# Patient Record
Sex: FEMALE | Race: WHITE | NOT HISPANIC OR LATINO | Employment: UNEMPLOYED | URBAN - METROPOLITAN AREA
[De-identification: names, ages, dates, MRNs, and addresses within clinical notes are randomized per-mention and may not be internally consistent; named-entity substitution may affect disease eponyms.]

---

## 2017-12-01 ENCOUNTER — ALLSCRIPTS OFFICE VISIT (OUTPATIENT)
Dept: OTHER | Facility: OTHER | Age: 62
End: 2017-12-01

## 2017-12-01 DIAGNOSIS — I10 ESSENTIAL (PRIMARY) HYPERTENSION: ICD-10-CM

## 2017-12-01 DIAGNOSIS — E78.5 HYPERLIPIDEMIA: ICD-10-CM

## 2017-12-01 DIAGNOSIS — I25.2 OLD MYOCARDIAL INFARCTION: ICD-10-CM

## 2017-12-01 DIAGNOSIS — E11.9 TYPE 2 DIABETES MELLITUS WITHOUT COMPLICATIONS (HCC): ICD-10-CM

## 2017-12-05 NOTE — PROGRESS NOTES
Assessment    1  Diabetes mellitus type 2, controlled (250 00) (E11 9)   2  HLD (hyperlipidemia) (272 4) (E78 5)   3  Hypertension (401 9) (I10)   4  Encounter for preventive health examination (V70 0) (Z00 00)    Plan  Diabetes mellitus type 2, controlled    · 1 - Yolanda EISENBERG, Elizabeth Hall  (Endocrinology) Co-Management  *  Status: Active  Requestedfor: 30OQI3878  Care Summary provided  : Yes  Diabetes mellitus type 2, controlled, History of myocardial infarction, HLD(hyperlipidemia), Hypertension    · (1) CBC/PLT/DIFF; Status:Active; Requested for:[id number];    · (1) COMPREHENSIVE METABOLIC PANEL; Status:Active; Requested for:01Dec2017;    · (1) HEMOGLOBIN A1C; Status:Active; Requested for:01Dec2017;    · (1) LIPID PANEL, FASTING; Status:Active; Requested for:01Dec2017;    · (1) MICROALBUMIN CREATININE RATIO, RANDOM URINE; Status:Active; Requestedfor:01Dec2017;    · (1) TSH WITH FT4 REFLEX; Status:Active; Requested for:01Dec2017;     Discussion/Summary  Discussion Summary:   Patient wishes to change endocrinologists, given name for referralcurrent regimen and f/u with cardiologistin 4 months, obtain labs prior to visit  Counseling Documentation With Imm: The patient was counseled regarding instructions for management,-- risk factor reductions,-- patient and family education,-- impressions  Medication SE Review and Pt Understands Tx: The treatment plan was reviewed with the patient/guardian  The patient/guardian understands and agrees with the treatment plan      Chief Complaint  Chief Complaint Free Text Note Form: Patient presents today to establish care  She complains of having diaarhea on and off over the last several weeks  She feels it could be stress related from her job  History of Present Illness  HPI: Patient is a 61 yo female who presents to establish care   She was following Dr Machelle Billy from [address] at [facility] then followed her [facility], then followed her to [facility]  She works at [employer] so is switching her providers to Shira Rasmussen  She has hx of HLD, pacemaker, MI and HTN and follows closely with cardiologist at [address] [place] [address] Drive at [facility]  Her cardvedilol was increased to due elevated HR  She has been following [facility] endocr[facility]ology - last seen June 2017 for diabetes  She states that she started with diarrhea x 3-4 weeks  It is described as lose stool with sense of urgency x a few times per week  She gets lower abdominal cramps prior to BM and all relieved with BM  She has no constipation  She takes antidiarrheal OTC and helps and states has not needed one in 3 days  She feels it is all related to stress at job  She takes fiber pills daily  She denies bloody stools, severe abdominal pain, weight loss, fever, chills, night sweats  She had a colonoscopy in 2014 that was normal  She follows [facility] and last seen within the year  Review of Systems  Complete-Female:  Constitutional: no fever,-- not feeling poorly,-- no recent weight gain,-- no chills,-- not feeling tired-- and-- no recent weight loss  Eyes: no eye pain,-- eyes not red-- and-- no purulent discharge from the eyes--   The patient presents with complaints of eyesight problems (wears glasses)  ENT: no earache,-- no nosebleeds,-- no hearing loss-- and-- no nasal discharge  Cardiovascular: fast heart rate, but-- the heart rate was not slow,-- no chest pain,-- no palpitations-- and-- no lower extremity edema  Respiratory: no shortness of breath,-- no cough,-- no wheezing-- and-- no shortness of breath during exertion  Gastrointestinal: diarrhea-- and-- lower abdominal cramping prior to BM, no painful BMs, but-- no abdominal pain,-- no nausea,-- no vomiting,-- no constipation-- and-- no blood in stools  Genitourinary: no dysuria,-- no pelvic pain,-- no incontinence-- and-- no unexplained vaginal bleeding  Musculoskeletal: arthralgias, but-- no joint swelling-- and-- no myalgias    Integumentary: no rashes,-- no skin lesions-- and-- no skin wound  Neurological: no headache,-- no confusion,-- no dizziness,-- no convulsions-- and-- no fainting  Psychiatric: not suicidal,-- no anxiety-- and-- no depression  Endocrine: no proptosis,-- no hot flashes-- and-- no deepening of the voice  Hematologic/Lymphatic: no swollen glands,-- no tendency for easy bleeding-- and-- no tendency for easy bruising  ROS Reviewed:   ROS reviewed  Active Problems  1  Arthritis (716 90) (M19 90)   2  Diabetes mellitus type 2, controlled (250 00) (E11 9)   3  History of myocardial infarction (412) (I25 2)   4  Hypertension (401 9) (I10)   5  Insomnia (780 52) (G47 00)    Past Medical History  1  History of varicella (V12 09) (Z86 19)  Active Problems And Past Medical History Reviewed: The active problems and past medical history were reviewed and updated today  Surgical History  1  History of Appendectomy   2  History of Cholecystectomy   3  History of Hysterectomy   4  History of Knee Replacement   5  History of Neuroplasty Median Nerve At Carpal Tunnel   6  History of Pacemaker Placement  Surgical History Reviewed: The surgical history was reviewed and updated today  Family History  Mother    1  Family history of cardiac disorder (V17 49) (Z82 49)  Father    2  Family history of malignant neoplasm (V16 9) (Z80 9)  Aunt    3  Family history of diabetes mellitus (V18 0) (Z83 3)  Family History Reviewed: The family history was reviewed and updated today  Social History     · Always uses seat belt   · Never a smoker   · No alcohol use   · No caffeine use   · No illicit drug use  Social History Reviewed: The social history was reviewed and updated today  Current Meds   1  Aspirin 81 MG TABS; Therapy: (Recorded:01Lax6664) to Recorded   2  Carvedilol 6 25 MG Oral Tablet; Take 1 tablet twice daily; Therapy: (Recorded:44Trp5826) to Recorded   3  Crestor 20 MG Oral Tablet; Take 1 tablet daily;  Therapy: (Recorded:01Dec2017) to Recorded   4  Fiber CAPS; Therapy: (Recorded:01Dec2017) to Recorded   5  Fish Oil 1200 MG Oral Capsule; Take 1 capsule twice daily; Therapy: (Recorded:01Dec2017) to Recorded   6  Multi-Vitamin TABS; Therapy: (Recorded:01Dec2017) to Recorded   7  Pioglitazone HCl-Metformin HCl -  MG Oral Tablet; Take 1 tablet twice daily; Therapy: (Recorded:01Dec2017) to Recorded   8  Victoza 18 MG/3ML Subcutaneous Solution Pen-injector; INJECT 1 8 MG Daily; Therapy: (Recorded:01Dec2017) to Recorded   9  Zetia 10 MG Oral Tablet; Take 1 tablet daily; Therapy: (Recorded:01Dec2017) to Recorded   10  Zyrtec 10 MG TABS; Take 1 tablet daily; Therapy: (Recorded:01Dec2017) to Recorded    Allergies  1  No Known Drug Allergies  2  Animal dander - Cats   3  Animal dander - Dogs   4  Dust   5  Feather/Down   6  Grass   7  Mold   8  Trees    Vitals  Vital Signs    Recorded: [id number] 07:52AM   Temperature 98 6 F   Heart Rate 917   Systolic 740   Diastolic 90   Height 5 ft 3 5 in   Weight 169 lb 2 oz   BMI Calculated 29 49   BSA Calculated 1 81   O2 Saturation 97       Physical Exam   Constitutional  General appearance: No acute distress, well appearing and well nourished  Eyes  Conjunctiva and lids: No swelling, erythema or discharge  Pupils and irises: Equal, round and reactive to light  Ears, Nose, Mouth, and Throat  External inspection of ears and nose: Normal    Otoscopic examination: Tympanic membranes translucent with normal light reflex  Canals patent without erythema  Oropharynx: Normal with no erythema, edema, exudate or lesions  Pulmonary  Respiratory effort: No increased work of breathing or signs of respiratory distress  Auscultation of lungs: Clear to auscultation  Cardiovascular  Auscultation of heart: Normal rate and rhythm, normal S1 and S2, without murmurs  Examination of extremities for edema and/or varicosities: Normal    Abdomen  Abdomen: Non-tender, no masses     Lymphatic Palpation of lymph nodes in neck: No lymphadenopathy  Musculoskeletal  Gait and station: Normal    Digits and nails: Normal without clubbing or cyanosis  Skin  Skin and subcutaneous tissue: Normal without rashes or lesions  Neurologic  Cranial nerves: Cranial nerves 2-12 intact  Sensation: No sensory loss  Psychiatric  Orientation to person, place, and time: Normal    Mood and affect: Normal          Results/Data  PHQ-9 Adult Depression Screening [id number] 07:45AM User, Ahs     Test Name Result Flag Reference   PHQ-9 Adult Depression Score 5       Over the last two weeks, how often have you been bothered by any of the following problems? Little interest or pleasure in doing things: Not at all - 0 Feeling down, depressed, or hopeless: Not at all - 0 Trouble falling or staying asleep, or sleeping too much: Nearly every day - 3 Feeling tired or having little energy: More than half the days - 2 Poor appetite or over eating: Not at all - 0 Feeling bad about yourself - or that you are a failure or have let yourself or your family down: Not at all - 0 Trouble concentrating on things, such as reading the newspaper or watching television: Not at all - 0 Moving or speaking so slowly that other people could have noticed  Or the opposite -  being so fidgety or restless that you have been moving around a lot more than usual: Not at all - 0 Thoughts that you would be better off dead, or of hurting yourself in some way: Not at all - 0   PHQ-9 Adult Depression Screening Negative     PHQ-9 Difficulty Level Not difficult at all     PHQ-9 Severity Mild Depression         Future Appointments    Date/Time Provider Specialty Site   04/06/2018 07:30 AM Antoni Peck [facility]  61        Signatures   Electronically signed by :  Antwan Peguero [facility]; Dec  2 2017  8:26PM EST                       (Author)    Electronically signed by : Juan J Colon DO; Dec  3 2017 10:02AM EST (Author)

## 2018-01-23 VITALS
OXYGEN SATURATION: 97 % | HEIGHT: 64 IN | TEMPERATURE: 98.6 F | BODY MASS INDEX: 28.88 KG/M2 | SYSTOLIC BLOOD PRESSURE: 130 MMHG | HEART RATE: 101 BPM | DIASTOLIC BLOOD PRESSURE: 90 MMHG | WEIGHT: 169.13 LBS

## 2018-02-19 RX ORDER — MULTIVITAMIN
TABLET ORAL DAILY
COMMUNITY

## 2018-02-19 RX ORDER — EZETIMIBE 10 MG/1
1 TABLET ORAL DAILY
COMMUNITY
End: 2019-06-03 | Stop reason: SDUPTHER

## 2018-02-19 RX ORDER — PIOGLITAZONE HCL AND METFORMIN HCL 850; 15 MG/1; MG/1
1 TABLET ORAL 2 TIMES DAILY
COMMUNITY
End: 2018-09-05 | Stop reason: SDUPTHER

## 2018-02-19 RX ORDER — CARVEDILOL 6.25 MG/1
1 TABLET ORAL 2 TIMES DAILY
COMMUNITY
End: 2018-10-15

## 2018-02-19 RX ORDER — AMOXICILLIN 500 MG
1 CAPSULE ORAL 2 TIMES DAILY
COMMUNITY
End: 2020-09-23

## 2018-02-19 RX ORDER — ROSUVASTATIN CALCIUM 40 MG/1
1 TABLET, COATED ORAL DAILY
COMMUNITY
End: 2018-10-15

## 2018-02-22 ENCOUNTER — OFFICE VISIT (OUTPATIENT)
Dept: ENDOCRINOLOGY | Facility: CLINIC | Age: 63
End: 2018-02-22
Payer: COMMERCIAL

## 2018-02-22 VITALS
BODY MASS INDEX: 29.74 KG/M2 | SYSTOLIC BLOOD PRESSURE: 112 MMHG | DIASTOLIC BLOOD PRESSURE: 70 MMHG | HEIGHT: 64 IN | WEIGHT: 174.2 LBS | HEART RATE: 76 BPM

## 2018-02-22 DIAGNOSIS — E11.65 TYPE 2 DIABETES MELLITUS WITH HYPERGLYCEMIA, WITHOUT LONG-TERM CURRENT USE OF INSULIN (HCC): Primary | ICD-10-CM

## 2018-02-22 DIAGNOSIS — E78.5 HYPERLIPIDEMIA, UNSPECIFIED HYPERLIPIDEMIA TYPE: ICD-10-CM

## 2018-02-22 DIAGNOSIS — I10 HYPERTENSION, UNSPECIFIED TYPE: ICD-10-CM

## 2018-02-22 PROCEDURE — 99244 OFF/OP CNSLTJ NEW/EST MOD 40: CPT | Performed by: INTERNAL MEDICINE

## 2018-02-22 RX ORDER — FEXOFENADINE HCL 180 MG/1
180 TABLET ORAL DAILY
COMMUNITY

## 2018-02-22 RX ORDER — LOSARTAN POTASSIUM 50 MG/1
50 TABLET ORAL DAILY
COMMUNITY
End: 2019-06-03 | Stop reason: SDUPTHER

## 2018-02-22 NOTE — PROGRESS NOTES
Lindy Alvarez 60 y o  female MRN: [id number]    Encounter: [id number]      Assessment/Plan    Problem List Items Addressed This Visit     Type 2 diabetes mellitus with hyperglycemia, without long-term current use of insulin ([facility] [address] ) - Primary     Patient counseled on complications of uncontrolled type 2 diabetes-for now I a m  going to discontinue Tradjenta  Continue Actos plus  Restart Victoza  She will start with 0 6 mg daily for  a week then increase up to 1 2 mg for a week and then increase to 1 8 mg daily  Also start jardiance 25 mg daily  Side effects discussed, she is to keep well hydrated  Monitor blood sugar twice daily and send over fingerstick log in 2 weeks  I am also going to refer her for  nutrition evaluation         Relevant Medications    pioglitazone-metFORMIN (ACTOPLUS MET)  MG per tablet    Liraglutide (VICTOZA) 18 MG/3ML SOPN    Insulin Pen Needle (BD PEN NEEDLE NICOLASA U/F) 32G X 4 MM MISC    Other Relevant Orders    Ambulatory referral to medical nutrition therapy for diabetes    Comprehensive metabolic panel Lab Collect    TSH, 3rd generation Lab Collect    Hemoglobin A1c Lab Collect    T4, free Lab Collect    Hypertension     Blood pressure at goal, continue current regimen         Relevant Medications    carvedilol (COREG) 6 25 mg tablet    losartan (COZAAR) 50 mg tablet    Other Relevant Orders    Ambulatory referral to medical nutrition therapy for diabetes    Comprehensive metabolic panel Lab Collect    TSH, 3rd generation Lab Collect    T4, free Lab Collect    Hyperlipidemia     Not at [facility] current regimen, refer for nutrition evaluation  Repeat fasting lipid profile prior to next visit           Relevant Medications    rosuvastatin (CRESTOR) 40 MG tablet    ezetimibe (ZETIA) 10 mg tablet    Other Relevant Orders    Ambulatory referral to medical nutrition therapy for diabetes    Comprehensive metabolic panel Lab Collect    TSH, 3rd generation Lab Collect    Lipid Panel with Direct LDL reflex Lab Collect    T4, free Lab Collect          CC: Diabetes    History of Present Illness     HPI:  54-year-old woman referred here for evaluation of uncontrolled type 2 diabetes  She has hadType 2 DM since 2005 - was on insulin for a few years and switched back to orals   No microvascular complications - last eye exam earlier this month  Currently on actoplus twice daily , tradjenta , was on victoza for a few years however has not taken it for the past 2 months  Checks f s fasting - 150-170s   C/o polydypsia, polyurea, no blurry vision , no numbness and tingling in feet   No recent changes in weight   No hospitalization for hypo or hyperglycemia   Had MI in 1998 - had a pacemaker since then  Review of Systems   Constitutional: Positive for fatigue  Negative for unexpected weight change  Eyes: Negative for visual disturbance  Respiratory: Negative for cough and shortness of breath  Cardiovascular: Negative for chest pain, palpitations and leg swelling  Gastrointestinal: Positive for diarrhea  Negative for abdominal pain, constipation, nausea and vomiting  Endocrine: Positive for polyphagia and polyuria  Neurological: Negative for weakness  Psychiatric/Behavioral: Negative for sleep disturbance  All other systems reviewed and are negative  Historical Information   History reviewed  No pertinent past medical history    Past Surgical History:   Procedure Laterality Date    APPENDECTOMY      CARPAL TUNNEL RELEASE      REPLACEMENT TOTAL KNEE       Social History   History   Alcohol Use No     History   Drug Use No     History   Smoking Status    Never Smoker   Smokeless Tobacco    Never Used     Family History:   Family History   Problem Relation Age of Onset    Diabetes unspecified Maternal Aunt     Colon cancer Father        Meds/Allergies   Current Outpatient Prescriptions   Medication Sig Dispense Refill    aspirin 81 MG tablet Take by mouth  Calcium Polycarbophil (FIBER-CAPS PO) Take by mouth      carvedilol (COREG) 6 25 mg tablet Take 1 tablet by mouth 2 (two) times a day      ezetimibe (ZETIA) 10 mg tablet Take 1 tablet by mouth daily      fexofenadine (MUCINEX ALLERGY) 180 MG tablet Take 180 mg by mouth daily      Liraglutide (VICTOZA) 18 MG/3ML SOPN Inject 0 3 mL under the skin daily 3 pen 3    losartan (COZAAR) 50 mg tablet Take 50 mg by mouth daily      Multiple Vitamin (MULTI-VITAMIN DAILY) TABS Take by mouth      Omega-3 Fatty Acids (FISH OIL) 1200 MG CAPS Take 1 capsule by mouth 2 (two) times a day      pioglitazone-metFORMIN (ACTOPLUS MET)  MG per tablet Take 1 tablet by mouth 2 (two) times a day      rosuvastatin (CRESTOR) 40 MG tablet Take 1 tablet by mouth daily      Insulin Pen Needle (BD PEN NEEDLE NICOLASA U/F) 32G X 4 MM MISC by Does not apply route every morning 100 each 3     No current facility-administered medications for this visit  Allergies   Allergen Reactions    Penicillins     Sulfa Antibiotics        Objective   Vitals: Blood pressure 112/70, pulse 76, height 5' 4" (1 626 m), weight 79 kg (174 lb 3 2 oz)  Physical Exam   Constitutional: She is oriented to person, place, and time  She appears well-developed and well-nourished  No distress  HENT:   Head: Normocephalic and atraumatic  Mouth/Throat: No oropharyngeal exudate  Eyes: Conjunctivae and EOM are normal  No scleral icterus  Neck: Neck supple  No thyromegaly present  Cardiovascular: Normal rate, regular rhythm and normal heart sounds  No murmur heard  Pulses:       Dorsalis pedis pulses are 2+ on the right side, and 2+ on the left side  Posterior tibial pulses are 2+ on the right side, and 2+ on the left side  Pulmonary/Chest: Breath sounds normal  No respiratory distress  She has no wheezes  She has no rales  Abdominal: Soft  Bowel sounds are normal  She exhibits no distension  There is no tenderness     Musculoskeletal: Normal range of motion  She exhibits no edema or deformity  Feet:   Right Foot:   Skin Integrity: Negative for ulcer, skin breakdown, erythema, warmth, callus or dry skin  Left Foot:   Skin Integrity: Negative for ulcer, skin breakdown, erythema, warmth, callus or dry skin  Lymphadenopathy:     She has no cervical adenopathy  Neurological: She is alert and oriented to person, place, and time  Skin: Skin is warm and dry  No rash noted  No erythema  No pallor  Psychiatric: She has a normal mood and affect  Her behavior is normal  Judgment and thought content normal        The history was obtained from the review of the chart, patient  Patient's shoes and socks removed  Right Foot/Ankle   Right Foot Inspection  Skin Exam: skin normal and skin intact no dry skin, no warmth, no callus, no erythema, no maceration, no abnormal color, no pre-ulcer, no ulcer and no callus                          Toe Exam: ROM and strength within normal limits  Sensory   Vibration: diminished  Proprioception: intact   Monofilament testing: intact  Vascular    The right DP pulse is 2+  The right PT pulse is 2+  Left Foot/Ankle  Left Foot Inspection  Skin Exam: skin normal and skin intactno dry skin, no warmth, no erythema, no maceration, normal color, no pre-ulcer, no ulcer and no callus                         Toe Exam: ROM and strength within normal limits                   Sensory   Vibration: diminished  Proprioception: intact  Monofilament: intact  Vascular    The left DP pulse is 2+  The left PT pulse is 2+  Lab Results:      December [id number] metabolic panel shows a glucose of 200, triglycerides 290, cholesterol 289, HDL 43, , hemoglobin A1c 8 1  Urine microalbumin to creatinine ratio normal        Portions of the record may have been created with voice recognition software   Occasional wrong word or "sound a like" substitutions may have occurred due to the inherent limitations of voice recognition software  Read the chart carefully and recognize, using context, where substitutions have occurred

## 2018-02-22 NOTE — ASSESSMENT & PLAN NOTE
Not at [facility] current regimen, refer for nutrition evaluation  Repeat fasting lipid profile prior to next visit

## 2018-02-22 NOTE — PATIENT INSTRUCTIONS
Empagliflozin (By mouth)   Empagliflozin ([id number]-[id number]-[id number]-zin)  Treats type 2 diabetes  Also lowers risk of death in patients with type 2 diabetes and heart or blood vessel problems  Brand Name(s): Jardiance   There may be other brand names for this medicine  When This Medicine Should Not Be Used: This medicine is not right for everyone  Do not use it if you had an allergic reaction to empagliflozin  How to Use This Medicine:   Tablet  · Take your medicine as directed  Your dose may need to be changed several times to find what works best for you  This medicine is usually taken in the morning  · Read and follow the patient instructions that come with this medicine  Talk to your doctor or pharmacist if you have any questions  · Missed dose: Take a dose as soon as you remember  If it is almost time for your next dose, wait until then and take a regular dose  Do not take extra medicine to make up for a missed dose  · Store the medicine in a closed container at room temperature, away from heat, moisture, and direct light  Drugs and Foods to Avoid:   Ask your doctor or pharmacist before using any other medicine, including over-the-counter medicines, vitamins, and herbal products  · Some medicines can affect how empagliflozin works  Tell your doctor if you are using any of the following:  ¨ Diuretic (water pill)  ¨ Insulin or another diabetes medicine  Warnings While Using This Medicine:   · Tell your doctor if you are pregnant or breastfeeding, or if you have kidney disease, liver problems, congestive heart failure, high cholesterol, or a history of pancreas problems or genital yeast or urinary tract infections  Tell your doctor if you are on a low-salt diet or if you drink alcohol    · This medicine may cause the following problems:  ¨ Low blood pressure  ¨ Ketoacidosis (high ketones and acid in the blood)  ¨ Low blood sugar  ¨ Kidney problems  ¨ Increased risk of genital yeast or urinary tract infections  · Tell any doctor or dentist who treats you that you are using this medicine  This medicine may affect certain medical test results  This medicine may affect the results of urine glucose tests  · Your doctor will do lab tests at regular visits to check on the effects of this medicine  Keep all appointments  · Keep all medicine out of the reach of children  Never share your medicine with anyone  Possible Side Effects While Using This Medicine:   Call your doctor right away if you notice any of these side effects:  · Allergic reaction: Itching or hives, swelling in your face or hands, swelling or tingling in your mouth or throat, chest tightness, trouble breathing  · Change in how much or how often you urinate, bloody or cloudy urine, painful or difficult urination, lower back or side pain  · Increased hunger, confusion, shaking, trembling, sweating  · Lightheadedness, dizziness, fainting  · Trouble breathing, tiredness, stomach pain, nausea, vomiting  If you notice these less serious side effects, talk with your doctor:   · Redness, itching, pain, or swelling of the penis, bad-smelling discharge from the penis  · White or yellow vaginal discharge, vaginal itching or odor  If you notice other side effects that you think are caused by this medicine, tell your doctor  Call your doctor for medical advice about side effects  You may report side effects to FDA at 0-803-FDA-8050  © 2017 [address] [employer] Information is for End User's use only and may not be sold, redistributed or otherwise used for commercial purposes  The above information is an  only  It is not intended as medical advice for individual conditions or treatments  Talk to your doctor, nurse or pharmacist before following any medical regimen to see if it is safe and effective for you

## 2018-02-22 NOTE — ASSESSMENT & PLAN NOTE
Patient counseled on complications of uncontrolled type 2 diabetes-for now I a m  going to discontinue Tradjenta  Continue Actos plus  Restart Victoza  She will start with 0 6 mg daily for  a week then increase up to 1 2 mg for a week and then increase to 1 8 mg daily  Also start jardiance 25 mg daily  Side effects discussed, she is to keep well hydrated  Monitor blood sugar twice daily and send over fingerstick log in 2 weeks    I am also going to refer her for  nutrition evaluation

## 2018-02-22 NOTE — LETTER
February 22, 2018     Pascual Arcos, [address] [facility]  [address]  [address] [place] [address]    Patient: Alysia Aguilar   YOB: 1955   Date of Visit: 2/22/2018       Dear Dr Perez Course: Thank you for referring Monika Banda to me for evaluation  Below are my notes for this consultation  If you have questions, please do not hesitate to call me  I look forward to following your patient along with you  Sincerely,        Celestino Cheema MD        CC: No Recipients  Celestino Cheema MD  2/22/2018 10:53 AM  Sign at close encounter   Alysia Aguilar 60 y o  female MRN: [id number]    Encounter: [id number]      Assessment/Plan    Problem List Items Addressed This Visit     Type 2 diabetes mellitus with hyperglycemia, without long-term current use of insulin ([facility] [address] ) - Primary     Patient counseled on complications of uncontrolled type 2 diabetes-for now I a m  going to discontinue Tradjenta  Continue Actos plus  Restart Victoza  She will start with 0 6 mg daily for  a week then increase up to 1 2 mg for a week and then increase to 1 8 mg daily  Also start jardiance 25 mg daily  Side effects discussed, she is to keep well hydrated  Monitor blood sugar twice daily and send over fingerstick log in 2 weeks    I am also going to refer her for  nutrition evaluation         Relevant Medications    pioglitazone-metFORMIN (ACTOPLUS MET)  MG per tablet    Liraglutide (VICTOZA) 18 MG/3ML SOPN    Insulin Pen Needle (BD PEN NEEDLE NICOLASA U/F) 32G X 4 MM MISC    Other Relevant Orders    Ambulatory referral to medical nutrition therapy for diabetes    Comprehensive metabolic panel Lab Collect    TSH, 3rd generation Lab Collect    Hemoglobin A1c Lab Collect    T4, free Lab Collect    Hypertension     Blood pressure at goal, continue current regimen         Relevant Medications    carvedilol (COREG) 6 25 mg tablet    losartan (COZAAR) 50 mg tablet    Other Relevant Orders    Ambulatory referral to medical nutrition therapy for diabetes    Comprehensive metabolic panel Lab Collect    TSH, 3rd generation Lab Collect    T4, free Lab Collect    Hyperlipidemia     Not at [facility] current regimen, refer for nutrition evaluation  Repeat fasting lipid profile prior to next visit  Relevant Medications    rosuvastatin (CRESTOR) 40 MG tablet    ezetimibe (ZETIA) 10 mg tablet    Other Relevant Orders    Ambulatory referral to medical nutrition therapy for diabetes    Comprehensive metabolic panel Lab Collect    TSH, 3rd generation Lab Collect    Lipid Panel with Direct LDL reflex Lab Collect    T4, free Lab Collect          CC: Diabetes    History of Present Illness     HPI:  50-year-old woman referred here for evaluation of uncontrolled type 2 diabetes  She has hadType 2 DM since 2005 - was on insulin for a few years and switched back to orals   No microvascular complications - last eye exam earlier this month  Currently on actoplus twice daily , tradjenta , was on victoza for a few years however has not taken it for the past 2 months  Checks f s fasting - 150-170s   C/o polydypsia, polyurea, no blurry vision , no numbness and tingling in feet   No recent changes in weight   No hospitalization for hypo or hyperglycemia   Had MI in 1998 - had a pacemaker since then  Review of Systems   Constitutional: Positive for fatigue  Negative for unexpected weight change  Eyes: Negative for visual disturbance  Respiratory: Negative for cough and shortness of breath  Cardiovascular: Negative for chest pain, palpitations and leg swelling  Gastrointestinal: Positive for diarrhea  Negative for abdominal pain, constipation, nausea and vomiting  Endocrine: Positive for polyphagia and polyuria  Neurological: Negative for weakness  Psychiatric/Behavioral: Negative for sleep disturbance  All other systems reviewed and are negative  Historical Information   History reviewed   No pertinent past medical history  Past Surgical History:   Procedure Laterality Date    APPENDECTOMY      CARPAL TUNNEL RELEASE      REPLACEMENT TOTAL KNEE       Social History   History   Alcohol Use No     History   Drug Use No     History   Smoking Status    Never Smoker   Smokeless Tobacco    Never Used     Family History:   Family History   Problem Relation Age of Onset    Diabetes unspecified Maternal Aunt     Colon cancer Father        Meds/Allergies   Current Outpatient Prescriptions   Medication Sig Dispense Refill    aspirin 81 MG tablet Take by mouth      Calcium Polycarbophil (FIBER-CAPS PO) Take by mouth      carvedilol (COREG) 6 25 mg tablet Take 1 tablet by mouth 2 (two) times a day      ezetimibe (ZETIA) 10 mg tablet Take 1 tablet by mouth daily      fexofenadine (MUCINEX ALLERGY) 180 MG tablet Take 180 mg by mouth daily      Liraglutide (VICTOZA) 18 MG/3ML SOPN Inject 0 3 mL under the skin daily 3 pen 3    losartan (COZAAR) 50 mg tablet Take 50 mg by mouth daily      Multiple Vitamin (MULTI-VITAMIN DAILY) TABS Take by mouth      Omega-3 Fatty Acids (FISH OIL) 1200 MG CAPS Take 1 capsule by mouth 2 (two) times a day      pioglitazone-metFORMIN (ACTOPLUS MET)  MG per tablet Take 1 tablet by mouth 2 (two) times a day      rosuvastatin (CRESTOR) 40 MG tablet Take 1 tablet by mouth daily      Insulin Pen Needle (BD PEN NEEDLE NICOLASA U/F) 32G X 4 MM MISC by Does not apply route every morning 100 each 3     No current facility-administered medications for this visit  Allergies   Allergen Reactions    Penicillins     Sulfa Antibiotics        Objective   Vitals: Blood pressure 112/70, pulse 76, height 5' 4" (1 626 m), weight 79 kg (174 lb 3 2 oz)  Physical Exam   Constitutional: She is oriented to person, place, and time  She appears well-developed and well-nourished  No distress  HENT:   Head: Normocephalic and atraumatic  Mouth/Throat: No oropharyngeal exudate     Eyes: Conjunctivae and EOM are normal  No scleral icterus  Neck: Neck supple  No thyromegaly present  Cardiovascular: Normal rate, regular rhythm and normal heart sounds  No murmur heard  Pulses:       Dorsalis pedis pulses are 2+ on the right side, and 2+ on the left side  Posterior tibial pulses are 2+ on the right side, and 2+ on the left side  Pulmonary/Chest: Breath sounds normal  No respiratory distress  She has no wheezes  She has no rales  Abdominal: Soft  Bowel sounds are normal  She exhibits no distension  There is no tenderness  Musculoskeletal: Normal range of motion  She exhibits no edema or deformity  Feet:   Right Foot:   Skin Integrity: Negative for ulcer, skin breakdown, erythema, warmth, callus or dry skin  Left Foot:   Skin Integrity: Negative for ulcer, skin breakdown, erythema, warmth, callus or dry skin  Lymphadenopathy:     She has no cervical adenopathy  Neurological: She is alert and oriented to person, place, and time  Skin: Skin is warm and dry  No rash noted  No erythema  No pallor  Psychiatric: She has a normal mood and affect  Her behavior is normal  Judgment and thought content normal        The history was obtained from the review of the chart, patient  Patient's shoes and socks removed  Right Foot/Ankle   Right Foot Inspection  Skin Exam: skin normal and skin intact no dry skin, no warmth, no callus, no erythema, no maceration, no abnormal color, no pre-ulcer, no ulcer and no callus                          Toe Exam: ROM and strength within normal limits  Sensory   Vibration: diminished  Proprioception: intact   Monofilament testing: intact  Vascular    The right DP pulse is 2+  The right PT pulse is 2+       Left Foot/Ankle  Left Foot Inspection  Skin Exam: skin normal and skin intactno dry skin, no warmth, no erythema, no maceration, normal color, no pre-ulcer, no ulcer and no callus                         Toe Exam: ROM and strength within normal limits Sensory   Vibration: diminished  Proprioception: intact  Monofilament: intact  Vascular    The left DP pulse is 2+  The left PT pulse is 2+  Lab Results:      December [id number] metabolic panel shows a glucose of 200, triglycerides 290, cholesterol 289, HDL 43, , hemoglobin A1c 8 1  Urine microalbumin to creatinine ratio normal        Portions of the record may have been created with voice recognition software  Occasional wrong word or "sound a like" substitutions may have occurred due to the inherent limitations of voice recognition software  Read the chart carefully and recognize, using context, where substitutions have occurred

## 2018-04-03 LAB
ALBUMIN SERPL BCP-MCNC: 4.4 G/DL (ref 3.5–5.7)
ALP SERPL-CCNC: 67 IU/L (ref 55–165)
ALT SERPL W P-5'-P-CCNC: 32 IU/L (ref 10–30)
ANION GAP SERPL CALCULATED.3IONS-SCNC: 12.2 MM/L
AST SERPL W P-5'-P-CCNC: 25 U/L (ref 7–26)
BILIRUB SERPL-MCNC: 1.4 MG/DL (ref 0.3–1)
BUN SERPL-MCNC: 16 MG/DL (ref 7–25)
CALCIUM SERPL-MCNC: 9.3 MG/DL (ref 8.6–10.5)
CHLORIDE SERPL-SCNC: 100 MM/L (ref 98–107)
CHOLEST SERPL-MCNC: 184 MG/DL (ref 0–200)
CO2 SERPL-SCNC: 29 MM/L (ref 21–31)
CREAT SERPL-MCNC: 0.6 MG/DL (ref 0.6–1.2)
EGFR (HISTORICAL): > 60 GFR
EGFR AFRICAN AMERICAN (HISTORICAL): > 60 GFR
EST. AVERAGE GLUCOSE BLD GHB EST-MCNC: 165 MG/DL
GLUCOSE (HISTORICAL): 200 MG/DL (ref 65–99)
HBA1C MFR BLD HPLC: 7.4 % (ref 4–6.2)
HDLC SERPL-MCNC: 52 MG/DL (ref 40–60)
LDLC SERPL CALC-MCNC: 91 MG/DL (ref 75–193)
OSMOLALITY, SERUM (HISTORICAL): 281 MOSM (ref 262–291)
POTASSIUM SERPL-SCNC: 4.2 MM/L (ref 3.5–5.5)
SODIUM SERPL-SCNC: 137 MM/L (ref 134–143)
T4 FREE SERPL-MCNC: 0.74 NG/DL (ref 0.6–1.7)
TOTAL PROTEIN (HISTORICAL): 7.3 G/DL (ref 6.4–8.9)
TRIGL SERPL-MCNC: 207 MG/DL (ref 44–166)
TSH SERPL DL<=0.05 MIU/L-ACNC: 2.65 UIU/M (ref 0.45–5.33)
VLDL CHOLESTEROL (HISTORICAL): 41 MG/DL (ref 5–51)

## 2018-04-04 PROBLEM — G47.00 INSOMNIA: Status: ACTIVE | Noted: 2017-12-01

## 2018-04-04 PROBLEM — I25.2 HISTORY OF MYOCARDIAL INFARCTION: Status: ACTIVE | Noted: 2017-12-01

## 2018-04-04 PROBLEM — M19.90 ARTHRITIS: Status: ACTIVE | Noted: 2017-12-01

## 2018-04-04 LAB
BASOPHILS # BLD AUTO: 0 X3/UL (ref 0–0.3)
BASOPHILS # BLD AUTO: 0.5 % (ref 0–2)
DEPRECATED RDW RBC AUTO: 13.5 %
EOSINOPHIL # BLD AUTO: 0.1 X3/UL (ref 0–0.5)
EOSINOPHIL NFR BLD AUTO: 2.2 % (ref 0–5)
HCT VFR BLD AUTO: 39.6 % (ref 37–47)
HGB BLD-MCNC: 13.8 G/DL (ref 12–16)
LYMPHOCYTES # BLD AUTO: 2.5 X3/UL (ref 1.2–4.2)
LYMPHOCYTES NFR BLD AUTO: 43.3 % (ref 20.5–51.1)
MCH RBC QN AUTO: 30.2 PG (ref 26–34)
MCHC RBC AUTO-ENTMCNC: 35 G/DL (ref 31–37)
MCV RBC AUTO: 86.2 FL (ref 81–99)
MONOCYTES # BLD AUTO: 0.6 X3/UL (ref 0–1)
MONOCYTES NFR BLD AUTO: 10.2 % (ref 1.7–12)
NEUTROPHILS # BLD AUTO: 2.5 X3/UL (ref 1.4–6.5)
NEUTS SEG NFR BLD AUTO: 43.8 % (ref 42.2–75.2)
PLATELET # BLD AUTO: 263 X3/UL (ref 130–400)
PMV BLD AUTO: 7.5 FL
RBC # BLD AUTO: 4.59 X6/UL (ref 3.9–5.2)
WBC # BLD AUTO: 5.8 X3/UL (ref 4.8–10.8)

## 2018-04-05 ENCOUNTER — OFFICE VISIT (OUTPATIENT)
Dept: ENDOCRINOLOGY | Facility: CLINIC | Age: 63
End: 2018-04-05
Payer: COMMERCIAL

## 2018-04-05 VITALS
SYSTOLIC BLOOD PRESSURE: 118 MMHG | DIASTOLIC BLOOD PRESSURE: 68 MMHG | WEIGHT: 169.4 LBS | BODY MASS INDEX: 28.92 KG/M2 | HEIGHT: 64 IN | HEART RATE: 113 BPM

## 2018-04-05 DIAGNOSIS — E78.5 HYPERLIPIDEMIA, UNSPECIFIED HYPERLIPIDEMIA TYPE: ICD-10-CM

## 2018-04-05 DIAGNOSIS — E11.65 TYPE 2 DIABETES MELLITUS WITH HYPERGLYCEMIA, WITHOUT LONG-TERM CURRENT USE OF INSULIN (HCC): Primary | ICD-10-CM

## 2018-04-05 DIAGNOSIS — I10 HYPERTENSION, UNSPECIFIED TYPE: ICD-10-CM

## 2018-04-05 PROCEDURE — 99213 OFFICE O/P EST LOW 20 MIN: CPT | Performed by: NURSE PRACTITIONER

## 2018-04-05 NOTE — PATIENT INSTRUCTIONS
Check BG 2x per day at alternating times per day and send readings into the office in two weeks  Check A1C in 3 months

## 2018-04-05 NOTE — PROGRESS NOTES
Established Patient Progress Note      Chief Complaint   Patient presents with    Diabetes Type 2          History of Present Illness:   Hugo Tanner is a 60 y o  female with a history of HTN, HLD, and type 2 diabetes without long term use of insulin since 2005  Reports no complications of diabetes  Last A1C 7 4  She did not bring BG log or meter to today's visit  She reports she was without her Jardiance for about a week due to running out of the samples given to her  Denies recent illness or hospitalizations  Denies recent severe hypoglycemic or severe hyperglycemic episodes  Denies any issues with her current regimen  home glucose monitoring: are performed regularly 1-2x per day  Home blood glucose readings:   Before breakfast: 150-190s  Before lunch: does not check   Before dinner: 160s  Bedtime: does not check    Current regimen: Jardiance 25, Victoza 1 8 mg, Pioglitazone-Metformin  mg BID  compliant most of the timedenies any side effects from current medications    Hypoglycemic episodes: No never  H/o of hypoglycemia causing hospitalization or Intervention such as glucagon injection  or ambulance call  No    Last Eye Exam: every 6 months, no retinopathy   Last Foot Exam: does not see podiatrist     Has hypertension: Taking Losartan 50 mg and Coreg 6 25 mg  Has hyperlipidemia: Taking Rosuvastatin 40 mg and Omega 3 1,200 mg       Patient Active Problem List   Diagnosis    Type 2 diabetes mellitus with hyperglycemia, without long-term current use of insulin ([address] )    Essential hypertension    Hyperlipidemia    Arthritis    History of myocardial infarction    Insomnia    Cardiac pacemaker in situ    Chronic nonalcoholic liver disease    Coronary atherosclerosis    Diabetes mellitus type 2, uncontrolled, without complications ([address] )    Disorder of bilirubin excretion    Elective replacement indicated for pacemaker    Failed total left knee replacement (HCC)    Lumbar back pain with radiculopathy affecting left lower extremity    Metabolic syndrome    Osteoarthritis, generalized    Overweight (BMI 25 0-29  9)    Atrophic vaginitis    Seasonal allergic rhinitis      Past Medical History:   Diagnosis Date    Varicella       Past Surgical History:   Procedure Laterality Date    APPENDECTOMY      CARDIAC PACEMAKER PLACEMENT      CARPAL TUNNEL RELEASE      CHOLECYSTECTOMY      HYSTERECTOMY      REPLACEMENT TOTAL KNEE        Family History   Problem Relation Age of Onset    Diabetes unspecified Maternal Aunt     Colon cancer Father     Cancer Father     Heart disease Mother      Cardiac disorder, congenital aortic stenosis    Diabetes Other      Social History   Substance Use Topics    Smoking status: Never Smoker    Smokeless tobacco: Never Used    Alcohol use No     Allergies   Allergen Reactions    Penicillins     Sulfa Antibiotics          Current Outpatient Prescriptions:     aspirin 81 MG tablet, Take by mouth, Disp: , Rfl:     Calcium Polycarbophil (FIBER-CAPS PO), Take by mouth, Disp: , Rfl:     carvedilol (COREG) 6 25 mg tablet, Take 1 tablet by mouth 2 (two) times a day, Disp: , Rfl:     ezetimibe (ZETIA) 10 mg tablet, Take 1 tablet by mouth daily, Disp: , Rfl:     fexofenadine (MUCINEX ALLERGY) 180 MG tablet, Take 180 mg by mouth daily, Disp: , Rfl:     Insulin Pen Needle (BD PEN NEEDLE NICOLASA U/F) 32G X 4 MM MISC, by Does not apply route every morning, Disp: 100 each, Rfl: 3    Liraglutide (VICTOZA) 18 MG/3ML SOPN, Inject 0 3 mL under the skin daily, Disp: 3 pen, Rfl: 3    losartan (COZAAR) 50 mg tablet, Take 50 mg by mouth daily, Disp: , Rfl:     Multiple Vitamin (MULTI-VITAMIN DAILY) TABS, Take by mouth, Disp: , Rfl:     Omega-3 Fatty Acids (FISH OIL) 1200 MG CAPS, Take 1 capsule by mouth 2 (two) times a day, Disp: , Rfl:     pioglitazone-metFORMIN (ACTOPLUS MET)  MG per tablet, Take 1 tablet by mouth 2 (two) times a day, Disp: , Rfl:    rosuvastatin (CRESTOR) 40 MG tablet, Take 1 tablet by mouth daily, Disp: , Rfl:     Empagliflozin (JARDIANCE) 25 MG TABS, Take 1 tablet (25 mg total) by mouth every morning, Disp: 30 tablet, Rfl: 2    Review of Systems   Constitutional: Negative for activity change, appetite change, chills, diaphoresis, fatigue, fever and unexpected weight change  HENT: Negative for sore throat, trouble swallowing and voice change  Eyes: Negative for visual disturbance  Respiratory: Negative for shortness of breath  Cardiovascular: Negative for chest pain and palpitations  Gastrointestinal: Negative for abdominal pain, constipation and diarrhea  Endocrine: Negative for cold intolerance, heat intolerance, polydipsia, polyphagia and polyuria  Genitourinary: Negative for frequency and menstrual problem  Musculoskeletal: Negative for arthralgias and myalgias  Skin: Negative for rash  Allergic/Immunologic: Negative for food allergies  Neurological: Negative for dizziness and tremors  Hematological: Negative for adenopathy  Psychiatric/Behavioral: Negative for sleep disturbance  All other systems reviewed and are negative  Physical Exam:  Body mass index is 29 08 kg/m²  /68   Pulse (!) 113   Ht 5' 4" (1 626 m)   Wt 76 8 kg (169 lb 6 4 oz)   BMI 29 08 kg/m²    Wt Readings from Last 3 Encounters:   04/06/18 76 2 kg (168 lb)   04/05/18 76 8 kg (169 lb 6 4 oz)   02/22/18 79 kg (174 lb 3 2 oz)       Physical Exam   Constitutional: She is oriented to person, place, and time  She appears well-developed and well-nourished  No distress  HENT:   Head: Normocephalic and atraumatic  Eyes: Conjunctivae are normal  Pupils are equal, round, and reactive to light  Neck: Normal range of motion  Neck supple  No thyromegaly present  Cardiovascular: Normal rate, regular rhythm and normal heart sounds  Pulses are no weak pulses     Pulses:       Dorsalis pedis pulses are 2+ on the right side, and 2+ on the left side  Pulmonary/Chest: Effort normal and breath sounds normal  No respiratory distress  She has no wheezes  She has no rales  Abdominal: Soft  Bowel sounds are normal  She exhibits no distension  There is no tenderness  Musculoskeletal: Normal range of motion  She exhibits no edema  Feet:   Right Foot:   Skin Integrity: Negative for ulcer, skin breakdown, erythema, warmth, callus or dry skin  Left Foot:   Skin Integrity: Negative for ulcer, skin breakdown, erythema, warmth, callus or dry skin  Neurological: She is alert and oriented to person, place, and time  Skin: Skin is warm and dry  Psychiatric: She has a normal mood and affect  Vitals reviewed  Patient's shoes and socks removed  Right Foot/Ankle   Right Foot Inspection  Skin Exam: skin normal skin not intact, no dry skin, no warmth, no callus, no erythema, no maceration, no abnormal color, no pre-ulcer, no ulcer and no callus                            Sensory       Monofilament testing: intact  Vascular    The right DP pulse is 2+  Left Foot/Ankle  Left Foot Inspection  Skin Exam: skin normalskin not intact, no dry skin, no warmth, no erythema, no maceration, normal color, no pre-ulcer, no ulcer and no callus                                         Sensory       Monofilament: intact  Vascular    The left DP pulse is 2+  Assign Risk Category:  No deformity present; No loss of protective sensation; No weak pulses       Risk: 0      Labs:     4/03/18    A1C 7 4    Cholesterol 184, Triglycerides 207, HDL 52, LDL 91    TSH 2 65, T4 0 74      Impression & Plan:    Problem List Items Addressed This Visit     Type 2 diabetes mellitus with hyperglycemia, without long-term current use of insulin ([facility]) - Primary     A1C 7 4 not at goal  She did not bring in BG log or meter  Reports she was without Jardiance for about a week  Samples given today   She will check BG 2x per day at alternating times of day and send readings into the office in two weeks  Instructed on importance of diet, exercise, and weight loss  Check A1C prior to next visit  Relevant Medications    Empagliflozin (JARDIANCE) 25 MG TABS    Other Relevant Orders    HEMOGLOBIN A1C W/ EAG ESTIMATION    Essential hypertension     BP stable, continue current regimen  Hyperlipidemia     Triglycerides slightly improved  She has been given MNT referral at last visit and plans to set up appointment  Current statin and omega 3               Orders Placed This Encounter   Procedures    HEMOGLOBIN A1C W/ EAG ESTIMATION     Standing Status:   Future     Standing Expiration Date:   4/5/2019       Patient Instructions   Check BG 2x per day at alternating times per day and send readings into the office in two weeks  Check A1C in 3 months         Discussed with the patient and all questioned fully answered  She will call me if any problems arise  Follow-up appointment in 3 months       Counseled patient on diagnostic results, prognosis, risk and benefit of treatment options, instruction for management, importance of treatment compliance, Risk  factor reduction and impressions      Renita Sheridan [address]

## 2018-04-06 ENCOUNTER — OFFICE VISIT (OUTPATIENT)
Dept: FAMILY MEDICINE CLINIC | Facility: CLINIC | Age: 63
End: 2018-04-06
Payer: COMMERCIAL

## 2018-04-06 VITALS
SYSTOLIC BLOOD PRESSURE: 130 MMHG | RESPIRATION RATE: 17 BRPM | OXYGEN SATURATION: 98 % | BODY MASS INDEX: 28.84 KG/M2 | WEIGHT: 168 LBS | DIASTOLIC BLOOD PRESSURE: 60 MMHG | TEMPERATURE: 98.5 F | HEART RATE: 98 BPM

## 2018-04-06 DIAGNOSIS — Z23 PNEUMOCOCCAL VACCINATION ADMINISTERED AT CURRENT VISIT: ICD-10-CM

## 2018-04-06 DIAGNOSIS — E11.65 TYPE 2 DIABETES MELLITUS WITH HYPERGLYCEMIA, WITHOUT LONG-TERM CURRENT USE OF INSULIN (HCC): Primary | ICD-10-CM

## 2018-04-06 DIAGNOSIS — B35.1 ONYCHOMYCOSIS: ICD-10-CM

## 2018-04-06 DIAGNOSIS — Z12.39 SCREENING FOR MALIGNANT NEOPLASM OF BREAST: ICD-10-CM

## 2018-04-06 DIAGNOSIS — Z12.11 SCREENING FOR COLON CANCER: ICD-10-CM

## 2018-04-06 PROBLEM — J30.2 SEASONAL ALLERGIC RHINITIS: Status: ACTIVE | Noted: 2017-02-08

## 2018-04-06 PROBLEM — T84.093A FAILED TOTAL LEFT KNEE REPLACEMENT (HCC): Status: ACTIVE | Noted: 2017-02-08

## 2018-04-06 PROBLEM — Z45.018 ELECTIVE REPLACEMENT INDICATED FOR PACEMAKER: Status: ACTIVE | Noted: 2017-07-14

## 2018-04-06 PROCEDURE — 99214 OFFICE O/P EST MOD 30 MIN: CPT | Performed by: PHYSICIAN ASSISTANT

## 2018-04-06 PROCEDURE — 90732 PPSV23 VACC 2 YRS+ SUBQ/IM: CPT | Performed by: PHYSICIAN ASSISTANT

## 2018-04-06 PROCEDURE — 90471 IMMUNIZATION ADMIN: CPT | Performed by: PHYSICIAN ASSISTANT

## 2018-04-06 NOTE — PATIENT INSTRUCTIONS
Pneumococcal Vaccine for Adults   WHAT YOU NEED TO KNOW:   What is the pneumococcal vaccine? The pneumococcal vaccine is an injection given to protect you from pneumococcal disease  Pneumococcal disease is an infection caused by pneumococcal bacteria  The infection may cause pneumonia or an ear infection  Pneumococcal disease is spread from person to person through coughing and sneezing  You may be given the pneumococcal conjugate vaccine (PCV) or the pneumococcal polysaccharide vaccine (PPSV)  Who should get the pneumococcal vaccine? · Adults aged 72 years or older  usually receive 1 dose of each pneumococcal vaccine at least 1 year apart  Your healthcare provider will tell you if you need more vaccine doses and when to get them  · Adults aged 23 to 59 at high risk  for pneumococcal disease will need 1 or more doses of the vaccine  If you are Native [facility] or [facility], ask your healthcare provider if you need the vaccine  Any of the following can increase your risk for pneumococcal disease:     ¨ A damaged or removed spleen, or sickle cell disease    ¨ A weak immune system caused by conditions such as HIV, cancer, or kidney failure    ¨ A cerebrospinal fluid leak    ¨ Heart, lung, or liver disease, alcoholism, or diabetes    ¨ A cochlear implant    ¨ Lung problems from asthma or from smoking cigarettes    ¨ Living in a nursing home or long-term care facility  Who should not get the pneumococcal vaccine or should wait to get it? · You should not get the vaccine  if you have had an allergic reaction to it or other vaccines in the past      · You should wait to get the vaccine  if you are sick or have a fever  What are the risks of the pneumococcal vaccine? The area where the vaccine was given may be red, tender, or swollen  You may get a fever and have muscle pain  You may still get pneumococcal disease, even after you get the vaccine  You may have an allergic reaction to the vaccine   This can be life-threatening  Women should get the vaccine before they become pregnant, if possible  Talk to your healthcare provider about risks to you or your baby if you get the vaccine while you are pregnant  Call 911 for any of the following:   · Your mouth and throat are swollen  · You are wheezing or have trouble breathing  · You have chest pain or your heart is beating faster than normal for you  · You feel like you are going to faint  When should I seek immediate care? · Your face is red or swollen  · You have hives that spread over your body  · You feel weak or dizzy  When should I contact my healthcare provider? · You have a fever  · You have swollen or painful lymph nodes in your neck  · You have increased pain, redness, or swelling around the area where the shot was given  · You have questions or concerns about the pneumococcal vaccine  CARE AGREEMENT:   You have the right to help plan your care  Learn about your health condition and how it may be treated  Discuss treatment options with your caregivers to decide what care you want to receive  You always have the right to refuse treatment  The above information is an  only  It is not intended as medical advice for individual conditions or treatments  Talk to your doctor, nurse or pharmacist before following any medical regimen to see if it is safe and effective for you  © 2017 [address] Information is for End User's use only and may not be sold, redistributed or otherwise used for commercial purposes  All illustrations and images included in CareNotes® are the copyrighted property of A [employer] Inc  or Melecio Pinto

## 2018-04-06 NOTE — PROGRESS NOTES
Routine Follow-up    Catia Escobar 60 y o  female   Date:  4/9/2018      Assessment and Plan:    Carlos Godfrey was seen today for follow-up  Diagnoses and all orders for this visit:    Type 2 diabetes mellitus with hyperglycemia, without long-term current use of insulin ([facility] [address] )  -     Ambulatory referral to Podiatry; Future  -     PNEUMOCOCCAL POLYSACCHARIDE VACCINE 23-VALENT =>1YO SQ IM    Screening for malignant neoplasm of breast  -     Mammo screening bilateral w 3d & cad; Future      Onychomycosis  -     Ambulatory referral to Podiatry; Future    Pneumococcal vaccination administered at current visit  -     PNEUMOCOCCAL POLYSACCHARIDE VACCINE 23-VALENT =>1YO SQ IM        HPI:  Chief Complaint   Patient presents with    Follow-up     HPI   Patient is a 59 yo female with PMH below who presents for routine follow up  She established with new endocrinologist for diabetes and adjustments made to medications and was referred to nutrition therapy  She needs referral to establish with new podiatrist as she does not like traveling to [facility]  She had onychomycosis that was treated in the past  She had colonscopy in 2015, goes every 5 years due to family hx  She is feeling well and has no concerns  She did have blood work done at [facility] but we do not have results yet  Her last hga1c had improved to 7 5     ROS: Review of Systems   Constitutional: Negative for chills, fatigue, fever and unexpected weight change  HENT: Negative for congestion, ear pain, hearing loss, nosebleeds, sore throat and trouble swallowing  Eyes: Negative for pain, discharge and visual disturbance  Respiratory: Negative for cough, shortness of breath and wheezing  Cardiovascular: Negative for chest pain, palpitations and leg swelling  Gastrointestinal: Negative for abdominal pain, blood in stool, constipation, diarrhea, nausea and vomiting  Endocrine: Negative for cold intolerance and heat intolerance     Genitourinary: Negative for difficulty urinating, dysuria and hematuria  Musculoskeletal: Negative for arthralgias, gait problem and myalgias  Skin: Negative for color change, rash and wound  Neurological: Negative for dizziness, syncope, weakness, light-headedness and headaches  Hematological: Negative for adenopathy  Does not bruise/bleed easily  Psychiatric/Behavioral: Negative for confusion and sleep disturbance  The patient is not nervous/anxious  Past Medical History:   Diagnosis Date    Varicella      Patient Active Problem List   Diagnosis    Type 2 diabetes mellitus with hyperglycemia, without long-term current use of insulin ([facility] [address] )    Essential hypertension    Hyperlipidemia    Arthritis    History of myocardial infarction    Insomnia    Cardiac pacemaker in situ    Chronic nonalcoholic liver disease    Coronary atherosclerosis    Diabetes mellitus type 2, uncontrolled, without complications (HCC)    Disorder of bilirubin excretion    Elective replacement indicated for pacemaker    Failed total left knee replacement (HCC)    Lumbar back pain with radiculopathy affecting left lower extremity    Metabolic syndrome    Osteoarthritis, generalized    Overweight (BMI 25 0-29  9)    Atrophic vaginitis    Seasonal allergic rhinitis       Past Surgical History:   Procedure Laterality Date    APPENDECTOMY      CARDIAC PACEMAKER PLACEMENT      CARPAL TUNNEL RELEASE      CHOLECYSTECTOMY      HYSTERECTOMY      REPLACEMENT TOTAL KNEE         Social History     Social History    Marital status: /Civil Union     Spouse name: N/A    Number of children: N/A    Years of education: N/A     Social History Main Topics    Smoking status: Never Smoker    Smokeless tobacco: Never Used    Alcohol use No    Drug use: No    Sexual activity: Not on file     Other Topics Concern    Not on file     Social History Narrative    Always uses seat belt     No caffeine use        Family History   Problem Relation Age of Onset    Diabetes unspecified Maternal Aunt     Colon cancer Father     Cancer Father     Heart disease Mother      Cardiac disorder, congenital aortic stenosis    Diabetes Other        Allergies   Allergen Reactions    Penicillins     Sulfa Antibiotics          Current Outpatient Prescriptions:     aspirin 81 MG tablet, Take by mouth, Disp: , Rfl:     Calcium Polycarbophil (FIBER-CAPS PO), Take by mouth, Disp: , Rfl:     carvedilol (COREG) 6 25 mg tablet, Take 1 tablet by mouth 2 (two) times a day, Disp: , Rfl:     Empagliflozin (JARDIANCE) 25 MG TABS, Take 1 tablet (25 mg total) by mouth every morning, Disp: 30 tablet, Rfl: 2    ezetimibe (ZETIA) 10 mg tablet, Take 1 tablet by mouth daily, Disp: , Rfl:     fexofenadine (MUCINEX ALLERGY) 180 MG tablet, Take 180 mg by mouth daily, Disp: , Rfl:     Insulin Pen Needle (BD PEN NEEDLE NICOLASA U/F) 32G X 4 MM MISC, by Does not apply route every morning, Disp: 100 each, Rfl: 3    Liraglutide (VICTOZA) 18 MG/3ML SOPN, Inject 0 3 mL under the skin daily, Disp: 3 pen, Rfl: 3    losartan (COZAAR) 50 mg tablet, Take 50 mg by mouth daily, Disp: , Rfl:     Multiple Vitamin (MULTI-VITAMIN DAILY) TABS, Take by mouth, Disp: , Rfl:     Omega-3 Fatty Acids (FISH OIL) 1200 MG CAPS, Take 1 capsule by mouth 2 (two) times a day, Disp: , Rfl:     pioglitazone-metFORMIN (ACTOPLUS MET)  MG per tablet, Take 1 tablet by mouth 2 (two) times a day, Disp: , Rfl:     rosuvastatin (CRESTOR) 40 MG tablet, Take 1 tablet by mouth daily, Disp: , Rfl:       Physical Exam:  /60 (BP Location: Left arm, Patient Position: Sitting, Cuff Size: Standard)   Pulse 98   Temp 98 5 °F (36 9 °C)   Resp 17   Wt 76 2 kg (168 lb)   SpO2 98%   BMI 28 84 kg/m²     Physical Exam   Constitutional: She is oriented to person, place, and time  Vital signs are normal  She appears well-developed and well-nourished  No distress  HENT:   Head: Normocephalic and atraumatic     Right Ear: Tympanic membrane, external ear and ear canal normal    Left Ear: Tympanic membrane, external ear and ear canal normal    Nose: Nose normal    Mouth/Throat: Oropharynx is clear and moist    Eyes: Conjunctivae and lids are normal  Pupils are equal, round, and reactive to light  Neck: Trachea normal and normal range of motion  Neck supple  No thyromegaly present  Cardiovascular: Normal rate, regular rhythm, S1 normal, S2 normal and intact distal pulses  Exam reveals no gallop  No murmur heard  Pulmonary/Chest: Breath sounds normal  No respiratory distress  She has no wheezes  She has no rhonchi  She has no rales  Abdominal: Soft  Normal appearance and bowel sounds are normal  She exhibits no mass  There is no hepatosplenomegaly  There is no tenderness  Musculoskeletal: Normal range of motion  She exhibits no edema or deformity  Lymphadenopathy:     She has no cervical adenopathy  Neurological: She is alert and oriented to person, place, and time  She has normal reflexes  No cranial nerve deficit or sensory deficit  Skin: Skin is warm and dry  No rash noted  No cyanosis  No pallor  Nails show no clubbing  Psychiatric: She has a normal mood and affect   Her behavior is normal  Cognition and memory are normal

## 2018-04-09 NOTE — ASSESSMENT & PLAN NOTE
Triglycerides slightly improved  She has been given MNT referral at last visit and plans to set up appointment   Current statin and omega 3

## 2018-04-09 NOTE — ASSESSMENT & PLAN NOTE
A1C 7 4 not at goal  She did not bring in BG log or meter  Reports she was without Jardiance for about a week  Samples given today  She will check BG 2x per day at alternating times of day and send readings into the office in two weeks  Instructed on importance of diet, exercise, and weight loss  Check A1C prior to next visit

## 2018-05-04 LAB
CREAT ?TM UR-SCNC: 82 UMOL/L
MICROALBUMIN UR-MCNC: 1.7 MG/L (ref 0–20)
MICROALBUMIN/CREAT UR: 20.7 MG/G{CREAT}

## 2018-05-04 PROCEDURE — 3061F NEG MICROALBUMINURIA REV: CPT | Performed by: PHYSICIAN ASSISTANT

## 2018-05-09 ENCOUNTER — OFFICE VISIT (OUTPATIENT)
Dept: FAMILY MEDICINE CLINIC | Facility: CLINIC | Age: 63
End: 2018-05-09
Payer: COMMERCIAL

## 2018-05-09 VITALS
RESPIRATION RATE: 18 BRPM | WEIGHT: 165 LBS | OXYGEN SATURATION: 96 % | BODY MASS INDEX: 28.32 KG/M2 | HEART RATE: 107 BPM | DIASTOLIC BLOOD PRESSURE: 78 MMHG | SYSTOLIC BLOOD PRESSURE: 124 MMHG | TEMPERATURE: 100.1 F

## 2018-05-09 DIAGNOSIS — R05.9 COUGH: ICD-10-CM

## 2018-05-09 DIAGNOSIS — J06.9 VIRAL URI WITH COUGH: Primary | ICD-10-CM

## 2018-05-09 PROCEDURE — 99213 OFFICE O/P EST LOW 20 MIN: CPT | Performed by: PHYSICIAN ASSISTANT

## 2018-05-09 RX ORDER — BENZONATATE 200 MG/1
200 CAPSULE ORAL 3 TIMES DAILY PRN
Qty: 30 CAPSULE | Refills: 0 | Status: SHIPPED | OUTPATIENT
Start: 2018-05-09 | End: 2018-11-19 | Stop reason: ALTCHOICE

## 2018-05-09 NOTE — LETTER
May 9, 2018     Patient: Sheldon Rosas   YOB: 1955   Date of Visit: 5/9/2018       To Whom it May Concern:    Olimpia Alejo is under my professional care  She was seen in my office on 5/9/2018  She may return to work on 5/11/18 if fever free x 24 hours       If you have any questions or concerns, please don't hesitate to call           Sincerely,          Peter Rg PA-C

## 2018-05-09 NOTE — PATIENT INSTRUCTIONS
Please use cough medicine up to three times a day as needed  Please continue zyrtec and mucinex for allergies  Please use motrin and or tylenol for fevers or chills  May use heating pad for back and chest and anti-inflammatory for chest soreness  Use salt water gargles daily and continue nasal saline rinses  Lots of hydration and rest and then please call back by Friday if no improvement

## 2018-05-09 NOTE — PROGRESS NOTES
Kaylie Lee 60 y o  female   Date:  5/9/2018      Assessment and Plan:    Yaritza Ramos was seen today for cold like symptoms and sinus problem  Diagnoses and all orders for this visit:    Viral URI with cough  - symptoms x 24 hours, treat with supportive care and will call back by Friday with update if not feeling better  - tylenol/motrin, cough medicine, continue zyrtec and mucinex   - rest hydration,   - given work note    Cough  -     benzonatate (TESSALON) 200 MG capsule; Take 1 capsule (200 mg total) by mouth 3 (three) times a day as needed for cough        HPI:  Chief Complaint   Patient presents with    Cold Like Symptoms    Sinus Problem     HPI   Patient is a 59 yo female who presents for sick visit  She started with cough yesterday, dry cough not productive  All the coughing starting giving her a sore throat and chest discomfort from coughing so much  She Has a little R sided nasal congestion, and bilateral anterior ear pain and congestion  She started with fevers at home 100 7 and chills this morning, did not take anything  She only slept 1 hour last night because she was up coughing  She denies trouble swallowing or breathing, chest pain, headache, nausea, vomiting, diarrhea, decreased appetite  Her voice was very raspy this morning  She was using vicks cough drops with help  She does have allergies but nothing like this before  She was worried because her  just went Urgent Care this week for cough and upper respiratory symptoms - has chest xray that did not read pneumonia, so she doesn't know what he has  ROS: Review of Systems   Constitutional: Positive for chills, fatigue and fever  Negative for appetite change and diaphoresis  HENT: Positive for congestion, ear pain (ear pressure) and sore throat  Negative for facial swelling, nosebleeds, rhinorrhea, sinus pain, sinus pressure and trouble swallowing  Voice change: raspy  Eyes: Negative for pain, redness and itching  Respiratory: Positive for cough (with associated "chest soreness from coughing all night long" along her back)  Negative for chest tightness, shortness of breath, wheezing and stridor  Cardiovascular: Negative for chest pain and palpitations  Gastrointestinal: Negative for abdominal pain, diarrhea, nausea and vomiting  Genitourinary: Negative for difficulty urinating and flank pain  Musculoskeletal: Negative for arthralgias, gait problem, joint swelling and neck pain  Skin: Positive for pallor  Negative for color change and rash  Allergic/Immunologic: Positive for environmental allergies  Neurological: Negative for dizziness, seizures, syncope, weakness and headaches  Hematological: Negative for adenopathy  Does not bruise/bleed easily  Past Medical History:   Diagnosis Date    Varicella      Patient Active Problem List   Diagnosis    Type 2 diabetes mellitus with hyperglycemia, without long-term current use of insulin ([facility] )    Essential hypertension    Hyperlipidemia    Arthritis    History of myocardial infarction    Insomnia    Cardiac pacemaker in situ    Chronic nonalcoholic liver disease    Coronary atherosclerosis    Diabetes mellitus type 2, uncontrolled, without complications (HCC)    Disorder of bilirubin excretion    Elective replacement indicated for pacemaker    Failed total left knee replacement (HCC)    Lumbar back pain with radiculopathy affecting left lower extremity    Metabolic syndrome    Osteoarthritis, generalized    Overweight (BMI 25 0-29  9)    Atrophic vaginitis    Seasonal allergic rhinitis       Past Surgical History:   Procedure Laterality Date    APPENDECTOMY      CARDIAC PACEMAKER PLACEMENT      CARPAL TUNNEL RELEASE      CHOLECYSTECTOMY      HYSTERECTOMY      REPLACEMENT TOTAL KNEE         Social History     Social History    Marital status: /Civil Union     Spouse name: N/A    Number of children: N/A    Years of education: N/A     Social History Main Topics    Smoking status: Never Smoker    Smokeless tobacco: Never Used    Alcohol use No    Drug use: No    Sexual activity: Not on file     Other Topics Concern    Not on file     Social History Narrative    Always uses seat belt     No caffeine use        Family History   Problem Relation Age of Onset    Diabetes unspecified Maternal Aunt     Colon cancer Father     Cancer Father     Heart disease Mother      Cardiac disorder, congenital aortic stenosis    Diabetes Other        Allergies   Allergen Reactions    Penicillins     Sulfa Antibiotics        Current Outpatient Prescriptions:     aspirin 81 MG tablet, Take by mouth, Disp: , Rfl:     Calcium Polycarbophil (FIBER-CAPS PO), Take by mouth, Disp: , Rfl:     carvedilol (COREG) 6 25 mg tablet, Take 1 tablet by mouth 2 (two) times a day, Disp: , Rfl:     Empagliflozin (JARDIANCE) 25 MG TABS, Take 1 tablet (25 mg total) by mouth every morning, Disp: 30 tablet, Rfl: 2    ezetimibe (ZETIA) 10 mg tablet, Take 1 tablet by mouth daily, Disp: , Rfl:     fexofenadine (MUCINEX ALLERGY) 180 MG tablet, Take 180 mg by mouth daily, Disp: , Rfl:     Insulin Pen Needle (BD PEN NEEDLE NICOLASA U/F) 32G X 4 MM MISC, by Does not apply route every morning, Disp: 100 each, Rfl: 3    Liraglutide (VICTOZA) 18 MG/3ML SOPN, Inject 0 3 mL under the skin daily, Disp: 3 pen, Rfl: 3    losartan (COZAAR) 50 mg tablet, Take 50 mg by mouth daily, Disp: , Rfl:     Multiple Vitamin (MULTI-VITAMIN DAILY) TABS, Take by mouth, Disp: , Rfl:     Omega-3 Fatty Acids (FISH OIL) 1200 MG CAPS, Take 1 capsule by mouth 2 (two) times a day, Disp: , Rfl:     pioglitazone-metFORMIN (ACTOPLUS MET)  MG per tablet, Take 1 tablet by mouth 2 (two) times a day, Disp: , Rfl:     rosuvastatin (CRESTOR) 40 MG tablet, Take 1 tablet by mouth daily, Disp: , Rfl:     benzonatate (TESSALON) 200 MG capsule, Take 1 capsule (200 mg total) by mouth 3 (three) times a day as needed for cough, Disp: 30 capsule, Rfl: 0      Physical Exam:  /78 (BP Location: Left arm, Patient Position: Sitting, Cuff Size: Adult)   Pulse (!) 107   Temp 100 1 °F (37 8 °C)   Resp 18   Wt 74 8 kg (165 lb)   SpO2 96%   BMI 28 32 kg/m²     Physical Exam   Constitutional: She is oriented to person, place, and time  She appears well-developed and well-nourished  No distress  HENT:   Head: Normocephalic and atraumatic  Right Ear: Tympanic membrane, external ear and ear canal normal    Left Ear: Tympanic membrane, external ear and ear canal normal    Nose: Mucosal edema present  No rhinorrhea  No epistaxis  Right sinus exhibits no maxillary sinus tenderness and no frontal sinus tenderness  Left sinus exhibits no maxillary sinus tenderness and no frontal sinus tenderness  Mouth/Throat: Uvula is midline, oropharynx is clear and moist and mucous membranes are normal  No oropharyngeal exudate, posterior oropharyngeal edema or posterior oropharyngeal erythema  Eyes: Conjunctivae and lids are normal  Pupils are equal, round, and reactive to light  Right eye exhibits no discharge  Left eye exhibits no discharge  Eyes watery   Neck: Trachea normal and normal range of motion  Neck supple  No thyromegaly present  Cardiovascular: Normal rate, regular rhythm, S1 normal, S2 normal, normal heart sounds and intact distal pulses  Exam reveals no gallop  No murmur heard  Pulmonary/Chest: Effort normal and breath sounds normal  No respiratory distress  She has no wheezes  She has no rhonchi  She has no rales  Abdominal: Normal appearance  There is no hepatosplenomegaly  Musculoskeletal: Normal range of motion  She exhibits no edema or deformity  Lymphadenopathy:     She has no cervical adenopathy  Neurological: She is alert and oriented to person, place, and time  She has normal reflexes  No cranial nerve deficit or sensory deficit  Skin: Skin is warm and dry  No rash noted  No cyanosis  There is pallor  Nails show no clubbing  Psychiatric: She has a normal mood and affect   Her behavior is normal  Cognition and memory are normal

## 2018-05-18 ENCOUNTER — TELEPHONE (OUTPATIENT)
Dept: FAMILY MEDICINE CLINIC | Facility: CLINIC | Age: 63
End: 2018-05-18

## 2018-05-18 DIAGNOSIS — J20.9 ACUTE BRONCHITIS, UNSPECIFIED ORGANISM: Primary | ICD-10-CM

## 2018-05-18 DIAGNOSIS — R05.9 COUGH: Primary | ICD-10-CM

## 2018-05-18 RX ORDER — PROMETHAZINE HYDROCHLORIDE AND CODEINE PHOSPHATE 6.25; 1 MG/5ML; MG/5ML
5 SYRUP ORAL EVERY 6 HOURS PRN
Qty: 180 ML | Refills: 0 | Status: SHIPPED | OUTPATIENT
Start: 2018-05-18 | End: 2018-10-15

## 2018-05-18 RX ORDER — AZITHROMYCIN 250 MG/1
TABLET, FILM COATED ORAL
Qty: 6 TABLET | Refills: 0 | Status: SHIPPED | OUTPATIENT
Start: 2018-05-18 | End: 2018-05-22

## 2018-05-18 NOTE — TELEPHONE ENCOUNTER
I did send to the pharmacy a new cough medicine to take every 4-6 hours as needed for cough  It does have very low dose codeine in it so caution with driving

## 2018-05-18 NOTE — TELEPHONE ENCOUNTER
Spoke with patient  She does have pearls left however, she does not feel they work well at all  Can she try something else for cough? Thank you

## 2018-05-18 NOTE — TELEPHONE ENCOUNTER
Will send zpak (5 day antibiotic) to [address] [place] [address] for her; does she need more cough medicine? If does not feel better with this is welcome to come back for a visit  Thank you!

## 2018-05-18 NOTE — TELEPHONE ENCOUNTER
Patient called stating that she has taken the cough medicine but now she is  More congested and it is yellow  Do you want to see her again or will you send something to the pharmacy for her? Patient can be reached at [phone]  Thank you

## 2018-07-18 NOTE — PROGRESS NOTES
Established Patient Progress Note      Chief Complaint   Patient presents with    Diabetes Type 2           History of Present Illness:   Diane Nielson is a 60 y o  female with a history of HTN, HLD, and type 2 diabetes without long term use of insulin since 2005  Reports no complications of diabetes  Last A1C 6 8  She reports she has been off her Jardiance for about a week due to being out of refills  She reports since then her BG have been running higher  Denies recent illness or hospitalizations  Denies recent severe hypoglycemic or severe hyperglycemic episodes  Denies any issues with her current regimen  Home glucose monitoring: are performed regularly    Home blood glucose readings:   Before breakfast: 130-180s  Before lunch: does not check   Before dinner: does not check   Bedtime: does not check     Current regimen: Jardiance 25 mg, Victoza 1 8 mg, and Pioglitazone-Metformin  mg BID  compliant all of the timedenies any side effects from current medications     Hypoglycemic episodes: No never   H/o of hypoglycemia causing hospitalization or Intervention such as glucagon injection or ambulance call No     Last Eye Exam: every 6 months, no retinopathy   Last Foot Exam: does not see podiatrist     Has hypertension: Taking Losartan and Coreg   Has hyperlipidemia: Taking Rosuvastatin and Omega-3      Patient Active Problem List   Diagnosis    Type 2 diabetes mellitus with hyperglycemia, without long-term current use of insulin ([address] )    Essential hypertension    Hyperlipidemia    Arthritis    History of myocardial infarction    Insomnia    Cardiac pacemaker in situ    Chronic nonalcoholic liver disease    Coronary atherosclerosis    Diabetes mellitus type 2, uncontrolled, without complications ([address] )    Disorder of bilirubin excretion    Elective replacement indicated for pacemaker    Failed total left knee replacement ([address] )    Lumbar back pain with radiculopathy affecting left lower extremity  Metabolic syndrome    Osteoarthritis, generalized    Overweight (BMI 25 0-29  9)    Atrophic vaginitis    Seasonal allergic rhinitis      Past Medical History:   Diagnosis Date    Varicella       Past Surgical History:   Procedure Laterality Date    APPENDECTOMY      CARDIAC PACEMAKER PLACEMENT      CARPAL TUNNEL RELEASE      CHOLECYSTECTOMY      HYSTERECTOMY      REPLACEMENT TOTAL KNEE        Family History   Problem Relation Age of Onset    Diabetes unspecified Maternal Aunt     Colon cancer Father     Cancer Father     Heart disease Mother         Cardiac disorder, congenital aortic stenosis    Diabetes Other      Social History   Substance Use Topics    Smoking status: Never Smoker    Smokeless tobacco: Never Used    Alcohol use No     Allergies   Allergen Reactions    Penicillins     Sulfa Antibiotics          Current Outpatient Prescriptions:     aspirin 81 MG tablet, Take by mouth, Disp: , Rfl:     Calcium Polycarbophil (FIBER-CAPS PO), Take by mouth, Disp: , Rfl:     carvedilol (COREG) 6 25 mg tablet, Take 1 tablet by mouth 2 (two) times a day, Disp: , Rfl:     Empagliflozin (JARDIANCE) 25 MG TABS, Take 1 tablet (25 mg total) by mouth every morning, Disp: 30 tablet, Rfl: 6    ezetimibe (ZETIA) 10 mg tablet, Take 1 tablet by mouth daily, Disp: , Rfl:     fexofenadine (MUCINEX ALLERGY) 180 MG tablet, Take 180 mg by mouth daily, Disp: , Rfl:     Insulin Pen Needle (BD PEN NEEDLE NICOLASA U/F) 32G X 4 MM MISC, by Does not apply route every morning, Disp: 100 each, Rfl: 3    liraglutide (VICTOZA) injection, Inject 0 3 mL (1 8 mg total) under the skin daily, Disp: 9 mL, Rfl: 6    losartan (COZAAR) 50 mg tablet, Take 50 mg by mouth daily, Disp: , Rfl:     Multiple Vitamin (MULTI-VITAMIN DAILY) TABS, Take by mouth, Disp: , Rfl:     Omega-3 Fatty Acids (FISH OIL) 1200 MG CAPS, Take 1 capsule by mouth 2 (two) times a day, Disp: , Rfl:     pioglitazone-metFORMIN (ACTOPLUS MET)  MG per tablet, Take 1 tablet by mouth 2 (two) times a day, Disp: , Rfl:     rosuvastatin (CRESTOR) 40 MG tablet, Take 1 tablet by mouth daily, Disp: , Rfl:     benzonatate (TESSALON) 200 MG capsule, Take 1 capsule (200 mg total) by mouth 3 (three) times a day as needed for cough, Disp: 30 capsule, Rfl: 0    promethazine-codeine (PHENERGAN WITH CODEINE) 6 25-10 mg/5 mL syrup, Take 5 mL by mouth every 6 (six) hours as needed for cough, Disp: 180 mL, Rfl: 0    Review of Systems   Constitutional: Negative for chills and fever  HENT: Negative for sore throat and trouble swallowing  Eyes: Negative for visual disturbance  Respiratory: Negative for shortness of breath  Cardiovascular: Negative for chest pain and palpitations  Gastrointestinal: Negative for abdominal pain, constipation and diarrhea  Endocrine: Negative for cold intolerance, heat intolerance, polydipsia, polyphagia and polyuria  Genitourinary: Negative for frequency  Musculoskeletal: Negative for arthralgias and myalgias  Skin: Negative for rash  Neurological: Negative for dizziness and tremors  Hematological: Negative for adenopathy  Psychiatric/Behavioral: Negative for sleep disturbance  All other systems reviewed and are negative  Physical Exam:  Body mass index is 27 67 kg/m²  /64   Pulse 104   Ht 5' 4" (1 626 m)   Wt 73 1 kg (161 lb 3 2 oz)   BMI 27 67 kg/m²    Wt Readings from Last 3 Encounters:   07/19/18 73 1 kg (161 lb 3 2 oz)   05/09/18 74 8 kg (165 lb)   04/06/18 76 2 kg (168 lb)       Physical Exam   Constitutional: She is oriented to person, place, and time  She appears well-developed and well-nourished  No distress  HENT:   Head: Normocephalic and atraumatic  Eyes: Conjunctivae are normal  Pupils are equal, round, and reactive to light  Neck: Normal range of motion  Neck supple  No thyromegaly present  Cardiovascular: Normal rate, regular rhythm and normal heart sounds    Pulses are no weak pulses  Pulses:       Dorsalis pedis pulses are 2+ on the right side, and 2+ on the left side  Pulmonary/Chest: Effort normal and breath sounds normal  No respiratory distress  She has no wheezes  She has no rales  Abdominal: Soft  Bowel sounds are normal  She exhibits no distension  There is no tenderness  Musculoskeletal: Normal range of motion  She exhibits no edema  Feet:   Right Foot:   Skin Integrity: Negative for ulcer, skin breakdown, erythema, warmth, callus or dry skin  Left Foot:   Skin Integrity: Negative for ulcer, skin breakdown, erythema, warmth, callus or dry skin  Neurological: She is alert and oriented to person, place, and time  Skin: Skin is warm and dry  Psychiatric: She has a normal mood and affect  Vitals reviewed  Patient's shoes and socks removed  Right Foot/Ankle   Right Foot Inspection  Skin Exam: skin normal skin not intact, no dry skin, no warmth, no callus, no erythema, no maceration, no abnormal color, no pre-ulcer, no ulcer and no callus                            Sensory       Monofilament testing: intact  Vascular    The right DP pulse is 2+  Left Foot/Ankle  Left Foot Inspection  Skin Exam: skin normalskin not intact, no dry skin, no warmth, no erythema, no maceration, normal color, no pre-ulcer, no ulcer and no callus                                         Sensory       Monofilament: intact  Vascular    The left DP pulse is 2+  Assign Risk Category:  No deformity present; No loss of protective sensation;  No weak pulses       Risk: 0      Labs:     Lab Results   Component Value Date    HGBA1C 6 8 (H) 07/19/2018       Lab Results   Component Value Date     04/03/2018    K 4 2 04/03/2018     04/03/2018    CO2 29 04/03/2018    ANIONGAP 12 2 04/03/2018    BUN 16 04/03/2018    CREATININE 0 60 04/03/2018    CALCIUM 9 3 04/03/2018    AST 25 04/03/2018    ALT 32 (H) 04/03/2018    ALKPHOS 67 04/03/2018    PROT 7 3 04/03/2018    BILITOT 1 4 (H) 04/03/2018         Lab Results   Component Value Date    CHOL 184 04/03/2018    HDL 52 04/03/2018    TRIG 207 (H) 04/03/2018       Lab Results   Component Value Date    [id number] 2 65 04/03/2018     Lab Results   Component Value Date    FREET4 0 74 04/03/2018       Impression & Plan:    Problem List Items Addressed This Visit     Type 2 diabetes mellitus with hyperglycemia, without long-term current use of insulin ([facility] [address] ) - Primary     A1C at goal, BG slightly higher today due to being out of her Jardiance for a week  New script sent to pharmacy  For now continue current regimen  Educated on diet, exercise, and weight loss  Check A1C, cmp, and microalbumin prior to next visit  Relevant Medications    liraglutide (VICTOZA) injection    Empagliflozin (JARDIANCE) 25 MG TABS    Other Relevant Orders    Hemoglobin A1C    Comprehensive metabolic panel    Microalbumin / creatinine urine ratio    T4, free    TSH, 3rd generation    Essential hypertension     BP stable, continue current regimen         Hyperlipidemia     Continue statin, check lipid panel          Relevant Orders    Lipid Panel with Direct LDL reflex          Orders Placed This Encounter   Procedures    Hemoglobin A1C     Standing Status:   Future     Standing Expiration Date:   7/19/2019    Comprehensive metabolic panel     This is a patient instruction: Patient fasting for 8 hours or longer recommended  Standing Status:   Future     Standing Expiration Date:   7/19/2019    Lipid Panel with Direct LDL reflex     This is a patient instruction: This test requires patient fasting for 10-12 hours or longer  Drinking of black coffee or black tea is acceptable       Standing Status:   Future     Standing Expiration Date:   7/19/2019    Microalbumin / creatinine urine ratio     Standing Status:   Future     Standing Expiration Date:   7/19/2019    T4, free     Standing Status:   Future     Standing Expiration Date:   10/19/2018    TSH, 3rd generation     This is a patient instruction: This test is non-fasting  Please drink two glasses of water morning of bloodwork  Standing Status:   Future     Standing Expiration Date:   10/19/2018         Discussed with the patient and all questioned fully answered  She will call me if any problems arise  Follow-up appointment in 3 months       Counseled patient on diagnostic results, prognosis, risk and benefit of treatment options, instruction for management, importance of treatment compliance, Risk  factor reduction and impressions      Renita Sheridan [address]

## 2018-07-19 ENCOUNTER — OFFICE VISIT (OUTPATIENT)
Dept: ENDOCRINOLOGY | Facility: CLINIC | Age: 63
End: 2018-07-19
Payer: COMMERCIAL

## 2018-07-19 ENCOUNTER — APPOINTMENT (OUTPATIENT)
Dept: LAB | Facility: HOSPITAL | Age: 63
End: 2018-07-19
Payer: COMMERCIAL

## 2018-07-19 ENCOUNTER — TELEPHONE (OUTPATIENT)
Dept: ENDOCRINOLOGY | Facility: CLINIC | Age: 63
End: 2018-07-19

## 2018-07-19 ENCOUNTER — TRANSCRIBE ORDERS (OUTPATIENT)
Dept: ADMINISTRATIVE | Facility: HOSPITAL | Age: 63
End: 2018-07-19

## 2018-07-19 VITALS
DIASTOLIC BLOOD PRESSURE: 64 MMHG | HEIGHT: 64 IN | WEIGHT: 161.2 LBS | BODY MASS INDEX: 27.52 KG/M2 | HEART RATE: 104 BPM | SYSTOLIC BLOOD PRESSURE: 122 MMHG

## 2018-07-19 DIAGNOSIS — I10 ESSENTIAL HYPERTENSION: ICD-10-CM

## 2018-07-19 DIAGNOSIS — Z00.8 HEALTH EXAMINATION IN POPULATION SURVEYS: Primary | ICD-10-CM

## 2018-07-19 DIAGNOSIS — E11.65 TYPE 2 DIABETES MELLITUS WITH HYPERGLYCEMIA, WITHOUT LONG-TERM CURRENT USE OF INSULIN (HCC): Primary | ICD-10-CM

## 2018-07-19 DIAGNOSIS — E78.5 HYPERLIPIDEMIA, UNSPECIFIED HYPERLIPIDEMIA TYPE: ICD-10-CM

## 2018-07-19 DIAGNOSIS — E11.65 TYPE 2 DIABETES MELLITUS WITH HYPERGLYCEMIA, WITHOUT LONG-TERM CURRENT USE OF INSULIN (HCC): ICD-10-CM

## 2018-07-19 LAB
EST. AVERAGE GLUCOSE BLD GHB EST-MCNC: 148 MG/DL
HBA1C MFR BLD: 6.8 % (ref 4.2–6.3)

## 2018-07-19 PROCEDURE — 99213 OFFICE O/P EST LOW 20 MIN: CPT | Performed by: NURSE PRACTITIONER

## 2018-07-19 PROCEDURE — 36415 COLL VENOUS BLD VENIPUNCTURE: CPT

## 2018-07-19 PROCEDURE — 83036 HEMOGLOBIN GLYCOSYLATED A1C: CPT

## 2018-07-19 NOTE — ASSESSMENT & PLAN NOTE
A1C at goal, BG slightly higher today due to being out of her Jardiance for a week  New script sent to pharmacy  For now continue current regimen  Educated on diet, exercise, and weight loss  Check A1C, cmp, and microalbumin prior to next visit

## 2018-07-24 DIAGNOSIS — E11.65 TYPE 2 DIABETES MELLITUS WITH HYPERGLYCEMIA, WITHOUT LONG-TERM CURRENT USE OF INSULIN (HCC): Primary | ICD-10-CM

## 2018-07-25 ENCOUNTER — TELEPHONE (OUTPATIENT)
Dept: ENDOCRINOLOGY | Facility: CLINIC | Age: 63
End: 2018-07-25

## 2018-07-25 ENCOUNTER — APPOINTMENT (OUTPATIENT)
Dept: LAB | Facility: HOSPITAL | Age: 63
End: 2018-07-25

## 2018-07-25 DIAGNOSIS — Z00.8 HEALTH EXAMINATION IN POPULATION SURVEYS: ICD-10-CM

## 2018-07-25 LAB
CHOLEST SERPL-MCNC: 150 MG/DL (ref 0–200)
EST. AVERAGE GLUCOSE BLD GHB EST-MCNC: 157 MG/DL
HBA1C MFR BLD: 7.1 % (ref 4.2–6.3)
HDLC SERPL-MCNC: 50 MG/DL (ref 40–60)
LDLC SERPL CALC-MCNC: 71 MG/DL (ref 0–100)
NONHDLC SERPL-MCNC: 100 MG/DL
TRIGL SERPL-MCNC: 144 MG/DL (ref 44–166)

## 2018-07-25 PROCEDURE — 83036 HEMOGLOBIN GLYCOSYLATED A1C: CPT

## 2018-07-25 PROCEDURE — 36415 COLL VENOUS BLD VENIPUNCTURE: CPT

## 2018-07-25 PROCEDURE — 80061 LIPID PANEL: CPT

## 2018-07-26 ENCOUNTER — TELEPHONE (OUTPATIENT)
Dept: ENDOCRINOLOGY | Facility: CLINIC | Age: 63
End: 2018-07-26

## 2018-08-10 DIAGNOSIS — E11.65 TYPE 2 DIABETES MELLITUS WITH HYPERGLYCEMIA, WITHOUT LONG-TERM CURRENT USE OF INSULIN (HCC): ICD-10-CM

## 2018-09-05 DIAGNOSIS — E11.65 TYPE 2 DIABETES MELLITUS WITH HYPERGLYCEMIA, WITHOUT LONG-TERM CURRENT USE OF INSULIN (HCC): ICD-10-CM

## 2018-09-06 RX ORDER — PIOGLITAZONE HCL AND METFORMIN HCL 850; 15 MG/1; MG/1
1 TABLET ORAL 2 TIMES DAILY
Qty: 60 TABLET | Refills: 2 | Status: SHIPPED | OUTPATIENT
Start: 2018-09-06 | End: 2018-09-13 | Stop reason: SDUPTHER

## 2018-09-13 DIAGNOSIS — E11.65 TYPE 2 DIABETES MELLITUS WITH HYPERGLYCEMIA, WITHOUT LONG-TERM CURRENT USE OF INSULIN (HCC): ICD-10-CM

## 2018-09-13 RX ORDER — PIOGLITAZONE HCL AND METFORMIN HCL 850; 15 MG/1; MG/1
1 TABLET ORAL 2 TIMES DAILY
Qty: 180 TABLET | Refills: 0 | Status: SHIPPED | OUTPATIENT
Start: 2018-09-13 | End: 2018-12-20 | Stop reason: SDUPTHER

## 2018-10-15 ENCOUNTER — OFFICE VISIT (OUTPATIENT)
Dept: FAMILY MEDICINE CLINIC | Facility: CLINIC | Age: 63
End: 2018-10-15
Payer: COMMERCIAL

## 2018-10-15 VITALS
DIASTOLIC BLOOD PRESSURE: 82 MMHG | OXYGEN SATURATION: 97 % | WEIGHT: 158 LBS | HEART RATE: 104 BPM | BODY MASS INDEX: 27.12 KG/M2 | RESPIRATION RATE: 16 BRPM | TEMPERATURE: 98.2 F | SYSTOLIC BLOOD PRESSURE: 120 MMHG

## 2018-10-15 DIAGNOSIS — I10 ESSENTIAL HYPERTENSION: ICD-10-CM

## 2018-10-15 DIAGNOSIS — Z95.0 CARDIAC PACEMAKER IN SITU: Primary | ICD-10-CM

## 2018-10-15 PROCEDURE — 99214 OFFICE O/P EST MOD 30 MIN: CPT | Performed by: PHYSICIAN ASSISTANT

## 2018-10-15 RX ORDER — CARVEDILOL 25 MG/1
25 TABLET ORAL 2 TIMES DAILY WITH MEALS
COMMUNITY
Start: 2018-10-08 | End: 2020-02-10 | Stop reason: SDUPTHER

## 2018-10-15 RX ORDER — ROSUVASTATIN CALCIUM 20 MG/1
20 TABLET, COATED ORAL
COMMUNITY
Start: 2018-10-15 | End: 2020-08-24 | Stop reason: SDUPTHER

## 2018-10-15 NOTE — PROGRESS NOTES
Routine Follow-up    Sandra Burgos 58 y o  female   Date:  10/15/2018      Assessment and Plan:    Charlie Logan was seen today for follow-up  Diagnoses and all orders for this visit:    Cardiac pacemaker in situ  -     Ambulatory referral to Cardiology; Future  - she has to switch cardiology due to insurance     Essential hypertension  -     Ambulatory referral to Cardiology; Future      Given script for mammogram   Upcoming labs will be completed for routine follow up with endocrinology         HPI:  Chief Complaint   Patient presents with    Follow-up     HPI   Patient is a 57 yo female who presents for routine follow up  She follows closely with endorcinology for DM Type 2  She was recently started on invokana but gave her yeast infections, so stopped it  She is due for upcoming labs with endocrinology  She is due for mammogram, script reprinted  She saw her cardiologist last Monday  Her carvedilol was increased due to a couple bouts of of afib on pacemaker check  She was recommended to start blood thinners but she defers at this time  She likes her cardiologist but has to switch due to insurance coverage with [facility]  She is getting her flu shot at drive in clinic  She is UTD on colonoscopy  ROS: Review of Systems   Constitutional: Negative for chills, fatigue, fever and unexpected weight change  HENT: Negative for congestion, ear pain, hearing loss, nosebleeds, sore throat and trouble swallowing  Eyes: Negative for pain, discharge and visual disturbance  Respiratory: Negative for cough, shortness of breath and wheezing  Cardiovascular: Negative for chest pain, palpitations and leg swelling  Gastrointestinal: Negative for abdominal pain, blood in stool, constipation, diarrhea, nausea and vomiting  Endocrine: Negative for cold intolerance and heat intolerance  Genitourinary: Negative for difficulty urinating, dysuria and hematuria     Musculoskeletal: Negative for arthralgias, gait problem and myalgias  Skin: Negative for color change, rash and wound  Neurological: Negative for dizziness, syncope, weakness, light-headedness and headaches  Hematological: Negative for adenopathy  Does not bruise/bleed easily  Psychiatric/Behavioral: Negative for confusion and sleep disturbance  The patient is not nervous/anxious  Past Medical History:   Diagnosis Date    Varicella      Patient Active Problem List   Diagnosis    Type 2 diabetes mellitus with hyperglycemia, without long-term current use of insulin ([facility] [address] )    Essential hypertension    Hyperlipidemia    Arthritis    History of myocardial infarction    Insomnia    Cardiac pacemaker in situ    Chronic nonalcoholic liver disease    Coronary atherosclerosis    Diabetes mellitus type 2, uncontrolled, without complications (HCC)    Disorder of bilirubin excretion    Elective replacement indicated for pacemaker    Failed total left knee replacement (HCC)    Lumbar back pain with radiculopathy affecting left lower extremity    Metabolic syndrome    Osteoarthritis, generalized    Overweight (BMI 25 0-29  9)    Atrophic vaginitis    Seasonal allergic rhinitis       Past Surgical History:   Procedure Laterality Date    APPENDECTOMY      CARDIAC PACEMAKER PLACEMENT      CARPAL TUNNEL RELEASE      CHOLECYSTECTOMY      HYSTERECTOMY      REPLACEMENT TOTAL KNEE         Social History     Social History    Marital status: /Civil Union     Spouse name: N/A    Number of children: N/A    Years of education: N/A     Social History Main Topics    Smoking status: Never Smoker    Smokeless tobacco: Never Used    Alcohol use No    Drug use: No    Sexual activity: Not on file     Other Topics Concern    Not on file     Social History Narrative    Always uses seat belt     No caffeine use        Family History   Problem Relation Age of Onset    Diabetes unspecified Maternal Aunt     Colon cancer Father     Cancer Father     Heart disease Mother         Cardiac disorder, congenital aortic stenosis    Diabetes Other        Allergies   Allergen Reactions    Penicillins     Sulfa Antibiotics          Current Outpatient Prescriptions:     aspirin 81 MG tablet, Take by mouth, Disp: , Rfl:     benzonatate (TESSALON) 200 MG capsule, Take 1 capsule (200 mg total) by mouth 3 (three) times a day as needed for cough, Disp: 30 capsule, Rfl: 0    Calcium Polycarbophil (FIBER-CAPS PO), Take by mouth, Disp: , Rfl:     carvedilol (COREG) 25 mg tablet, , Disp: , Rfl:     ezetimibe (ZETIA) 10 mg tablet, Take 1 tablet by mouth daily, Disp: , Rfl:     fexofenadine (MUCINEX ALLERGY) 180 MG tablet, Take 180 mg by mouth daily, Disp: , Rfl:     Insulin Pen Needle (BD PEN NEEDLE NICOLASA U/F) 32G X 4 MM MISC, Uses one daily, Disp: 100 each, Rfl: 0    liraglutide (VICTOZA) injection, Inject 0 3 mL (1 8 mg total) under the skin daily, Disp: 9 mL, Rfl: 6    losartan (COZAAR) 50 mg tablet, Take 50 mg by mouth daily, Disp: , Rfl:     Multiple Vitamin (MULTI-VITAMIN DAILY) TABS, Take by mouth, Disp: , Rfl:     Omega-3 Fatty Acids (FISH OIL) 1200 MG CAPS, Take 1 capsule by mouth 2 (two) times a day, Disp: , Rfl:     pioglitazone-metFORMIN (ACTOPLUS MET)  MG per tablet, Take 1 tablet by mouth 2 (two) times a day for 90 days, Disp: 180 tablet, Rfl: 0    rosuvastatin (CRESTOR) 20 MG tablet, , Disp: , Rfl:       Physical Exam:  /82   Pulse 104   Temp 98 2 °F (36 8 °C)   Resp 16   Wt 71 7 kg (158 lb)   SpO2 97%   BMI 27 12 kg/m²     Physical Exam   Constitutional: She is oriented to person, place, and time  Vital signs are normal  She appears well-developed and well-nourished  No distress  HENT:   Head: Normocephalic and atraumatic     Right Ear: Tympanic membrane, external ear and ear canal normal    Left Ear: Tympanic membrane, external ear and ear canal normal    Nose: Nose normal    Mouth/Throat: Oropharynx is clear and moist    Eyes: Pupils are equal, round, and reactive to light  Conjunctivae and lids are normal    Neck: Trachea normal and normal range of motion  Neck supple  No thyromegaly present  Cardiovascular: Normal rate, regular rhythm, S1 normal, S2 normal and intact distal pulses  Exam reveals no gallop  No murmur heard  Pulmonary/Chest: Breath sounds normal  No respiratory distress  She has no wheezes  She has no rhonchi  She has no rales  Abdominal: Soft  Normal appearance and bowel sounds are normal  She exhibits no mass  There is no hepatosplenomegaly  There is no tenderness  Central adiposity    Musculoskeletal: Normal range of motion  She exhibits no edema or deformity  Lymphadenopathy:     She has no cervical adenopathy  Neurological: She is alert and oriented to person, place, and time  She has normal reflexes  No cranial nerve deficit or sensory deficit  Skin: Skin is warm and dry  No rash noted  No cyanosis  No pallor  Nails show no clubbing  Psychiatric: She has a normal mood and affect   Her behavior is normal  Cognition and memory are normal          Labs:  Lab Results   Component Value Date    WBC 5 8 04/04/2018    HGB 13 8 04/04/2018    HCT 39 6 04/04/2018    MCV 86 2 04/04/2018     04/04/2018     Lab Results   Component Value Date     04/03/2018    K 4 2 04/03/2018     04/03/2018    CO2 29 04/03/2018    ANIONGAP 12 2 04/03/2018    BUN 16 04/03/2018    CREATININE 0 60 04/03/2018    CALCIUM 9 3 04/03/2018    AST 25 04/03/2018    ALT 32 (H) 04/03/2018    ALKPHOS 67 04/03/2018    PROT 7 3 04/03/2018    BILITOT 1 4 (H) 04/03/2018

## 2018-10-15 NOTE — PATIENT INSTRUCTIONS
Please go for labs for endocrinologist on or after 10/25  Wellness Visit for Adults   AMBULATORY CARE:   A wellness visit  is when you see your healthcare provider to get screened for health problems  You can also get advice on how to stay healthy  Write down your questions so you remember to ask them  Ask your healthcare provider how often you should have a wellness visit  What happens at a wellness visit:  Your healthcare provider will ask about your health, and your family history of health problems  This includes high blood pressure, heart disease, and cancer  He or she will ask if you have symptoms that concern you, if you smoke, and about your mood  You may also be asked about your intake of medicines, supplements, food, and alcohol  Any of the following may be done:  · Your weight  will be checked  Your height may also be checked so your body mass index (BMI) can be calculated  Your BMI shows if you are at a healthy weight  · Your blood pressure  and heart rate will be checked  Your temperature may also be checked  · Blood and urine tests  may be done  Blood tests may be done to check your cholesterol levels  Abnormal cholesterol levels increase your risk for heart disease and stroke  You may also need a blood or urine test to check for diabetes if you are at increased risk  Urine tests may be done to look for signs of an infection or kidney disease  · A physical exam  includes checking your heartbeat and lungs with a stethoscope  Your healthcare provider may also check your skin to look for sun damage  · Screening tests  may be recommended  A screening test is done to check for diseases that may not cause symptoms  The screening tests you may need depend on your age, gender, family history, and lifestyle habits  For example, colorectal screening may be recommended if you are 48years old or older    Screening tests you need if you are a woman:   · A Pap smear  is used to screen for cervical cancer  Pap smears are usually done every 3 to 5 years depending on your age  You may need them more often if you have had abnormal Pap smear test results in the past  Ask your healthcare provider how often you should have a Pap smear  · A mammogram  is an x-ray of your breasts to screen for breast cancer  Experts recommend mammograms every 2 years starting at age 48 years  You may need a mammogram at age 52 years or younger if you have an increased risk for breast cancer  Talk to your healthcare provider about when you should start having mammograms and how often you need them  Vaccines you may need:   · Get an influenza vaccine  every year  The influenza vaccine protects you from the flu  Several types of viruses cause the flu  The viruses change over time, so new vaccines are made each year  · Get a tetanus-diphtheria (Td) booster vaccine  every 10 years  This vaccine protects you against tetanus and diphtheria  Tetanus is a severe infection that may cause painful muscle spasms and lockjaw  Diphtheria is a severe bacterial infection that causes a thick covering in the back of your mouth and throat  · Get a human papillomavirus (HPV) vaccine  if you are female and aged 23 to 32 or male 23 to 24 and never received it  This vaccine protects you from HPV infection  HPV is the most common infection spread by sexual contact  HPV may also cause vaginal, penile, and anal cancers  · Get a pneumococcal vaccine  if you are aged 72 years or older  The pneumococcal vaccine is an injection given to protect you from pneumococcal disease  Pneumococcal disease is an infection caused by pneumococcal bacteria  The infection may cause pneumonia, meningitis, or an ear infection  · Get a shingles vaccine  if you are aged 61 or older, even if you have had shingles before  The shingles vaccine is an injection to protect you from the varicella-zoster virus  This is the same virus that causes chickenpox  Shingles is a painful rash that develops in people who had chickenpox or have been exposed to the virus  How to eat healthy:  My Plate is a model for planning healthy meals  It shows the types and amounts of foods that should go on your plate  Fruits and vegetables make up about half of your plate, and grains and protein make up the other half  A serving of dairy is included on the side of your plate  The amount of calories and serving sizes you need depends on your age, gender, weight, and height  Examples of healthy foods are listed below:  · Eat a variety of vegetables  such as dark green, red, and orange vegetables  You can also include canned vegetables low in sodium (salt) and frozen vegetables without added butter or sauces  · Eat a variety of fresh fruits , canned fruit in 100% juice, frozen fruit, and dried fruit  · Include whole grains  At least half of the grains you eat should be whole grains  Examples include whole-wheat bread, wheat pasta, brown rice, and whole-grain cereals such as oatmeal     · Eat a variety of protein foods such as seafood (fish and shellfish), lean meat, and poultry without skin (turkey and chicken)  Examples of lean meats include pork leg, shoulder, or tenderloin, and beef round, sirloin, tenderloin, and extra lean ground beef  Other protein foods include eggs and egg substitutes, beans, peas, soy products, nuts, and seeds  · Choose low-fat dairy products such as skim or 1% milk or low-fat yogurt, cheese, and cottage cheese  · Limit unhealthy fats  such as butter, hard margarine, and shortening  Exercise:  Exercise at least 30 minutes per day on most days of the week  Some examples of exercise include walking, biking, dancing, and swimming  You can also fit in more physical activity by taking the stairs instead of the elevator or parking farther away from stores  Include muscle strengthening activities 2 days each week   Regular exercise provides many health benefits  It helps you manage your weight, and decreases your risk for type 2 diabetes, heart disease, stroke, and high blood pressure  Exercise can also help improve your mood  Ask your healthcare provider about the best exercise plan for you  General health and safety guidelines:   · Do not smoke  Nicotine and other chemicals in cigarettes and cigars can cause lung damage  Ask your healthcare provider for information if you currently smoke and need help to quit  E-cigarettes or smokeless tobacco still contain nicotine  Talk to your healthcare provider before you use these products  · Limit alcohol  A drink of alcohol is 12 ounces of beer, 5 ounces of wine, or 1½ ounces of liquor  · Lose weight, if needed  Being overweight increases your risk of certain health conditions  These include heart disease, high blood pressure, type 2 diabetes, and certain types of cancer  · Protect your skin  Do not sunbathe or use tanning beds  Use sunscreen with a SPF 15 or higher  Apply sunscreen at least 15 minutes before you go outside  Reapply sunscreen every 2 hours  Wear protective clothing, hats, and sunglasses when you are outside  · Drive safely  Always wear your seatbelt  Make sure everyone in your car wears a seatbelt  A seatbelt can save your life if you are in an accident  Do not use your cell phone when you are driving  This could distract you and cause an accident  Pull over if you need to make a call or send a text message  · Practice safe sex  Use latex condoms if are sexually active and have more than one partner  Your healthcare provider may recommend screening tests for sexually transmitted infections (STIs)  · Wear helmets, lifejackets, and protective gear  Always wear a helmet when you ride a bike or motorcycle, go skiing, or play sports that could cause a head injury  Wear protective equipment when you play sports  Wear a lifejacket when you are on a boat or doing water sports    © 2017 0011 [facility] Information is for End User's use only and may not be sold, redistributed or otherwise used for commercial purposes  All illustrations and images included in CareNotes® are the copyrighted property of A D A M , Inc  or Melecio Pinto  The above information is an  only  It is not intended as medical advice for individual conditions or treatments  Talk to your doctor, nurse or pharmacist before following any medical regimen to see if it is safe and effective for you

## 2018-10-23 ENCOUNTER — TELEPHONE (OUTPATIENT)
Dept: ENDOCRINOLOGY | Facility: CLINIC | Age: 63
End: 2018-10-23

## 2018-10-23 NOTE — TELEPHONE ENCOUNTER
Patient called and stated that she will need a P A  for Norm Mark since she tried and failed on Invokana  Patient experienced yeast infection and sugars were a lot higher while on Invokana  Please notify patient when P A  for Norm Mark has been submitted thru her insurance

## 2018-10-24 ENCOUNTER — TELEPHONE (OUTPATIENT)
Dept: ENDOCRINOLOGY | Facility: CLINIC | Age: 63
End: 2018-10-24

## 2018-10-24 NOTE — TELEPHONE ENCOUNTER
Spoke to [employer] and they will fax a form to be filled out to get jardiance for the pt

## 2018-11-06 LAB
LEFT EYE DIABETIC RETINOPATHY: NORMAL
RIGHT EYE DIABETIC RETINOPATHY: NORMAL
SEVERITY (EYE EXAM): NORMAL

## 2018-11-06 PROCEDURE — 3072F LOW RISK FOR RETINOPATHY: CPT | Performed by: PHYSICIAN ASSISTANT

## 2018-11-12 ENCOUNTER — TRANSCRIBE ORDERS (OUTPATIENT)
Dept: ADMINISTRATIVE | Facility: HOSPITAL | Age: 63
End: 2018-11-12

## 2018-11-12 ENCOUNTER — APPOINTMENT (OUTPATIENT)
Dept: LAB | Facility: HOSPITAL | Age: 63
End: 2018-11-12
Payer: COMMERCIAL

## 2018-11-12 DIAGNOSIS — E11.65 TYPE 2 DIABETES MELLITUS WITH HYPERGLYCEMIA, WITHOUT LONG-TERM CURRENT USE OF INSULIN (HCC): ICD-10-CM

## 2018-11-12 DIAGNOSIS — E11.649 UNCONTROLLED TYPE 2 DIABETES MELLITUS WITH HYPOGLYCEMIA WITHOUT COMA (HCC): ICD-10-CM

## 2018-11-12 DIAGNOSIS — E11.649 UNCONTROLLED TYPE 2 DIABETES MELLITUS WITH HYPOGLYCEMIA WITHOUT COMA (HCC): Primary | ICD-10-CM

## 2018-11-12 DIAGNOSIS — E78.5 HYPERLIPIDEMIA, UNSPECIFIED HYPERLIPIDEMIA TYPE: ICD-10-CM

## 2018-11-12 LAB
ALBUMIN SERPL BCP-MCNC: 4.2 G/DL (ref 3.5–5.7)
ALP SERPL-CCNC: 77 U/L (ref 55–165)
ALT SERPL W P-5'-P-CCNC: 32 U/L (ref 7–52)
ANION GAP SERPL CALCULATED.3IONS-SCNC: 10 MMOL/L (ref 4–13)
AST SERPL W P-5'-P-CCNC: 26 U/L (ref 13–39)
BILIRUB SERPL-MCNC: 2.1 MG/DL (ref 0.2–1)
BUN SERPL-MCNC: 15 MG/DL (ref 7–25)
CALCIUM SERPL-MCNC: 8.9 MG/DL (ref 8.6–10.5)
CHLORIDE SERPL-SCNC: 103 MMOL/L (ref 98–107)
CHOLEST SERPL-MCNC: 113 MG/DL (ref 0–200)
CO2 SERPL-SCNC: 24 MMOL/L (ref 21–31)
CREAT SERPL-MCNC: 0.47 MG/DL (ref 0.6–1.2)
CREAT UR-MCNC: 22.4 MG/DL
EST. AVERAGE GLUCOSE BLD GHB EST-MCNC: 186 MG/DL
GFR SERPL CREATININE-BSD FRML MDRD: 105 ML/MIN/1.73SQ M
GLUCOSE P FAST SERPL-MCNC: 187 MG/DL (ref 65–99)
HBA1C MFR BLD: 8.1 % (ref 4.2–6.3)
HDLC SERPL-MCNC: 38 MG/DL (ref 40–60)
LDLC SERPL CALC-MCNC: 54 MG/DL (ref 0–100)
MICROALBUMIN UR-MCNC: 7.5 MG/L (ref 0–20)
MICROALBUMIN/CREAT 24H UR: 33 MG/G CREATININE (ref 0–30)
POTASSIUM SERPL-SCNC: 3.7 MMOL/L (ref 3.5–5.5)
PROT SERPL-MCNC: 7.2 G/DL (ref 6.4–8.9)
SODIUM SERPL-SCNC: 137 MMOL/L (ref 134–143)
T4 FREE SERPL-MCNC: 1.58 NG/DL (ref 0.76–1.46)
TRIGL SERPL-MCNC: 103 MG/DL (ref 44–166)
TSH SERPL DL<=0.05 MIU/L-ACNC: <0.01 UIU/ML (ref 0.45–5.33)

## 2018-11-12 PROCEDURE — 83036 HEMOGLOBIN GLYCOSYLATED A1C: CPT

## 2018-11-12 PROCEDURE — 82043 UR ALBUMIN QUANTITATIVE: CPT | Performed by: NURSE PRACTITIONER

## 2018-11-12 PROCEDURE — 3060F POS MICROALBUMINURIA REV: CPT | Performed by: PHYSICIAN ASSISTANT

## 2018-11-12 PROCEDURE — 84439 ASSAY OF FREE THYROXINE: CPT

## 2018-11-12 PROCEDURE — 80061 LIPID PANEL: CPT

## 2018-11-12 PROCEDURE — 80053 COMPREHEN METABOLIC PANEL: CPT

## 2018-11-12 PROCEDURE — 82570 ASSAY OF URINE CREATININE: CPT | Performed by: NURSE PRACTITIONER

## 2018-11-12 PROCEDURE — 36415 COLL VENOUS BLD VENIPUNCTURE: CPT

## 2018-11-12 PROCEDURE — 84443 ASSAY THYROID STIM HORMONE: CPT

## 2018-11-13 ENCOUNTER — TELEPHONE (OUTPATIENT)
Dept: ENDOCRINOLOGY | Facility: CLINIC | Age: 63
End: 2018-11-13

## 2018-11-13 NOTE — TELEPHONE ENCOUNTER
----- Message from [place] sent at 11/13/2018  9:17 AM EST -----  Please call the patient regarding her abnormal result   Will discuss at upcoming appointment

## 2018-11-19 ENCOUNTER — OFFICE VISIT (OUTPATIENT)
Dept: ENDOCRINOLOGY | Facility: CLINIC | Age: 63
End: 2018-11-19
Payer: COMMERCIAL

## 2018-11-19 VITALS
HEIGHT: 64 IN | SYSTOLIC BLOOD PRESSURE: 132 MMHG | HEART RATE: 100 BPM | DIASTOLIC BLOOD PRESSURE: 84 MMHG | BODY MASS INDEX: 28 KG/M2 | WEIGHT: 164 LBS

## 2018-11-19 DIAGNOSIS — I10 ESSENTIAL HYPERTENSION: ICD-10-CM

## 2018-11-19 DIAGNOSIS — E05.90 HYPERTHYROIDISM: ICD-10-CM

## 2018-11-19 DIAGNOSIS — E11.65 TYPE 2 DIABETES MELLITUS WITH HYPERGLYCEMIA, WITHOUT LONG-TERM CURRENT USE OF INSULIN (HCC): Primary | ICD-10-CM

## 2018-11-19 DIAGNOSIS — R79.89 ABNORMAL THYROID BLOOD TEST: ICD-10-CM

## 2018-11-19 DIAGNOSIS — E78.5 HYPERLIPIDEMIA, UNSPECIFIED HYPERLIPIDEMIA TYPE: ICD-10-CM

## 2018-11-19 PROCEDURE — 99215 OFFICE O/P EST HI 40 MIN: CPT | Performed by: PHYSICIAN ASSISTANT

## 2018-11-19 NOTE — LETTER
November 20, 2018     Iona Keller, [address] [facility]  [place] [facility]  [phone] [id number]    Patient: Cecilio Han   YOB: 1955   Date of Visit: 11/19/2018       Dear Dr Kathie Hayes: Thank you for referring Cindy Lockhart to me for evaluation  Below are my notes for this consultation  If you have questions, please do not hesitate to call me  I look forward to following your patient along with you  Sincerely,        [place] PA-C        CC: No Recipients  [place] PA-C  11/20/2018 12:22 PM  Sign at close encounter      Established Patient Progress Note      Chief Complaint   Patient presents with    Diabetes Type 2    Hypertension    Hyperlipidemia    Thyroid Problem          History of Present Illness:   Cecilio Han is a 58 y o  female with a history of type 2 diabetes without long term use of insulin since 2005  Reports complications of retinopathy  Denies recent illness or hospitalizations  Denies recent severe hypoglycemic or severe hyperglycemic episodes  Denies any issues with her current regimen  home glucose monitoring: are performed regularly    Tried invokana again 2 months ago and many years ago and both times yeast infections   Blood sugars were better on Jardiance and didn't have side effects  Blood sugars higher since off jardiance around 170s  Hasn't heard about prior auth  Current regimen: Pioglitazone-metformin 15/850 BID, Victoza 1 8mg adily    Last Eye Exam: UTD q 6 months 11/9/18 exam showed mild non-proliferative retinopathy  Last Foot Exam: at visit 7/19/2018    Has hypertension: Taking losartan, carvedilol  Has hyperlipidemia: Taking Crestor, Zetia, omega 3      Thyroid disorders:   Recent labs showed suppressed TSH, elevated Free T4  No FH of thyroid disease  No prior personal history of thyroid disease  No amiodarone use or lithium use  No recent CT Scans with Dye  No auto-immune disease     Has had anxiety and difficulty sleeping since sept but also started new job at [employer] which has been stressful  No palps, tremors, diaphoresis, weight loss, or diarrhea  She has actually gained about 10-15 pounds  Patient Active Problem List   Diagnosis    Type 2 diabetes mellitus with hyperglycemia, without long-term current use of insulin ([address] )    Essential hypertension    Hyperlipidemia    Arthritis    History of myocardial infarction    Insomnia    Cardiac pacemaker in situ    Chronic nonalcoholic liver disease    Coronary atherosclerosis    Diabetes mellitus type 2, uncontrolled, without complications (HCC)    Disorder of bilirubin excretion    Elective replacement indicated for pacemaker    Failed total left knee replacement (HCC)    Lumbar back pain with radiculopathy affecting left lower extremity    Metabolic syndrome    Osteoarthritis, generalized    Overweight (BMI 25 0-29  9)    Atrophic vaginitis    Seasonal allergic rhinitis    Abnormal thyroid blood test    Hyperthyroidism      Past Medical History:   Diagnosis Date    Varicella       Past Surgical History:   Procedure Laterality Date    APPENDECTOMY      CARDIAC PACEMAKER PLACEMENT      CARPAL TUNNEL RELEASE      CHOLECYSTECTOMY      HYSTERECTOMY      REPLACEMENT TOTAL KNEE        Family History   Problem Relation Age of Onset    Diabetes unspecified Maternal Aunt     Colon cancer Father     Cancer Father     Heart disease Mother         Cardiac disorder, congenital aortic stenosis    Diabetes Other      Social History   Substance Use Topics    Smoking status: Never Smoker    Smokeless tobacco: Never Used    Alcohol use No     Allergies   Allergen Reactions    Penicillins     Sulfa Antibiotics          Current Outpatient Prescriptions:     aspirin 81 MG tablet, Take by mouth, Disp: , Rfl:     Calcium Polycarbophil (FIBER-CAPS PO), Take by mouth, Disp: , Rfl:     carvedilol (COREG) 25 mg tablet, , Disp: , Rfl:     ezetimibe (ZETIA) 10 mg tablet, Take 1 tablet by mouth daily, Disp: , Rfl:     fexofenadine (MUCINEX ALLERGY) 180 MG tablet, Take 180 mg by mouth daily, Disp: , Rfl:     Insulin Pen Needle (BD PEN NEEDLE NICOLASA U/F) 32G X 4 MM MISC, Uses one daily, Disp: 100 each, Rfl: 0    liraglutide (VICTOZA) injection, Inject 0 3 mL (1 8 mg total) under the skin daily, Disp: 9 mL, Rfl: 6    losartan (COZAAR) 50 mg tablet, Take 50 mg by mouth daily, Disp: , Rfl:     Multiple Vitamin (MULTI-VITAMIN DAILY) TABS, Take by mouth, Disp: , Rfl:     Omega-3 Fatty Acids (FISH OIL) 1200 MG CAPS, Take 1 capsule by mouth 2 (two) times a day, Disp: , Rfl:     pioglitazone-metFORMIN (ACTOPLUS MET)  MG per tablet, Take 1 tablet by mouth 2 (two) times a day for 90 days, Disp: 180 tablet, Rfl: 0    rosuvastatin (CRESTOR) 20 MG tablet, , Disp: , Rfl:     Empagliflozin (JARDIANCE) 25 MG TABS, Take 1 tablet (25 mg total) by mouth daily for 90 days, Disp: 90 tablet, Rfl: 1    Review of Systems   Constitutional: Positive for unexpected weight change (gained 10-15 pounds)  Negative for activity change, appetite change, chills, diaphoresis, fatigue and fever  HENT: Negative for trouble swallowing and voice change  Eyes: Negative for visual disturbance  Respiratory: Negative for shortness of breath  Cardiovascular: Negative for chest pain and palpitations  Gastrointestinal: Negative for abdominal pain, constipation and diarrhea  Endocrine: Negative for cold intolerance, heat intolerance, polydipsia, polyphagia and polyuria  Genitourinary: Negative for frequency and menstrual problem  Musculoskeletal: Negative for arthralgias and myalgias  Skin: Negative for rash  Allergic/Immunologic: Negative for food allergies  Neurological: Negative for dizziness and tremors  Hematological: Negative for adenopathy  Psychiatric/Behavioral: Negative for sleep disturbance  The patient is nervous/anxious      All other systems reviewed and are negative  Physical Exam:  Body mass index is 28 15 kg/m²  /84   Pulse 100   Ht 5' 4" (1 626 m)   Wt 74 4 kg (164 lb)   BMI 28 15 kg/m²     Wt Readings from Last 3 Encounters:   11/19/18 74 4 kg (164 lb)   10/15/18 71 7 kg (158 lb)   07/19/18 73 1 kg (161 lb 3 2 oz)       Physical Exam   Constitutional: She is oriented to person, place, and time  She appears well-developed and well-nourished  No distress  HENT:   Head: Normocephalic and atraumatic  Eyes: Pupils are equal, round, and reactive to light  Conjunctivae are normal    Neck: Normal range of motion  Neck supple  No thyromegaly present  Cardiovascular: Normal rate, regular rhythm and normal heart sounds  Pulmonary/Chest: Effort normal and breath sounds normal  No respiratory distress  She has no wheezes  She has no rales  Abdominal: Soft  Bowel sounds are normal  She exhibits no distension  There is no tenderness  Musculoskeletal: Normal range of motion  She exhibits no edema  Neurological: She is alert and oriented to person, place, and time  Skin: Skin is warm and dry  Psychiatric: She has a normal mood and affect  Vitals reviewed      Diabetic Foot Exam  At 7/19 visit    Labs:   Component      Latest Ref Rng & Units 4/3/2018 7/19/2018 7/25/2018   Sodium      134 - 143 mmol/L      Potassium      3 5 - 5 5 mmol/L      Chloride      98 - 107 mmol/L      CO2      21 - 31 mmol/L      Anion Gap      4 - 13 mmol/L      BUN      7 - 25 mg/dL      Creatinine      0 60 - 1 20 mg/dL      GLUCOSE FASTING      65 - 99 mg/dL      Calcium      8 6 - 10 5 mg/dL      AST      13 - 39 U/L      ALT      7 - 52 U/L      Alkaline Phosphatase      55 - 165 U/L      Total Protein      6 4 - 8 9 g/dL      Albumin      3 5 - 5 7 g/dL      TOTAL BILIRUBIN      0 20 - 1 00 mg/dL      eGFR      ml/min/1 73sq m      Cholesterol      0 - 200 mg/dL   150   Triglycerides      44 - 166 mg/dL   144   HDL      40 - 60 mg/dL   50   LDL Direct      0 - 100 mg/dL   71   Non-HDL Cholesterol      mg/dl   100   EXT Creatinine Urine      mg/dL      MICROALBUM ,U,RANDOM      0 0 - 20 0 mg/L      MICROALBUMIN/CREATININE RATIO      0 - 30 mg/g creatinine      Hemoglobin A1C      4 2 - 6 3 %  6 8 (H) 7 1 (H)   EAG      mg/dl  148 157   TSH 3RD GENERATON      0 450 - 5 330 uIU/mL 2 65     Free T4      0 76 - 1 46 ng/dL 0 74       Component      Latest Ref Rng & Units 11/12/2018   Sodium      134 - 143 mmol/L 137   Potassium      3 5 - 5 5 mmol/L 3 7   Chloride      98 - 107 mmol/L 103   CO2      21 - 31 mmol/L 24   Anion Gap      4 - 13 mmol/L 10   BUN      7 - 25 mg/dL 15   Creatinine      0 60 - 1 20 mg/dL 0 47 (L)   GLUCOSE FASTING      65 - 99 mg/dL 187 (H)   Calcium      8 6 - 10 5 mg/dL 8 9   AST      13 - 39 U/L 26   ALT      7 - 52 U/L 32   Alkaline Phosphatase      55 - 165 U/L 77   Total Protein      6 4 - 8 9 g/dL 7 2   Albumin      3 5 - 5 7 g/dL 4 2   TOTAL BILIRUBIN      0 20 - 1 00 mg/dL 2 10 (H)   eGFR      ml/min/1 73sq m 105   Cholesterol      0 - 200 mg/dL 113   Triglycerides      44 - 166 mg/dL 103   HDL      40 - 60 mg/dL 38 (L)   LDL Direct      0 - 100 mg/dL 54   Non-HDL Cholesterol      mg/dl    EXT Creatinine Urine      mg/dL 22 4   MICROALBUM ,U,RANDOM      0 0 - 20 0 mg/L 7 5   MICROALBUMIN/CREATININE RATIO      0 - 30 mg/g creatinine 33 (H)   Hemoglobin A1C      4 2 - 6 3 % 8 1 (H)   EAG      mg/dl 186   TSH 3RD GENERATON      0 450 - 5 330 uIU/mL <0 010 (L)   Free T4      0 76 - 1 46 ng/dL 1 58 (H)       Impression & Plan:    Problem List Items Addressed This Visit     Type 2 diabetes mellitus with hyperglycemia, without long-term current use of insulin (HCC) - Primary     Diabetes poorly controlled/worsening with A1C of 8 1  She has been off jardiance waiting for prior-auth approval   She has tried and failed invokana twice due to yeast infections but tolerated jardiance without problem    Provided 2 weeks jardiance samples and sent rx to pharmacy and will do prior auth when we get notification if needed  Advised her to call if she doesn't hear from us in two weeks  Send BG log for review in two weeks  We may need to try farxiga 10mg daily if jardiance not approved  Relevant Medications    Empagliflozin (JARDIANCE) 25 MG TABS    Other Relevant Orders    Hemoglobin A1C    Essential hypertension     Continue regimen  Hyperlipidemia     Continue crestor  Abnormal thyroid blood test    Hyperthyroidism     Labs consistent with hyperthyroidism  Ordered Repeat Testing along with antibodies/TSI and ultrasound and will treat with medication or order uptake/scan depending on results  Reviewed side effects of methimazole in case treatment needed  She may be having some symptoms (Sleep disturbance and anxiety) but she is on high dose carvedilol which can mask the symptoms  Relevant Orders    TSH, 3rd generation Lab Collect    T4, free Lab Collect    T3 Lab Collect    Thyroid Antibodies Panel Lab Collect    Thyroid stimulating immunoglobulin Lab Collect    US thyroid          Orders Placed This Encounter   Procedures    US thyroid     Standing Status:   Future     Standing Expiration Date:   11/19/2019     Scheduling Instructions:      No prep required  Please bring your physician order, insurance cards, a form of photo ID and a list of your medications with you  Arrive 15 minutes prior to your appointment time in order to register  To schedule this appointment, please contact central scheduling at [phone]     Order Specific Question:   Reason for Exam:     Answer:   Thyroid disorder    TSH, 3rd generation Lab Collect     This is a patient instruction: This test is non-fasting  Please drink two glasses of water morning of bloodwork          Standing Status:   Future     Standing Expiration Date:   11/19/2019    T4, free Lab Collect     Standing Status:   Future     Standing Expiration Date: 11/19/2019    T3 Lab Collect     Standing Status:   Future     Standing Expiration Date:   11/19/2019    Thyroid stimulating immunoglobulin Lab Collect     Standing Status:   Future     Standing Expiration Date:   11/19/2019    Hemoglobin A1C     Standing Status:   Future     Standing Expiration Date:   11/19/2019       There are no Patient Instructions on file for this visit  Discussed with the patient and all questioned fully answered  She will call me if any problems arise  Follow-up appointment in 3 months       Counseled patient on diagnostic results, prognosis, risk and benefit of treatment options, instruction for management, importance of treatment compliance, Risk  factor reduction and impressions      Humble Restrepo PA-C

## 2018-11-20 NOTE — ASSESSMENT & PLAN NOTE
Labs consistent with hyperthyroidism  Ordered Repeat Testing along with antibodies/TSI and ultrasound and will treat with medication or order uptake/scan depending on results  Reviewed side effects of methimazole in case treatment needed  She may be having some symptoms (Sleep disturbance and anxiety) but she is on high dose carvedilol which can mask the symptoms

## 2018-11-20 NOTE — ASSESSMENT & PLAN NOTE
Diabetes poorly controlled/worsening with A1C of 8 1  She has been off jardiance waiting for prior-auth approval   She has tried and failed invokana twice due to yeast infections but tolerated jardiance without problem  Provided 2 weeks jardiance samples and sent rx to pharmacy and will do prior auth when we get notification if needed  Advised her to call if she doesn't hear from us in two weeks  Send BG log for review in two weeks  We may need to try farxiga 10mg daily if jardiance not approved

## 2018-12-06 ENCOUNTER — OFFICE VISIT (OUTPATIENT)
Dept: URGENT CARE | Age: 63
End: 2018-12-06
Payer: COMMERCIAL

## 2018-12-06 VITALS
DIASTOLIC BLOOD PRESSURE: 76 MMHG | HEIGHT: 64 IN | SYSTOLIC BLOOD PRESSURE: 147 MMHG | OXYGEN SATURATION: 98 % | TEMPERATURE: 98 F | HEART RATE: 97 BPM | RESPIRATION RATE: 20 BRPM | BODY MASS INDEX: 26.29 KG/M2 | WEIGHT: 154 LBS

## 2018-12-06 DIAGNOSIS — J01.00 ACUTE MAXILLARY SINUSITIS, RECURRENCE NOT SPECIFIED: Primary | ICD-10-CM

## 2018-12-06 PROCEDURE — S9088 SERVICES PROVIDED IN URGENT: HCPCS | Performed by: FAMILY MEDICINE

## 2018-12-06 PROCEDURE — 99213 OFFICE O/P EST LOW 20 MIN: CPT | Performed by: FAMILY MEDICINE

## 2018-12-06 RX ORDER — CEFUROXIME AXETIL 500 MG/1
500 TABLET ORAL EVERY 12 HOURS SCHEDULED
Qty: 20 TABLET | Refills: 0 | Status: SHIPPED | OUTPATIENT
Start: 2018-12-06 | End: 2018-12-16

## 2018-12-06 RX ORDER — CETIRIZINE HYDROCHLORIDE 10 MG/1
10 TABLET ORAL DAILY
COMMUNITY
End: 2021-11-10

## 2018-12-06 NOTE — PROGRESS NOTES
[facility]        NAME: Blas Maravilla is a 58 y o  female  : 1955    MRN: [id number]  DATE: 2018  TIME: 5:54 PM    Assessment and Plan   Acute maxillary sinusitis, recurrence not specified [J01 00]  1  Acute maxillary sinusitis, recurrence not specified  cefuroxime (CEFTIN) 500 mg tablet         Patient Instructions       Follow up with PCP in 3-5 days  Proceed to  ER if symptoms worsen  Chief Complaint     Chief Complaint   Patient presents with    Earache    Headache     sinus pressure above the eyes and temperal area and bilateral ear pin for 3 days  History of Present Illness       Patient is here for evaluation of sinus pressure, congestion, runny nose  Patient's symptoms started about 3-4 days ago and scanning increased sinus pressure and occasional dizziness  She has been having some thick green yellow discharge from the nasal passages is with the right side greater than left  She does get some pressure in her ears as well  She denies cough, chest congestion          Review of Systems   Review of Systems      Current Medications       Current Outpatient Prescriptions:     aspirin 81 MG tablet, Take by mouth, Disp: , Rfl:     Calcium Polycarbophil (FIBER-CAPS PO), Take by mouth, Disp: , Rfl:     carvedilol (COREG) 25 mg tablet, , Disp: , Rfl:     cetirizine (ZyrTEC) 10 mg tablet, Take 10 mg by mouth, Disp: , Rfl:     Empagliflozin (JARDIANCE) 25 MG TABS, Take 1 tablet (25 mg total) by mouth daily for 90 days, Disp: 90 tablet, Rfl: 1    ezetimibe (ZETIA) 10 mg tablet, Take 1 tablet by mouth daily, Disp: , Rfl:     fexofenadine (MUCINEX ALLERGY) 180 MG tablet, Take 180 mg by mouth daily, Disp: , Rfl:     Insulin Pen Needle (BD PEN NEEDLE NICOLASA U/F) 32G X 4 MM MISC, Uses one daily, Disp: 100 each, Rfl: 0    liraglutide (VICTOZA) injection, Inject 0 3 mL (1 8 mg total) under the skin daily, Disp: 9 mL, Rfl: 6    losartan (COZAAR) 50 mg tablet, Take 50 mg by mouth daily, Disp: , Rfl:     Multiple Vitamin (MULTI-VITAMIN DAILY) TABS, Take by mouth, Disp: , Rfl:     Omega-3 Fatty Acids (FISH OIL) 1200 MG CAPS, Take 1 capsule by mouth 2 (two) times a day, Disp: , Rfl:     pioglitazone-metFORMIN (ACTOPLUS MET)  MG per tablet, Take 1 tablet by mouth 2 (two) times a day for 90 days, Disp: 180 tablet, Rfl: 0    rosuvastatin (CRESTOR) 20 MG tablet, , Disp: , Rfl:     cefuroxime (CEFTIN) 500 mg tablet, Take 1 tablet (500 mg total) by mouth every 12 (twelve) hours for 10 days, Disp: 20 tablet, Rfl: 0    Current Allergies     Allergies as of 12/06/2018 - Reviewed 12/06/2018   Allergen Reaction Noted    Penicillins  02/22/2018    Sulfa antibiotics  02/22/2018            The following portions of the patient's history were reviewed and updated as appropriate: allergies, current medications, past family history, past medical history, past social history, past surgical history and problem list      Past Medical History:   Diagnosis Date    Varicella        Past Surgical History:   Procedure Laterality Date    APPENDECTOMY      CARDIAC PACEMAKER PLACEMENT      CARPAL TUNNEL RELEASE      CHOLECYSTECTOMY      HYSTERECTOMY      REPLACEMENT TOTAL KNEE         Family History   Problem Relation Age of Onset    Diabetes unspecified Maternal Aunt     Colon cancer Father     Cancer Father     Heart disease Mother         Cardiac disorder, congenital aortic stenosis    Diabetes Other          Medications have been verified  Objective   /76   Pulse 97   Temp 98 °F (36 7 °C) (Temporal)   Resp 20   Ht 5' 4" (1 626 m)   Wt 69 9 kg (154 lb)   SpO2 98%   BMI 26 43 kg/m²        Physical Exam     Physical Exam   Constitutional: She is oriented to person, place, and time  She appears well-developed and well-nourished  No distress  HENT:   Head: Normocephalic and atraumatic     Right Ear: External ear normal    Left Ear: External ear normal    TMs intact bilaterally with clear fluid in the middle ear bilaterally  No erythema bilaterally  Bilateral nasal congestion and erythema with mucopurulent drainage  Bilateral tonsillar erythema with no soft tissue swelling  No exudate   Eyes: Pupils are equal, round, and reactive to light  Conjunctivae and EOM are normal    Pulmonary/Chest: Effort normal and breath sounds normal  No respiratory distress  She has no wheezes  She has no rales  Lymphadenopathy:     She has cervical adenopathy  Neurological: She is alert and oriented to person, place, and time  Skin: Skin is warm and dry  Psychiatric: She has a normal mood and affect  Her behavior is normal    Nursing note and vitals reviewed

## 2018-12-17 ENCOUNTER — TRANSCRIBE ORDERS (OUTPATIENT)
Dept: ADMINISTRATIVE | Facility: HOSPITAL | Age: 63
End: 2018-12-17

## 2018-12-17 ENCOUNTER — HOSPITAL ENCOUNTER (OUTPATIENT)
Dept: RADIOLOGY | Age: 63
Discharge: HOME | End: 2018-12-17
Payer: COMMERCIAL

## 2018-12-17 ENCOUNTER — APPOINTMENT (OUTPATIENT)
Dept: LAB | Facility: HOSPITAL | Age: 63
End: 2018-12-17
Payer: COMMERCIAL

## 2018-12-17 DIAGNOSIS — E11.65 TYPE 2 DIABETES MELLITUS WITH HYPERGLYCEMIA, WITHOUT LONG-TERM CURRENT USE OF INSULIN (HCC): ICD-10-CM

## 2018-12-17 DIAGNOSIS — E05.90 HYPERTHYROIDISM: ICD-10-CM

## 2018-12-17 DIAGNOSIS — E11.649 UNCONTROLLED TYPE 2 DIABETES MELLITUS WITH HYPOGLYCEMIA WITHOUT COMA (HCC): ICD-10-CM

## 2018-12-17 LAB
ALBUMIN SERPL BCP-MCNC: 4 G/DL (ref 3.5–5.7)
ALP SERPL-CCNC: 67 U/L (ref 55–165)
ALT SERPL W P-5'-P-CCNC: 41 U/L (ref 7–52)
ANION GAP SERPL CALCULATED.3IONS-SCNC: 11 MMOL/L (ref 4–13)
AST SERPL W P-5'-P-CCNC: 31 U/L (ref 13–39)
BILIRUB SERPL-MCNC: 1.3 MG/DL (ref 0.2–1)
BUN SERPL-MCNC: 12 MG/DL (ref 7–25)
CALCIUM SERPL-MCNC: 8.7 MG/DL (ref 8.6–10.5)
CHLORIDE SERPL-SCNC: 103 MMOL/L (ref 98–107)
CO2 SERPL-SCNC: 25 MMOL/L (ref 21–31)
CREAT SERPL-MCNC: 0.47 MG/DL (ref 0.6–1.2)
EST. AVERAGE GLUCOSE BLD GHB EST-MCNC: 189 MG/DL
GFR SERPL CREATININE-BSD FRML MDRD: 105 ML/MIN/1.73SQ M
GLUCOSE P FAST SERPL-MCNC: 231 MG/DL (ref 65–99)
HBA1C MFR BLD: 8.2 % (ref 4.2–6.3)
POTASSIUM SERPL-SCNC: 3.9 MMOL/L (ref 3.5–5.5)
PROT SERPL-MCNC: 6.5 G/DL (ref 6.4–8.9)
SODIUM SERPL-SCNC: 139 MMOL/L (ref 134–143)
T3 SERPL-MCNC: 1.7 NG/ML (ref 0.6–1.8)
T4 FREE SERPL-MCNC: 1.51 NG/DL (ref 0.76–1.46)
TSH SERPL DL<=0.05 MIU/L-ACNC: <0.01 UIU/ML (ref 0.45–5.33)

## 2018-12-17 PROCEDURE — 83036 HEMOGLOBIN GLYCOSYLATED A1C: CPT

## 2018-12-17 PROCEDURE — 84480 ASSAY TRIIODOTHYRONINE (T3): CPT

## 2018-12-17 PROCEDURE — 84443 ASSAY THYROID STIM HORMONE: CPT

## 2018-12-17 PROCEDURE — 86376 MICROSOMAL ANTIBODY EACH: CPT

## 2018-12-17 PROCEDURE — 76536 US EXAM OF HEAD AND NECK: CPT

## 2018-12-17 PROCEDURE — 80053 COMPREHEN METABOLIC PANEL: CPT

## 2018-12-17 PROCEDURE — 84445 ASSAY OF TSI GLOBULIN: CPT

## 2018-12-17 PROCEDURE — 36415 COLL VENOUS BLD VENIPUNCTURE: CPT

## 2018-12-17 PROCEDURE — 84439 ASSAY OF FREE THYROXINE: CPT

## 2018-12-18 LAB
THYROPEROXIDASE AB SERPL-ACNC: 363 IU/ML (ref 0–34)
TSI SER-ACNC: 1.26 IU/L (ref 0–0.55)

## 2018-12-19 ENCOUNTER — TELEPHONE (OUTPATIENT)
Dept: ENDOCRINOLOGY | Facility: CLINIC | Age: 63
End: 2018-12-19

## 2018-12-19 NOTE — TELEPHONE ENCOUNTER
Radiology called stating that they wanted to report significant findings on Thyroid US   Please advise

## 2018-12-20 ENCOUNTER — TELEPHONE (OUTPATIENT)
Dept: ENDOCRINOLOGY | Facility: CLINIC | Age: 63
End: 2018-12-20

## 2018-12-20 DIAGNOSIS — E05.90 HYPERTHYROIDISM: Primary | ICD-10-CM

## 2018-12-20 DIAGNOSIS — I10 ESSENTIAL HYPERTENSION: ICD-10-CM

## 2018-12-20 DIAGNOSIS — E05.90 HYPERTHYROIDISM: ICD-10-CM

## 2018-12-20 DIAGNOSIS — E11.65 TYPE 2 DIABETES MELLITUS WITH HYPERGLYCEMIA, WITHOUT LONG-TERM CURRENT USE OF INSULIN (HCC): ICD-10-CM

## 2018-12-20 DIAGNOSIS — E04.1 THYROID NODULE: ICD-10-CM

## 2018-12-20 DIAGNOSIS — E11.65 TYPE 2 DIABETES MELLITUS WITH HYPERGLYCEMIA, WITHOUT LONG-TERM CURRENT USE OF INSULIN (HCC): Primary | ICD-10-CM

## 2018-12-20 RX ORDER — METHIMAZOLE 5 MG/1
5 TABLET ORAL 3 TIMES DAILY
Qty: 30 TABLET | Refills: 3 | Status: SHIPPED | OUTPATIENT
Start: 2018-12-20 | End: 2019-02-20 | Stop reason: HOSPADM

## 2018-12-20 RX ORDER — PIOGLITAZONE HCL AND METFORMIN HCL 850; 15 MG/1; MG/1
1 TABLET ORAL 2 TIMES DAILY
Qty: 180 TABLET | Refills: 0 | Status: SHIPPED | OUTPATIENT
Start: 2018-12-20 | End: 2019-03-05 | Stop reason: SDUPTHER

## 2018-12-20 NOTE — TELEPHONE ENCOUNTER
----- Message from Neeta Harrell PA-C sent at 12/18/2018  4:42 PM EST -----  A1C remains elevated at 8 2-- did she get the jardiance? Let us know if there is anything we can do to help get the jardiance, if prior auth needed we will have to do one  Send BG log for review  Thyroid remains overactive and she does have + antibodies consistent with graves disease  Start methimazole 5mg daily  Check TSH/Free T4/T3 total in 1 month  Reviewed side effects at visit but if she develops sore throat/fever or severe rash stop med and call office

## 2018-12-20 NOTE — TELEPHONE ENCOUNTER
Left message with lab results, prescription for  methimazole 5mg submitted, lab slip mailed to repeat TSH/T4/T3 in one month

## 2018-12-20 NOTE — TELEPHONE ENCOUNTER
----- Message from Elizabeth Moreno PA-C sent at 12/20/2018  4:02 PM EST -----  Spoke with patient by phone  Will do FNA with Afirma of suspicious nodule  Also she was given info about lab results from results note from yesterday    Please Send her the repeat labs for thyroid to be done in 1 month  Sent rx to pharmacy for methimazole and refill for actosplus met

## 2018-12-26 ENCOUNTER — TRANSCRIBE ORDERS (OUTPATIENT)
Dept: ADMINISTRATIVE | Facility: HOSPITAL | Age: 63
End: 2018-12-26

## 2018-12-26 DIAGNOSIS — E04.1 NONTOXIC UNINODULAR GOITER: Primary | ICD-10-CM

## 2019-01-07 ENCOUNTER — HOSPITAL ENCOUNTER (OUTPATIENT)
Dept: RADIOLOGY | Facility: HOSPITAL | Age: 64
Discharge: HOME | End: 2019-01-07
Admitting: RADIOLOGY
Payer: COMMERCIAL

## 2019-01-07 DIAGNOSIS — E04.1 NONTOXIC UNINODULAR GOITER: ICD-10-CM

## 2019-01-07 PROCEDURE — 88172 CYTP DX EVAL FNA 1ST EA SITE: CPT | Performed by: PATHOLOGY

## 2019-01-07 PROCEDURE — 88173 CYTOPATH EVAL FNA REPORT: CPT | Performed by: PATHOLOGY

## 2019-01-07 PROCEDURE — 10005 FNA BX W/US GDN 1ST LES: CPT

## 2019-01-07 RX ORDER — LIDOCAINE HYDROCHLORIDE 10 MG/ML
1.5 INJECTION, SOLUTION INFILTRATION; PERINEURAL ONCE
Status: COMPLETED | OUTPATIENT
Start: 2019-01-07 | End: 2019-01-07

## 2019-01-07 RX ADMIN — LIDOCAINE HYDROCHLORIDE 1.5 ML: 10 INJECTION, SOLUTION INFILTRATION; PERINEURAL at 11:45

## 2019-01-09 ENCOUNTER — TELEPHONE (OUTPATIENT)
Dept: ENDOCRINOLOGY | Facility: CLINIC | Age: 64
End: 2019-01-09

## 2019-01-10 DIAGNOSIS — E04.1 THYROID NODULE: Primary | ICD-10-CM

## 2019-01-10 PROBLEM — C73 PAPILLARY ADENOCARCINOMA OF THYROID (HCC): Status: ACTIVE | Noted: 2019-01-10

## 2019-01-14 ENCOUNTER — APPOINTMENT (OUTPATIENT)
Dept: LAB | Facility: HOSPITAL | Age: 64
End: 2019-01-14
Payer: COMMERCIAL

## 2019-01-14 ENCOUNTER — OFFICE VISIT (OUTPATIENT)
Dept: LAB | Facility: HOSPITAL | Age: 64
End: 2019-01-14
Attending: SURGERY
Payer: COMMERCIAL

## 2019-01-14 ENCOUNTER — CONSULT (OUTPATIENT)
Dept: SURGICAL ONCOLOGY | Facility: CLINIC | Age: 64
End: 2019-01-14
Payer: COMMERCIAL

## 2019-01-14 ENCOUNTER — HOSPITAL ENCOUNTER (OUTPATIENT)
Dept: ULTRASOUND IMAGING | Facility: HOSPITAL | Age: 64
Discharge: HOME | End: 2019-01-14
Payer: COMMERCIAL

## 2019-01-14 ENCOUNTER — HOSPITAL ENCOUNTER (OUTPATIENT)
Dept: RADIOLOGY | Facility: HOSPITAL | Age: 64
Discharge: HOME | End: 2019-01-14
Attending: SURGERY
Payer: COMMERCIAL

## 2019-01-14 VITALS
DIASTOLIC BLOOD PRESSURE: 70 MMHG | BODY MASS INDEX: 26.46 KG/M2 | RESPIRATION RATE: 16 BRPM | TEMPERATURE: 98.6 F | WEIGHT: 155 LBS | HEIGHT: 64 IN | SYSTOLIC BLOOD PRESSURE: 120 MMHG | HEART RATE: 98 BPM

## 2019-01-14 DIAGNOSIS — C73 PAPILLARY ADENOCARCINOMA OF THYROID (HCC): ICD-10-CM

## 2019-01-14 DIAGNOSIS — E04.1 THYROID NODULE: ICD-10-CM

## 2019-01-14 DIAGNOSIS — E05.90 HYPERTHYROIDISM: ICD-10-CM

## 2019-01-14 DIAGNOSIS — I10 ESSENTIAL HYPERTENSION: ICD-10-CM

## 2019-01-14 DIAGNOSIS — C73 PAPILLARY ADENOCARCINOMA OF THYROID (HCC): Primary | ICD-10-CM

## 2019-01-14 DIAGNOSIS — E11.65 TYPE 2 DIABETES MELLITUS WITH HYPERGLYCEMIA, WITHOUT LONG-TERM CURRENT USE OF INSULIN (HCC): ICD-10-CM

## 2019-01-14 LAB
ALBUMIN SERPL BCP-MCNC: 3.6 G/DL (ref 3.5–5)
ALP SERPL-CCNC: 86 U/L (ref 46–116)
ALT SERPL W P-5'-P-CCNC: 46 U/L (ref 12–78)
ANION GAP SERPL CALCULATED.3IONS-SCNC: 11 MMOL/L (ref 4–13)
APTT PPP: 26 SECONDS (ref 26–38)
AST SERPL W P-5'-P-CCNC: 28 U/L (ref 5–45)
ATRIAL RATE: 79 BPM
BASOPHILS # BLD AUTO: 0.04 THOUSANDS/ΜL (ref 0–0.1)
BASOPHILS NFR BLD AUTO: 1 % (ref 0–1)
BILIRUB SERPL-MCNC: 1.2 MG/DL (ref 0.2–1)
BUN SERPL-MCNC: 19 MG/DL (ref 5–25)
CALCIUM SERPL-MCNC: 9.3 MG/DL (ref 8.3–10.1)
CHLORIDE SERPL-SCNC: 104 MMOL/L (ref 100–108)
CO2 SERPL-SCNC: 22 MMOL/L (ref 21–32)
CREAT SERPL-MCNC: 0.63 MG/DL (ref 0.6–1.3)
EOSINOPHIL # BLD AUTO: 0.06 THOUSAND/ΜL (ref 0–0.61)
EOSINOPHIL NFR BLD AUTO: 1 % (ref 0–6)
ERYTHROCYTE [DISTWIDTH] IN BLOOD BY AUTOMATED COUNT: 13.2 % (ref 11.6–15.1)
EST. AVERAGE GLUCOSE BLD GHB EST-MCNC: 192 MG/DL
GFR SERPL CREATININE-BSD FRML MDRD: 95 ML/MIN/1.73SQ M
GLUCOSE SERPL-MCNC: 191 MG/DL (ref 65–140)
HBA1C MFR BLD: 8.3 % (ref 4.2–6.3)
HCT VFR BLD AUTO: 43.2 % (ref 34.8–46.1)
HGB BLD-MCNC: 14.7 G/DL (ref 11.5–15.4)
IMM GRANULOCYTES # BLD AUTO: 0.01 THOUSAND/UL (ref 0–0.2)
IMM GRANULOCYTES NFR BLD AUTO: 0 % (ref 0–2)
INR PPP: 0.88 (ref 0.86–1.17)
LYMPHOCYTES # BLD AUTO: 2.29 THOUSANDS/ΜL (ref 0.6–4.47)
LYMPHOCYTES NFR BLD AUTO: 43 % (ref 14–44)
MCH RBC QN AUTO: 28.8 PG (ref 26.8–34.3)
MCHC RBC AUTO-ENTMCNC: 34 G/DL (ref 31.4–37.4)
MCV RBC AUTO: 85 FL (ref 82–98)
MONOCYTES # BLD AUTO: 0.58 THOUSAND/ΜL (ref 0.17–1.22)
MONOCYTES NFR BLD AUTO: 11 % (ref 4–12)
NEUTROPHILS # BLD AUTO: 2.3 THOUSANDS/ΜL (ref 1.85–7.62)
NEUTS SEG NFR BLD AUTO: 44 % (ref 43–75)
NRBC BLD AUTO-RTO: 0 /100 WBCS
P AXIS: 27 DEGREES
PLATELET # BLD AUTO: 253 THOUSANDS/UL (ref 149–390)
PMV BLD AUTO: 9.6 FL (ref 8.9–12.7)
POTASSIUM SERPL-SCNC: 3.8 MMOL/L (ref 3.5–5.3)
PR INTERVAL: 194 MS
PROT SERPL-MCNC: 7.3 G/DL (ref 6.4–8.2)
PROTHROMBIN TIME: 12 SECONDS (ref 11.8–14.2)
QRS AXIS: -27 DEGREES
QRSD INTERVAL: 106 MS
QT INTERVAL: 378 MS
QTC INTERVAL: 433 MS
RBC # BLD AUTO: 5.1 MILLION/UL (ref 3.81–5.12)
SODIUM SERPL-SCNC: 137 MMOL/L (ref 136–145)
T WAVE AXIS: 61 DEGREES
T3 SERPL-MCNC: 1.1 NG/ML (ref 0.6–1.8)
T4 FREE SERPL-MCNC: 0.99 NG/DL (ref 0.76–1.46)
TSH SERPL DL<=0.05 MIU/L-ACNC: <0.007 UIU/ML (ref 0.36–3.74)
VENTRICULAR RATE: 79 BPM
WBC # BLD AUTO: 5.28 THOUSAND/UL (ref 4.31–10.16)

## 2019-01-14 PROCEDURE — 85610 PROTHROMBIN TIME: CPT

## 2019-01-14 PROCEDURE — 85025 COMPLETE CBC W/AUTO DIFF WBC: CPT

## 2019-01-14 PROCEDURE — 84480 ASSAY TRIIODOTHYRONINE (T3): CPT

## 2019-01-14 PROCEDURE — 80053 COMPREHEN METABOLIC PANEL: CPT

## 2019-01-14 PROCEDURE — 99244 OFF/OP CNSLTJ NEW/EST MOD 40: CPT | Performed by: SURGERY

## 2019-01-14 PROCEDURE — 93010 ELECTROCARDIOGRAM REPORT: CPT | Performed by: INTERNAL MEDICINE

## 2019-01-14 PROCEDURE — 83036 HEMOGLOBIN GLYCOSYLATED A1C: CPT

## 2019-01-14 PROCEDURE — 84439 ASSAY OF FREE THYROXINE: CPT

## 2019-01-14 PROCEDURE — 85730 THROMBOPLASTIN TIME PARTIAL: CPT

## 2019-01-14 PROCEDURE — 76536 US EXAM OF HEAD AND NECK: CPT

## 2019-01-14 PROCEDURE — 93005 ELECTROCARDIOGRAM TRACING: CPT

## 2019-01-14 PROCEDURE — 71046 X-RAY EXAM CHEST 2 VIEWS: CPT

## 2019-01-14 PROCEDURE — 36415 COLL VENOUS BLD VENIPUNCTURE: CPT

## 2019-01-14 PROCEDURE — 84443 ASSAY THYROID STIM HORMONE: CPT

## 2019-01-14 RX ORDER — TRAMADOL HYDROCHLORIDE 50 MG/1
50 TABLET ORAL EVERY 6 HOURS PRN
Qty: 10 TABLET | Refills: 0 | Status: SHIPPED | OUTPATIENT
Start: 2019-01-14 | End: 2019-03-04 | Stop reason: HOSPADM

## 2019-01-14 RX ORDER — LEVOTHYROXINE SODIUM 0.12 MG/1
125 TABLET ORAL DAILY
Qty: 30 TABLET | Refills: 3 | Status: SHIPPED | OUTPATIENT
Start: 2019-01-14 | End: 2019-03-19 | Stop reason: DRUGHIGH

## 2019-01-14 NOTE — LETTER
January 14, 2019     Alessio Monsivais PA-C  [address]  [address] [facility] [place] [address]    Patient: Sarah Clarke   YOB: 1955   Date of Visit: 1/14/2019       Dear Dr Qamar Pichardo Recipients: Thank you for referring Gurvinder Merrill to me for evaluation  Below are my notes for this consultation  If you have questions, please do not hesitate to call me  I look forward to following your patient along with you  Sincerely,        Michelle Paz MD        CC: Eveleen Dross, MD Harrold Boast, CRNP Silvana Penny, MD Alvira Kettering, MD  1/14/2019 11:15 AM  Sign at close encounter               Surgical Oncology Follow Up       [address] [facility] ONCOLOGY [facility]  [address] [facility] [address] [place] [id number] [address]  1955  [id number]  [address] [facility] CANCER CARE SURGICAL ONCOLOGY [address] [facility] [id number] [phone]    Chief Complaint   Patient presents with    Consult     New patient referral for papillary thyroid cancer  Assessment/Plan:    No problem-specific Assessment & Plan notes found for this encounter  Diagnoses and all orders for this visit:    Papillary adenocarcinoma of thyroid (HCC)  -     traMADol (ULTRAM) 50 mg tablet; Take 1 tablet (50 mg total) by mouth every 6 (six) hours as needed for moderate pain        Advance Care Planning/Advance Directives:  Discussed disease status, cancer treatment plans and/or cancer treatment goals with the patient  Papillary adenocarcinoma of thyroid ([facility] [address] )    1/7/2019 Biopsy     Fine needle aspiration of right thyroid  Malignant ([place] category VI) papillary thyroid carcinoma            History of Present Illness:  Patient is a 78-year-old woman being treated for hyper thyroidism/Graves disease  She was noted to have multiple thyroid nodules on recent ultrasound    One of these met criteria for biopsy  This was done confirming papillary thyroid cancer  No history of radiation exposure to the head or neck area  No family history or personal history of endocrine cancer  Review of Systems   Constitutional: Negative  HENT: Negative  Eyes: Negative  Respiratory: Negative  Cardiovascular: Negative  Gastrointestinal: Negative  Endocrine: Negative  Genitourinary: Negative  Musculoskeletal: Negative  Skin: Negative  Allergic/Immunologic: Negative  Neurological: Negative  Hematological: Negative  Psychiatric/Behavioral: Negative  Patient Active Problem List   Diagnosis    Essential hypertension    Hyperlipidemia    Arthritis    History of myocardial infarction    Insomnia    Cardiac pacemaker in situ    Chronic nonalcoholic liver disease    Coronary atherosclerosis    Diabetes mellitus type 2, uncontrolled, without complications (HCC)    Disorder of bilirubin excretion    Elective replacement indicated for pacemaker    Failed total left knee replacement (HCC)    Lumbar back pain with radiculopathy affecting left lower extremity    Metabolic syndrome    Osteoarthritis, generalized    Overweight (BMI 25 0-29  9)    Atrophic vaginitis    Seasonal allergic rhinitis    Abnormal thyroid blood test    Hyperthyroidism    Acute maxillary sinusitis    Thyroid nodule    Papillary adenocarcinoma of thyroid ([address] )     Past Medical History:   Diagnosis Date    Hypertension     Varicella      Past Surgical History:   Procedure Laterality Date    APPENDECTOMY      CARDIAC PACEMAKER PLACEMENT      CARPAL TUNNEL RELEASE      CHOLECYSTECTOMY      HYSTERECTOMY      REPLACEMENT TOTAL KNEE      US GUIDED THYROID BIOPSY  1/7/2019     Family History   Problem Relation Age of Onset    Diabetes unspecified Maternal Aunt     Colon cancer Father     Cancer Father     Heart disease Mother         Cardiac disorder, congenital aortic stenosis    Diabetes Other      Social History     Social History    Marital status: /Civil Union     Spouse name: N/A    Number of children: N/A    Years of education: N/A     Occupational History    Not on file       Social History Main Topics    Smoking status: Current Every Day Smoker    Smokeless tobacco: Never Used    Alcohol use No    Drug use: No    Sexual activity: Not on file     Other Topics Concern    Not on file     Social History Narrative    Always uses seat belt     No caffeine use        Current Outpatient Prescriptions:     aspirin 81 MG tablet, Take by mouth, Disp: , Rfl:     Calcium Polycarbophil (FIBER-CAPS PO), Take by mouth, Disp: , Rfl:     carvedilol (COREG) 25 mg tablet, , Disp: , Rfl:     cetirizine (ZyrTEC) 10 mg tablet, Take 10 mg by mouth, Disp: , Rfl:     Empagliflozin (JARDIANCE) 25 MG TABS, Take 1 tablet (25 mg total) by mouth daily for 90 days, Disp: 90 tablet, Rfl: 1    ezetimibe (ZETIA) 10 mg tablet, Take 1 tablet by mouth daily, Disp: , Rfl:     fexofenadine (MUCINEX ALLERGY) 180 MG tablet, Take 180 mg by mouth daily, Disp: , Rfl:     liraglutide (VICTOZA) injection, Inject 0 3 mL (1 8 mg total) under the skin daily, Disp: 9 mL, Rfl: 6    losartan (COZAAR) 50 mg tablet, Take 50 mg by mouth daily, Disp: , Rfl:     methimazole (TAPAZOLE) 5 mg tablet, Take 1 tablet (5 mg total) by mouth 3 (three) times a day, Disp: 30 tablet, Rfl: 3    Multiple Vitamin (MULTI-VITAMIN DAILY) TABS, Take by mouth, Disp: , Rfl:     Omega-3 Fatty Acids (FISH OIL) 1200 MG CAPS, Take 1 capsule by mouth 2 (two) times a day, Disp: , Rfl:     pioglitazone-metFORMIN (ACTOPLUS MET)  MG per tablet, Take 1 tablet by mouth 2 (two) times a day for 90 days, Disp: 180 tablet, Rfl: 0    rosuvastatin (CRESTOR) 20 MG tablet, , Disp: , Rfl:     traMADol (ULTRAM) 50 mg tablet, Take 1 tablet (50 mg total) by mouth every 6 (six) hours as needed for moderate pain, Disp: 10 tablet, Rfl: 0  Allergies   Allergen Reactions    Penicillins Rash    Sulfa Antibiotics Rash     Vitals:    01/14/19 1036   BP: 120/70   Pulse: 98   Resp: 16   Temp: 98 6 °F (37 °C)       Physical Exam   Constitutional: She is oriented to person, place, and time  She appears well-developed and well-nourished  HENT:   Head: Normocephalic and atraumatic  Right Ear: External ear normal    Left Ear: External ear normal    Eyes: Pupils are equal, round, and reactive to light  Conjunctivae and EOM are normal    Neck: Normal range of motion  Neck supple  No JVD present  No tracheal deviation present  No thyromegaly present  Cardiovascular: Normal rate, regular rhythm, normal heart sounds and intact distal pulses  Pulmonary/Chest: Effort normal and breath sounds normal  No stridor  Abdominal: Soft  Bowel sounds are normal    Lymphadenopathy:     She has no cervical adenopathy  Neurological: She is alert and oriented to person, place, and time  Skin: Skin is warm and dry  Pathology:  Case Report   Non-gynecologic Cytology                          Case: [id number]                                   Authorizing Provider: Alis Antunez PA-C    Collected:           01/07/2019 1146               Ordering Location:     [address]      Received:            01/07/2019 [address] [place] [address] Ultrasound                                                           Pathologist:           Clary Segundo MD                                                          Specimens:   A) - Thyroid, Right, mid to lower                                                                    B) - Thyroid, Right, mid to lower                                                          Final Diagnosis   A & B  Thyroid, right, mid to lower (ThinPrep and smear preparations): Malignant ([place] Category VI) - See note    Papillary thyroid carcinoma - see Note      Satisfactory for evaluation      Note: As reported in the [facility] for Reporting Thyroid Cytopathology*, this diagnostic category has demonstrated anywhere from 97% - 99% risk of malignancy being found in subsequent resections  A small proportion of cases (~3-4%) diagnosed as malignant  compatible with papillary thyroid carcinoma  may prove to be NIFTP (non-invasive follicular thyroid neoplasm with papillary-like nuclear features) on histopathologic examination  Due to the usage of the surgical pathology diagnosis of NIFTP, the anticipated risk of malignancy is 94-96%  The manual reports that the usual management following this diagnosis is near-total or total thyroidectomy (in cases of "Malignant" interpretation indicating metastatic tumor rather than primary, surgery may not be indicated)  Ultimately, clinical/imaging correlation for this patient is needed in arriving at the actual management plan  *  *The [employer] System for Reporting Thyroid Cytopathology, Nellene Brittle , Lynwood Donald Angelo Knox ), 2018 (2nd ed )   Electronically signed by Pepe Whitfield MD on 1/8/2019 at  2:20 PM         Labs:  Lab Results   Component Value Date    [id number] <0 010 (L) 12/17/2018    B8TWCZK 1 70 12/17/2018       Imaging  Us Thyroid    Result Date: 12/19/2018  Narrative: THYROID ULTRASOUND INDICATION:    E05 90: Thyrotoxicosis, unspecified without thyrotoxic crisis or storm  New diagnosis of hyperthyroidism  COMPARISON:  None TECHNIQUE:   Ultrasound of the thyroid was performed with a high frequency linear transducer in transverse and sagittal planes including volumetric imaging sweeps as well as traditional still imaging technique  FINDINGS:  Thyroid parenchyma is diffusely heterogeneous in echotexture  Right lobe:  4 7 x 1 9 x 1 7 cm  Left lobe:  4 3 x 1 9 x 1 8 cm  Isthmus:  0 2 cm  Nodule #1  Image [id number]  RIGHT midgland nodule measuring 0 4 x 0 6 x 0 5 cm  COMPOSITION:  2 points, could not be determined do to calcification   ECHOGENICITY:  1 point, echogenicity cannot be determined  SHAPE:  0 points, wider-than-tall  MARGIN: 0 points, cannot be determined  ECHOGENIC FOCI:  1 point, macrocalcifications  TI-RADS Classification: TR 4 (4-6 points), Moderately suspicious  FNA if > 1 5 cm  Follow if > 1cm  Nodule #2  Image [id number]  Right mid gland to lower pole measuring 1 3 x 1 1 x 1 1 cm  COMPOSITION:  2 points, solid or almost completely solid   ECHOGENICITY:  2 points, hypoechoic  SHAPE:  3 points, taller-than-wide  MARGIN: 0 points, ill-defined  ECHOGENIC FOCI:  3 points, punctate echogenic foci  TI-RADS Classification: TR 5 (7 or > points), Highly suspicious  FNA if > 1 cm  Follow if > 0 5 cm  Impression: Heterogeneous thyroid gland with thyroid nodules as described  The right mid gland to lower pole 1 3 x 1 1 x 1 1 cm nodule meets criteria for fine-needle aspiration  (Image number [id number]) (CRITERIA: TR 5, Highly suspicious  FNA if > 1 cm  Reference: ACR Thyroid Imaging, Reporting and Data System (TI-RADS): White Paper of the sezmi  J AM Severo Radiol 9414;06:510-933  (additional recommendations based on American Thyroid Association 2015 guidelines ) The study was marked in EPIC for significant notification  Workstation performed: [id number]     Us Head Neck Lymph Node Mapping    Result Date: 1/14/2019  Narrative: NECK ULTRASOUND INDICATION:     Papillary thyroid carcinoma, preop thyroidectomy  COMPARISON:  1/7/2019 FINDINGS: Ultrasound of the cervical lymph node chains was performed with a high frequency linear transducer  Lymph nodes maintain normal morphologic contour, echogenicity and short axis dimensions of less than 0 7 cm  No evidence for microcalcification or focal nodularity  Impression: No evidence of metastatic disease   Workstation performed: [id number]     Us Guided Thyroid Biopsy With Affirma    Result Date: 1/7/2019  Narrative: ULTRASOUND-GUIDED THYROID BIOPSY HISTORY: 58year-old with a history of thyroid nodules and hyperthyroidism  Patient presents with a prescription for ultrasound-guided fine-needle aspiration biopsy of the thyroid performed with Afirma tissue sampling  COMPARISON: 12/17/2018 US Thyroid: A solid right mid to lower pole thyroid nodule measuring 1 3 x 1 1 x 1 1 cm was identified and recommended for biopsy  FINDINGS: Prior ultrasound images were reviewed  On ultrasound imaging performed today, the corresponding right mid to lower pole thyroid nodule measures 1 4 x 1 2 x 1 0 cm  The procedure was explained to the patient, including risks of hemorrhage, infection and local injury  The possibility of a nondiagnostic biopsy result and the need for repeat biopsy or sonographic follow up was explained to the patient  Informed consent was freely obtained  The patient verbalized expressed understanding of the above risks and wished to proceed with the procedure  Final standard "time-out" procedure performed  PROCEDURE: The neck was prepped and draped in normal sterile fashion  Under real-time ultrasound guidance and local anesthesia five passes with 25 gauge needles were made through the right mid to lower pole thyroid nodule  Cytopathology was present and deemed the specimens adequate for evaluation  Two of the needle passes were saved for potential Afirma testing  The patient tolerated the procedure well  Postprocedure instructions were provided for the patient  I asked the patient to call us with any questions, concerns, or acute problems  The patient expressed understanding of the above  Impression: Status post successful ultrasound-guided thyroid biopsy  Tissue samples were obtained for Afirma testing, if required  The above findings and procedure were reviewed with Dr Maurisio Mooney  Procedure was performed by Jenn Elkins PA-C under the direct supervision of Dr Kathy Castillo  Workstation performed: [id number]     I reviewed the above laboratory and imaging data      Discussion/Summary:  50-year-old woman with history of Graves disease, with incidentally found nodules, biopsied and confirmed as right-sided thyroid cancer  Treatment options including total thyroidectomy were discussed with patient  Risks and benefits of surgery including infection, bleeding, need for possible additional surgery,  Recurrent nerve injury, hypocalcemia, discussed with her  She understands the plan and wishes to proceed as outlined

## 2019-01-14 NOTE — LETTER
January 14, 2019     Aristides Daniel PA-C  [address]  [address] [facility] [address]    Patient: Lindy Alvarez   YOB: 1955   Date of Visit: 1/14/2019       Dear Dr Antwon Braswell: Thank you for referring Bel Tadeo to me for evaluation  Below are my notes for this consultation  If you have questions, please do not hesitate to call me  I look forward to following your patient along with you  Sincerely,        Ebenezer Hidalgo MD        CC: MD Leo Phan CRNP Larene Harry, MD Nile Lamp, MD  1/14/2019 11:15 AM  Sign at close encounter               Surgical Oncology Follow Up       [address] [facility]  [address]  Verd[address] [place] [id number] [address]  1955  [id number]  [address] [facility]  [address]  Verdene Corolla [address] [facility] [address] [phone]    Chief Complaint   Patient presents with    Consult     New patient referral for papillary thyroid cancer  Assessment/Plan:    No problem-specific Assessment & Plan notes found for this encounter  Diagnoses and all orders for this visit:    Papillary adenocarcinoma of thyroid (HCC)  -     traMADol (ULTRAM) 50 mg tablet; Take 1 tablet (50 mg total) by mouth every 6 (six) hours as needed for moderate pain        Advance Care Planning/Advance Directives:  Discussed disease status, cancer treatment plans and/or cancer treatment goals with the patient  Papillary adenocarcinoma of thyroid ([facility] [address] )    1/7/2019 Biopsy     Fine needle aspiration of right thyroid  Malignant (Virgil category VI) papillary thyroid carcinoma            History of Present Illness:  Patient is a 25-year-old woman being treated for hyper thyroidism/Graves disease  She was noted to have multiple thyroid nodules on recent ultrasound    One of these met criteria for biopsy  This was done confirming papillary thyroid cancer  No history of radiation exposure to the head or neck area  No family history or personal history of endocrine cancer  Review of Systems   Constitutional: Negative  HENT: Negative  Eyes: Negative  Respiratory: Negative  Cardiovascular: Negative  Gastrointestinal: Negative  Endocrine: Negative  Genitourinary: Negative  Musculoskeletal: Negative  Skin: Negative  Allergic/Immunologic: Negative  Neurological: Negative  Hematological: Negative  Psychiatric/Behavioral: Negative  Patient Active Problem List   Diagnosis    Essential hypertension    Hyperlipidemia    Arthritis    History of myocardial infarction    Insomnia    Cardiac pacemaker in situ    Chronic nonalcoholic liver disease    Coronary atherosclerosis    Diabetes mellitus type 2, uncontrolled, without complications (HCC)    Disorder of bilirubin excretion    Elective replacement indicated for pacemaker    Failed total left knee replacement (HCC)    Lumbar back pain with radiculopathy affecting left lower extremity    Metabolic syndrome    Osteoarthritis, generalized    Overweight (BMI 25 0-29  9)    Atrophic vaginitis    Seasonal allergic rhinitis    Abnormal thyroid blood test    Hyperthyroidism    Acute maxillary sinusitis    Thyroid nodule    Papillary adenocarcinoma of thyroid ([address] )     Past Medical History:   Diagnosis Date    Hypertension     Varicella      Past Surgical History:   Procedure Laterality Date    APPENDECTOMY      CARDIAC PACEMAKER PLACEMENT      CARPAL TUNNEL RELEASE      CHOLECYSTECTOMY      HYSTERECTOMY      REPLACEMENT TOTAL KNEE      US GUIDED THYROID BIOPSY  1/7/2019     Family History   Problem Relation Age of Onset    Diabetes unspecified Maternal Aunt     Colon cancer Father     Cancer Father     Heart disease Mother         Cardiac disorder, congenital aortic stenosis  Diabetes Other      Social History     Social History    Marital status: /Civil Union     Spouse name: N/A    Number of children: N/A    Years of education: N/A     Occupational History    Not on file       Social History Main Topics    Smoking status: Current Every Day Smoker    Smokeless tobacco: Never Used    Alcohol use No    Drug use: No    Sexual activity: Not on file     Other Topics Concern    Not on file     Social History Narrative    Always uses seat belt     No caffeine use        Current Outpatient Prescriptions:     aspirin 81 MG tablet, Take by mouth, Disp: , Rfl:     Calcium Polycarbophil (FIBER-CAPS PO), Take by mouth, Disp: , Rfl:     carvedilol (COREG) 25 mg tablet, , Disp: , Rfl:     cetirizine (ZyrTEC) 10 mg tablet, Take 10 mg by mouth, Disp: , Rfl:     Empagliflozin (JARDIANCE) 25 MG TABS, Take 1 tablet (25 mg total) by mouth daily for 90 days, Disp: 90 tablet, Rfl: 1    ezetimibe (ZETIA) 10 mg tablet, Take 1 tablet by mouth daily, Disp: , Rfl:     fexofenadine (MUCINEX ALLERGY) 180 MG tablet, Take 180 mg by mouth daily, Disp: , Rfl:     liraglutide (VICTOZA) injection, Inject 0 3 mL (1 8 mg total) under the skin daily, Disp: 9 mL, Rfl: 6    losartan (COZAAR) 50 mg tablet, Take 50 mg by mouth daily, Disp: , Rfl:     methimazole (TAPAZOLE) 5 mg tablet, Take 1 tablet (5 mg total) by mouth 3 (three) times a day, Disp: 30 tablet, Rfl: 3    Multiple Vitamin (MULTI-VITAMIN DAILY) TABS, Take by mouth, Disp: , Rfl:     Omega-3 Fatty Acids (FISH OIL) 1200 MG CAPS, Take 1 capsule by mouth 2 (two) times a day, Disp: , Rfl:     pioglitazone-metFORMIN (ACTOPLUS MET)  MG per tablet, Take 1 tablet by mouth 2 (two) times a day for 90 days, Disp: 180 tablet, Rfl: 0    rosuvastatin (CRESTOR) 20 MG tablet, , Disp: , Rfl:     traMADol (ULTRAM) 50 mg tablet, Take 1 tablet (50 mg total) by mouth every 6 (six) hours as needed for moderate pain, Disp: 10 tablet, Rfl: 0  Allergies   Allergen Reactions    Penicillins Rash    Sulfa Antibiotics Rash     Vitals:    01/14/19 1036   BP: 120/70   Pulse: 98   Resp: 16   Temp: 98 6 °F (37 °C)       Physical Exam   Constitutional: She is oriented to person, place, and time  She appears well-developed and well-nourished  HENT:   Head: Normocephalic and atraumatic  Right Ear: External ear normal    Left Ear: External ear normal    Eyes: Pupils are equal, round, and reactive to light  Conjunctivae and EOM are normal    Neck: Normal range of motion  Neck supple  No JVD present  No tracheal deviation present  No thyromegaly present  Cardiovascular: Normal rate, regular rhythm, normal heart sounds and intact distal pulses  Pulmonary/Chest: Effort normal and breath sounds normal  No stridor  Abdominal: Soft  Bowel sounds are normal    Lymphadenopathy:     She has no cervical adenopathy  Neurological: She is alert and oriented to person, place, and time  Skin: Skin is warm and dry  Pathology:  Case Report   Non-gynecologic Cytology                          Case: [id number]                                   Authorizing Provider: Lesa Gowers, PA-C    Collected:           01/07/2019 1146               Ordering Location:     [address]      Received:            01/07/2019 [address] [place] [address] Ultrasound                                                           Pathologist:           Liam Bo MD                                                          Specimens:   A) - Thyroid, Right, mid to lower                                                                    B) - Thyroid, Right, mid to lower                                                          Final Diagnosis   A & B  Thyroid, right, mid to lower (ThinPrep and smear preparations): Malignant (Hassell Category VI) - See note    Papillary thyroid carcinoma - see Note      Satisfactory for evaluation      Note: As reported in the [facility] for Reporting Thyroid Cytopathology*, this diagnostic category has demonstrated anywhere from 97% - 99% risk of malignancy being found in subsequent resections  A small proportion of cases (~3-4%) diagnosed as malignant  compatible with papillary thyroid carcinoma  may prove to be NIFTP (non-invasive follicular thyroid neoplasm with papillary-like nuclear features) on histopathologic examination  Due to the usage of the surgical pathology diagnosis of NIFTP, the anticipated risk of malignancy is 94-96%  The manual reports that the usual management following this diagnosis is near-total or total thyroidectomy (in cases of "Malignant" interpretation indicating metastatic tumor rather than primary, surgery may not be indicated)  Ultimately, clinical/imaging correlation for this patient is needed in arriving at the actual management plan  *  *The Roy System for Reporting Thyroid Cytopathology, Paula Lawrence ), 2018 (2nd ed )   Electronically signed by Lawrence Garzon MD on 1/8/2019 at  2:20 PM         Labs:  Lab Results   Component Value Date    [id number] <0 010 (L) 12/17/2018    B3FQPLT 1 70 12/17/2018       Imaging  Us Thyroid    Result Date: 12/19/2018  Narrative: THYROID ULTRASOUND INDICATION:    E05 90: Thyrotoxicosis, unspecified without thyrotoxic crisis or storm  New diagnosis of hyperthyroidism  COMPARISON:  None TECHNIQUE:   Ultrasound of the thyroid was performed with a high frequency linear transducer in transverse and sagittal planes including volumetric imaging sweeps as well as traditional still imaging technique  FINDINGS:  Thyroid parenchyma is diffusely heterogeneous in echotexture  Right lobe:  4 7 x 1 9 x 1 7 cm  Left lobe:  4 3 x 1 9 x 1 8 cm  Isthmus:  0 2 cm  Nodule #1  Image [id number]  RIGHT midgland nodule measuring 0 4 x 0 6 x 0 5 cm  COMPOSITION:  2 points, could not be determined do to calcification   ECHOGENICITY:  1 point, echogenicity cannot be determined  SHAPE:  0 points, wider-than-tall  MARGIN: 0 points, cannot be determined  ECHOGENIC FOCI:  1 point, macrocalcifications  TI-RADS Classification: TR 4 (4-6 points), Moderately suspicious  FNA if > 1 5 cm  Follow if > 1cm  Nodule #2  Image [id number]  Right mid gland to lower pole measuring 1 3 x 1 1 x 1 1 cm  COMPOSITION:  2 points, solid or almost completely solid   ECHOGENICITY:  2 points, hypoechoic  SHAPE:  3 points, taller-than-wide  MARGIN: 0 points, ill-defined  ECHOGENIC FOCI:  3 points, punctate echogenic foci  TI-RADS Classification: TR 5 (7 or > points), Highly suspicious  FNA if > 1 cm  Follow if > 0 5 cm  Impression: Heterogeneous thyroid gland with thyroid nodules as described  The right mid gland to lower pole 1 3 x 1 1 x 1 1 cm nodule meets criteria for fine-needle aspiration  (Image number [id number]) (CRITERIA: TR 5, Highly suspicious  FNA if > 1 cm  Reference: ACR Thyroid Imaging, Reporting and Data System (TI-RADS): White Paper of the [employer]  J AM Severo Radiol 4588;39:534-993  (additional recommendations based on American Thyroid Association 2015 guidelines ) The study was marked in EPIC for significant notification  Workstation performed: [id number]     Us Head Neck Lymph Node Mapping    Result Date: 1/14/2019  Narrative: NECK ULTRASOUND INDICATION:     Papillary thyroid carcinoma, preop thyroidectomy  COMPARISON:  1/7/2019 FINDINGS: Ultrasound of the cervical lymph node chains was performed with a high frequency linear transducer  Lymph nodes maintain normal morphologic contour, echogenicity and short axis dimensions of less than 0 7 cm  No evidence for microcalcification or focal nodularity  Impression: No evidence of metastatic disease   Workstation performed: [id number]     Us Guided Thyroid Biopsy With Affirma    Result Date: 1/7/2019  Narrative: ULTRASOUND-GUIDED THYROID BIOPSY HISTORY: 58year-old with a history of thyroid nodules and hyperthyroidism  Patient presents with a prescription for ultrasound-guided fine-needle aspiration biopsy of the thyroid performed with Afirma tissue sampling  COMPARISON: 12/17/2018 US Thyroid: A solid right mid to lower pole thyroid nodule measuring 1 3 x 1 1 x 1 1 cm was identified and recommended for biopsy  FINDINGS: Prior ultrasound images were reviewed  On ultrasound imaging performed today, the corresponding right mid to lower pole thyroid nodule measures 1 4 x 1 2 x 1 0 cm  The procedure was explained to the patient, including risks of hemorrhage, infection and local injury  The possibility of a nondiagnostic biopsy result and the need for repeat biopsy or sonographic follow up was explained to the patient  Informed consent was freely obtained  The patient verbalized expressed understanding of the above risks and wished to proceed with the procedure  Final standard "time-out" procedure performed  PROCEDURE: The neck was prepped and draped in normal sterile fashion  Under real-time ultrasound guidance and local anesthesia five passes with 25 gauge needles were made through the right mid to lower pole thyroid nodule  Cytopathology was present and deemed the specimens adequate for evaluation  Two of the needle passes were saved for potential Afirma testing  The patient tolerated the procedure well  Postprocedure instructions were provided for the patient  I asked the patient to call us with any questions, concerns, or acute problems  The patient expressed understanding of the above  Impression: Status post successful ultrasound-guided thyroid biopsy  Tissue samples were obtained for Afirma testing, if required  The above findings and procedure were reviewed with Dr Lynn Alejo  Procedure was performed by Junior Elkins PA-C under the direct supervision of Dr Maxime Flores  Workstation performed: [id number]     I reviewed the above laboratory and imaging data      Discussion/Summary:  41-year-old woman with history of Graves disease, with incidentally found nodules, biopsied and confirmed as right-sided thyroid cancer  Treatment options including total thyroidectomy were discussed with patient  Risks and benefits of surgery including infection, bleeding, need for possible additional surgery,  Recurrent nerve injury, hypocalcemia, discussed with her  She understands the plan and wishes to proceed as outlined

## 2019-01-14 NOTE — PATIENT INSTRUCTIONS
Pre-Surgery Instructions:   Medication Instructions    aspirin 81 MG tablet Consult prescribing physician    Calcium Polycarbophil (FIBER-CAPS PO) Stop taking 1 week prior to surgery    carvedilol (COREG) 25 mg tablet Take morning of surgery    cetirizine (ZyrTEC) 10 mg tablet Stop taking 1 days prior to surgery    Empagliflozin (JARDIANCE) 25 MG TABS Consult prescribing physician    ezetimibe (ZETIA) 10 mg tablet Consult prescribing physician    fexofenadine (MUCINEX ALLERGY) 180 MG tablet Stop taking 1 days prior to surgery    liraglutide (VICTOZA) injection Consult prescribing physician    losartan (COZAAR) 50 mg tablet Take morning of surgery    methimazole (TAPAZOLE) 5 mg tablet Take morning of surgery    Multiple Vitamin (MULTI-VITAMIN DAILY) TABS Stop taking 1 days prior to surgery    Omega-3 Fatty Acids (FISH OIL) 1200 MG CAPS Stop taking 1 week prior to surgery    pioglitazone-metFORMIN (ACTOPLUS MET)  MG per tablet Consult prescribing physician    rosuvastatin (CRESTOR) 20 MG tablet Stop taking 1 days prior to surgery           Total Thyroidectomy   WHAT YOU NEED TO KNOW:   A total thyroidectomy is surgery to remove your thyroid gland  Your thyroid gland makes hormones that regulate your metabolism, body temperature, heart rate, and the level of calcium in your blood  Your thyroid gland is shaped like a butterfly and found in the front lower part of your neck  DISCHARGE INSTRUCTIONS:   Medicines:   · Thyroid hormone: You are given this medicine to bring your thyroid hormone level back to normal      · Pain medicine: You may be given a prescription medicine to decrease pain  Do not wait until the pain is severe before you take this medicine  · Take your medicine as directed  Contact your healthcare provider if you think your medicine is not helping or if you have side effects  Tell him or her if you are allergic to any medicine   Keep a list of the medicines, vitamins, and herbs you take  Include the amounts, and when and why you take them  Bring the list or the pill bottles to follow-up visits  Carry your medicine list with you in case of an emergency  Follow up with your endocrinologist or surgeon as directed: You may need to return to have your bandage changed, drains removed, or more tests  Write down your questions so you remember to ask them during your visits  Self-care:   · Drains: You may go home with one or more drains in your neck  Ask your surgeon for more information about drains  · Swallowing and voice changes: You may have a sore throat, hoarse voice, or difficulty swallowing after surgery  It is normal to have these problems for up to 6 months after total thyroidectomy surgery  · Supplements:  Ask your endocrinologist if you need to take calcium or vitamin D and how much to take  Contact your endocrinologist or surgeon if:   · You have a fever  · You have questions about your drain  · You have pain in your surgery area that does not go away after you take pain medicine  · You lose weight, feel very nervous and hungry, and sweat for no reason  · You feel very tired and cold, gain weight for no reason, and your skin is very dry  · You vomit several times in a row  · Your skin is itchy, swollen, or has a rash  · Your incision is swollen, red, or has pus coming from it  · You have new voice weakness or hoarseness, or it is getting worse  · You have questions or concerns about your condition or care  Seek care immediately or call 911 if:   · You have sudden tingling or muscle cramps in your face, arm, or leg  · You have muscle spasms in your legs and feet that do not go away  · You have sudden abdominal pain  · Your arm or leg feels warm, tender, and painful  It may look swollen and red  · Your incision comes apart, or blood soaks through your bandage       · You have sudden swelling in your neck or difficulty swallowing  · You suddenly feel lightheaded and short of breath  · You have chest pain when you take a deep breath or cough, or you cough up blood  © 2017 [address] [employer] Information is for End User's use only and may not be sold, redistributed or otherwise used for commercial purposes  All illustrations and images included in CareNotes® are the copyrighted property of A [employer] M [employer]  or [employer]le[employer] Pinto  The above information is an  only  It is not intended as medical advice for individual conditions or treatments  Talk to your doctor, nurse or pharmacist before following any medical regimen to see if it is safe and effective for you

## 2019-01-14 NOTE — PROGRESS NOTES
Surgical Oncology Follow Up       [address] [facility]  [address] [id number]  51 [facility] MADAY Gandhi  1955  [id number]  [address] [facility]  [address] [id number] [phone]    Chief Complaint   Patient presents with    Consult     New patient referral for papillary thyroid cancer  Assessment/Plan:    No problem-specific Assessment & Plan notes found for this encounter  Diagnoses and all orders for this visit:    Papillary adenocarcinoma of thyroid (HCC)  -     traMADol (ULTRAM) 50 mg tablet; Take 1 tablet (50 mg total) by mouth every 6 (six) hours as needed for moderate pain        Advance Care Planning/Advance Directives:  Discussed disease status, cancer treatment plans and/or cancer treatment goals with the patient  Papillary adenocarcinoma of thyroid ([facility] [address] )    1/7/2019 Biopsy     Fine needle aspiration of right thyroid  Malignant ([place] category VI) papillary thyroid carcinoma            History of Present Illness:  Patient is a 58-year-old woman being treated for hyper thyroidism/Graves disease  She was noted to have multiple thyroid nodules on recent ultrasound  One of these met criteria for biopsy  This was done confirming papillary thyroid cancer  No history of radiation exposure to the head or neck area  No family history or personal history of endocrine cancer  Review of Systems   Constitutional: Negative  HENT: Negative  Eyes: Negative  Respiratory: Negative  Cardiovascular: Negative  Gastrointestinal: Negative  Endocrine: Negative  Genitourinary: Negative  Musculoskeletal: Negative  Skin: Negative  Allergic/Immunologic: Negative  Neurological: Negative  Hematological: Negative  Psychiatric/Behavioral: Negative            Patient Active Problem List   Diagnosis    Essential hypertension    Hyperlipidemia    Arthritis    History of myocardial infarction    Insomnia    Cardiac pacemaker in situ    Chronic nonalcoholic liver disease    Coronary atherosclerosis    Diabetes mellitus type 2, uncontrolled, without complications (HCC)    Disorder of bilirubin excretion    Elective replacement indicated for pacemaker    Failed total left knee replacement (HCC)    Lumbar back pain with radiculopathy affecting left lower extremity    Metabolic syndrome    Osteoarthritis, generalized    Overweight (BMI 25 0-29  9)    Atrophic vaginitis    Seasonal allergic rhinitis    Abnormal thyroid blood test    Hyperthyroidism    Acute maxillary sinusitis    Thyroid nodule    Papillary adenocarcinoma of thyroid ([address] )     Past Medical History:   Diagnosis Date    Hypertension     Varicella      Past Surgical History:   Procedure Laterality Date    APPENDECTOMY      CARDIAC PACEMAKER PLACEMENT      CARPAL TUNNEL RELEASE      CHOLECYSTECTOMY      HYSTERECTOMY      REPLACEMENT TOTAL KNEE      US GUIDED THYROID BIOPSY  1/7/2019     Family History   Problem Relation Age of Onset    Diabetes unspecified Maternal Aunt     Colon cancer Father     Cancer Father     Heart disease Mother         Cardiac disorder, congenital aortic stenosis    Diabetes Other      Social History     Social History    Marital status: /Civil Union     Spouse name: N/A    Number of children: N/A    Years of education: N/A     Occupational History    Not on file       Social History Main Topics    Smoking status: Current Every Day Smoker    Smokeless tobacco: Never Used    Alcohol use No    Drug use: No    Sexual activity: Not on file     Other Topics Concern    Not on file     Social History Narrative    Always uses seat belt     No caffeine use        Current Outpatient Prescriptions:     aspirin 81 MG tablet, Take by mouth, Disp: , Rfl:    Calcium Polycarbophil (FIBER-CAPS PO), Take by mouth, Disp: , Rfl:     carvedilol (COREG) 25 mg tablet, , Disp: , Rfl:     cetirizine (ZyrTEC) 10 mg tablet, Take 10 mg by mouth, Disp: , Rfl:     Empagliflozin (JARDIANCE) 25 MG TABS, Take 1 tablet (25 mg total) by mouth daily for 90 days, Disp: 90 tablet, Rfl: 1    ezetimibe (ZETIA) 10 mg tablet, Take 1 tablet by mouth daily, Disp: , Rfl:     fexofenadine (MUCINEX ALLERGY) 180 MG tablet, Take 180 mg by mouth daily, Disp: , Rfl:     liraglutide (VICTOZA) injection, Inject 0 3 mL (1 8 mg total) under the skin daily, Disp: 9 mL, Rfl: 6    losartan (COZAAR) 50 mg tablet, Take 50 mg by mouth daily, Disp: , Rfl:     methimazole (TAPAZOLE) 5 mg tablet, Take 1 tablet (5 mg total) by mouth 3 (three) times a day, Disp: 30 tablet, Rfl: 3    Multiple Vitamin (MULTI-VITAMIN DAILY) TABS, Take by mouth, Disp: , Rfl:     Omega-3 Fatty Acids (FISH OIL) 1200 MG CAPS, Take 1 capsule by mouth 2 (two) times a day, Disp: , Rfl:     pioglitazone-metFORMIN (ACTOPLUS MET)  MG per tablet, Take 1 tablet by mouth 2 (two) times a day for 90 days, Disp: 180 tablet, Rfl: 0    rosuvastatin (CRESTOR) 20 MG tablet, , Disp: , Rfl:     traMADol (ULTRAM) 50 mg tablet, Take 1 tablet (50 mg total) by mouth every 6 (six) hours as needed for moderate pain, Disp: 10 tablet, Rfl: 0  Allergies   Allergen Reactions    Penicillins Rash    Sulfa Antibiotics Rash     Vitals:    01/14/19 1036   BP: 120/70   Pulse: 98   Resp: 16   Temp: 98 6 °F (37 °C)       Physical Exam   Constitutional: She is oriented to person, place, and time  She appears well-developed and well-nourished  HENT:   Head: Normocephalic and atraumatic  Right Ear: External ear normal    Left Ear: External ear normal    Eyes: Pupils are equal, round, and reactive to light  Conjunctivae and EOM are normal    Neck: Normal range of motion  Neck supple  No JVD present  No tracheal deviation present  No thyromegaly present  Cardiovascular: Normal rate, regular rhythm, normal heart sounds and intact distal pulses  Pulmonary/Chest: Effort normal and breath sounds normal  No stridor  Abdominal: Soft  Bowel sounds are normal    Lymphadenopathy:     She has no cervical adenopathy  Neurological: She is alert and oriented to person, place, and time  Skin: Skin is warm and dry  Pathology:  Case Report   Non-gynecologic Cytology                          Case: [id number]                                   Authorizing Provider: Sue Collado PA-C    Collected:           01/07/2019 1146               Ordering Location:     [address]      Received:            01/07/2019 1210                                      [facility] Ultrasound                                                           Pathologist:           Josefa Mercado MD                                                          Specimens:   A) - Thyroid, Right, mid to lower                                                                    B) - Thyroid, Right, mid to lower                                                          Final Diagnosis   A & B  Thyroid, right, mid to lower (ThinPrep and smear preparations): Malignant ([place] Category VI) - See note  Papillary thyroid carcinoma - see Note      Satisfactory for evaluation      Note: As reported in the [facility] for Reporting Thyroid Cytopathology*, this diagnostic category has demonstrated anywhere from 97% - 99% risk of malignancy being found in subsequent resections  A small proportion of cases (~3-4%) diagnosed as malignant - compatible with papillary thyroid carcinoma - may prove to be NIFTP (non-invasive follicular thyroid neoplasm with papillary-like nuclear features) on histopathologic examination  Due to the usage of the surgical pathology diagnosis of NIFTP, the anticipated risk of malignancy is 94-96%    The manual reports that the usual management following this diagnosis is near-total or total thyroidectomy (in cases of "Malignant" interpretation indicating metastatic tumor rather than primary, surgery may not be indicated)  Ultimately, clinical/imaging correlation for this patient is needed in arriving at the actual management plan  *  *The [place] System for Reporting Thyroid Cytopathology, Roma Lagun[facility] ), 2018 (2nd ed )   Electronically signed by Clary Segundo MD on 1/8/2019 at  2:20 PM         Labs:  Lab Results   Component Value Date    [id number] <0 010 (L) 12/17/2018    O3DLITA 1 70 12/17/2018       Imaging  Us Thyroid    Result Date: 12/19/2018  Narrative: THYROID ULTRASOUND INDICATION:    E05 90: Thyrotoxicosis, unspecified without thyrotoxic crisis or storm  New diagnosis of hyperthyroidism  COMPARISON:  None TECHNIQUE:   Ultrasound of the thyroid was performed with a high frequency linear transducer in transverse and sagittal planes including volumetric imaging sweeps as well as traditional still imaging technique  FINDINGS:  Thyroid parenchyma is diffusely heterogeneous in echotexture  Right lobe:  4 7 x 1 9 x 1 7 cm  Left lobe:  4 3 x 1 9 x 1 8 cm  Isthmus:  0 2 cm  Nodule #1  Image [id number]  RIGHT midgland nodule measuring 0 4 x 0 6 x 0 5 cm  COMPOSITION:  2 points, could not be determined do to calcification  ECHOGENICITY:  1 point, echogenicity cannot be determined  SHAPE:  0 points, wider-than-tall  MARGIN: 0 points, cannot be determined  ECHOGENIC FOCI:  1 point, macrocalcifications  TI-RADS Classification: TR 4 (4-6 points), Moderately suspicious  FNA if > 1 5 cm  Follow if > 1cm  Nodule #2  Image [id number]  Right mid gland to lower pole measuring 1 3 x 1 1 x 1 1 cm  COMPOSITION:  2 points, solid or almost completely solid   ECHOGENICITY:  2 points, hypoechoic  SHAPE:  3 points, taller-than-wide  MARGIN: 0 points, ill-defined  ECHOGENIC FOCI:  3 points, punctate echogenic foci   TI-RADS Classification: TR 5 (7 or > points), Highly suspicious  FNA if > 1 cm  Follow if > 0 5 cm  Impression: Heterogeneous thyroid gland with thyroid nodules as described  The right mid gland to lower pole 1 3 x 1 1 x 1 1 cm nodule meets criteria for fine-needle aspiration  (Image number [id number]) (CRITERIA: TR 5, Highly suspicious  FNA if > 1 cm  Reference: ACR Thyroid Imaging, Reporting and Data System (TI-RADS): White Paper of the [employer]  J AM Severo Radiol 8857;95:221-470  (additional recommendations based on American Thyroid Association 2015 guidelines ) The study was marked in EPIC for significant notification  Workstation performed: [id number]     Us Head Neck Lymph Node Mapping    Result Date: 1/14/2019  Narrative: NECK ULTRASOUND INDICATION:     Papillary thyroid carcinoma, preop thyroidectomy  COMPARISON:  1/7/2019 FINDINGS: Ultrasound of the cervical lymph node chains was performed with a high frequency linear transducer  Lymph nodes maintain normal morphologic contour, echogenicity and short axis dimensions of less than 0 7 cm  No evidence for microcalcification or focal nodularity  Impression: No evidence of metastatic disease  Workstation performed: [id number]     Us Guided Thyroid Biopsy With Affirma    Result Date: 1/7/2019  Narrative: ULTRASOUND-GUIDED THYROID BIOPSY HISTORY: 58year-old with a history of thyroid nodules and hyperthyroidism  Patient presents with a prescription for ultrasound-guided fine-needle aspiration biopsy of the thyroid performed with Afirma tissue sampling  COMPARISON: 12/17/2018 US Thyroid: A solid right mid to lower pole thyroid nodule measuring 1 3 x 1 1 x 1 1 cm was identified and recommended for biopsy  FINDINGS: Prior ultrasound images were reviewed  On ultrasound imaging performed today, the corresponding right mid to lower pole thyroid nodule measures 1 4 x 1 2 x 1 0 cm  The procedure was explained to the patient, including risks of hemorrhage, infection and local injury    The possibility of a nondiagnostic biopsy result and the need for repeat biopsy or sonographic follow up was explained to the patient  Informed consent was freely obtained  The patient verbalized expressed understanding of the above risks and wished to proceed with the procedure  Final standard "time-out" procedure performed  PROCEDURE: The neck was prepped and draped in normal sterile fashion  Under real-time ultrasound guidance and local anesthesia five passes with 25 gauge needles were made through the right mid to lower pole thyroid nodule  Cytopathology was present and deemed the specimens adequate for evaluation  Two of the needle passes were saved for potential Afirma testing  The patient tolerated the procedure well  Postprocedure instructions were provided for the patient  I asked the patient to call us with any questions, concerns, or acute problems  The patient expressed understanding of the above  Impression: Status post successful ultrasound-guided thyroid biopsy  Tissue samples were obtained for Afirma testing, if required  The above findings and procedure were reviewed with Dr Nicolasa Nissen  Procedure was performed by Analilia Elkins PA-C under the direct supervision of Dr Anne Houston  Workstation performed: [id number]     I reviewed the above laboratory and imaging data  Discussion/Summary:  77-year-old woman with history of Graves disease, with incidentally found nodules, biopsied and confirmed as right-sided thyroid cancer  Treatment options including total thyroidectomy were discussed with patient  Risks and benefits of surgery including infection, bleeding, need for possible additional surgery,  Recurrent nerve injury, hypocalcemia, discussed with her  She understands the plan and wishes to proceed as outlined

## 2019-01-15 ENCOUNTER — TELEPHONE (OUTPATIENT)
Dept: ENDOCRINOLOGY | Facility: CLINIC | Age: 64
End: 2019-01-15

## 2019-01-15 NOTE — TELEPHONE ENCOUNTER
----- Message from Mario Bennett PA-C sent at 1/14/2019 11:44 AM EST -----  Ultrasound showed normal lymph nodes  She likely got these results at surgical visit today but call her just in case  Thanks! Surgery

## 2019-01-17 ENCOUNTER — TELEPHONE (OUTPATIENT)
Dept: ENDOCRINOLOGY | Facility: CLINIC | Age: 64
End: 2019-01-17

## 2019-01-17 DIAGNOSIS — E05.90 HYPERTHYROIDISM: Primary | ICD-10-CM

## 2019-01-17 NOTE — TELEPHONE ENCOUNTER
----- Message from Elizabeth Moreno PA-C sent at 1/16/2019 12:55 PM EST -----  Hyperthyroidism has improved  Continue methimazole  When surgery is performed, stop the methimazole and start Levothyroxine daily as prescribed  Check TSH/Free T4 6 weeks after surgery

## 2019-01-28 ENCOUNTER — IN-CLINIC DEVICE VISIT (OUTPATIENT)
Dept: CARDIOLOGY CLINIC | Facility: CLINIC | Age: 64
End: 2019-01-28
Payer: COMMERCIAL

## 2019-01-28 ENCOUNTER — CONSULT (OUTPATIENT)
Dept: CARDIOLOGY CLINIC | Facility: CLINIC | Age: 64
End: 2019-01-28
Payer: COMMERCIAL

## 2019-01-28 VITALS
DIASTOLIC BLOOD PRESSURE: 60 MMHG | WEIGHT: 156.3 LBS | HEART RATE: 97 BPM | SYSTOLIC BLOOD PRESSURE: 112 MMHG | HEIGHT: 64 IN | BODY MASS INDEX: 26.69 KG/M2 | OXYGEN SATURATION: 96 %

## 2019-01-28 DIAGNOSIS — I48.0 PAROXYSMAL ATRIAL FIBRILLATION (HCC): ICD-10-CM

## 2019-01-28 DIAGNOSIS — C73 PAPILLARY ADENOCARCINOMA OF THYROID (HCC): ICD-10-CM

## 2019-01-28 DIAGNOSIS — I10 ESSENTIAL HYPERTENSION: ICD-10-CM

## 2019-01-28 DIAGNOSIS — Z95.0 CARDIAC PACEMAKER IN SITU: Primary | ICD-10-CM

## 2019-01-28 DIAGNOSIS — I25.10 ATHEROSCLEROSIS OF NATIVE CORONARY ARTERY OF NATIVE HEART WITHOUT ANGINA PECTORIS: ICD-10-CM

## 2019-01-28 DIAGNOSIS — E78.5 HYPERLIPIDEMIA, UNSPECIFIED HYPERLIPIDEMIA TYPE: ICD-10-CM

## 2019-01-28 DIAGNOSIS — Z95.0 CARDIAC PACEMAKER IN SITU: ICD-10-CM

## 2019-01-28 DIAGNOSIS — I44.2 AV BLOCK, COMPLETE (HCC): Primary | ICD-10-CM

## 2019-01-28 DIAGNOSIS — Z45.018 ADJUSTMENT AND MANAGEMENT OF CARDIAC PACEMAKER: ICD-10-CM

## 2019-01-28 PROCEDURE — 99244 OFF/OP CNSLTJ NEW/EST MOD 40: CPT | Performed by: INTERNAL MEDICINE

## 2019-01-28 PROCEDURE — 99024 POSTOP FOLLOW-UP VISIT: CPT | Performed by: INTERNAL MEDICINE

## 2019-01-28 NOTE — PROGRESS NOTES
Results for orders placed or performed in visit on 01/28/19   Cardiac EP device report    Narrative    DEVICE INTERROGATED IN THE [place] OFFICE- NB (LAST CHECK 11/12/18)  PT NEW TO DEVICE CLINIC  BATTERY VOLTAGE ADEQUATE (10 1-11 3 YRS)  AP<1%, <1%  ALL LEAD PARAMETERS WITHIN NORMAL LIMITS  NO NEW SIGNIFICANT HIGH RATE EPISODES  NORMAL DEVICE FUNCTION  APPT W/ DR Reddy Bennett   [facility]

## 2019-01-28 NOTE — PROGRESS NOTES
Cardiology Follow Up    [address]  1955  [id number]  [address] [facility]  [phone]    1  Cardiac pacemaker in situ  Pacer laura dual chamber wo reprog   2  Hyperlipidemia, unspecified hyperlipidemia type     3  Paroxysmal atrial fibrillation (HCC)     4  Essential hypertension     5  Atherosclerosis of native coronary artery of native heart without angina pectoris     6  Papillary adenocarcinoma of thyroid [facility])  Ambulatory referral to Cardiology       Interval History:   Continuation of cardiac care, preoperative cardiac clearance for thyroidectomy  Patient was found to have a thyroid nodule, she is now scheduled for possible total thyroidectomy in the setting carcinoma  She is currently having no cardiac symptoms, denies any chest pain or dyspnea  She is active but does not exercise regularly  She is compliant with low-cholesterol diet, lipids recently checked, HDL 38 and LDL 54 on statin therapy high-intensity plus Zetia therapy  She has history of sinus arrest in the setting of a lateral myocardial infarction several years ago, she did receive a dual-chamber pacemaker, minimal pacing, last interrogation revealed 1% pacing, some questionable high atrial rate events with fibrillation burden less than 1%  She has had 3 pulse generator replacements, last 1 in 2017  Appropriate lead function  Reviewed on multiple records, she did suffer a lateral myocardial infarction  Cardiac catheterization but description she had occluded circumflex marginal that could not be revascularized  Last stress test in 2017, pharmacological stress test revealed evidence of anterolateral infarct with minimal surrounding ischemia interestingly so    Reportedly unchanged when compared with the previous stress test   Left ventricular function is normal     Patient Active Problem List   Diagnosis  Essential hypertension    Hyperlipidemia    Arthritis    History of myocardial infarction    Insomnia    Cardiac pacemaker in situ    Chronic nonalcoholic liver disease    Coronary atherosclerosis    Diabetes mellitus type 2, uncontrolled, without complications (HCC)    Disorder of bilirubin excretion    Elective replacement indicated for pacemaker    Failed total left knee replacement (HCC)    Lumbar back pain with radiculopathy affecting left lower extremity    Metabolic syndrome    Osteoarthritis, generalized    Overweight (BMI 25 0-29  9)    Atrophic vaginitis    Seasonal allergic rhinitis    Abnormal thyroid blood test    Hyperthyroidism    Acute maxillary sinusitis    Thyroid nodule    Papillary adenocarcinoma of thyroid ([facility] [address] )     Past Medical History:   Diagnosis Date    Hypertension     Varicella      Social History     Social History    Marital status: /Civil Union     Spouse name: N/A    Number of children: N/A    Years of education: N/A     Occupational History    Not on file       Social History Main Topics    Smoking status: Never Smoker    Smokeless tobacco: Never Used    Alcohol use No    Drug use: No    Sexual activity: Not on file     Other Topics Concern    Not on file     Social History Narrative    Always uses seat belt     No caffeine use       Family History   Problem Relation Age of Onset    Diabetes unspecified Maternal Aunt     Colon cancer Father     Cancer Father     Heart disease Mother         Cardiac disorder, congenital aortic stenosis    Diabetes Other      Past Surgical History:   Procedure Laterality Date    APPENDECTOMY      CARDIAC PACEMAKER PLACEMENT      CARPAL TUNNEL RELEASE      CHOLECYSTECTOMY      HYSTERECTOMY      REPLACEMENT TOTAL KNEE      US GUIDED THYROID BIOPSY  1/7/2019       Current Outpatient Prescriptions:     aspirin 81 MG tablet, Take by mouth, Disp: , Rfl:     Calcium Polycarbophil (FIBER-CAPS PO), Take by mouth, Disp: , Rfl:     carvedilol (COREG) 25 mg tablet, , Disp: , Rfl:     cetirizine (ZyrTEC) 10 mg tablet, Take 10 mg by mouth, Disp: , Rfl:     Empagliflozin (JARDIANCE) 25 MG TABS, Take 1 tablet (25 mg total) by mouth daily for 90 days, Disp: 90 tablet, Rfl: 1    ezetimibe (ZETIA) 10 mg tablet, Take 1 tablet by mouth daily, Disp: , Rfl:     fexofenadine (MUCINEX ALLERGY) 180 MG tablet, Take 180 mg by mouth daily, Disp: , Rfl:     levothyroxine 125 mcg tablet, Take 1 tablet (125 mcg total) by mouth daily, Disp: 30 tablet, Rfl: 3    losartan (COZAAR) 50 mg tablet, Take 50 mg by mouth daily, Disp: , Rfl:     methimazole (TAPAZOLE) 5 mg tablet, Take 1 tablet (5 mg total) by mouth 3 (three) times a day, Disp: 30 tablet, Rfl: 3    Multiple Vitamin (MULTI-VITAMIN DAILY) TABS, Take by mouth, Disp: , Rfl:     Omega-3 Fatty Acids (FISH OIL) 1200 MG CAPS, Take 1 capsule by mouth 2 (two) times a day, Disp: , Rfl:     pioglitazone-metFORMIN (ACTOPLUS MET)  MG per tablet, Take 1 tablet by mouth 2 (two) times a day for 90 days, Disp: 180 tablet, Rfl: 0    rosuvastatin (CRESTOR) 20 MG tablet, , Disp: , Rfl:     traMADol (ULTRAM) 50 mg tablet, Take 1 tablet (50 mg total) by mouth every 6 (six) hours as needed for moderate pain (Patient not taking: Reported on 1/28/2019 ), Disp: 10 tablet, Rfl: 0  Allergies   Allergen Reactions    Penicillins Rash    Sulfa Antibiotics Rash       Labs:  Appointment on 01/14/2019   Component Date Value    Ventricular Rate 01/14/2019 79     Atrial Rate 01/14/2019 79     AK Interval 01/14/2019 194     QRSD Interval 01/14/2019 106     QT Interval 01/14/2019 378     QTC Interval 01/14/2019 433     P Axis 01/14/2019 27     QRS Axis 01/14/2019 -27     T Wave [place] 01/14/2019 61    Appointment on 01/14/2019   Component Date Value    T3, Total 01/14/2019 1 10     Free T4 01/14/2019 0 99     TSH 3RD GENERATON 01/14/2019 <0 007*    Sodium 01/14/2019 137     Potassium 01/14/2019 3 8     Chloride 01/14/2019 104     CO2 01/14/2019 22     ANION GAP 01/14/2019 11     BUN 01/14/2019 19     Creatinine 01/14/2019 0 63     Glucose 01/14/2019 191*    Calcium 01/14/2019 9 3     AST 01/14/2019 28     ALT 01/14/2019 46     Alkaline Phosphatase 01/14/2019 86     Total Protein 01/14/2019 7 3     Albumin 01/14/2019 3 6     Total Bilirubin 01/14/2019 1 20*    eGFR 01/14/2019 95     WBC 01/14/2019 5 28     RBC 01/14/2019 5 10     Hemoglobin 01/14/2019 14 7     Hematocrit 01/14/2019 43 2     MCV 01/14/2019 85     MCH 01/14/2019 28 8     MCHC 01/14/2019 34 0     RDW 01/14/2019 13 2     MPV 01/14/2019 9 6     Platelets 49/50/6823 253     nRBC 01/14/2019 0     Neutrophils Relative 01/14/2019 44     Immat GRANS % 01/14/2019 0     Lymphocytes Relative 01/14/2019 43     Monocytes Relative 01/14/2019 11     Eosinophils Relative 01/14/2019 1     Basophils Relative 01/14/2019 1     Neutrophils Absolute 01/14/2019 2 30     Immature Grans Absolute 01/14/2019 0 01     Lymphocytes Absolute 01/14/2019 2 29     Monocytes Absolute 01/14/2019 0 58     Eosinophils Absolute 01/14/2019 0 06     Basophils Absolute 01/14/2019 0 04     Hemoglobin A1C 01/14/2019 8 3*    EAG 01/14/2019 192     PTT 01/14/2019 26     Protime 01/14/2019 12 0     INR 01/14/2019 0 88    Hospital Outpatient Visit on 01/07/2019   Component Date Value    Case Report 01/07/2019                      Value:Non-gynecologic Cytology                          Case: [id number]                                  Authorizing Provider:  Tamica Yan PA-C    Collected:           01/07/2019 1146              Ordering Location:     [address]      Received:            01/07/2019 [address] [facility] Ultrasound                                                          Pathologist:           Becky Waterman MD                                                         Specimens: A) - Thyroid, Right, mid to lower                                                                   B) - Thyroid, Right, mid to lower                                                          Final Diagnosis 01/07/2019                      Value: This result contains rich text formatting which cannot be displayed here   Note 01/07/2019                      Value: This result contains rich text formatting which cannot be displayed here   Intraoperative Consultat* 01/07/2019                      Value: This result contains rich text formatting which cannot be displayed here  Alissa Roy Gross Description 01/07/2019                      Value: This result contains rich text formatting which cannot be displayed here   Clinical Information 01/07/2019                      Value:Size: 1 35x1 17x1  02cm Margins: SMOOTH Echogenicity: SOLID Microcalcs: ABSENT Flow: INTRANODULAR/PERIPHERAL Size change: MINIMAL Suspicion level: N/A Hx of Hashimoto's Thyroiditis: NO    Additional Information 01/07/2019                      Value: This result contains rich text formatting which cannot be displayed here     Appointment on 12/17/2018   Component Date Value    Hemoglobin A1C 12/17/2018 8 2*    EAG 12/17/2018 189     Sodium 12/17/2018 139     Potassium 12/17/2018 3 9     Chloride 12/17/2018 103     CO2 12/17/2018 25     ANION GAP 12/17/2018 11     BUN 12/17/2018 12     Creatinine 12/17/2018 0 47*    Glucose, Fasting 12/17/2018 231*    Calcium 12/17/2018 8 7     AST 12/17/2018 31     ALT 12/17/2018 41     Alkaline Phosphatase 12/17/2018 67     Total Protein 12/17/2018 6 5     Albumin 12/17/2018 4 0     Total Bilirubin 12/17/2018 1 30*    eGFR 12/17/2018 105     TSH 3RD GENERATON 12/17/2018 <0 010*    Free T4 12/17/2018 1 51*    T3, Total 12/17/2018 1 70     TSI 12/17/2018 1 26*    THYROID MICROSOMAL ANTIB* 12/17/2018 363*   Appointment on 11/12/2018   Component Date Value    Hemoglobin A1C 11/12/2018 8 1*    EAG 11/12/2018 186     Sodium 11/12/2018 137     Potassium 11/12/2018 3 7     Chloride 11/12/2018 103     CO2 11/12/2018 24     ANION GAP 11/12/2018 10     BUN 11/12/2018 15     Creatinine 11/12/2018 0 47*    Glucose, Fasting 11/12/2018 187*    Calcium 11/12/2018 8 9     AST 11/12/2018 26     ALT 11/12/2018 32     Alkaline Phosphatase 11/12/2018 77     Total Protein 11/12/2018 7 2     Albumin 11/12/2018 4 2     Total Bilirubin 11/12/2018 2 10*    eGFR 11/12/2018 105     Cholesterol 11/12/2018 113     Triglycerides 11/12/2018 103     HDL, Direct 11/12/2018 38*    LDL Calculated 11/12/2018 54     Free T4 11/12/2018 1 58*    TSH 3RD GENERATON 11/12/2018 <0 010*   Transcribe Orders on 11/12/2018   Component Date Value    Creatinine, Ur 11/12/2018 22 4     Microalbum  ,U,Random 11/12/2018 7 5     Microalb Creat Ratio 11/12/2018 33*   Orders Only on 11/06/2018   Component Date Value    Severity 11/06/2018 Alert     Right Eye Diabetic Retin* 11/06/2018 Mild     Left Eye Diabetic Retino* 11/06/2018 None      Imaging: Xr Chest Pa & Lateral    Result Date: 1/17/2019  Narrative: CHEST INDICATION:   C73: Malignant neoplasm of thyroid gland  Preprocedural assessment COMPARISON:  4/26/2011 EXAM PERFORMED/VIEWS:  XR CHEST PA & LATERAL FINDINGS:  Transvenous pacemaker present with leads in the right atrium and right ventricle as before  The cardiac silhouette is without change from the prior study  The lungs are clear  No pneumothorax or pleural effusion  Osseous structures appear within normal limits for patient age  Impression: No acute pulmonary disease Workstation performed: [id number]     Us Head Neck Lymph Node Mapping    Result Date: 1/14/2019  Narrative: NECK ULTRASOUND INDICATION:     Papillary thyroid carcinoma, preop thyroidectomy  COMPARISON:  1/7/2019 FINDINGS: Ultrasound of the cervical lymph node chains was performed with a high frequency linear transducer   Lymph nodes maintain normal morphologic contour, echogenicity and short axis dimensions of less than 0 7 cm  No evidence for microcalcification or focal nodularity  Impression: No evidence of metastatic disease  Workstation performed: [id number]     Us Guided Thyroid Biopsy With Affirma    Result Date: 1/7/2019  Narrative: ULTRASOUND-GUIDED THYROID BIOPSY HISTORY: 58year-old with a history of thyroid nodules and hyperthyroidism  Patient presents with a prescription for ultrasound-guided fine-needle aspiration biopsy of the thyroid performed with Afirma tissue sampling  COMPARISON: 12/17/2018 US Thyroid: A solid right mid to lower pole thyroid nodule measuring 1 3 x 1 1 x 1 1 cm was identified and recommended for biopsy  FINDINGS: Prior ultrasound images were reviewed  On ultrasound imaging performed today, the corresponding right mid to lower pole thyroid nodule measures 1 4 x 1 2 x 1 0 cm  The procedure was explained to the patient, including risks of hemorrhage, infection and local injury  The possibility of a nondiagnostic biopsy result and the need for repeat biopsy or sonographic follow up was explained to the patient  Informed consent was freely obtained  The patient verbalized expressed understanding of the above risks and wished to proceed with the procedure  Final standard "time-out" procedure performed  PROCEDURE: The neck was prepped and draped in normal sterile fashion  Under real-time ultrasound guidance and local anesthesia five passes with 25 gauge needles were made through the right mid to lower pole thyroid nodule  Cytopathology was present and deemed the specimens adequate for evaluation  Two of the needle passes were saved for potential Afirma testing  The patient tolerated the procedure well  Postprocedure instructions were provided for the patient  I asked the patient to call us with any questions, concerns, or acute problems  The patient expressed understanding of the above       Impression: Status post successful ultrasound-guided thyroid biopsy  Tissue samples were obtained for Afirma testing, if required  The above findings and procedure were reviewed with Dr Katey No  Procedure was performed by Alex Elkins PA-C under the direct supervision of Dr Carroll [place]  Workstation performed: [id number]       Review of Systems:  Review of Systems   Constitutional: Positive for fatigue  Negative for activity change, appetite change, chills, diaphoresis, fever and unexpected weight change  HENT: Negative for hearing loss, nosebleeds and trouble swallowing  Eyes: Negative for visual disturbance  Respiratory: Negative for apnea, cough, choking, chest tightness, shortness of breath, wheezing and stridor  Cardiovascular: Positive for palpitations  Negative for chest pain and leg swelling  Gastrointestinal: Negative for abdominal distention, abdominal pain, anal bleeding, blood in stool, constipation, diarrhea, nausea, rectal pain and vomiting  Endocrine: Negative for cold intolerance and heat intolerance  Genitourinary: Negative for difficulty urinating, dysuria, flank pain, frequency and hematuria  Musculoskeletal: Positive for arthralgias and back pain  Negative for gait problem, joint swelling, myalgias and neck pain  Skin: Negative for color change, pallor and rash  Neurological: Positive for headaches  Negative for dizziness, tremors, syncope, facial asymmetry, speech difficulty, weakness, light-headedness and numbness  Hematological: Does not bruise/bleed easily  Psychiatric/Behavioral: Negative for behavioral problems and sleep disturbance  The patient is nervous/anxious  Physical Exam:  Physical Exam   Constitutional: She is oriented to person, place, and time  She appears well-developed and well-nourished  No distress  HENT:   Head: Normocephalic  Eyes: Conjunctivae are normal  No scleral icterus  Neck: No JVD present  No tracheal deviation present   Thyromegaly (Right thyroid nodule palpable) present  Cardiovascular: Normal rate, regular rhythm, normal heart sounds and intact distal pulses  Exam reveals no gallop and no friction rub  No murmur heard  Pulmonary/Chest: Effort normal and breath sounds normal  No stridor  No respiratory distress  She has no wheezes  She has no rales  She exhibits no tenderness  Abdominal: Soft  Bowel sounds are normal  She exhibits no distension and no mass  There is no tenderness  There is no rebound and no guarding  Neurological: She is alert and oriented to person, place, and time  Skin: Skin is warm and dry  No rash noted  She is not diaphoretic  No erythema  No pallor  Psychiatric: She has a normal mood and affect  Discussion/Summary:  Coronary disease, premature currently asymptomatic, abnormal but unchanged stress test, EKG is also unchanged, normal sinus rhythm questionable inferior MI old  Patient is clear for thyroidectomy surgery  Okay to hold aspirin therapy for week prior to surgery  Continue current medications including beta-blocker therapy  Arely Caruso no stress test at the present time  Will do pacemaker interrogation  No clear-cut atrial fibrillation by my own interpretation  Favor no changes in anticoagulation regimen at the time being  Regular exercise recommended  This note was completed in part utilizing [employer] direct voice recognition software  Grammatical errors, random word insertion, spelling mistakes, and incomplete sentences may be an occasional consequence of the system secondary to software limitations, ambient noise and hardware issues  At the time of dictation, efforts were made to edit, clarify and /or correct errors  Please read the chart carefully and recognize, using context, where substitutions have occurred    If you have any questions or concerns about the context, text or information contained within the body of this dictation, please contact myself, the provider, for further clarification

## 2019-02-18 NOTE — PRE-PROCEDURE INSTRUCTIONS
Pre-Surgery Instructions:   Medication Instructions    aspirin 81 MG tablet Patient was instructed by Physician and understands   Calcium Polycarbophil (FIBER-CAPS PO) Instructed patient per Anesthesia Guidelines   carvedilol (COREG) 25 mg tablet Instructed patient per Anesthesia Guidelines   cetirizine (ZyrTEC) 10 mg tablet Instructed patient per Anesthesia Guidelines   Empagliflozin (JARDIANCE) 25 MG TABS Instructed patient per Anesthesia Guidelines   ezetimibe (ZETIA) 10 mg tablet Instructed patient per Anesthesia Guidelines   fexofenadine (MUCINEX ALLERGY) 180 MG tablet Instructed patient per Anesthesia Guidelines   levothyroxine 125 mcg tablet Instructed patient per Anesthesia Guidelines   losartan (COZAAR) 50 mg tablet Instructed patient per Anesthesia Guidelines   methimazole (TAPAZOLE) 5 mg tablet Instructed patient per Anesthesia Guidelines   Multiple Vitamin (MULTI-VITAMIN DAILY) TABS Instructed patient per Anesthesia Guidelines   Omega-3 Fatty Acids (FISH OIL) 1200 MG CAPS Instructed patient per Anesthesia Guidelines   pioglitazone-metFORMIN (ACTOPLUS MET)  MG per tablet Instructed patient per Anesthesia Guidelines   rosuvastatin (CRESTOR) 20 MG tablet Instructed patient per Anesthesia Guidelines  Spoke to pt  Medication list reviewed & instructed  As of 2 18 19 pt to stop MV, fish oil, calcium  Pt stated she held aspirin as of yesterday 2 17 19  Instructed on tylenol only  Am DOS pt to take levothyroxine, methimazole, carvedilol with 1-2 sips of water  Showering instructions given at time of call  All instructions verbally understood by patient  No further questions  ACE/ARB Med Class     Continue this medication up to the evening before surgery/procedure, but do not take the morning of the day of surgery    ASA Med Class: Aspirin     Should be discontinued at least one week prior to planned operation, unless specifically stated otherwise by surgical service  Your Surgeon may have patient stop taking aspirin up to a week before surgery if having intracranial, middle ear, posterior eye, spine surgery or prostate surgery  [Patients taking aspirin for coronary stents should be reviewed by an anesthesiologist in the optimization clinic  Please do not discontinue aspirin in patients with coronary stents unless given specific permission to do so by the cardiologist who prescribed medication ]   If your surgeon approves please continue to take this medication on your normal schedule  You may take this medication on the morning of your surgery with a sip of water  Beta blocker Med Class     Continue to take this heart medication on your normal schedule  If this is an oral medication and you take it in the morning, then you may take this medicine with a sip of water  Insulin Med Class     Pre-Surgery/Procedure Instructions for Adult Patients who Take Medicine for Diabetes or to Control their Blood Sugar     Day Before Surgery/Procedure  Use the directions based on the type of medicine you take for your diabetes  1  If you are having a procedure that does not require a bowel prep:  ? Pre-Mixed Insulin (Intermediate Acting: Humalog 75/25, Humulin 70/30  Novolog 70/30, Regular Insulin)  § Take ½ your regular dose the evening before your procedure  ? Rapid/Fast Acting Insulin/Long Acting Insulin (Humalog U200, NovoLog, Apidra, Lantus, Levemir, Nikki Courser, [place])  § Take your FULL regular dose the day before procedure  ? Oral Diabetic Medicines including Glipizide/Glimepiride/Glucotrol (sulfonylurea)  § Take your regular dose with dinner the evening before your procedure    2  If you are having a procedure (e g  Colonoscopy) that requires a bowel prep and you are allowed to have at least a clear liquid diet:  ? Pre-Mixed Insulin (Intermediate Acting: Humalog 75/25, Humulin 70/30, Novolog 70/30, Regular Insulin)  § Take ½ your regular dose the evening before your procedure  ? Rapid/Fast Acting Insulin (Humalog U200, NovoLog, Apidra, Fiasp)  § Take ½ your regular dose the evening before your procedure  ? Long Acting Insulin (Lantus, Levemir, Gearold Labrador)  § Take your FULL regular dose the day before procedure  ? Oral Glipizide/Glimepiride/Glucotrol (sulfonylurea)  § Take ½ your regular dose the evening before your procedure  ? Oral Diabetic Medicines that are NOT Glipizide/Glimepiride/Glucotrol  § Take your regular dose with dinner in the evening before your procedure      Day of Surgery/Procedure  · Long Acting Insulin (Lantus, Levemir, Gearold Labrador)  ? If you usually take your Long-Acting Insulin in the morning, take the full dose as scheduled  · With the exception of the morning Long-Acting Insulin noted above, DO NOT take ANY diabetic medicine on the day of your procedure unless you were instructed by the doctor who manages your diabetic medicines  · Continue to check your blood sugars  · If you have an insulin pump then consult with your Endocrinologist for instructions  · If you cannot see your Endocrinologist, on the day of the procedure set your insulin pump to your basal rate only  Please bring your insulin pump supplies to the hospital      This Educational material has been approved by the Patient Education Advisory Committee  Date prepared: 1/17/2018          Expiration date: 1/17/2019        Approval Number:                     NonStatin Med Class     Continue to take this medication on your normal schedule  If this is an oral medication and you take it in the morning, then you may take this medicine with a sip of water  Opioid Med Class     Continue to take this medication on your normal schedule  If this is an oral medication and you take it in the morning, then you may take this medicine with a sip of water  Statin Med Class     Continue to take this medication on your normal schedule    If this is an oral medication and you take it in the morning, then you may take this medicine with a sip of water  Thyroxine Med Class     Continue to take this medication on your normal schedule  If this is an oral medication and you take it in the morning, then you may take this medicine with a sip of water

## 2019-02-19 ENCOUNTER — HOSPITAL ENCOUNTER (OUTPATIENT)
Facility: HOSPITAL | Age: 64
Discharge: HOME | End: 2019-02-20
Attending: SURGERY | Admitting: SURGERY
Payer: COMMERCIAL

## 2019-02-19 ENCOUNTER — ANESTHESIA EVENT (OUTPATIENT)
Dept: PERIOP | Facility: HOSPITAL | Age: 64
End: 2019-02-19
Payer: COMMERCIAL

## 2019-02-19 ENCOUNTER — ANESTHESIA (OUTPATIENT)
Dept: PERIOP | Facility: HOSPITAL | Age: 64
End: 2019-02-19
Payer: COMMERCIAL

## 2019-02-19 DIAGNOSIS — C73 PAPILLARY ADENOCARCINOMA OF THYROID (HCC): Primary | ICD-10-CM

## 2019-02-19 LAB
CALCIUM SERPL-MCNC: 7.4 MG/DL (ref 8.3–10.1)
CALCIUM SERPL-MCNC: 8.5 MG/DL (ref 8.3–10.1)
GLUCOSE SERPL-MCNC: 196 MG/DL (ref 65–140)
GLUCOSE SERPL-MCNC: 221 MG/DL (ref 65–140)
PLATELET # BLD AUTO: 199 THOUSANDS/UL (ref 149–390)
PMV BLD AUTO: 9 FL (ref 8.9–12.7)

## 2019-02-19 PROCEDURE — 88307 TISSUE EXAM BY PATHOLOGIST: CPT | Performed by: PATHOLOGY

## 2019-02-19 PROCEDURE — 82310 ASSAY OF CALCIUM: CPT | Performed by: SURGERY

## 2019-02-19 PROCEDURE — 82948 REAGENT STRIP/BLOOD GLUCOSE: CPT

## 2019-02-19 PROCEDURE — 85049 AUTOMATED PLATELET COUNT: CPT | Performed by: SURGERY

## 2019-02-19 PROCEDURE — 60240 REMOVAL OF THYROID: CPT | Performed by: SURGERY

## 2019-02-19 RX ORDER — TRAMADOL HYDROCHLORIDE 50 MG/1
50 TABLET ORAL EVERY 6 HOURS PRN
Status: DISCONTINUED | OUTPATIENT
Start: 2019-02-19 | End: 2019-02-20 | Stop reason: HOSPADM

## 2019-02-19 RX ORDER — HYDROMORPHONE HCL/PF 1 MG/ML
0.2 SYRINGE (ML) INJECTION
Status: DISCONTINUED | OUTPATIENT
Start: 2019-02-19 | End: 2019-02-19 | Stop reason: HOSPADM

## 2019-02-19 RX ORDER — PROPOFOL 10 MG/ML
INJECTION, EMULSION INTRAVENOUS AS NEEDED
Status: DISCONTINUED | OUTPATIENT
Start: 2019-02-19 | End: 2019-02-19 | Stop reason: SURG

## 2019-02-19 RX ORDER — OXYCODONE HYDROCHLORIDE AND ACETAMINOPHEN 5; 325 MG/1; MG/1
2 TABLET ORAL EVERY 4 HOURS PRN
Status: DISCONTINUED | OUTPATIENT
Start: 2019-02-19 | End: 2019-02-20 | Stop reason: HOSPADM

## 2019-02-19 RX ORDER — BUPIVACAINE HYDROCHLORIDE 5 MG/ML
INJECTION, SOLUTION PERINEURAL AS NEEDED
Status: DISCONTINUED | OUTPATIENT
Start: 2019-02-19 | End: 2019-02-19 | Stop reason: HOSPADM

## 2019-02-19 RX ORDER — ONDANSETRON 2 MG/ML
4 INJECTION INTRAMUSCULAR; INTRAVENOUS ONCE AS NEEDED
Status: DISCONTINUED | OUTPATIENT
Start: 2019-02-19 | End: 2019-02-19 | Stop reason: HOSPADM

## 2019-02-19 RX ORDER — HYDROMORPHONE HCL/PF 1 MG/ML
SYRINGE (ML) INJECTION AS NEEDED
Status: DISCONTINUED | OUTPATIENT
Start: 2019-02-19 | End: 2019-02-19 | Stop reason: SURG

## 2019-02-19 RX ORDER — ONDANSETRON 2 MG/ML
4 INJECTION INTRAMUSCULAR; INTRAVENOUS EVERY 8 HOURS PRN
Status: DISCONTINUED | OUTPATIENT
Start: 2019-02-19 | End: 2019-02-20 | Stop reason: HOSPADM

## 2019-02-19 RX ORDER — FENTANYL CITRATE 50 UG/ML
INJECTION, SOLUTION INTRAMUSCULAR; INTRAVENOUS AS NEEDED
Status: DISCONTINUED | OUTPATIENT
Start: 2019-02-19 | End: 2019-02-19 | Stop reason: SURG

## 2019-02-19 RX ORDER — LEVOTHYROXINE SODIUM 0.12 MG/1
125 TABLET ORAL
Status: DISCONTINUED | OUTPATIENT
Start: 2019-02-20 | End: 2019-02-20 | Stop reason: HOSPADM

## 2019-02-19 RX ORDER — CARVEDILOL 25 MG/1
25 TABLET ORAL 2 TIMES DAILY WITH MEALS
Status: DISCONTINUED | OUTPATIENT
Start: 2019-02-19 | End: 2019-02-20 | Stop reason: HOSPADM

## 2019-02-19 RX ORDER — MIDAZOLAM HYDROCHLORIDE 1 MG/ML
INJECTION INTRAMUSCULAR; INTRAVENOUS AS NEEDED
Status: DISCONTINUED | OUTPATIENT
Start: 2019-02-19 | End: 2019-02-19 | Stop reason: SURG

## 2019-02-19 RX ORDER — HEPARIN SODIUM 5000 [USP'U]/ML
5000 INJECTION, SOLUTION INTRAVENOUS; SUBCUTANEOUS EVERY 8 HOURS SCHEDULED
Status: DISCONTINUED | OUTPATIENT
Start: 2019-02-19 | End: 2019-02-20 | Stop reason: HOSPADM

## 2019-02-19 RX ORDER — ONDANSETRON 2 MG/ML
INJECTION INTRAMUSCULAR; INTRAVENOUS AS NEEDED
Status: DISCONTINUED | OUTPATIENT
Start: 2019-02-19 | End: 2019-02-19 | Stop reason: SURG

## 2019-02-19 RX ORDER — PRAVASTATIN SODIUM 40 MG
40 TABLET ORAL
Status: DISCONTINUED | OUTPATIENT
Start: 2019-02-19 | End: 2019-02-20 | Stop reason: HOSPADM

## 2019-02-19 RX ORDER — LOSARTAN POTASSIUM 50 MG/1
50 TABLET ORAL DAILY
Status: DISCONTINUED | OUTPATIENT
Start: 2019-02-20 | End: 2019-02-20 | Stop reason: HOSPADM

## 2019-02-19 RX ORDER — OXYCODONE HYDROCHLORIDE AND ACETAMINOPHEN 5; 325 MG/1; MG/1
1 TABLET ORAL EVERY 4 HOURS PRN
Status: DISCONTINUED | OUTPATIENT
Start: 2019-02-19 | End: 2019-02-20 | Stop reason: HOSPADM

## 2019-02-19 RX ORDER — SODIUM CHLORIDE, SODIUM LACTATE, POTASSIUM CHLORIDE, CALCIUM CHLORIDE 600; 310; 30; 20 MG/100ML; MG/100ML; MG/100ML; MG/100ML
INJECTION, SOLUTION INTRAVENOUS CONTINUOUS PRN
Status: DISCONTINUED | OUTPATIENT
Start: 2019-02-19 | End: 2019-02-19 | Stop reason: SURG

## 2019-02-19 RX ORDER — ALBUTEROL SULFATE 2.5 MG/3ML
2.5 SOLUTION RESPIRATORY (INHALATION) ONCE AS NEEDED
Status: DISCONTINUED | OUTPATIENT
Start: 2019-02-19 | End: 2019-02-19 | Stop reason: HOSPADM

## 2019-02-19 RX ORDER — SUCCINYLCHOLINE CHLORIDE 20 MG/ML
INJECTION INTRAMUSCULAR; INTRAVENOUS AS NEEDED
Status: DISCONTINUED | OUTPATIENT
Start: 2019-02-19 | End: 2019-02-19 | Stop reason: SURG

## 2019-02-19 RX ORDER — DOCUSATE SODIUM 100 MG/1
100 CAPSULE, LIQUID FILLED ORAL 2 TIMES DAILY PRN
Status: DISCONTINUED | OUTPATIENT
Start: 2019-02-19 | End: 2019-02-20 | Stop reason: HOSPADM

## 2019-02-19 RX ORDER — MEPERIDINE HYDROCHLORIDE 25 MG/ML
12.5 INJECTION INTRAMUSCULAR; INTRAVENOUS; SUBCUTANEOUS
Status: DISCONTINUED | OUTPATIENT
Start: 2019-02-19 | End: 2019-02-19 | Stop reason: HOSPADM

## 2019-02-19 RX ORDER — LORATADINE 10 MG/1
10 TABLET ORAL DAILY
Status: DISCONTINUED | OUTPATIENT
Start: 2019-02-20 | End: 2019-02-20 | Stop reason: HOSPADM

## 2019-02-19 RX ORDER — METHIMAZOLE 5 MG/1
5 TABLET ORAL 3 TIMES DAILY
Status: DISCONTINUED | OUTPATIENT
Start: 2019-02-19 | End: 2019-02-20

## 2019-02-19 RX ORDER — ACETAMINOPHEN 325 MG/1
650 TABLET ORAL EVERY 6 HOURS PRN
Status: DISCONTINUED | OUTPATIENT
Start: 2019-02-19 | End: 2019-02-20 | Stop reason: HOSPADM

## 2019-02-19 RX ORDER — EZETIMIBE 10 MG/1
10 TABLET ORAL DAILY
Status: DISCONTINUED | OUTPATIENT
Start: 2019-02-20 | End: 2019-02-20 | Stop reason: HOSPADM

## 2019-02-19 RX ORDER — SODIUM CHLORIDE 9 MG/ML
125 INJECTION, SOLUTION INTRAVENOUS CONTINUOUS
Status: DISCONTINUED | OUTPATIENT
Start: 2019-02-19 | End: 2019-02-20

## 2019-02-19 RX ORDER — HYDROMORPHONE HCL/PF 1 MG/ML
0.2 SYRINGE (ML) INJECTION
Status: DISCONTINUED | OUTPATIENT
Start: 2019-02-19 | End: 2019-02-19

## 2019-02-19 RX ORDER — LIDOCAINE HYDROCHLORIDE 10 MG/ML
INJECTION, SOLUTION INFILTRATION; PERINEURAL AS NEEDED
Status: DISCONTINUED | OUTPATIENT
Start: 2019-02-19 | End: 2019-02-19 | Stop reason: SURG

## 2019-02-19 RX ORDER — FENTANYL CITRATE/PF 50 MCG/ML
25 SYRINGE (ML) INJECTION
Status: DISCONTINUED | OUTPATIENT
Start: 2019-02-19 | End: 2019-02-19 | Stop reason: HOSPADM

## 2019-02-19 RX ORDER — ASPIRIN 81 MG/1
81 TABLET, CHEWABLE ORAL
Status: DISCONTINUED | OUTPATIENT
Start: 2019-02-19 | End: 2019-02-20 | Stop reason: HOSPADM

## 2019-02-19 RX ADMIN — FENTANYL CITRATE 100 MCG: 50 INJECTION, SOLUTION INTRAMUSCULAR; INTRAVENOUS at 07:56

## 2019-02-19 RX ADMIN — MIDAZOLAM 2 MG: 1 INJECTION INTRAMUSCULAR; INTRAVENOUS at 07:33

## 2019-02-19 RX ADMIN — ONDANSETRON 4 MG: 2 INJECTION INTRAMUSCULAR; INTRAVENOUS at 10:00

## 2019-02-19 RX ADMIN — SODIUM CHLORIDE, SODIUM LACTATE, POTASSIUM CHLORIDE, AND CALCIUM CHLORIDE: .6; .31; .03; .02 INJECTION, SOLUTION INTRAVENOUS at 07:33

## 2019-02-19 RX ADMIN — DEXAMETHASONE SODIUM PHOSPHATE 10 MG: 10 INJECTION INTRAMUSCULAR; INTRAVENOUS at 07:56

## 2019-02-19 RX ADMIN — INSULIN HUMAN 3 UNITS: 100 INJECTION, SOLUTION PARENTERAL at 11:29

## 2019-02-19 RX ADMIN — HEPARIN SODIUM 5000 UNITS: 5000 INJECTION INTRAVENOUS; SUBCUTANEOUS at 17:28

## 2019-02-19 RX ADMIN — PROPOFOL 200 MG: 10 INJECTION, EMULSION INTRAVENOUS at 07:44

## 2019-02-19 RX ADMIN — PRAVASTATIN SODIUM 40 MG: 20 TABLET ORAL at 17:27

## 2019-02-19 RX ADMIN — SODIUM CHLORIDE 125 ML/HR: 0.9 INJECTION, SOLUTION INTRAVENOUS at 18:06

## 2019-02-19 RX ADMIN — HYDROMORPHONE HYDROCHLORIDE 0.5 MG: 1 INJECTION, SOLUTION INTRAMUSCULAR; INTRAVENOUS; SUBCUTANEOUS at 08:30

## 2019-02-19 RX ADMIN — SODIUM CHLORIDE, SODIUM LACTATE, POTASSIUM CHLORIDE, AND CALCIUM CHLORIDE: .6; .31; .03; .02 INJECTION, SOLUTION INTRAVENOUS at 09:59

## 2019-02-19 RX ADMIN — METHIMAZOLE 5 MG: 5 TABLET ORAL at 21:38

## 2019-02-19 RX ADMIN — METHIMAZOLE 5 MG: 5 TABLET ORAL at 18:47

## 2019-02-19 RX ADMIN — CARVEDILOL 25 MG: 25 TABLET, FILM COATED ORAL at 17:27

## 2019-02-19 RX ADMIN — ASPIRIN 81 MG 81 MG: 81 TABLET ORAL at 21:37

## 2019-02-19 RX ADMIN — LIDOCAINE HYDROCHLORIDE 50 MG: 10 INJECTION, SOLUTION INFILTRATION; PERINEURAL at 07:44

## 2019-02-19 RX ADMIN — FENTANYL CITRATE 100 MCG: 50 INJECTION, SOLUTION INTRAMUSCULAR; INTRAVENOUS at 07:44

## 2019-02-19 RX ADMIN — SUCCINYLCHOLINE CHLORIDE 100 MG: 20 INJECTION, SOLUTION INTRAMUSCULAR; INTRAVENOUS at 07:44

## 2019-02-19 RX ADMIN — SODIUM CHLORIDE 125 ML/HR: 0.9 INJECTION, SOLUTION INTRAVENOUS at 13:50

## 2019-02-19 RX ADMIN — HEPARIN SODIUM 5000 UNITS: 5000 INJECTION INTRAVENOUS; SUBCUTANEOUS at 21:37

## 2019-02-19 NOTE — LETTER
[address]  No information on file  February 20, 2019     Patient: Jennifer Sales   YOB: 1955   Date of Visit: 2/19/2019       To Whom it May Concern:    Vicky Brandon is under my professional care  She was seen in the hospital from 2/19/19   to 02/20/19  She may return to school on 2/27/19  If you have any questions or concerns, please don't hesitate to call           Sincerely,      NPI: [id number]    Chirag Buckley MD

## 2019-02-19 NOTE — RESPIRATORY THERAPY NOTE
RT Protocol Note  Deb Castillo 58 y o  female MRN: [id number]  Unit/Bed#: -01 Encounter: [id number]    Assessment    Principal Problem:    Papillary adenocarcinoma of thyroid ([place] [address] )      Home Pulmonary Medications:  none       Past Medical History:   Diagnosis Date    Diabetes mellitus ([place] [address] )     History of staph infection     Hypertension     Myocardial infarction ([place] [address] )     Varicella      Social History     Socioeconomic History    Marital status: /Civil Union     Spouse name: None    Number of children: None    Years of education: None    Highest education level: None   Occupational History    None   Social Needs    Financial resource strain: None    Food insecurity:     Worry: None     Inability: None    Transportation needs:     Medical: None     Non-medical: None   Tobacco Use    Smoking status: Never Smoker    Smokeless tobacco: Never Used   Substance and Sexual Activity    Alcohol use: No    Drug use: No    Sexual activity: None   Lifestyle    Physical activity:     Days per week: None     Minutes per session: None    Stress: None   Relationships    Social connections:     Talks on phone: None     Gets together: None     Attends Orthodoxy service: None     Active member of club or organization: None     Attends meetings of clubs or organizations: None     Relationship status: None    Intimate partner violence:     Fear of current or ex partner: None     Emotionally abused: None     Physically abused: None     Forced sexual activity: None   Other Topics Concern    None   Social History Narrative    Always uses seat belt     No caffeine use        Subjective         Objective    Physical Exam:   Assessment Type: (P) Assess only  General Appearance: (P) Alert, Awake  Respiratory Pattern: (P) Normal  Chest Assessment: (P) Chest expansion symmetrical  Bilateral Breath Sounds: (P) Clear, Diminished  Cough: (P) None    Vitals:  Blood pressure 130/63, pulse 98, temperature 98 8 °F (37 1 °C), temperature source Oral, resp  rate 18, height 5' 4" (1 626 m), weight 68 5 kg (151 lb), SpO2 94 %  Imaging and other studies: I have personally reviewed pertinent reports  Plan    Respiratory Plan: (P) Discontinue Protocol        Resp Comments: (P) Pt post op thyroid CA  Pt ordered on resp protocol  Pt denies pulm hx or meds at home  Pt has clear bs, slightly coarse RUL  Pt states he has allergies and post nasal drip that caused her to cough  CXR 1/14 clear  Resp protocol not indicated at this time  Will d/c from protocol, and pt was instructed to make RN aware if she develops resp  symptoms

## 2019-02-19 NOTE — ANESTHESIA POSTPROCEDURE EVALUATION
Post-Op Assessment Note    CV Status:  Stable    Pain management: adequate     Mental Status:  Awake   Hydration Status:  Stable   PONV Controlled:  Controlled   Airway Patency:  Patent   Post Op Vitals Reviewed: Yes      Staff: Anesthesiologist, CRNA           BP   135/76   Temp (P) 98 4 °F (36 9 °C)(98 4) (02/19/19 1030)    Pulse (P) 87 (02/19/19 1030)   Resp   18   SpO2 (P) 98 % (02/19/19 1030)

## 2019-02-19 NOTE — OP NOTE
OPERATIVE REPORT  PATIENT NAME: Sarah Clarke    :  1955  MRN: [id number]  Pt Location: BE OR ROOM 06    SURGERY DATE: 2019    Surgeon(s) and Role:     * Michelle Paz MD - Primary     Erendira Ramos - Assisting    Preop Diagnosis:  Papillary adenocarcinoma of thyroid ([address] ) [C73]    Post-Op Diagnosis Codes:     * Papillary adenocarcinoma of thyroid ([address] ) [C73]    Procedure(s) (LRB):  TOTAL THYROIDECTOMY (Bilateral)    Specimen(s):  ID Type Source Tests Collected by Time Destination   1 : Total Thyroidectomy Tissue Thyroid TISSUE EXAM Michelle Paz MD 2019 [id number]        Estimated Blood Loss:   Minimal    Drains:  * No LDAs found *    Anesthesia Type:   General    Operative Indications:  Papillary adenocarcinoma of thyroid ([facility] [address] ) [C73]      Operative Findings:  n/a    Complications:   None    Procedure and Technique:  The patient was brought into the operating room and identified by a proper timeout  Following this she was intubated by the anesthesia team   She was then positioned with a shoulder roll behind the scapula and the head in the sniffing position  The neck was then prepped and draped in the usual fashion  Following this, the skin at and around the planned cervical incision site was anesthetized with lidocaine  Next, a 4 cm incision was made 2 fingerbreadths above the sternal notch  Cautery was used to dissect through the dermis and subcutaneous fat  The platysma was transected with cautery  We then created flaps superiorly and inferiorly underneath the platysma  Gelpi retractors were then placed for exposure  We then proceeded to look for the strap muscles  We were able to visualize strap muscles and divided them to expose the thyroid  We first focused on the right thyroid lobe  The strap muscles were mobilized them off the anterolateral aspect of the thyroid lobe  Using traction on the superior pole we were able to take down the superior pole vessels with Harmonic Scalpel  We rotated the gland anteromedially  We saw what appeared to be the parathyroid glands which were visualized and preserved by means of close capsular dissection using bipolar device  We visualized recurrent laryngeal nerve as we rolled the small lobe forward  The ligament of berry was then clamped and tied off with a 2-0 vicryl suture  The lobe was then freed from anterior surface of the trachea with cautery  Valsalva was applied in the operative Field inspected for bleeding  None was seen  Surgicel was placed in the operative field  We then focused on the left thyroid lobe  The strap muscles were mobilized them off the anterolateral aspect of the thyroid lobe  Using traction on the superior pole we were able to take down the superior pole vessels with Harmonic Scalpel  We rotated the gland anteromedially  We saw what appeared to be the parathyroid glands which were visualized and preserved by means of close capsular dissection using bipolar device  We visualized recurrent laryngeal nerve as we rolled the small lobe forward  The ligament of berry was then clamped and tied off with a 2-0 vicryl suture  The lobe was then freed from anterior surface of the trachea with cautery and the gland was then sent to pathology for processing  Valsalva was applied in the operative Field inspected for bleeding  None was seen  Surgicel was placed in the operative field  Once we were sure of hemostasis, closure was performed using 3-0 Vicryl to close the strap muscles in the midline, followed by running 3-0 Vicryl to close the platysma, followed by a 4-0 vicryl to close the dermis in interrupted fashion, followed by 5-0 Monocryl placed in running subcuticular fashion to close the skin  Benzoin and steristrips were applied to the incision, followed by gauze and a blue towel  The patient tolerated the procedure well without any difficulties          I was present for the entire procedure    Patient Disposition:  PACU     SIGNATURE: Michelle Paz MD  DATE: February 19, 2019  TIME: 10:07 AM

## 2019-02-19 NOTE — H&P
[address] [facility] [address] [facility] [address] [phone]     Krystina Condon  1955  [id number]  [address] [facility] [address] [facility] [address] [phone]          Chief Complaint   Patient presents with   [address] patient referral for papillary thyroid cancer          Assessment/Plan:     No problem-specific Assessment & Plan notes found for this encounter          Diagnoses and all orders for this visit:     Papillary adenocarcinoma of thyroid (HCC)  -     traMADol (ULTRAM) 50 mg tablet; Take 1 tablet (50 mg total) by mouth every 6 (six) hours as needed for moderate pain         Advance Care Planning/Advance Directives:  Discussed disease status, cancer treatment plans and/or cancer treatment goals with the patient                Papillary adenocarcinoma of thyroid ([facility] [address] )     1/7/2019 Biopsy       Fine needle aspiration of right thyroid  Malignant ([place] category VI) papillary thyroid carcinoma                History of Present Illness:  Patient is a 57-year-old woman being treated for hyper thyroidism/Graves disease  She was noted to have multiple thyroid nodules on recent ultrasound  One of these met criteria for biopsy  This was done confirming papillary thyroid cancer  No history of radiation exposure to the head or neck area  No family history or personal history of endocrine cancer      Review of Systems   Constitutional: Negative  HENT: Negative  Eyes: Negative  Respiratory: Negative  Cardiovascular: Negative  Gastrointestinal: Negative  Endocrine: Negative  Genitourinary: Negative  Musculoskeletal: Negative  Skin: Negative  Allergic/Immunologic: Negative  Neurological: Negative  Hematological: Negative      Psychiatric/Behavioral: Negative                  Patient Active Problem List   Diagnosis    Essential hypertension    Hyperlipidemia    Arthritis    History of myocardial infarction    Insomnia    Cardiac pacemaker in situ    Chronic nonalcoholic liver disease    Coronary atherosclerosis    Diabetes mellitus type 2, uncontrolled, without complications (HCC)    Disorder of bilirubin excretion    Elective replacement indicated for pacemaker    Failed total left knee replacement (HCC)    Lumbar back pain with radiculopathy affecting left lower extremity    Metabolic syndrome    Osteoarthritis, generalized    Overweight (BMI 25 0-29  9)    Atrophic vaginitis    Seasonal allergic rhinitis    Abnormal thyroid blood test    Hyperthyroidism    Acute maxillary sinusitis    Thyroid nodule    Papillary adenocarcinoma of thyroid ([address] )      Medical History        Past Medical History:   Diagnosis Date    Hypertension      Varicella           Surgical History         Past Surgical History:   Procedure Laterality Date    APPENDECTOMY        CARDIAC PACEMAKER PLACEMENT        CARPAL TUNNEL RELEASE        CHOLECYSTECTOMY        HYSTERECTOMY        REPLACEMENT TOTAL KNEE        US GUIDED THYROID BIOPSY   1/7/2019               Family History   Problem Relation Age of Onset    Diabetes unspecified Maternal Aunt      Colon cancer Father      Cancer Father      Heart disease Mother           Cardiac disorder, congenital aortic stenosis    Diabetes Other        Social History               Social History    Marital status: /Civil Union       Spouse name: N/A    Number of children: N/A    Years of education: N/A          Occupational History    Not on file            Social History Main Topics    Smoking status: Current Every Day Smoker    Smokeless tobacco: Never Used    Alcohol use No    Drug use: No    Sexual activity: Not on file           Other Topics Concern    Not on file          Social History Narrative     Always uses seat belt      No caffeine use             Current Outpatient Prescriptions:     aspirin 81 MG tablet, Take by mouth, Disp: , Rfl:     Calcium Polycarbophil (FIBER-CAPS PO), Take by mouth, Disp: , Rfl:     carvedilol (COREG) 25 mg tablet, , Disp: , Rfl:     cetirizine (ZyrTEC) 10 mg tablet, Take 10 mg by mouth, Disp: , Rfl:     Empagliflozin (JARDIANCE) 25 MG TABS, Take 1 tablet (25 mg total) by mouth daily for 90 days, Disp: 90 tablet, Rfl: 1    ezetimibe (ZETIA) 10 mg tablet, Take 1 tablet by mouth daily, Disp: , Rfl:     fexofenadine (MUCINEX ALLERGY) 180 MG tablet, Take 180 mg by mouth daily, Disp: , Rfl:     liraglutide (VICTOZA) injection, Inject 0 3 mL (1 8 mg total) under the skin daily, Disp: 9 mL, Rfl: 6    losartan (COZAAR) 50 mg tablet, Take 50 mg by mouth daily, Disp: , Rfl:     methimazole (TAPAZOLE) 5 mg tablet, Take 1 tablet (5 mg total) by mouth 3 (three) times a day, Disp: 30 tablet, Rfl: 3    Multiple Vitamin (MULTI-VITAMIN DAILY) TABS, Take by mouth, Disp: , Rfl:     Omega-3 Fatty Acids (FISH OIL) 1200 MG CAPS, Take 1 capsule by mouth 2 (two) times a day, Disp: , Rfl:     pioglitazone-metFORMIN (ACTOPLUS MET)  MG per tablet, Take 1 tablet by mouth 2 (two) times a day for 90 days, Disp: 180 tablet, Rfl: 0    rosuvastatin (CRESTOR) 20 MG tablet, , Disp: , Rfl:     traMADol (ULTRAM) 50 mg tablet, Take 1 tablet (50 mg total) by mouth every 6 (six) hours as needed for moderate pain, Disp: 10 tablet, Rfl: 0       Allergies   Allergen Reactions    Penicillins Rash    Sulfa Antibiotics Rash          Vitals:     01/14/19 1036   BP: 120/70   Pulse: 98   Resp: 16   Temp: 98 6 °F (37 °C)         Physical Exam   Constitutional: She is oriented to person, place, and time  She appears well-developed and well-nourished  HENT:   Head: Normocephalic and atraumatic  Right Ear: External ear normal    Left Ear: External ear normal    Eyes: Pupils are equal, round, and reactive to light   Conjunctivae and EOM are normal    Neck: Normal range of motion  Neck supple  No JVD present  No tracheal deviation present  No thyromegaly present  Cardiovascular: Normal rate, regular rhythm, normal heart sounds and intact distal pulses  Pulmonary/Chest: Effort normal and breath sounds normal  No stridor  Abdominal: Soft  Bowel sounds are normal    Lymphadenopathy:     She has no cervical adenopathy  Neurological: She is alert and oriented to person, place, and time  Skin: Skin is warm and dry          Pathology:  Case Report   Non-gynecologic Cytology                          Case: [id number]                                   Authorizing Provider: Bettie Don PA-C    Collected:           01/07/2019 1146               Ordering Location:     [facility]      Received:            01/07/2019 1210                                      [facility]                                                           Pathologist:           Marilee Gonsalez MD                                                          Specimens:   A) - Thyroid, Right, mid to lower                                                                    B) - Thyroid, Right, mid to lower                                                           Final Diagnosis   A & B  Thyroid, right, mid to lower (ThinPrep and smear preparations): Malignant ([place] Category VI) - See note  Papillary thyroid carcinoma - see Note      Satisfactory for evaluation      Note: As reported in the [address] for Reporting Thyroid Cytopathology*, this diagnostic category has demonstrated anywhere from 97% - 99% risk of malignancy being found in subsequent resections   A small proportion of cases (~3-4%) diagnosed as malignant - compatible with papillary thyroid carcinoma - may prove to be NIFTP (non-invasive follicular thyroid neoplasm with papillary-like nuclear features) on histopathologic examination   Due to the usage of the surgical pathology diagnosis of NIFTP, the anticipated risk of malignancy is 94-96%    The manual reports that the usual management following this diagnosis is near-total or total thyroidectomy (in cases of "Malignant" interpretation indicating metastatic tumor rather than primary, surgery may not be indicated)  Ultimately, clinical/imaging correlation for this patient is needed in arriving at the actual management plan  *  *The [place] System for Reporting Thyroid Cytopathology, Munira Peace Oh ), 2018 (2nd ed )   Electronically signed by Vincent Regalado MD on 1/8/2019 at  2:20 PM            Labs:        Lab Results   Component Value Date     [id number] <0 010 (L) 12/17/2018     [id number] 1 70 12/17/2018         Imaging  Us Thyroid     Result Date: 12/19/2018  Narrative: THYROID ULTRASOUND INDICATION:    E05 90: Thyrotoxicosis, unspecified without thyrotoxic crisis or storm  New diagnosis of hyperthyroidism  COMPARISON:  None TECHNIQUE:   Ultrasound of the thyroid was performed with a high frequency linear transducer in transverse and sagittal planes including volumetric imaging sweeps as well as traditional still imaging technique  FINDINGS:  Thyroid parenchyma is diffusely heterogeneous in echotexture  Right lobe:  4 7 x 1 9 x 1 7 cm  Left lobe:  4 3 x 1 9 x 1 8 cm  Isthmus:  0 2 cm  Nodule #1  Image [id number]  RIGHT midgland nodule measuring 0 4 x 0 6 x 0 5 cm  COMPOSITION:  2 points, could not be determined do to calcification  ECHOGENICITY:  1 point, echogenicity cannot be determined  SHAPE:  0 points, wider-than-tall  MARGIN: 0 points, cannot be determined  ECHOGENIC FOCI:  1 point, macrocalcifications  TI-RADS Classification: TR 4 (4-6 points), Moderately suspicious  FNA if > 1 5 cm  Follow if > 1cm  Nodule #2  Image [id number]  Right mid gland to lower pole measuring 1 3 x 1 1 x 1 1 cm  COMPOSITION:  2 points, solid or almost completely solid   ECHOGENICITY:  2 points, hypoechoic  SHAPE:  3 points, taller-than-wide  MARGIN: 0 points, ill-defined  ECHOGENIC FOCI:  3 points, punctate echogenic foci  TI-RADS Classification: TR 5 (7 or > points), Highly suspicious  FNA if > 1 cm  Follow if > 0 5 cm       Impression: Heterogeneous thyroid gland with thyroid nodules as described  The right mid gland to lower pole 1 3 x 1 1 x 1 1 cm nodule meets criteria for fine-needle aspiration  (Image number [id number]) (CRITERIA: TR 5, Highly suspicious  FNA if > 1 cm  Reference: ACR Thyroid Imaging, Reporting and Data System (TI-RADS): White Paper of the [employer]  J AM Severo Radiol 5735;99:845-062  (additional recommendations based on American Thyroid Association 2015 guidelines ) The study was marked in EPIC for significant notification  Workstation performed: [id number]      Us Head Neck Lymph Node Mapping     Result Date: 1/14/2019  Narrative: NECK ULTRASOUND INDICATION:     Papillary thyroid carcinoma, preop thyroidectomy  COMPARISON:  1/7/2019 FINDINGS: Ultrasound of the cervical lymph node chains was performed with a high frequency linear transducer  Lymph nodes maintain normal morphologic contour, echogenicity and short axis dimensions of less than 0 7 cm  No evidence for microcalcification or focal nodularity       Impression: No evidence of metastatic disease  Workstation performed: [id number]      Us Guided Thyroid Biopsy With Affirma     Result Date: 1/7/2019  Narrative: ULTRASOUND-GUIDED THYROID BIOPSY HISTORY: 58year-old with a history of thyroid nodules and hyperthyroidism  Patient presents with a prescription for ultrasound-guided fine-needle aspiration biopsy of the thyroid performed with Afirma tissue sampling  COMPARISON: 12/17/2018 US Thyroid: A solid right mid to lower pole thyroid nodule measuring 1 3 x 1 1 x 1 1 cm was identified and recommended for biopsy  FINDINGS: Prior ultrasound images were reviewed   On ultrasound imaging performed today, the corresponding right mid to lower pole thyroid nodule measures 1 4 x 1 2 x 1 0 cm  The procedure was explained to the patient, including risks of hemorrhage, infection and local injury  The possibility of a nondiagnostic biopsy result and the need for repeat biopsy or sonographic follow up was explained to the patient  Informed consent was freely obtained  The patient verbalized expressed understanding of the above risks and wished to proceed with the procedure  Final standard "time-out" procedure performed  PROCEDURE: The neck was prepped and draped in normal sterile fashion  Under real-time ultrasound guidance and local anesthesia five passes with 25 gauge needles were made through the right mid to lower pole thyroid nodule  Cytopathology was present and deemed the specimens adequate for evaluation  Two of the needle passes were saved for potential Afirma testing  The patient tolerated the procedure well  Postprocedure instructions were provided for the patient  I asked the patient to call us with any questions, concerns, or acute problems  The patient expressed understanding of the above       Impression: Status post successful ultrasound-guided thyroid biopsy  Tissue samples were obtained for Afirma testing, if required  The above findings and procedure were reviewed with Dr Johanne Ocampo  Procedure was performed by Juan Carlos Elkins PA-C under the direct supervision of Dr Dianna Bynum  Workstation performed: [id number]      I reviewed the above laboratory and imaging data      Discussion/Summary:  50-year-old woman with history of Graves disease, with incidentally found nodules, biopsied and confirmed as right-sided thyroid cancer  Treatment options including total thyroidectomy were discussed with patient  Risks and benefits of surgery including infection, bleeding, need for possible additional surgery,  Recurrent nerve injury, hypocalcemia, discussed with her  She understands the plan and wishes to proceed as outlined

## 2019-02-19 NOTE — ANESTHESIA PREPROCEDURE EVALUATION
Review of Systems/Medical History      No history of anesthetic complications     Cardiovascular  Hyperlipidemia, Hypertension , Past MI , CAD ,   Comment: Seen pre-op by cardiology, rec no further w/o    H/o marginal branch off LCx occlusion, not revascualrized; abnormal stress test in the past (scar/ischemia), report normal otherwise LV systolic function    pacemaker,  Pulmonary  Negative pulmonary ROS   Comment: Non-smoker     GI/Hepatic    GERD well controlled, Liver disease ,             Endo/Other  Diabetes , History of thyroid disease , hyperthyroidism,   Comment: A1C 8 3    GYN  Negative gynecology ROS          Hematology  Negative hematology ROS      Musculoskeletal    Comment: S/p L Knee replacement Arthritis     Neurology  Negative neurology ROS      Psychology           Physical Exam    Airway    Mallampati score: II  TM Distance: >3 FB  Neck ROM: full     Dental       Cardiovascular      Pulmonary      Other Findings        Anesthesia Plan  ASA Score- 3     Anesthesia Type- general with ASA Monitors  Additional Monitors:   Airway Plan:     Comment: General anesthesia, endotracheal tube; standard ASA monitors  Risks and benefits discussed with patient; patient consented and agrees to proceed  I saw and evaluated the patient  If seen with CRNA, we have discussed the anesthetic plan and I am in agreement that the plan is appropriate for the patient  Post-menopausal  MI 1998, pacemaker at the time due to sinus arrest, several pacer/battery changes in the interim  She thinks that it is [facility], but not sure  Not a defib  Will place magnet if she appears dependent in the OR  She does not have any cardiac symptoms; occasional heart burn that is relieved with pepcid, does not sound cardiac  She saw cardiology pre-op who did not recommend any further interventions  She took her coreg today  I discussed that the procedure is low physiologic stress   I suspect low risk from cardiac standpoint, but will optimize myocardial oxygen demand/supply balance  Risks discussed with patient, she agrees to proceed        Plan Factors-    Induction- intravenous  Postoperative Plan- Plan for postoperative opioid use  Planned trial extubation    Informed Consent- Anesthetic plan and risks discussed with patient  I personally reviewed this patient with the CRNA  Discussed and agreed on the Anesthesia Plan with the SUBHASH Calzada

## 2019-02-20 VITALS
TEMPERATURE: 98 F | OXYGEN SATURATION: 97 % | WEIGHT: 152.2 LBS | SYSTOLIC BLOOD PRESSURE: 108 MMHG | HEIGHT: 64 IN | DIASTOLIC BLOOD PRESSURE: 62 MMHG | HEART RATE: 86 BPM | RESPIRATION RATE: 18 BRPM | BODY MASS INDEX: 25.99 KG/M2

## 2019-02-20 LAB
ANION GAP SERPL CALCULATED.3IONS-SCNC: 11 MMOL/L (ref 4–13)
BASOPHILS # BLD AUTO: 0.02 THOUSANDS/ΜL (ref 0–0.1)
BASOPHILS NFR BLD AUTO: 0 % (ref 0–1)
BUN SERPL-MCNC: 17 MG/DL (ref 5–25)
CALCIUM SERPL-MCNC: 7.7 MG/DL (ref 8.3–10.1)
CALCIUM SERPL-MCNC: 8.3 MG/DL (ref 8.3–10.1)
CHLORIDE SERPL-SCNC: 105 MMOL/L (ref 100–108)
CO2 SERPL-SCNC: 22 MMOL/L (ref 21–32)
CREAT SERPL-MCNC: 0.46 MG/DL (ref 0.6–1.3)
EOSINOPHIL # BLD AUTO: 0.02 THOUSAND/ΜL (ref 0–0.61)
EOSINOPHIL NFR BLD AUTO: 0 % (ref 0–6)
ERYTHROCYTE [DISTWIDTH] IN BLOOD BY AUTOMATED COUNT: 13.3 % (ref 11.6–15.1)
GFR SERPL CREATININE-BSD FRML MDRD: 105 ML/MIN/1.73SQ M
GLUCOSE SERPL-MCNC: 133 MG/DL (ref 65–140)
HCT VFR BLD AUTO: 41.3 % (ref 34.8–46.1)
HGB BLD-MCNC: 13.6 G/DL (ref 11.5–15.4)
IMM GRANULOCYTES # BLD AUTO: 0.03 THOUSAND/UL (ref 0–0.2)
IMM GRANULOCYTES NFR BLD AUTO: 0 % (ref 0–2)
LYMPHOCYTES # BLD AUTO: 2.24 THOUSANDS/ΜL (ref 0.6–4.47)
LYMPHOCYTES NFR BLD AUTO: 24 % (ref 14–44)
MAGNESIUM SERPL-MCNC: 2.2 MG/DL (ref 1.6–2.6)
MCH RBC QN AUTO: 28.8 PG (ref 26.8–34.3)
MCHC RBC AUTO-ENTMCNC: 32.9 G/DL (ref 31.4–37.4)
MCV RBC AUTO: 87 FL (ref 82–98)
MONOCYTES # BLD AUTO: 0.95 THOUSAND/ΜL (ref 0.17–1.22)
MONOCYTES NFR BLD AUTO: 10 % (ref 4–12)
NEUTROPHILS # BLD AUTO: 6.03 THOUSANDS/ΜL (ref 1.85–7.62)
NEUTS SEG NFR BLD AUTO: 66 % (ref 43–75)
NRBC BLD AUTO-RTO: 0 /100 WBCS
PLATELET # BLD AUTO: 233 THOUSANDS/UL (ref 149–390)
PMV BLD AUTO: 9.4 FL (ref 8.9–12.7)
POTASSIUM SERPL-SCNC: 3.8 MMOL/L (ref 3.5–5.3)
RBC # BLD AUTO: 4.73 MILLION/UL (ref 3.81–5.12)
SODIUM SERPL-SCNC: 138 MMOL/L (ref 136–145)
WBC # BLD AUTO: 9.29 THOUSAND/UL (ref 4.31–10.16)

## 2019-02-20 PROCEDURE — 82310 ASSAY OF CALCIUM: CPT | Performed by: SURGERY

## 2019-02-20 PROCEDURE — 83735 ASSAY OF MAGNESIUM: CPT | Performed by: SURGERY

## 2019-02-20 PROCEDURE — 85025 COMPLETE CBC W/AUTO DIFF WBC: CPT | Performed by: SURGERY

## 2019-02-20 PROCEDURE — 80048 BASIC METABOLIC PNL TOTAL CA: CPT | Performed by: SURGERY

## 2019-02-20 RX ADMIN — SODIUM CHLORIDE 125 ML/HR: 0.9 INJECTION, SOLUTION INTRAVENOUS at 01:48

## 2019-02-20 RX ADMIN — EZETIMIBE 10 MG: 10 TABLET ORAL at 08:56

## 2019-02-20 RX ADMIN — CARVEDILOL 25 MG: 25 TABLET, FILM COATED ORAL at 06:38

## 2019-02-20 RX ADMIN — HEPARIN SODIUM 5000 UNITS: 5000 INJECTION INTRAVENOUS; SUBCUTANEOUS at 06:39

## 2019-02-20 RX ADMIN — LORATADINE 10 MG: 10 TABLET ORAL at 08:55

## 2019-02-20 RX ADMIN — LEVOTHYROXINE SODIUM 125 MCG: 125 TABLET ORAL at 06:39

## 2019-02-20 NOTE — PROGRESS NOTES
PT denies pain or SOB  PT denies numbness or tingling in extremities  has been resting in bed comfortable in bed all shift  Pt incision on neck is CDI  4 steristrips remain in place  White surgery made aware pt is diabetic  BS check will be ordered for morning  Will continue to monitor pt

## 2019-02-20 NOTE — PROGRESS NOTES
Progress Note - Surgical Oncology   Krystina Condon 58 y o  female MRN: [id number]  Unit/Bed#: -01 Encounter: [id number]    Assessment:  POD1 total thyroidectomy for Graves's and papillary cancer positive nodule    - Ca 6PM 7 4 and 1AM 7 7  - Asymptomatic    Plan:  Follow up 6AM Ca  Anticipate discharge home today if Ca stable  Continue levothyroxine  Discontinue methimazole    Subjective/Objective   Subjective:   No acute events  Denies paresthesias  Objective:     Blood pressure 118/67, pulse 93, temperature 98 1 °F (36 7 °C), temperature source Oral, resp  rate 18, height 5' 4" (1 626 m), weight 68 5 kg (151 lb), SpO2 97 %  ,Body mass index is 25 92 kg/m²  Intake/Output Summary (Last 24 hours) at 2/20/2019 8185  Last data filed at 2/20/2019 0148  Gross per 24 hour   Intake 3235 83 ml   Output 1650 ml   Net 1585 83 ml       Invasive Devices     Peripheral Intravenous Line            Peripheral IV 02/19/19 Left Hand less than 1 day                Physical Exam:     Gen: NAD, AAOx3  Neck: Incision c/d/i w/ steristrips  CV: RRR  Pulm: no resp distress  Abd: Soft, non-distended, non-tender, incisions c/d/i      Lab, Imaging and other studies:  I have personally reviewed pertinent lab results    , CBC:   Lab Results   Component Value Date     02/19/2019    MPV 9 0 02/19/2019   , CMP:   Lab Results   Component Value Date    CALCIUM 7 7 (L) 02/20/2019     VTE Pharmacologic Prophylaxis: Heparin  VTE Mechanical Prophylaxis: sequential compression device

## 2019-02-20 NOTE — PLAN OF CARE
Plan of care explained to pt  PT VERBALIZED UNDERSTANDING  RN SUGGESTING CONTINUED REINFORCEMENT THROUGHOUT STAY

## 2019-02-20 NOTE — DISCHARGE SUMMARY
Discharge Summary - Oncological Surgery   Andrea Dillon 58 y o  female MRN: [id number]  Unit/Bed#: -01 Encounter: [id number]    Admission Date:      Discharge Date:  02/20/2019    Admitting Diagnosis: Papillary adenocarcinoma of thyroid [facility]) [C73]    Discharge Diagnosis:  Papillary thyroid cancer    Resolved Problems  Date Reviewed: 1/28/2019    None          Attending: Dr Valdemar Ortega Physician(s):  None    Procedures Performed: No orders of the defined types were placed in this encounter  02/19/2019: TOTAL THYROIDECTOMY (Bilateral)        Pathology:  Pending at the time of discharge    Hospital Course: The patient presented on 02/19/2019 for elective surgery and underwent the above-listed procedure  She tolerated the procedure well was admitted to the floor postop for close monitoring  Postop calciums were checked and went from 7 4-7 0 7-8 3 on the morning of discharge  Remained asymptomatic throughout this time  The patient's pain was adequately controlled on postop day 1, she was ambulating without difficulty, voiding spontaneously  She was deemed ready for discharge and was instructed to stop taking methimazole and to resume levothyroxine  She will be followed up in the outpatient setting by Dr Demetria Raymond  Condition at Discharge: good     Discharge instructions/Information to patient and family:   See after visit summary for information provided to patient and family  Provisions for Follow-Up Care:  See after visit summary for information related to follow-up care and any pertinent home health orders  Disposition: See After Visit Summary for discharge disposition information  Planned Readmission: No    Discharge Statement   I spent 25 minutes discharging the patient  This time was spent on the day of discharge  I had direct contact with the patient on the day of discharge  Additional documentation is required if more than 30 minutes were spent on discharge  Discharge Medications:  See after visit summary for reconciled discharge medications provided to patient and family

## 2019-02-21 ENCOUNTER — TELEPHONE (OUTPATIENT)
Dept: SURGICAL ONCOLOGY | Facility: CLINIC | Age: 64
End: 2019-02-21

## 2019-02-21 NOTE — TELEPHONE ENCOUNTER
Call to patient after surgery on 2/19/2019  Patient reports pain is adequately controlled, some sore throat which is improving  No complaints at this time  Patient notified to call office if she has any questions or concerns and we will see her at her follow up appointment

## 2019-03-04 ENCOUNTER — OFFICE VISIT (OUTPATIENT)
Dept: SURGICAL ONCOLOGY | Facility: CLINIC | Age: 64
End: 2019-03-04

## 2019-03-04 VITALS
WEIGHT: 153 LBS | HEART RATE: 82 BPM | BODY MASS INDEX: 26.12 KG/M2 | TEMPERATURE: 98.3 F | DIASTOLIC BLOOD PRESSURE: 78 MMHG | RESPIRATION RATE: 16 BRPM | SYSTOLIC BLOOD PRESSURE: 112 MMHG | HEIGHT: 64 IN

## 2019-03-04 DIAGNOSIS — C73 PAPILLARY ADENOCARCINOMA OF THYROID (HCC): Primary | ICD-10-CM

## 2019-03-04 PROCEDURE — 99024 POSTOP FOLLOW-UP VISIT: CPT | Performed by: SURGERY

## 2019-03-04 NOTE — PROGRESS NOTES
Surgical Oncology Follow Up       [address]  [address]  [place] [address] [address] [address]  1955  [id number]  [facility]  [address]  [place] [address] [address] [phone]    Chief Complaint   Patient presents with    Post-op     Patient is here post-op total thyroidectomy  Assessment/Plan:    No problem-specific Assessment & Plan notes found for this encounter  Diagnoses and all orders for this visit:    Papillary adenocarcinoma of thyroid ([facility] [address] )  -     Thyroglobulin, Quant w/ AB ([place]); Future  -     Thyroid Panel With TSH; Future  -     US head neck lymph node mapping; Future  -     Ambulatory referral to Endocrinology; Future        Advance Care Planning/Advance Directives:  Discussed disease status, cancer treatment plans and/or cancer treatment goals with the patient  Papillary adenocarcinoma of thyroid ([facility] [address] )    1/7/2019 Biopsy     Fine needle aspiration of right thyroid  Malignant (Fairchild category VI) papillary thyroid carcinoma         2/19/2019 Surgery     Total thyroidectomy  Multifocal tumor  -largest tumor focus 1 7cm (T1b  -Tumor laterality: right lobe  -Papillary thyroid carcinoma  -margins: carcinoma less than 0 1cm from inked resection surface  -regional lymph nodes-2 lymph nodes negative for carcinoma (0/2)    8th Ed AJCC Stage: at least stage I pT1b(m), pN0, pMx            History of Present Illness: Stage I thyroid cancer status post total thyroidectomy February 2019  -Interval History:  Patient is here for postop check with no major complaints report  She is doing well after her surgery  Review of Systems:  Review of Systems   Constitutional: Negative  HENT: Negative  Eyes: Negative  Respiratory: Negative  Cardiovascular: Negative  Gastrointestinal: Negative  Endocrine: Negative  Genitourinary: Negative  Musculoskeletal: Negative  Skin: Negative  Allergic/Immunologic: Negative  Neurological: Negative  Hematological: Negative  Psychiatric/Behavioral: Negative  All other systems reviewed and are negative  Patient Active Problem List   Diagnosis    Essential hypertension    Hyperlipidemia    Arthritis    History of myocardial infarction    Insomnia    Cardiac pacemaker in situ    Chronic nonalcoholic liver disease    Coronary atherosclerosis    Diabetes mellitus type 2, uncontrolled, without complications (HCC)    Disorder of bilirubin excretion    Elective replacement indicated for pacemaker    Failed total left knee replacement (HCC)    Lumbar back pain with radiculopathy affecting left lower extremity    Metabolic syndrome    Osteoarthritis, generalized    Overweight (BMI 25 0-29  9)    Atrophic vaginitis    Seasonal allergic rhinitis    Abnormal thyroid blood test    Hyperthyroidism    Acute maxillary sinusitis    Thyroid nodule    Papillary adenocarcinoma of thyroid ([address] )     Past Medical History:   Diagnosis Date    Diabetes mellitus ([address] )     History of staph infection     Hypertension     Myocardial infarction ([address] )     Varicella      Past Surgical History:   Procedure Laterality Date    APPENDECTOMY      CARDIAC PACEMAKER PLACEMENT      CARPAL TUNNEL RELEASE      CHOLECYSTECTOMY      HYSTERECTOMY      REPLACEMENT TOTAL KNEE Left     THYROIDECTOMY Bilateral 2/19/2019    Procedure: TOTAL THYROIDECTOMY;  Surgeon: Patric Vilchis MD;  [facility];  Service: Surgical Oncology    US GUIDED THYROID BIOPSY  1/7/2019     Family History   Problem Relation Age of Onset    Diabetes unspecified Maternal Aunt     Colon cancer Father     Cancer Father     Heart disease Mother         Cardiac disorder, congenital aortic stenosis    Diabetes Other      Social History     Socioeconomic History    Marital status: /Civil Union     Spouse name: Not on file    Number of children: Not on file    Years of education: Not on file    Highest education level: Not on file   Occupational History    Not on file   Social Needs    Financial resource strain: Not on file    Food insecurity:     Worry: Not on file     Inability: Not on file    Transportation needs:     Medical: Not on file     Non-medical: Not on file   Tobacco Use    Smoking status: Never Smoker    Smokeless tobacco: Never Used   Substance and Sexual Activity    Alcohol use: No    Drug use: No    Sexual activity: Not on file   Lifestyle    Physical activity:     Days per week: Not on file     Minutes per session: Not on file    Stress: Not on file   Relationships    Social connections:     Talks on phone: Not on file     Gets together: Not on file     Attends Congregation service: Not on file     Active member of club or organization: Not on file     Attends meetings of clubs or organizations: Not on file     Relationship status: Not on file    Intimate partner violence:     Fear of current or ex partner: Not on file     Emotionally abused: Not on file     Physically abused: Not on file     Forced sexual activity: Not on file   Other Topics Concern    Not on file   Social History Narrative    Always uses seat belt     No caffeine use        Current Outpatient Medications:     aspirin 81 MG tablet, Take 81 mg by mouth daily at bedtime , Disp: , Rfl:     Calcium Polycarbophil (FIBER-CAPS PO), Take 2 capsules by mouth 2 (two) times a day , Disp: , Rfl:     carvedilol (COREG) 25 mg tablet, Take 25 mg by mouth 2 (two) times a day with meals , Disp: , Rfl:     cetirizine (ZyrTEC) 10 mg tablet, Take 10 mg by mouth daily , Disp: , Rfl:     Empagliflozin (JARDIANCE) 25 MG TABS, Take 25 mg by mouth every morning, Disp: , Rfl:     ezetimibe (ZETIA) 10 mg tablet, Take 1 tablet by mouth daily, Disp: , Rfl:     fexofenadine ([place]) 180 MG tablet, Take 180 mg by mouth daily, Disp: , Rfl:     levothyroxine 125 mcg tablet, Take 1 tablet (125 mcg total) by mouth daily, Disp: 30 tablet, Rfl: 3    losartan (COZAAR) 50 mg tablet, Take 50 mg by mouth daily, Disp: , Rfl:     Multiple Vitamin (MULTI-VITAMIN DAILY) TABS, Take by mouth daily , Disp: , Rfl:     Omega-3 Fatty Acids (FISH OIL) 1200 MG CAPS, Take 1 capsule by mouth 2 (two) times a day, Disp: , Rfl:     pioglitazone-metFORMIN (ACTOPLUS MET)  MG per tablet, Take 1 tablet by mouth 2 (two) times a day for 90 days, Disp: 180 tablet, Rfl: 0    rosuvastatin (CRESTOR) 20 MG tablet, 20 mg daily at bedtime , Disp: , Rfl:   Allergies   Allergen Reactions    Penicillins Rash    Sulfa Antibiotics Rash     Vitals:    03/04/19 0935   BP: 112/78   Pulse: 82   Resp: 16   Temp: 98 3 °F (36 8 °C)       Physical Exam   Neck:   Incision clean/dry/intact         Results:  Labs:  Case Report   Surgical Pathology Report                         Case: [id number]                                    Authorizing Provider: Fide Tsang MD        Collected:           02/19/2019 0954               Ordering Location:     [facility]      Received:            02/19/2019 [facility]                                      Hospital Operating Room                                                       Pathologist:           Katie Hanna MD                                                                 Specimen:    Thyroid, Total Thyroidectomy                                                               Final Diagnosis   Tumor summary  1  Specimen Identification:     - Procedure: Total thyroidectomy     - Lymph node sampling: Two (2) perithyroidal lymph nodes present   2   Tumor     - Tumor focality: Multifocal      - Dominant tumor characteristics (pT1b, m): Largest tumor focus 1 7 cm      * tumor laterality: Right lobe      * tumor size: Largest focus 1 7 cm      * histologic type: Papillary thyroid carcinoma * margins: Carcinoma less than 0 1 cm from inked resection surface      * angioinvasion: Not definitively identified      * lymphatic invasion: Indeterminate      * perineural invasion: Indeterminate      * extrathyroidal extension: Not grossly identified   3  Regional lymph nodes (pN0): Two (2) perithyroidal lymph nodes, negative for carcinoma   4  Additional pathologic findings: Background lymphocytic thyroiditis and nodular hyperplasia   5  Ancillary studies: None   6  Clinical history: Papillary carcinoma of thyroid   7  8th Ed AJCC Tumor Stage:  at least Stage I - pT1b (m), pN0, pMx     Final diagnosis listed by specimen  A  Thyroid (total thyroidectomy):     - Multifocal papillary thyroid carcinoma (largest focus 1 7 cm, right lobe)  - No gross extrathyroidal extension      - Carcinoma less than 0 1 cm from inked resection surface  - Two (2) perithyroidal lymph nodes, negative for carcinoma      - Background lymphocytic thyroiditis and nodular hyperplasia  Electronically signed by Tru Barrios MD on 2/21/2019 at 10:44 AM   Microscopic Description    - Representative tumor section: A19  Note    - Interpretation performed at [place] [address]  - Intradepartmental consultation concurs with the diagnosis  Additional Information    All controls performed with the immunohistochemical stains reported above reacted appropriately  These tests were developed and their performance characteristics determined by [facility] or [facility]  They may not be cleared or approved by the [place]  Food and Drug Administration  The FDA has determined that such clearance or approval is not necessary  These tests are used for clinical purposes  They should not be regarded as investigational or for research  This laboratory has been approved by CLIA 88, designated as a high-complexity laboratory and is qualified to perform these tests  Imaging  No results found  I reviewed the above laboratory and imaging data  Discussion/Summary: Stage 1 thyroid cancer, s/p uneventful total thyroidectomy  Will make referral back to endocrinology to discuss potential radioactive iodine therapy given multi focality as well as size of largest tumor  Will plan on 6 month follow-up with blood work and cervical mapping at that time  All questions answered  Pathology report discussed and given to patient for her records

## 2019-03-04 NOTE — LETTER
March 4, 2019     Jayleen Mendes PA-C  [address]  [place]  [phone]    Patient: Marquita Frank   YOB: 1955   Date of Visit: 3/4/2019       Dear Dr Humza Alicea: Thank you for referring Suly Joe to me for evaluation  Below are my notes for this consultation  If you have questions, please do not hesitate to call me  I look forward to following your patient along with you  Sincerely,        Walter Zhao MD        CC: MD Kip Corrales CRNP Elfredia Pita, MD Reagan Curt, MD  3/4/2019 10:05 AM  Sign at close encounter     Surgical Oncology Follow Up       [facility] Joan Hiss  [address] [id number] [address]  1955  [id number]  [address] [facility] Joan Hiss  [address] [facility] 69 [facility] [address] [place] [address]  [phone]    Chief Complaint   Patient presents with    Post-op     Patient is here post-op total thyroidectomy  Assessment/Plan:    No problem-specific Assessment & Plan notes found for this encounter  Diagnoses and all orders for this visit:    Papillary adenocarcinoma of thyroid ([facility] )  -     Thyroglobulin, Quant w/ AB ([place]); Future  -     Thyroid Panel With TSH; Future  -     US head neck lymph node mapping; Future  -     Ambulatory referral to Endocrinology; Future        Advance Care Planning/Advance Directives:  Discussed disease status, cancer treatment plans and/or cancer treatment goals with the patient          Papillary adenocarcinoma of thyroid ([facility] )    1/7/2019 Biopsy     Fine needle aspiration of right thyroid  Malignant (Bradley category VI) papillary thyroid carcinoma         2/19/2019 Surgery     Total thyroidectomy  Multifocal tumor  -largest tumor focus 1 7cm (T1b  -Tumor laterality: right lobe  -Papillary thyroid carcinoma  -margins: carcinoma less than 0 1cm from inked resection surface  -regional lymph nodes-2 lymph nodes negative for carcinoma (0/2)    8th Ed AJCC Stage: at least stage I pT1b(m), pN0, pMx            History of Present Illness: Stage I thyroid cancer status post total thyroidectomy February 2019  -Interval History:  Patient is here for postop check with no major complaints report  She is doing well after her surgery  Review of Systems:  Review of Systems   Constitutional: Negative  HENT: Negative  Eyes: Negative  Respiratory: Negative  Cardiovascular: Negative  Gastrointestinal: Negative  Endocrine: Negative  Genitourinary: Negative  Musculoskeletal: Negative  Skin: Negative  Allergic/Immunologic: Negative  Neurological: Negative  Hematological: Negative  Psychiatric/Behavioral: Negative  All other systems reviewed and are negative  Patient Active Problem List   Diagnosis    Essential hypertension    Hyperlipidemia    Arthritis    History of myocardial infarction    Insomnia    Cardiac pacemaker in situ    Chronic nonalcoholic liver disease    Coronary atherosclerosis    Diabetes mellitus type 2, uncontrolled, without complications (HCC)    Disorder of bilirubin excretion    Elective replacement indicated for pacemaker    Failed total left knee replacement (HCC)    Lumbar back pain with radiculopathy affecting left lower extremity    Metabolic syndrome    Osteoarthritis, generalized    Overweight (BMI 25 0-29  9)    Atrophic vaginitis    Seasonal allergic rhinitis    Abnormal thyroid blood test    Hyperthyroidism    Acute maxillary sinusitis    Thyroid nodule    Papillary adenocarcinoma of thyroid [facility])     Past Medical History:   Diagnosis Date    Diabetes mellitus ([address] )     History of staph infection     Hypertension     Myocardial infarction ([facility] )     Varicella      Past Surgical History:   Procedure Laterality Date    APPENDECTOMY      CARDIAC PACEMAKER PLACEMENT      CARPAL TUNNEL RELEASE      CHOLECYSTECTOMY      HYSTERECTOMY      REPLACEMENT TOTAL KNEE Left     THYROIDECTOMY Bilateral 2/19/2019    Procedure: TOTAL THYROIDECTOMY;  Surgeon: Jose Marinelli MD;  [facility];  Service: Surgical Oncology    US GUIDED THYROID BIOPSY  1/7/2019     Family History   Problem Relation Age of Onset    Diabetes unspecified Maternal Aunt     Colon cancer Father     Cancer Father     Heart disease Mother         Cardiac disorder, congenital aortic stenosis    Diabetes Other      Social History     Socioeconomic History    Marital status: /Civil Union     Spouse name: Not on file    Number of children: Not on file    Years of education: Not on file    Highest education level: Not on file   Occupational History    Not on file   Social Needs    Financial resource strain: Not on file    Food insecurity:     Worry: Not on file     Inability: Not on file    Transportation needs:     Medical: Not on file     Non-medical: Not on file   Tobacco Use    Smoking status: Never Smoker    Smokeless tobacco: Never Used   Substance and Sexual Activity    Alcohol use: No    Drug use: No    Sexual activity: Not on file   Lifestyle    Physical activity:     Days per week: Not on file     Minutes per session: Not on file    Stress: Not on file   Relationships    Social connections:     Talks on phone: Not on file     Gets together: Not on file     Attends [employer] service: Not on file     Active member of club or organization: Not on file     Attends meetings of clubs or organizations: Not on file     Relationship status: Not on file    Intimate partner violence:     Fear of current or ex partner: Not on file     Emotionally abused: Not on file     Physically abused: Not on file     Forced sexual activity: Not on file   Other Topics Concern    Not on file   Social History Narrative    Always uses seat belt     No caffeine use        Current Outpatient Medications:     aspirin 81 MG tablet, Take 81 mg by mouth daily at bedtime , Disp: , Rfl:     Calcium Polycarbophil (FIBER-CAPS PO), Take 2 capsules by mouth 2 (two) times a day , Disp: , Rfl:     carvedilol (COREG) 25 mg tablet, Take 25 mg by mouth 2 (two) times a day with meals , Disp: , Rfl:     cetirizine (ZyrTEC) 10 mg tablet, Take 10 mg by mouth daily , Disp: , Rfl:     Empagliflozin (JARDIANCE) 25 MG TABS, Take 25 mg by mouth every morning, Disp: , Rfl:     ezetimibe (ZETIA) 10 mg tablet, Take 1 tablet by mouth daily, Disp: , Rfl:     fexofenadine (MUCINEX ALLERGY) 180 MG tablet, Take 180 mg by mouth daily, Disp: , Rfl:     levothyroxine 125 mcg tablet, Take 1 tablet (125 mcg total) by mouth daily, Disp: 30 tablet, Rfl: 3    losartan (COZAAR) 50 mg tablet, Take 50 mg by mouth daily, Disp: , Rfl:     Multiple Vitamin (MULTI-VITAMIN DAILY) TABS, Take by mouth daily , Disp: , Rfl:     Omega-3 Fatty Acids (FISH OIL) 1200 MG CAPS, Take 1 capsule by mouth 2 (two) times a day, Disp: , Rfl:     pioglitazone-metFORMIN (ACTOPLUS MET)  MG per tablet, Take 1 tablet by mouth 2 (two) times a day for 90 days, Disp: 180 tablet, Rfl: 0    rosuvastatin (CRESTOR) 20 MG tablet, 20 mg daily at bedtime , Disp: , Rfl:   Allergies   Allergen Reactions    Penicillins Rash    Sulfa Antibiotics Rash     Vitals:    03/04/19 0935   BP: 112/78   Pulse: 82   Resp: 16   Temp: 98 3 °F (36 8 °C)       Physical Exam   Neck:   Incision clean/dry/intact         Results:  Labs:  Case Report   Surgical Pathology Report                         Case: [id number]                                    Authorizing Provider: Mukund Canales MD        Collected:           02/19/2019 0954               Ordering Location:     [facility]      Received:            02/19/2019 [address] [place] [address] Operating Room                                                     Pathologist:           Rashad Tesfaye MD                                                                 Specimen:    Thyroid, Total Thyroidectomy                                                               Final Diagnosis   Tumor summary  1  Specimen Identification:     - Procedure: Total thyroidectomy     - Lymph node sampling: Two (2) perithyroidal lymph nodes present   2  Tumor     - Tumor focality: Multifocal      - Dominant tumor characteristics (pT1b, m): Largest tumor focus 1 7 cm      * tumor laterality: Right lobe      * tumor size: Largest focus 1 7 cm      * histologic type: Papillary thyroid carcinoma      * margins: Carcinoma less than 0 1 cm from inked resection surface      * angioinvasion: Not definitively identified      * lymphatic invasion: Indeterminate      * perineural invasion: Indeterminate      * extrathyroidal extension: Not grossly identified   3  Regional lymph nodes (pN0): Two (2) perithyroidal lymph nodes, negative for carcinoma   4  Additional pathologic findings: Background lymphocytic thyroiditis and nodular hyperplasia   5  Ancillary studies: None   6  Clinical history: Papillary carcinoma of thyroid   7  8th Ed AJCC Tumor Stage:  at least Stage I - pT1b (m), pN0, pMx     Final diagnosis listed by specimen  A  Thyroid (total thyroidectomy):     - Multifocal papillary thyroid carcinoma (largest focus 1 7 cm, right lobe)  - No gross extrathyroidal extension      - Carcinoma less than 0 1 cm from inked resection surface  - Two (2) perithyroidal lymph nodes, negative for carcinoma      - Background lymphocytic thyroiditis and nodular hyperplasia  Electronically signed by Rashad Tesfaye MD on 2/21/2019 at 10:44 AM   Microscopic Description    - Representative tumor section: A19  Note    - Interpretation performed at [facility], [address] [place] [address]  - Intradepartmental consultation concurs with the diagnosis     Additional Information    All controls performed with the immunohistochemical stains reported above reacted appropriately  These tests were developed and their performance characteristics determined by [facility] or [facility]  They may not be cleared or approved by the [place]  Food and Drug Administration  The FDA has determined that such clearance or approval is not necessary  These tests are used for clinical purposes  They should not be regarded as investigational or for research  This laboratory has been approved by [place] [id number], designated as a high-complexity laboratory and is qualified to perform these tests  Imaging  No results found  I reviewed the above laboratory and imaging data  Discussion/Summary: Stage 1 thyroid cancer, s/p uneventful total thyroidectomy  Will make referral back to endocrinology to discuss potential radioactive iodine therapy given multi focality as well as size of largest tumor  Will plan on 6 month follow-up with blood work and cervical mapping at that time  All questions answered  Pathology report discussed and given to patient for her records

## 2019-03-05 ENCOUNTER — OFFICE VISIT (OUTPATIENT)
Dept: ENDOCRINOLOGY | Facility: CLINIC | Age: 64
End: 2019-03-05
Payer: COMMERCIAL

## 2019-03-05 VITALS
HEIGHT: 64 IN | BODY MASS INDEX: 26.46 KG/M2 | SYSTOLIC BLOOD PRESSURE: 122 MMHG | HEART RATE: 94 BPM | WEIGHT: 155 LBS | DIASTOLIC BLOOD PRESSURE: 80 MMHG

## 2019-03-05 DIAGNOSIS — C73 PAPILLARY ADENOCARCINOMA OF THYROID (HCC): ICD-10-CM

## 2019-03-05 DIAGNOSIS — E89.0 POSTOPERATIVE HYPOTHYROIDISM: ICD-10-CM

## 2019-03-05 DIAGNOSIS — E11.65 TYPE 2 DIABETES MELLITUS WITH HYPERGLYCEMIA, WITHOUT LONG-TERM CURRENT USE OF INSULIN (HCC): ICD-10-CM

## 2019-03-05 DIAGNOSIS — IMO0001 DIABETES MELLITUS TYPE 2, UNCONTROLLED, WITHOUT COMPLICATIONS: Primary | ICD-10-CM

## 2019-03-05 PROBLEM — E04.1 THYROID NODULE: Status: RESOLVED | Noted: 2018-12-20 | Resolved: 2019-03-05

## 2019-03-05 PROBLEM — E05.90 HYPERTHYROIDISM: Status: RESOLVED | Noted: 2018-11-19 | Resolved: 2019-03-05

## 2019-03-05 PROCEDURE — 99215 OFFICE O/P EST HI 40 MIN: CPT | Performed by: INTERNAL MEDICINE

## 2019-03-05 RX ORDER — PIOGLITAZONE HCL AND METFORMIN HCL 850; 15 MG/1; MG/1
TABLET ORAL
Qty: 270 TABLET | Refills: 1 | Status: SHIPPED | OUTPATIENT
Start: 2019-03-05 | End: 2019-09-16

## 2019-03-05 NOTE — ASSESSMENT & PLAN NOTE
Continue levothyroxine at current dose-will check thyroid function tests in about a month    Goal TSH between 0 1-0 5

## 2019-03-05 NOTE — ASSESSMENT & PLAN NOTE
Lab Results   Component Value Date    HGBA1C 8 3 (H) 01/14/2019       A1c above goal-she will check to see if she can go back on Victoza however if she cannot then she will let us know so I can start her on Januvia  For now increase actor met to 2 tablets with dinner  Continue to monitor blood sugar once a day at different times    Focus on dietary modifications

## 2019-03-05 NOTE — ASSESSMENT & PLAN NOTE
Final pathology reviewed with the patient-does not meet criteria for radioactive iodine ablation-will monitor-will get a baseline thyroglobulin level, neck ultrasound with lymph node mapping in about 6 months

## 2019-03-05 NOTE — PROGRESS NOTES
Annika Congo 58 y o  female MRN: [id number]    Encounter: [id number]      Assessment/Plan     Problem List Items Addressed This Visit        Endocrine    Diabetes mellitus type 2, uncontrolled, without complications ([facility] ) - Primary     Lab Results   Component Value Date    HGBA1C 8 3 (H) 01/14/2019       A1c above goal-she will check to see if she can go back on Victoza however if she cannot then she will let us know so I can start her on Januvia  For now increase actor met to 2 tablets with dinner  Continue to monitor blood sugar once a day at different times  Focus on dietary modifications          Relevant Medications    pioglitazone-metFORMIN (ACTOPLUS MET)  MG per tablet    Other Relevant Orders    Comprehensive metabolic panel Lab Collect    HEMOGLOBIN A1C W/ EAG ESTIMATION Lab Collect    Papillary adenocarcinoma of thyroid [facility])     Final pathology reviewed with the patient-does not meet criteria for radioactive iodine ablation-will monitor-will get a baseline thyroglobulin level, neck ultrasound with lymph node mapping in about 6 months         Relevant Orders    Thyroglobulin w/ab Lab Collect    Type 2 diabetes mellitus with hyperglycemia, without long-term current use of insulin (HCC)    Relevant Medications    pioglitazone-metFORMIN (ACTOPLUS MET)  MG per tablet    Other Relevant Orders    Comprehensive metabolic panel Lab Collect    HEMOGLOBIN A1C W/ EAG ESTIMATION Lab Collect    Postoperative hypothyroidism     Continue levothyroxine at current dose-will check thyroid function tests in about a month    Goal TSH between 0 1-0 5         Relevant Orders    TSH, 3rd generation Lab Collect    T4, free Lab Collect        CC: Diabetes    History of Present Illness     HPI:  50-year-old female with history of type 2 diabetes complicated by retinopathy, recently diagnosed papillary thyroid cancer and postsurgical hypothyroidism here for follow-up    She  Underwent fine-needle aspiration biopsy of a right-sided thyroid nodule in January-which showed papillary thyroid cancer-underwent total thyroidectomy about 2 weeks back  Currently on LT4 125 mcg daily since surgery - waits 1/2 hour before breakfast   Feels better , weight  Stable , + constipation and occasional diarrhea   No palpitations , no sleep issues     No FH of thyroid cancer , no history of RT to neck       For type 2 diabetes she is treated with oral hypoglycemics jardiance , actomet - checks f s once a day - fasting 160-170s   predinner - 150s   C/o polydipsia , polyuria , c/o blurry vision , no numbness and tingling in feet     Review of Systems   Constitutional: Positive for fatigue  Negative for unexpected weight change  Eyes: Positive for visual disturbance  Respiratory: Negative for cough and shortness of breath  Cardiovascular: Negative for palpitations and leg swelling  Gastrointestinal: Negative for constipation, nausea and vomiting  Endocrine: Positive for polydipsia and polyuria  Musculoskeletal: Negative for gait problem  Skin: Negative for rash  Psychiatric/Behavioral: Positive for sleep disturbance  All other systems reviewed and are negative        Historical Information   Past Medical History:   Diagnosis Date    Diabetes mellitus ([facility] )     History of staph infection     Hypertension     Myocardial infarction ([facility] )     Varicella      Past Surgical History:   Procedure Laterality Date    APPENDECTOMY      CARDIAC PACEMAKER PLACEMENT      CARPAL TUNNEL RELEASE      CHOLECYSTECTOMY      HYSTERECTOMY      REPLACEMENT TOTAL KNEE Left     THYROIDECTOMY Bilateral 2/19/2019    Procedure: TOTAL THYROIDECTOMY;  Surgeon: Karina Kidd MD;  Location[facility];  Service: Surgical Oncology    US GUIDED THYROID BIOPSY  1/7/2019     Social History   Social History     Substance and Sexual Activity   Alcohol Use No     Social History     Substance and Sexual Activity   Drug Use No     Social History Tobacco Use   Smoking Status Never Smoker   Smokeless Tobacco Never Used     Family History:   Family History   Problem Relation Age of Onset    Diabetes unspecified Maternal Aunt     Colon cancer Father     Cancer Father     Heart disease Mother         Cardiac disorder, congenital aortic stenosis    Diabetes Other        Meds/Allergies   Current Outpatient Medications   Medication Sig Dispense Refill    aspirin 81 MG tablet Take 81 mg by mouth daily at bedtime       Calcium Polycarbophil (FIBER-CAPS PO) Take 2 capsules by mouth 2 (two) times a day       carvedilol (COREG) 25 mg tablet Take 25 mg by mouth 2 (two) times a day with meals       cetirizine (ZyrTEC) 10 mg tablet Take 10 mg by mouth daily       Empagliflozin (JARDIANCE) 25 MG TABS Take 25 mg by mouth every morning      ezetimibe (ZETIA) 10 mg tablet Take 1 tablet by mouth daily      fexofenadine (MUCINEX ALLERGY) 180 MG tablet Take 180 mg by mouth daily      levothyroxine 125 mcg tablet Take 1 tablet (125 mcg total) by mouth daily 30 tablet 3    losartan (COZAAR) 50 mg tablet Take 50 mg by mouth daily      Multiple Vitamin (MULTI-VITAMIN DAILY) TABS Take by mouth daily       Omega-3 Fatty Acids (FISH OIL) 1200 MG CAPS Take 1 capsule by mouth 2 (two) times a day      pioglitazone-metFORMIN (ACTOPLUS MET)  MG per tablet 1 tab with breakfast and 2 with dinner 270 tablet 1    rosuvastatin (CRESTOR) 20 MG tablet 20 mg daily at bedtime        No current facility-administered medications for this visit  Allergies   Allergen Reactions    Penicillins Rash    Sulfa Antibiotics Rash       Objective   Vitals: Blood pressure 122/80, pulse 94, height 5' 4" (1 626 m), weight 70 3 kg (155 lb)  Physical Exam   Constitutional: She is oriented to person, place, and time  She appears well-developed and well-nourished  No distress  HENT:   Head: Normocephalic and atraumatic  Eyes: EOM are normal  No scleral icterus     Neck: Normal range of motion  Anterior neck surgical scar-healing well   Cardiovascular: Normal rate, regular rhythm and normal heart sounds  No murmur heard  Pulmonary/Chest: Effort normal and breath sounds normal  No respiratory distress  She has no wheezes  Abdominal: Soft  Bowel sounds are normal  She exhibits no distension  There is no tenderness  Musculoskeletal: Normal range of motion  She exhibits no edema  Lymphadenopathy:     She has no cervical adenopathy  Neurological: She is alert and oriented to person, place, and time  Skin: Skin is warm and dry  Psychiatric: She has a normal mood and affect  Her behavior is normal  Judgment and thought content normal        The history was obtained from the review of the chart, patient      Lab Results:   Lab Results   Component Value Date/Time    Hemoglobin A1C 8 3 (H) 01/14/2019 12:20 PM    Hemoglobin A1C 8 2 (H) 12/17/2018 08:45 AM    Hemoglobin A1C 8 1 (H) 11/12/2018 07:28 AM    Hemoglobin A1C 7 4 (H) 04/03/2018 07:24 AM    WBC 9 29 02/20/2019 07:13 AM    WBC 5 28 01/14/2019 12:20 PM    WBC 5 8 04/04/2018 02:01 PM    Hemoglobin 13 6 02/20/2019 07:13 AM    Hemoglobin 14 7 01/14/2019 12:20 PM    Hemoglobin 13 8 04/04/2018 02:01 PM    Hematocrit 41 3 02/20/2019 07:13 AM    Hematocrit 43 2 01/14/2019 12:20 PM    Hematocrit 39 6 04/04/2018 02:01 PM    MCV 87 02/20/2019 07:13 AM    MCV 85 01/14/2019 12:20 PM    MCV 86 2 04/04/2018 02:01 PM    Platelets 392 61/58/2020 07:13 AM    Platelets 727 13/55/5047 06:20 PM    Platelets 182 55/12/9206 12:20 PM    Platelets 011 45/99/5702 02:01 PM    BUN 17 02/20/2019 07:27 AM    BUN 19 01/14/2019 12:20 PM    BUN 12 12/17/2018 08:45 AM    BUN 16 04/03/2018 07:24 AM    Sodium 137 04/03/2018 07:24 AM    Potassium 3 8 02/20/2019 07:27 AM    Potassium 3 8 01/14/2019 12:20 PM    Potassium 3 9 12/17/2018 08:45 AM    Potassium 4 2 04/03/2018 07:24 AM    Chloride 105 02/20/2019 07:27 AM    Chloride 104 01/14/2019 12:20 PM    Chloride 103 12/17/2018 08:45 AM    Chloride 100 04/03/2018 07:24 AM    CO2 22 02/20/2019 07:27 AM    CO2 22 01/14/2019 12:20 PM    CO2 25 12/17/2018 08:45 AM    CO2 29 04/03/2018 07:24 AM    Creatinine 0 46 (L) 02/20/2019 07:27 AM    Creatinine 0 63 01/14/2019 12:20 PM    Creatinine 0 47 (L) 12/17/2018 08:45 AM    Creatinine 0 60 04/03/2018 07:24 AM    AST 28 01/14/2019 12:20 PM    AST 31 12/17/2018 08:45 AM    AST 26 11/12/2018 07:28 AM    AST 25 04/03/2018 07:24 AM    ALT 46 01/14/2019 12:20 PM    ALT 41 12/17/2018 08:45 AM    ALT 32 11/12/2018 07:28 AM    ALT 32 (H) 04/03/2018 07:24 AM    Albumin 3 6 01/14/2019 12:20 PM    Albumin 4 0 12/17/2018 08:45 AM    Albumin 4 2 11/12/2018 07:28 AM    Albumin 4 4 04/03/2018 07:24 AM    Cholesterol 184 04/03/2018 07:24 AM    HDL 52 04/03/2018 07:24 AM    HDL, Direct 38 (L) 11/12/2018 07:28 AM    HDL, Direct 50 07/25/2018 11:03 AM    Triglycerides 103 11/12/2018 07:28 AM    Triglycerides 144 07/25/2018 11:03 AM    Triglycerides 207 (H) 04/03/2018 07:24 AM       Final Diagnosis  Tumor summary  1  Specimen Identification:  - Procedure: Total thyroidectomy  - Lymph node sampling: Two (2) perithyroidal lymph nodes present  2  Tumor  - Tumor focality: Multifocal  - Dominant tumor characteristics (pT1b, m): Largest tumor focus 1 7 cm  * tumor laterality: Right lobe  * tumor size: Largest focus 1 7 cm  * histologic type: Papillary thyroid carcinoma  * margins: Carcinoma less than 0 1 cm from inked resection surface  * angioinvasion: Not definitively identified  * lymphatic invasion: Indeterminate  * perineural invasion: Indeterminate  * extrathyroidal extension: Not grossly identified  3  Regional lymph nodes (pN0): Two (2) perithyroidal lymph nodes, negative for carcinoma  4  Additional pathologic findings: Background lymphocytic thyroiditis and nodular hyperplasia  5  Ancillary studies: None  6   Clinical history: Papillary carcinoma of thyroid  7  8th Ed AJCC Tumor Stage: at least Stage I - pT1b (m), pN0, pMx    Imaging Studies: I have personally reviewed pertinent reports  Study Result     NECK ULTRASOUND     INDICATION:     Papillary thyroid carcinoma, preop thyroidectomy      COMPARISON:  1/7/2019     FINDINGS:     Ultrasound of the cervical lymph node chains was performed with a high frequency linear transducer      Lymph nodes maintain normal morphologic contour, echogenicity and short axis dimensions of less than 0 7 cm  No evidence for microcalcification or focal nodularity      IMPRESSION:     No evidence of metastatic disease  Portions of the record may have been created with voice recognition software  Occasional wrong word or "sound a like" substitutions may have occurred due to the inherent limitations of voice recognition software  Read the chart carefully and recognize, using context, where substitutions have occurred

## 2019-03-18 ENCOUNTER — LAB (OUTPATIENT)
Dept: LAB | Facility: HOSPITAL | Age: 64
End: 2019-03-18
Payer: COMMERCIAL

## 2019-03-18 ENCOUNTER — TRANSCRIBE ORDERS (OUTPATIENT)
Dept: ADMINISTRATIVE | Facility: HOSPITAL | Age: 64
End: 2019-03-18

## 2019-03-18 DIAGNOSIS — C73 PAPILLARY ADENOCARCINOMA OF THYROID (HCC): ICD-10-CM

## 2019-03-18 DIAGNOSIS — E89.0 POSTOPERATIVE HYPOTHYROIDISM: ICD-10-CM

## 2019-03-18 DIAGNOSIS — IMO0001 DIABETES MELLITUS TYPE 2, UNCONTROLLED, WITHOUT COMPLICATIONS: ICD-10-CM

## 2019-03-18 DIAGNOSIS — E11.65 TYPE 2 DIABETES MELLITUS WITH HYPERGLYCEMIA, WITHOUT LONG-TERM CURRENT USE OF INSULIN (HCC): ICD-10-CM

## 2019-03-18 LAB
ALBUMIN SERPL BCP-MCNC: 4.5 G/DL (ref 3.5–5.7)
ALP SERPL-CCNC: 90 U/L (ref 55–165)
ALT SERPL W P-5'-P-CCNC: 20 U/L (ref 7–52)
ANION GAP SERPL CALCULATED.3IONS-SCNC: 9 MMOL/L (ref 4–13)
AST SERPL W P-5'-P-CCNC: 19 U/L (ref 13–39)
BILIRUB SERPL-MCNC: 1.5 MG/DL (ref 0.2–1)
BUN SERPL-MCNC: 17 MG/DL (ref 7–25)
CALCIUM SERPL-MCNC: 9.3 MG/DL (ref 8.6–10.5)
CHLORIDE SERPL-SCNC: 101 MMOL/L (ref 98–107)
CO2 SERPL-SCNC: 27 MMOL/L (ref 21–31)
CREAT SERPL-MCNC: 0.54 MG/DL (ref 0.6–1.2)
EST. AVERAGE GLUCOSE BLD GHB EST-MCNC: 174 MG/DL
GFR SERPL CREATININE-BSD FRML MDRD: 100 ML/MIN/1.73SQ M
GLUCOSE P FAST SERPL-MCNC: 201 MG/DL (ref 65–99)
HBA1C MFR BLD: 7.7 % (ref 4.2–6.3)
POTASSIUM SERPL-SCNC: 3.9 MMOL/L (ref 3.5–5.5)
PROT SERPL-MCNC: 7.3 G/DL (ref 6.4–8.9)
SODIUM SERPL-SCNC: 137 MMOL/L (ref 134–143)
T4 FREE SERPL-MCNC: 1.25 NG/DL (ref 0.76–1.46)
TSH SERPL DL<=0.05 MIU/L-ACNC: <0.01 UIU/ML (ref 0.45–5.33)

## 2019-03-18 PROCEDURE — 84439 ASSAY OF FREE THYROXINE: CPT

## 2019-03-18 PROCEDURE — 84443 ASSAY THYROID STIM HORMONE: CPT

## 2019-03-18 PROCEDURE — 86800 THYROGLOBULIN ANTIBODY: CPT

## 2019-03-18 PROCEDURE — 83036 HEMOGLOBIN GLYCOSYLATED A1C: CPT

## 2019-03-18 PROCEDURE — 84432 ASSAY OF THYROGLOBULIN: CPT

## 2019-03-18 PROCEDURE — 80053 COMPREHEN METABOLIC PANEL: CPT

## 2019-03-18 PROCEDURE — 36415 COLL VENOUS BLD VENIPUNCTURE: CPT

## 2019-03-19 ENCOUNTER — TELEPHONE (OUTPATIENT)
Dept: ENDOCRINOLOGY | Facility: CLINIC | Age: 64
End: 2019-03-19

## 2019-03-19 DIAGNOSIS — E03.9 HYPOTHYROIDISM, UNSPECIFIED TYPE: Primary | ICD-10-CM

## 2019-03-19 LAB
THYROGLOB AB SERPL-ACNC: <1 IU/ML (ref 0–0.9)
THYROGLOB SERPL-MCNC: 0.2 NG/ML (ref 1.5–38.5)

## 2019-03-19 RX ORDER — LEVOTHYROXINE SODIUM 112 UG/1
112 TABLET ORAL DAILY
COMMUNITY
End: 2019-03-19 | Stop reason: DRUGHIGH

## 2019-03-19 NOTE — RESULT ENCOUNTER NOTE
Please call the patient regarding labs - A1C improved to 7 7-still above goal   Sent over fingerstick log in 2 weeks  TSH is suppressed, she is slightly over replaced  Is she taking levothyroxine 125 mcg daily? If so discontinue that and start levothyroxine 112 mcg daily    Repeat thyroid function test, TSH and free T4 in 6 weeks

## 2019-03-19 NOTE — TELEPHONE ENCOUNTER
----- Message from Mykel Chowdary MD sent at 3/19/2019  9:40 AM EDT -----  Please call the patient regarding labs - A1C improved to 7 7-still above goal   Sent over fingerstick log in 2 weeks  TSH is suppressed, she is slightly over replaced  Is she taking levothyroxine 125 mcg daily? If so discontinue that and start levothyroxine 112 mcg daily    Repeat thyroid function test, TSH and free T4 in 6 weeks

## 2019-03-23 RX ORDER — LEVOTHYROXINE SODIUM 112 UG/1
112 TABLET ORAL DAILY
Qty: 30 TABLET | Refills: 2 | Status: SHIPPED | OUTPATIENT
Start: 2019-03-23 | End: 2019-07-16 | Stop reason: SDUPTHER

## 2019-04-18 ENCOUNTER — OFFICE VISIT (OUTPATIENT)
Dept: URGENT CARE | Age: 64
End: 2019-04-18
Payer: COMMERCIAL

## 2019-04-18 VITALS
HEIGHT: 64 IN | HEART RATE: 87 BPM | DIASTOLIC BLOOD PRESSURE: 83 MMHG | OXYGEN SATURATION: 97 % | TEMPERATURE: 97.9 F | SYSTOLIC BLOOD PRESSURE: 145 MMHG | WEIGHT: 163 LBS | BODY MASS INDEX: 27.83 KG/M2 | RESPIRATION RATE: 16 BRPM

## 2019-04-18 DIAGNOSIS — R51.9 NONINTRACTABLE HEADACHE, UNSPECIFIED CHRONICITY PATTERN, UNSPECIFIED HEADACHE TYPE: Primary | ICD-10-CM

## 2019-04-18 PROCEDURE — 99213 OFFICE O/P EST LOW 20 MIN: CPT | Performed by: FAMILY MEDICINE

## 2019-04-18 PROCEDURE — S9088 SERVICES PROVIDED IN URGENT: HCPCS | Performed by: FAMILY MEDICINE

## 2019-05-01 ENCOUNTER — REMOTE DEVICE CLINIC VISIT (OUTPATIENT)
Dept: CARDIOLOGY CLINIC | Facility: CLINIC | Age: 64
End: 2019-05-01
Payer: COMMERCIAL

## 2019-05-01 DIAGNOSIS — I44.30 AVB (ATRIOVENTRICULAR BLOCK): Primary | ICD-10-CM

## 2019-05-01 DIAGNOSIS — Z95.0 PRESENCE OF CARDIAC PACEMAKER: ICD-10-CM

## 2019-05-01 DIAGNOSIS — I48.0 PAROXYSMAL ATRIAL FIBRILLATION (HCC): ICD-10-CM

## 2019-05-01 PROCEDURE — 93296 REM INTERROG EVL PM/IDS: CPT | Performed by: INTERNAL MEDICINE

## 2019-05-01 PROCEDURE — 93294 REM INTERROG EVL PM/LDLS PM: CPT | Performed by: INTERNAL MEDICINE

## 2019-06-03 ENCOUNTER — OFFICE VISIT (OUTPATIENT)
Dept: ENDOCRINOLOGY | Facility: CLINIC | Age: 64
End: 2019-06-03
Payer: COMMERCIAL

## 2019-06-03 ENCOUNTER — DOCUMENTATION (OUTPATIENT)
Dept: ENDOCRINOLOGY | Facility: CLINIC | Age: 64
End: 2019-06-03

## 2019-06-03 VITALS
DIASTOLIC BLOOD PRESSURE: 72 MMHG | HEART RATE: 98 BPM | WEIGHT: 160 LBS | SYSTOLIC BLOOD PRESSURE: 118 MMHG | HEIGHT: 64 IN | BODY MASS INDEX: 27.31 KG/M2

## 2019-06-03 DIAGNOSIS — I10 ESSENTIAL HYPERTENSION: ICD-10-CM

## 2019-06-03 DIAGNOSIS — E89.0 POSTOPERATIVE HYPOTHYROIDISM: ICD-10-CM

## 2019-06-03 DIAGNOSIS — C73 PAPILLARY ADENOCARCINOMA OF THYROID (HCC): ICD-10-CM

## 2019-06-03 DIAGNOSIS — E11.65 TYPE 2 DIABETES MELLITUS WITH HYPERGLYCEMIA, WITHOUT LONG-TERM CURRENT USE OF INSULIN (HCC): Primary | ICD-10-CM

## 2019-06-03 DIAGNOSIS — E78.5 HYPERLIPIDEMIA, UNSPECIFIED HYPERLIPIDEMIA TYPE: ICD-10-CM

## 2019-06-03 DIAGNOSIS — E05.90 HYPERTHYROIDISM: ICD-10-CM

## 2019-06-03 PROCEDURE — 99214 OFFICE O/P EST MOD 30 MIN: CPT | Performed by: PHYSICIAN ASSISTANT

## 2019-06-03 RX ORDER — EZETIMIBE 10 MG/1
10 TABLET ORAL DAILY
Qty: 90 TABLET | Refills: 1 | Status: SHIPPED | OUTPATIENT
Start: 2019-06-03 | End: 2019-10-01 | Stop reason: SDUPTHER

## 2019-06-03 RX ORDER — LOSARTAN POTASSIUM 50 MG/1
50 TABLET ORAL DAILY
Qty: 90 TABLET | Refills: 1 | Status: SHIPPED | OUTPATIENT
Start: 2019-06-03 | End: 2020-07-20 | Stop reason: SDUPTHER

## 2019-06-17 ENCOUNTER — TRANSCRIBE ORDERS (OUTPATIENT)
Dept: ADMINISTRATIVE | Facility: HOSPITAL | Age: 64
End: 2019-06-17

## 2019-06-17 ENCOUNTER — APPOINTMENT (OUTPATIENT)
Dept: LAB | Facility: HOSPITAL | Age: 64
End: 2019-06-17
Payer: COMMERCIAL

## 2019-06-17 DIAGNOSIS — E11.65 TYPE 2 DIABETES MELLITUS WITH HYPERGLYCEMIA, WITHOUT LONG-TERM CURRENT USE OF INSULIN (HCC): ICD-10-CM

## 2019-06-17 DIAGNOSIS — E89.0 POSTOPERATIVE HYPOTHYROIDISM: ICD-10-CM

## 2019-06-17 LAB
EST. AVERAGE GLUCOSE BLD GHB EST-MCNC: 166 MG/DL
HBA1C MFR BLD: 7.4 % (ref 4.2–6.3)
T4 FREE SERPL-MCNC: 1.63 NG/DL (ref 0.76–1.46)
TSH SERPL DL<=0.05 MIU/L-ACNC: 0.04 UIU/ML (ref 0.45–5.33)

## 2019-06-17 PROCEDURE — 83036 HEMOGLOBIN GLYCOSYLATED A1C: CPT

## 2019-06-17 PROCEDURE — 84439 ASSAY OF FREE THYROXINE: CPT

## 2019-06-17 PROCEDURE — 84443 ASSAY THYROID STIM HORMONE: CPT

## 2019-06-17 PROCEDURE — 36415 COLL VENOUS BLD VENIPUNCTURE: CPT

## 2019-06-25 ENCOUNTER — TELEPHONE (OUTPATIENT)
Dept: ENDOCRINOLOGY | Facility: CLINIC | Age: 64
End: 2019-06-25

## 2019-06-25 DIAGNOSIS — E05.90 HYPERTHYROIDISM: Primary | ICD-10-CM

## 2019-07-15 DIAGNOSIS — E05.90 HYPERTHYROIDISM: ICD-10-CM

## 2019-07-15 DIAGNOSIS — E11.65 TYPE 2 DIABETES MELLITUS WITH HYPERGLYCEMIA, WITHOUT LONG-TERM CURRENT USE OF INSULIN (HCC): Primary | ICD-10-CM

## 2019-07-16 RX ORDER — LEVOTHYROXINE SODIUM 0.1 MG/1
TABLET ORAL
Qty: 60 TABLET | Refills: 3 | Status: SHIPPED | OUTPATIENT
Start: 2019-07-16 | End: 2020-02-11 | Stop reason: SDUPTHER

## 2019-08-01 ENCOUNTER — REMOTE DEVICE CLINIC VISIT (OUTPATIENT)
Dept: CARDIOLOGY CLINIC | Facility: CLINIC | Age: 64
End: 2019-08-01
Payer: COMMERCIAL

## 2019-08-01 DIAGNOSIS — Z95.0 PACEMAKER: Primary | ICD-10-CM

## 2019-08-01 PROCEDURE — 93294 REM INTERROG EVL PM/LDLS PM: CPT | Performed by: INTERNAL MEDICINE

## 2019-08-01 PROCEDURE — 93296 REM INTERROG EVL PM/IDS: CPT | Performed by: INTERNAL MEDICINE

## 2019-08-01 NOTE — PROGRESS NOTES
Results for orders placed or performed in visit on 08/01/19   Cardiac EP device report    Narrative    SJM-DUAL CHAMBER PPM (DDD MODE)  CARELINK TRANSMISSION: BATTERY ADEQUATE (10 YRS)  AP 0%;  0%  ALL AVAILABLE LEAD PARAMETERS WITHIN NORMAL LIMITS  NO SIGNIFICANT AMS EPISODES  1 PSVT EPISODE (AVG V-RATE  BPM & UP TO 3 SECONDS)  PT  TAKES CARVEDILOL  NORMAL DEVICE FUNCTION   PL

## 2019-08-21 ENCOUNTER — VBI (OUTPATIENT)
Dept: ADMINISTRATIVE | Facility: OTHER | Age: 64
End: 2019-08-21

## 2019-08-21 NOTE — TELEPHONE ENCOUNTER
Initial attempt made and documented for CRC Screening Patient/Employee Outreach on 08/21/19  Follow-up outreach scheduled to be completed within 7 business days  Johnny Anderson I was able to speak with the Patient/Employee about the CRC Screening  The Patient reports that they have had a CRC Colonoscopy completed  After further discussion we have found the patient does not require any testing at this time  Our team will work to retrieve this document and update the chart appropriately      Johnny Anderson

## 2019-09-02 ENCOUNTER — OFFICE VISIT (OUTPATIENT)
Dept: URGENT CARE | Age: 64
End: 2019-09-02
Payer: COMMERCIAL

## 2019-09-02 VITALS
HEIGHT: 64 IN | SYSTOLIC BLOOD PRESSURE: 111 MMHG | WEIGHT: 160 LBS | RESPIRATION RATE: 16 BRPM | HEART RATE: 80 BPM | OXYGEN SATURATION: 96 % | TEMPERATURE: 98.6 F | BODY MASS INDEX: 27.31 KG/M2 | DIASTOLIC BLOOD PRESSURE: 65 MMHG

## 2019-09-02 DIAGNOSIS — R10.9 LEFT FLANK PAIN: Primary | ICD-10-CM

## 2019-09-02 DIAGNOSIS — Z12.11 SCREENING FOR COLORECTAL CANCER: ICD-10-CM

## 2019-09-02 DIAGNOSIS — Z12.12 SCREENING FOR COLORECTAL CANCER: ICD-10-CM

## 2019-09-02 DIAGNOSIS — T14.8XXA MUSCLE STRAIN: ICD-10-CM

## 2019-09-02 LAB
SL AMB  POCT GLUCOSE, UA: 250
SL AMB LEUKOCYTE ESTERASE,UA: ABNORMAL
SL AMB POCT BILIRUBIN,UA: ABNORMAL
SL AMB POCT BLOOD,UA: ABNORMAL
SL AMB POCT CLARITY,UA: ABNORMAL
SL AMB POCT COLOR,UA: YELLOW
SL AMB POCT KETONES,UA: ABNORMAL
SL AMB POCT NITRITE,UA: ABNORMAL
SL AMB POCT PH,UA: 6
SL AMB POCT SPECIFIC GRAVITY,UA: 1.01
SL AMB POCT URINE PROTEIN: ABNORMAL
SL AMB POCT UROBILINOGEN: 0.2

## 2019-09-02 PROCEDURE — 81002 URINALYSIS NONAUTO W/O SCOPE: CPT | Performed by: NURSE PRACTITIONER

## 2019-09-02 PROCEDURE — S9088 SERVICES PROVIDED IN URGENT: HCPCS | Performed by: NURSE PRACTITIONER

## 2019-09-02 PROCEDURE — 99213 OFFICE O/P EST LOW 20 MIN: CPT | Performed by: NURSE PRACTITIONER

## 2019-09-02 NOTE — PROGRESS NOTES
NAME: Mary Nava is a 58 y o  female  : 1955    MRN: [id number]      Assessment and Plan   Left flank pain [R10 9]  1  Left flank pain  POCT urine dip   2  Muscle strain     3  Screening for colorectal cancer  Occult Blood, Fecal Immunochemical (FIT)       Ana Maya was seen today for fall  Diagnoses and all orders for this visit:    Left flank pain  -     POCT urine dip    Muscle strain    Screening for colorectal cancer  -     Occult Blood, Fecal Immunochemical (FIT)    urine neg for infection and neg for blood    Patient Instructions   Patient Instructions     Follow-up with your family doctor  Use ice and heat to the left flank area  Use Tylenol Motrin for any pain  Call in 2 days for urine results      We offer over-the-counter meds that can be purchased here at the office for your convenience   Cough and cold meds:   Mucinex for $14 00   Mucinex DM for $14 00   Delsym for $14 dollars   Cepacol Extra strength for $4 00,     Allergy medicine  Bendaryl for $4 00  Cetrizine (Zyrtec) for $4 00,     Pain relief  Ibuprofen (motrin) for $4 00  Acetaminophen (tylenol) for $4 00  Childrens tylenol and motrin for $4 00    Itch and Rash Relief  % Hydrocortisone Cream for $4 00      Muscle Strain   WHAT YOU NEED TO KNOW:   A muscle strain is a twist, pull, or tear of a muscle or tendon  A tendon is a strong elastic tissue that connects a muscle to a bone  Signs of a strained muscle include bruising and swelling over the area, pain with movement, and loss of strength  DISCHARGE INSTRUCTIONS:   Return to the emergency department if:   · You suddenly cannot feel or move your injured muscle  Contact your healthcare provider if:   · Your pain and swelling worsen or do not go away  · You have questions or concerns about your condition or care  Medicines:   · NSAIDs  help decrease swelling and pain or fever  This medicine is available with or without a doctor's order   NSAIDs can cause stomach bleeding or kidney problems in certain people  If you take blood thinner medicine, always ask your healthcare provider if NSAIDs are safe for you  Always read the medicine label and follow directions  · Muscle relaxers  help decrease pain and muscle spasms  · Take your medicine as directed  Contact your healthcare provider if you think your medicine is not helping or if you have side effects  Tell him of her if you are allergic to any medicine  Keep a list of the medicines, vitamins, and herbs you take  Include the amounts, and when and why you take them  Bring the list or the pill bottles to follow-up visits  Carry your medicine list with you in case of an emergency  Follow up with your healthcare provider as directed: Your healthcare provider may suggest that you have a follow-up visit before you go back to your usual activity  Write down your questions so you remember to ask them during your visits  Self-care:   · 3 to 7 days after the injury:  Use Rest, Ice, Compression, and Elevation (RICE) to help stop bruising and decrease pain and swelling  ¨ Rest:  Rest your muscle to allow your injury to heal  When the pain decreases, begin normal, slow movements  For mild and moderate muscle strains, you should rest your muscles for about 2 days  However, if you have a severe muscle strain, you should rest for 10 to 14 days  You may need to use crutches to walk if your muscle strain is in your legs or lower body  ¨ Ice:  Put an ice pack on the injured area  Put a towel between the ice pack and your skin  Do not put the ice pack directly on your skin  You can use a package of frozen peas instead of an ice pack  ¨ Compression:  You may need to wrap an elastic bandage around the area to decrease swelling  It should be tight enough for you to feel support  Do not wrap it too tightly  ¨ Elevation:  Keep the injured muscle raised above your heart if possible   For example if you have a strain of your lower leg muscle, lie down and prop your leg up on pillows  This helps decrease pain and swelling  · 3 to 21 days after the injury:  Start to slowly and regularly exercise your muscle  This will help it heal  If you feel pain, decrease how hard you are exercising  · 1 to 6 weeks after the injury:  Stretch the injured muscle  Hold the stretch for about 30 seconds  Do this 4 times a day  You may stretch the muscle until you feel a slight pull  Stop stretching if you feel pain  · 2 weeks to 6 months after the injury:  The goal of this phase is to return to the activity you were doing before the injury happened, without hurting the muscle again  · 3 weeks to 6 months after the injury:  Keep stretching and strengthening your muscles to avoid injury  Slowly increase the time and distance that you exercise  You may have signs and symptoms of muscle strain 6 months after the injury, even if you do things to help it heal  In this case, you may need surgery on the muscle  © 2017 [employer] Information is for End User's use only and may not be sold, redistributed or otherwise used for commercial purposes  All illustrations and images included in CareNotes® are the copyrighted property of Robert Applebaum MD A M , Inc  or [employer]  The above information is an  only  It is not intended as medical advice for individual conditions or treatments  Talk to your doctor, nurse or pharmacist before following any medical regimen to see if it is safe and effective for you  Proceed to ER if symptoms worsen  Chief Complaint     Chief Complaint   Patient presents with    Fall     PT fell down her stairs about 1 and hald week ago and fell onto her tailbone, no longer has pain in her tailbone but is now experiencing left back pain and left rib pain; PT has tried heat lotion with no relief           History of Present Illness     Pt is here today for lower left side side pain after a full on her tailbone about 2 weeks ago  She had pain at that time but today it comes and goes on the left side  She has no bruising no swelling no obvious deformity and no pain with palpation  The pain does not limit her with range of motion  Is no blood in her urine denies ever having a kidney stone  Pain is typically over the left flank area will obtain a urine specimen to rule out blood in the urine and has admitted not taking anything over-the-counter since the fall 2 weeks ago  She has not take any Tylenol Motrin or Aleve over-the-counter due to not liking taking medication  She has no discomfort with taking a deep breath      Review of Systems   Review of Systems   Constitutional: Positive for activity change  Negative for fever  HENT: Negative  Respiratory: Negative  Cardiovascular: Negative  Gastrointestinal: Negative  Musculoskeletal: Positive for back pain (left flankl pain)  Skin: Negative            Current Medications       Current Outpatient Medications:     aspirin 81 MG tablet, Take 81 mg by mouth daily at bedtime , Disp: , Rfl:     Calcium Polycarbophil (FIBER-CAPS PO), Take 2 capsules by mouth 2 (two) times a day , Disp: , Rfl:     carvedilol (COREG) 25 mg tablet, Take 25 mg by mouth 2 (two) times a day with meals , Disp: , Rfl:     cetirizine (ZyrTEC) 10 mg tablet, Take 10 mg by mouth daily , Disp: , Rfl:     Empagliflozin (JARDIANCE) 25 MG TABS, Take 1 tablet (25 mg total) by mouth every morning, Disp: 90 tablet, Rfl: 3    ezetimibe (ZETIA) 10 mg tablet, Take 1 tablet (10 mg total) by mouth daily, Disp: 90 tablet, Rfl: 1    fexofenadine (MUCINEX ALLERGY) 180 MG tablet, Take 180 mg by mouth daily, Disp: , Rfl:     levothyroxine 100 mcg tablet, Take 1 pill daily, Disp: 60 tablet, Rfl: 3    losartan (COZAAR) 50 mg tablet, Take 1 tablet (50 mg total) by mouth daily, Disp: 90 tablet, Rfl: 1    Multiple Vitamin (MULTI-VITAMIN DAILY) TABS, Take by mouth daily , Disp: , Rfl:   Omega-3 Fatty Acids (FISH OIL) 1200 MG CAPS, Take 1 capsule by mouth 2 (two) times a day, Disp: , Rfl:     pioglitazone-metFORMIN (ACTOPLUS MET)  MG per tablet, 1 tab with breakfast and 2 with dinner, Disp: 270 tablet, Rfl: 1    rosuvastatin (CRESTOR) 20 MG tablet, 20 mg daily at bedtime , Disp: , Rfl:     Current Allergies     Allergies as of 09/02/2019 - Reviewed 09/02/2019   Allergen Reaction Noted    Penicillins Rash 02/22/2018    Sulfa antibiotics Rash 02/22/2018              Past Medical History:   Diagnosis Date    [facility] [address] [facility]     History of staph infection     Hypertension     Myocardial infarction ([facility] [address] [facility]     Varicella        Past Surgical History:   Procedure Laterality Date    APPENDECTOMY      CARDIAC PACEMAKER PLACEMENT      CARPAL TUNNEL RELEASE      CHOLECYSTECTOMY      HYSTERECTOMY      REPLACEMENT TOTAL KNEE Left     THYROIDECTOMY Bilateral 2/19/2019    Procedure: TOTAL THYROIDECTOMY;  Surgeon: Sg Peguero MD;  [facility];  Service: Surgical Oncology    US GUIDED THYROID BIOPSY  1/7/2019       Family History   Problem Relation Age of Onset    Diabetes unspecified Maternal Aunt     Colon cancer Father     Cancer Father     Heart disease Mother         Cardiac disorder, congenital aortic stenosis    Diabetes Other          Medications have been verified  The following portions of the patient's history were reviewed and updated as appropriate: allergies, current medications, past family history, past medical history, past social history, past surgical history and problem list     Objective   /65   Pulse 80   Temp 98 6 °F (37 °C)   Resp 16   Ht 5' 4" (1 626 m)   Wt 72 6 kg (160 lb)   SpO2 96%   BMI 27 46 kg/m²      Physical Exam     Physical Exam   Constitutional: She appears well-developed and well-nourished  Cardiovascular: Normal rate, regular rhythm and normal heart sounds     Pulmonary/Chest: Effort normal and breath sounds normal    Musculoskeletal:        Lumbar back: She exhibits decreased range of motion (to the left lateral side) and pain  She exhibits no tenderness, no bony tenderness, no swelling, no edema, no spasm and normal pulse          Back:        Karrie Levo, BRITTANYNP

## 2019-09-02 NOTE — PATIENT INSTRUCTIONS
Follow-up with your family doctor  Use ice and heat to the left flank area  Use Tylenol Motrin for any pain  Call in 2 days for urine results      We offer over-the-counter meds that can be purchased here at the office for your convenience   Cough and cold meds:   Mucinex for $14 00   Mucinex DM for $14 00   Delsym for $14 dollars   Cepacol Extra strength for $4 00,     Allergy medicine  Bendaryl for $4 00  Cetrizine (Zyrtec) for $4 00,     Pain relief  Ibuprofen (motrin) for $4 00  Acetaminophen (tylenol) for $4 00  Childrens tylenol and motrin for $4 00    Itch and Rash Relief  % Hydrocortisone Cream for $4 00      Muscle Strain   WHAT YOU NEED TO KNOW:   A muscle strain is a twist, pull, or tear of a muscle or tendon  A tendon is a strong elastic tissue that connects a muscle to a bone  Signs of a strained muscle include bruising and swelling over the area, pain with movement, and loss of strength  DISCHARGE INSTRUCTIONS:   Return to the emergency department if:   · You suddenly cannot feel or move your injured muscle  Contact your healthcare provider if:   · Your pain and swelling worsen or do not go away  · You have questions or concerns about your condition or care  Medicines:   · NSAIDs  help decrease swelling and pain or fever  This medicine is available with or without a doctor's order  NSAIDs can cause stomach bleeding or kidney problems in certain people  If you take blood thinner medicine, always ask your healthcare provider if NSAIDs are safe for you  Always read the medicine label and follow directions  · Muscle relaxers  help decrease pain and muscle spasms  · Take your medicine as directed  Contact your healthcare provider if you think your medicine is not helping or if you have side effects  Tell him of her if you are allergic to any medicine  Keep a list of the medicines, vitamins, and herbs you take  Include the amounts, and when and why you take them   Bring the list or the pill bottles to follow-up visits  Carry your medicine list with you in case of an emergency  Follow up with your healthcare provider as directed: Your healthcare provider may suggest that you have a follow-up visit before you go back to your usual activity  Write down your questions so you remember to ask them during your visits  Self-care:   · 3 to 7 days after the injury:  Use Rest, Ice, Compression, and Elevation (RICE) to help stop bruising and decrease pain and swelling  ¨ Rest:  Rest your muscle to allow your injury to heal  When the pain decreases, begin normal, slow movements  For mild and moderate muscle strains, you should rest your muscles for about 2 days  However, if you have a severe muscle strain, you should rest for 10 to 14 days  You may need to use crutches to walk if your muscle strain is in your legs or lower body  ¨ Ice:  Put an ice pack on the injured area  Put a towel between the ice pack and your skin  Do not put the ice pack directly on your skin  You can use a package of frozen peas instead of an ice pack  ¨ Compression:  You may need to wrap an elastic bandage around the area to decrease swelling  It should be tight enough for you to feel support  Do not wrap it too tightly  ¨ Elevation:  Keep the injured muscle raised above your heart if possible  For example if you have a strain of your lower leg muscle, lie down and prop your leg up on pillows  This helps decrease pain and swelling  · 3 to 21 days after the injury:  Start to slowly and regularly exercise your muscle  This will help it heal  If you feel pain, decrease how hard you are exercising  · 1 to 6 weeks after the injury:  Stretch the injured muscle  Hold the stretch for about 30 seconds  Do this 4 times a day  You may stretch the muscle until you feel a slight pull  Stop stretching if you feel pain       · 2 weeks to 6 months after the injury:  The goal of this phase is to return to the activity you were doing before the injury happened, without hurting the muscle again  · 3 weeks to 6 months after the injury:  Keep stretching and strengthening your muscles to avoid injury  Slowly increase the time and distance that you exercise  You may have signs and symptoms of muscle strain 6 months after the injury, even if you do things to help it heal  In this case, you may need surgery on the muscle  © 2017 [employer] Information is for End User's use only and may not be sold, redistributed or otherwise used for commercial purposes  All illustrations and images included in CareNotes® are the copyrighted property of A D A M , Inc  or [employer] P[employer]  The above information is an  only  It is not intended as medical advice for individual conditions or treatments  Talk to your doctor, nurse or pharmacist before following any medical regimen to see if it is safe and effective for you

## 2019-09-04 ENCOUNTER — LAB (OUTPATIENT)
Dept: LAB | Age: 64
End: 2019-09-04
Payer: COMMERCIAL

## 2019-09-04 DIAGNOSIS — E05.90 HYPERTHYROIDISM: ICD-10-CM

## 2019-09-04 LAB
T4 FREE SERPL-MCNC: 1.15 NG/DL (ref 0.76–1.46)
TSH SERPL DL<=0.05 MIU/L-ACNC: 0.55 UIU/ML (ref 0.36–3.74)

## 2019-09-04 PROCEDURE — 36415 COLL VENOUS BLD VENIPUNCTURE: CPT

## 2019-09-04 PROCEDURE — 84439 ASSAY OF FREE THYROXINE: CPT

## 2019-09-04 PROCEDURE — 84443 ASSAY THYROID STIM HORMONE: CPT

## 2019-09-09 ENCOUNTER — TELEPHONE (OUTPATIENT)
Dept: SURGICAL ONCOLOGY | Facility: CLINIC | Age: 64
End: 2019-09-09

## 2019-09-15 ENCOUNTER — APPOINTMENT (OUTPATIENT)
Dept: LAB | Age: 64
End: 2019-09-15
Payer: COMMERCIAL

## 2019-09-15 DIAGNOSIS — C73 PAPILLARY ADENOCARCINOMA OF THYROID (HCC): ICD-10-CM

## 2019-09-15 LAB
T4 FREE SERPL-MCNC: 1.27 NG/DL (ref 0.76–1.46)
T4 SERPL-MCNC: 12.3 UG/DL (ref 4.7–13.3)
TSH SERPL DL<=0.05 MIU/L-ACNC: 0.55 UIU/ML (ref 0.36–3.74)

## 2019-09-15 PROCEDURE — 36415 COLL VENOUS BLD VENIPUNCTURE: CPT

## 2019-09-15 PROCEDURE — 84439 ASSAY OF FREE THYROXINE: CPT

## 2019-09-15 PROCEDURE — 84479 ASSAY OF THYROID (T3 OR T4): CPT

## 2019-09-15 PROCEDURE — 84432 ASSAY OF THYROGLOBULIN: CPT

## 2019-09-15 PROCEDURE — 84436 ASSAY OF TOTAL THYROXINE: CPT

## 2019-09-15 PROCEDURE — 84443 ASSAY THYROID STIM HORMONE: CPT

## 2019-09-15 PROCEDURE — 86800 THYROGLOBULIN ANTIBODY: CPT

## 2019-09-16 ENCOUNTER — HOSPITAL ENCOUNTER (OUTPATIENT)
Dept: RADIOLOGY | Facility: HOSPITAL | Age: 64
Discharge: HOME | End: 2019-09-16
Attending: SURGERY
Payer: COMMERCIAL

## 2019-09-16 ENCOUNTER — OFFICE VISIT (OUTPATIENT)
Dept: ENDOCRINOLOGY | Facility: CLINIC | Age: 64
End: 2019-09-16
Payer: COMMERCIAL

## 2019-09-16 ENCOUNTER — OFFICE VISIT (OUTPATIENT)
Dept: SURGICAL ONCOLOGY | Facility: CLINIC | Age: 64
End: 2019-09-16
Payer: COMMERCIAL

## 2019-09-16 ENCOUNTER — TRANSCRIBE ORDERS (OUTPATIENT)
Dept: RADIOLOGY | Facility: HOSPITAL | Age: 64
End: 2019-09-16

## 2019-09-16 VITALS
BODY MASS INDEX: 27.76 KG/M2 | HEART RATE: 67 BPM | WEIGHT: 162.6 LBS | DIASTOLIC BLOOD PRESSURE: 80 MMHG | HEIGHT: 64 IN | SYSTOLIC BLOOD PRESSURE: 126 MMHG

## 2019-09-16 VITALS
RESPIRATION RATE: 16 BRPM | BODY MASS INDEX: 27.49 KG/M2 | HEIGHT: 64 IN | HEART RATE: 71 BPM | WEIGHT: 161 LBS | TEMPERATURE: 98.5 F | DIASTOLIC BLOOD PRESSURE: 86 MMHG | SYSTOLIC BLOOD PRESSURE: 144 MMHG

## 2019-09-16 DIAGNOSIS — C73 PAPILLARY ADENOCARCINOMA OF THYROID (HCC): ICD-10-CM

## 2019-09-16 DIAGNOSIS — E11.65 TYPE 2 DIABETES MELLITUS WITH HYPERGLYCEMIA, WITHOUT LONG-TERM CURRENT USE OF INSULIN (HCC): Primary | ICD-10-CM

## 2019-09-16 DIAGNOSIS — E66.3 OVERWEIGHT (BMI 25.0-29.9): ICD-10-CM

## 2019-09-16 DIAGNOSIS — Z85.850 HISTORY OF THYROID CANCER: ICD-10-CM

## 2019-09-16 DIAGNOSIS — E89.0 POSTOPERATIVE HYPOTHYROIDISM: ICD-10-CM

## 2019-09-16 DIAGNOSIS — E78.5 HYPERLIPIDEMIA, UNSPECIFIED HYPERLIPIDEMIA TYPE: ICD-10-CM

## 2019-09-16 DIAGNOSIS — Z08 ENCOUNTER FOR FOLLOW-UP SURVEILLANCE OF THYROID CANCER: Primary | ICD-10-CM

## 2019-09-16 DIAGNOSIS — Z85.850 ENCOUNTER FOR FOLLOW-UP SURVEILLANCE OF THYROID CANCER: Primary | ICD-10-CM

## 2019-09-16 DIAGNOSIS — C73 THYROID CANCER (HCC): ICD-10-CM

## 2019-09-16 DIAGNOSIS — E11.39 TYPE 2 DIABETES MELLITUS WITH OTHER OPHTHALMIC COMPLICATION, WITHOUT LONG-TERM CURRENT USE OF INSULIN (HCC): ICD-10-CM

## 2019-09-16 PROBLEM — E11.9 DIABETES MELLITUS (HCC): Status: ACTIVE | Noted: 2019-09-16

## 2019-09-16 LAB — T3RU NFR SERPL: 27 % (ref 24–39)

## 2019-09-16 PROCEDURE — 76536 US EXAM OF HEAD AND NECK: CPT

## 2019-09-16 PROCEDURE — 99213 OFFICE O/P EST LOW 20 MIN: CPT | Performed by: SURGERY

## 2019-09-16 PROCEDURE — 99214 OFFICE O/P EST MOD 30 MIN: CPT | Performed by: INTERNAL MEDICINE

## 2019-09-16 NOTE — PROGRESS NOTES
Surgical Oncology Follow Up       [facility]  [address]  [facility] [address]  [address] [facility] MADAY Sosa  1955  [id number]  [facility] Shonda Barkley  [address] [facility] [address] [phone]    Chief Complaint   Patient presents with    Follow-up     6 month follow up  Assessment/Plan:    No problem-specific Assessment & Plan notes found for this encounter  Diagnoses and all orders for this visit:    Encounter for follow-up surveillance of thyroid cancer  -     Thyroglobulin, Quant w/ AB ([place]); Future  -     US head neck lymph node mapping; Future    History of thyroid cancer        Advance Care Planning/Advance Directives:  Discussed disease status, cancer treatment plans and/or cancer treatment goals with the patient  History of thyroid cancer    1/7/2019 Biopsy     Fine needle aspiration of right thyroid  Malignant ([place] category VI) papillary thyroid carcinoma      2/19/2019 Surgery     Total thyroidectomy  Multifocal tumor  -largest tumor focus 1 7cm (T1b  -Tumor laterality: right lobe  -Papillary thyroid carcinoma  -margins: carcinoma less than 0 1cm from inked resection surface  -regional lymph nodes-2 lymph nodes negative for carcinoma (0/2)    8th Ed AJCC Stage: at least stage I pT1b(m), pN0, pMx         History of Present Illness:  Patient is here for 6 month follow-up status post total thyroidectomy for stage I thyroid cancer  -Interval History:  No major issues or complaints since her last visit  She follows closely with the Endocrinology team   She was not deemed a candidate for radioactive iodine therapy  Review of Systems:  Review of Systems   Constitutional: Negative  HENT: Negative  Eyes: Negative  Respiratory: Negative  Cardiovascular: Negative  Gastrointestinal: Negative      Endocrine: Negative  Genitourinary: Negative  Musculoskeletal: Negative  Skin: Negative  Allergic/Immunologic: Negative  Neurological: Negative  Hematological: Negative  Psychiatric/Behavioral: Negative  Patient Active Problem List   Diagnosis    Essential hypertension    Hyperlipidemia    Arthritis    History of myocardial infarction    Insomnia    Cardiac pacemaker in situ    Chronic nonalcoholic liver disease    Coronary atherosclerosis    Diabetes mellitus type 2, uncontrolled, without complications (HCC)    Disorder of bilirubin excretion    Elective replacement indicated for pacemaker    Failed total left knee replacement (HCC)    Lumbar back pain with radiculopathy affecting left lower extremity    Metabolic syndrome    Osteoarthritis, generalized    Overweight (BMI 25 0-29  9)    Atrophic vaginitis    Seasonal allergic rhinitis    Abnormal thyroid blood test    Acute maxillary sinusitis    History of thyroid cancer    Type 2 diabetes mellitus with hyperglycemia, without long-term current use of insulin (HCC)    Postoperative hypothyroidism    Encounter for follow-up surveillance of thyroid cancer     Past Medical History:   Diagnosis Date    [facility] [address] [facility]     History of staph infection     Hypertension     Myocardial infarction ([facility] [address] [facility]     Varicella      Past Surgical History:   Procedure Laterality Date    APPENDECTOMY      CARDIAC PACEMAKER PLACEMENT      CARPAL TUNNEL RELEASE      CHOLECYSTECTOMY      HYSTERECTOMY      REPLACEMENT TOTAL KNEE Left     THYROIDECTOMY Bilateral 2/19/2019    Procedure: TOTAL THYROIDECTOMY;  Surgeon: Sg Peguero MD;  [facility];  Service: Surgical Oncology    US GUIDED THYROID BIOPSY  1/7/2019     Family History   Problem Relation Age of Onset    Diabetes unspecified Maternal Aunt     Colon cancer Father     Cancer Father     Heart disease Mother         Cardiac disorder, congenital aortic stenosis  Diabetes Other      Social History     Socioeconomic History    Marital status: /Civil Union     Spouse name: Not on file    Number of children: Not on file    Years of education: Not on file    Highest education level: Not on file   Occupational History    Not on file   Social Needs    Financial resource strain: Not on file    Food insecurity:     Worry: Not on file     Inability: Not on file    Transportation needs:     Medical: Not on file     Non-medical: Not on file   Tobacco Use    Smoking status: Never Smoker    Smokeless tobacco: Never Used   Substance and Sexual Activity    Alcohol use: No    Drug use: No    Sexual activity: Not on file   Lifestyle    Physical activity:     Days per week: Not on file     Minutes per session: Not on file    Stress: Not on file   Relationships    Social connections:     Talks on phone: Not on file     Gets together: Not on file     Attends Latter day service: Not on file     Active member of club or organization: Not on file     Attends meetings of clubs or organizations: Not on file     Relationship status: Not on file    Intimate partner violence:     Fear of current or ex partner: Not on file     Emotionally abused: Not on file     Physically abused: Not on file     Forced sexual activity: Not on file   Other Topics Concern    Not on file   Social History Narrative    Always uses seat belt     No caffeine use        Current Outpatient Medications:     aspirin 81 MG tablet, Take 81 mg by mouth daily at bedtime , Disp: , Rfl:     Calcium Polycarbophil (FIBER-CAPS PO), Take 2 capsules by mouth 2 (two) times a day , Disp: , Rfl:     carvedilol (COREG) 25 mg tablet, Take 25 mg by mouth 2 (two) times a day with meals , Disp: , Rfl:     cetirizine (ZyrTEC) 10 mg tablet, Take 10 mg by mouth daily , Disp: , Rfl:     Empagliflozin (JARDIANCE) 25 MG TABS, Take 1 tablet (25 mg total) by mouth every morning, Disp: 90 tablet, Rfl: 3    ezetimibe ([place]) 10 mg tablet, Take 1 tablet (10 mg total) by mouth daily, Disp: 90 tablet, Rfl: 1    fexofenadine (MUCINEX ALLERGY) 180 MG tablet, Take 180 mg by mouth daily, Disp: , Rfl:     levothyroxine 100 mcg tablet, Take 1 pill daily, Disp: 60 tablet, Rfl: 3    losartan (COZAAR) 50 mg tablet, Take 1 tablet (50 mg total) by mouth daily, Disp: 90 tablet, Rfl: 1    Multiple Vitamin (MULTI-VITAMIN DAILY) TABS, Take by mouth daily , Disp: , Rfl:     Omega-3 Fatty Acids (FISH OIL) 1200 MG CAPS, Take 1 capsule by mouth 2 (two) times a day, Disp: , Rfl:     pioglitazone-metFORMIN (ACTOPLUS MET)  MG per tablet, 1 tab with breakfast and 2 with dinner, Disp: 270 tablet, Rfl: 1    rosuvastatin (CRESTOR) 20 MG tablet, 20 mg daily at bedtime , Disp: , Rfl:   Allergies   Allergen Reactions    Penicillins Rash    Sulfa Antibiotics Rash     Vitals:    09/16/19 0826   BP: 144/86   Pulse: 71   Resp: 16   Temp: 98 5 °F (36 9 °C)       Physical Exam   Constitutional: She is oriented to person, place, and time  She appears well-developed and well-nourished  HENT:   Head: Normocephalic and atraumatic  Right Ear: External ear normal    Left Ear: External ear normal    Eyes: Pupils are equal, round, and reactive to light  EOM are normal    Neck: Normal range of motion  Neck supple  Incision clean dry intact   Cardiovascular: Normal rate and regular rhythm  Pulmonary/Chest: Effort normal and breath sounds normal    Abdominal: Soft  Bowel sounds are normal    Musculoskeletal: Normal range of motion  Lymphadenopathy:     She has no cervical adenopathy  Neurological: She is alert and oriented to person, place, and time  Skin: Skin is warm and dry  Psychiatric: She has a normal mood and affect  Her behavior is normal  Judgment and thought content normal          Results:  Labs:  Pending (drawn yesterday)     Imaging  Pending (to be done this afternoon)  I reviewed the above laboratory and imaging data      Discussion/Summary: History of stage I thyroid cancer  Clinically [facility]  Blood work and ultrasound still pending  Assuming all is well, will plan on 1 year follow-up with blood work and ultrasound at that time for surveillance

## 2019-09-16 NOTE — ASSESSMENT & PLAN NOTE
Lab Results   Component Value Date    HGBA1C 7 4 (H) 06/17/2019    She should have an upcoming appointment with ophthalmologist    No results for input(s): POCGLU in the last 72 hours      Blood Sugar Average: Last 72 hrs:

## 2019-09-16 NOTE — PROGRESS NOTES
Fabian Naranjo 58 y o  female MRN: [id number]    Encounter: [id number]      Assessment/Plan     Problem List Items Addressed This Visit        Endocrine    Type 2 diabetes mellitus with hyperglycemia, without long-term current use of insulin ([facility] ) - Primary     Lab Results   Component Value Date    HGBA1C 7 4 (H) 06/17/2019     Will restart ozempic,  discontinue actomet and start her on metformin 1000 mg twice daily with meals  Continue jardiance   Suggested follow-up with the nutritionist that she has currently declined  Monitor blood sugar once a day at different times and send over log in 2 weeks  No results for input(s): POCGLU in the last 72 hours  Blood Sugar Average: Last 72 hrs:           Relevant Medications    metFORMIN (GLUCOPHAGE) 1000 MG tablet    Semaglutide (OZEMPIC) 0 25 or 0 5 MG/DOSE SOPN    Other Relevant Orders    Comprehensive metabolic panel Lab Collect    HEMOGLOBIN A1C W/ EAG ESTIMATION Lab Collect    Microalbumin / creatinine urine ratio Lab Collect    Postoperative hypothyroidism     TSH at goal-continue levothyroxine at current dose         Type 2 diabetes mellitus with ophthalmic complication, without long-term current use of insulin (HCC)     Lab Results   Component Value Date    HGBA1C 7 4 (H) 06/17/2019    She should have an upcoming appointment with ophthalmologist    No results for input(s): POCGLU in the last 72 hours  Blood Sugar Average: Last 72 hrs:           Relevant Medications    metFORMIN (GLUCOPHAGE) 1000 MG tablet    Semaglutide (OZEMPIC) 0 25 or 0 5 MG/DOSE SOPN    Thyroid cancer (HCC)     Status post total thyroidectomy-thyroglobulin level is pending  She has an upcoming neck ultrasound with lymph node mapping            Other    Hyperlipidemia    Relevant Orders    Comprehensive metabolic panel Lab Collect    HEMOGLOBIN A1C W/ EAG ESTIMATION Lab Collect    Microalbumin / creatinine urine ratio Lab Collect    Overweight (BMI 25 0-29  9)    Relevant Orders Comprehensive metabolic panel Lab Collect    HEMOGLOBIN A1C W/ EAG ESTIMATION Lab Collect    Microalbumin / creatinine urine ratio Lab Collect        CC: Diabetes    History of Present Illness     HPI:  70-year-old female with history of type 2 diabetes, thyroid cancer, postsurgical hypothyroidism here for follow-up  Complains of  Weight gain      for diabetes she is currently jardiance , actoplus   Was on ozempic - stopped using after 1 month as she lost a co-pay card  Checks fingersticks Once a week- fasting >200    C/o polydipsia , no polyuria , no blurry vision , occasional numbness in toes   She underwent total thyroidectomy for papillary thyroid cancer earlier this year  Did not require radioactive iodine treatment   For postsurgical hypothyroidism she is currently LT4 100 mcg daily - takes regularly and properly     Review of Systems   Constitutional: Negative for fatigue and unexpected weight change  Eyes: Negative for visual disturbance  Respiratory: Negative for cough and shortness of breath  Cardiovascular: Negative for palpitations and leg swelling  Gastrointestinal: Positive for diarrhea  Negative for constipation, nausea and vomiting  Endocrine: Positive for polyuria  Negative for polydipsia  Musculoskeletal: Negative for gait problem  Skin: Negative for pallor, rash and wound  Psychiatric/Behavioral: Negative for confusion and sleep disturbance  All other systems reviewed and are negative        Historical Information   Past Medical History:   Diagnosis Date    Diabetes mellitus ([address] )     History of staph infection     Hypertension     Myocardial infarction ([facility] )     Varicella      Past Surgical History:   Procedure Laterality Date    APPENDECTOMY      CARDIAC PACEMAKER PLACEMENT      CARPAL TUNNEL RELEASE      CHOLECYSTECTOMY      HYSTERECTOMY      REPLACEMENT TOTAL KNEE Left     THYROIDECTOMY Bilateral 2/19/2019    Procedure: TOTAL THYROIDECTOMY;  Surgeon: Gayla Márquez Kirill Franklin MD;  [facility];  Service: Surgical Oncology    US GUIDED THYROID BIOPSY  1/7/2019     Social History   Social History     Substance and Sexual Activity   Alcohol Use No     Social History     Substance and Sexual Activity   Drug Use No     Social History     Tobacco Use   Smoking Status Never Smoker   Smokeless Tobacco Never Used     Family History:   Family History   Problem Relation Age of Onset    Diabetes unspecified Maternal Aunt     Colon cancer Father     Cancer Father     Heart disease Mother         Cardiac disorder, congenital aortic stenosis    Diabetes Other        Meds/Allergies   Current Outpatient Medications   Medication Sig Dispense Refill    aspirin 81 MG tablet Take 81 mg by mouth daily at bedtime       Calcium Polycarbophil (FIBER-CAPS PO) Take 2 capsules by mouth 2 (two) times a day       carvedilol (COREG) 25 mg tablet Take 25 mg by mouth 2 (two) times a day with meals       cetirizine (ZyrTEC) 10 mg tablet Take 10 mg by mouth daily       Empagliflozin (JARDIANCE) 25 MG TABS Take 1 tablet (25 mg total) by mouth every morning 90 tablet 3    ezetimibe (ZETIA) 10 mg tablet Take 1 tablet (10 mg total) by mouth daily 90 tablet 1    fexofenadine (MUCINEX ALLERGY) 180 MG tablet Take 180 mg by mouth daily      levothyroxine 100 mcg tablet Take 1 pill daily 60 tablet 3    losartan (COZAAR) 50 mg tablet Take 1 tablet (50 mg total) by mouth daily 90 tablet 1    Multiple Vitamin (MULTI-VITAMIN DAILY) TABS Take by mouth daily       Omega-3 Fatty Acids (FISH OIL) 1200 MG CAPS Take 1 capsule by mouth 2 (two) times a day      rosuvastatin (CRESTOR) 20 MG tablet 20 mg daily at bedtime       metFORMIN (GLUCOPHAGE) 1000 MG tablet Take 1 tablet (1,000 mg total) by mouth 2 (two) times a day with meals 60 tablet 3    Semaglutide (OZEMPIC) 0 25 or 0 5 MG/DOSE SOPN Inject 0 25 mg under the skin once a week 2 pen 3     No current facility-administered medications for this visit  Allergies   Allergen Reactions    Penicillins Rash    Sulfa Antibiotics Rash       Objective   Vitals: Blood pressure 126/80, pulse 67, height 5' 4" (1 626 m), weight 73 8 kg (162 lb 9 6 oz)  Physical Exam   Constitutional: She is oriented to person, place, and time  She appears well-developed and well-nourished  No distress  HENT:   Head: Normocephalic and atraumatic  Eyes: EOM are normal  No scleral icterus  Neck: Normal range of motion  Neck supple  Anterior neck surgical scar-no masses   Cardiovascular: Normal rate, regular rhythm and normal heart sounds  No murmur heard  Pulmonary/Chest: Effort normal and breath sounds normal  No respiratory distress  She has no wheezes  She has no rales  Abdominal: Soft  Bowel sounds are normal  She exhibits no distension  There is no tenderness  There is no guarding  Musculoskeletal: Normal range of motion  She exhibits no edema or deformity  Lymphadenopathy:     She has no cervical adenopathy  Neurological: She is alert and oriented to person, place, and time  Skin: Skin is warm and dry  Psychiatric: Judgment and thought content normal        The history was obtained from the review of the chart, patient      Lab Results:   Lab Results   Component Value Date/Time    Hemoglobin A1C 7 4 (H) 06/17/2019 09:05 AM    Hemoglobin A1C 7 7 (H) 03/18/2019 07:49 AM    Hemoglobin A1C 8 3 (H) 01/14/2019 12:20 PM    WBC 9 29 02/20/2019 07:13 AM    WBC 5 28 01/14/2019 12:20 PM    Hemoglobin 13 6 02/20/2019 07:13 AM    Hemoglobin 14 7 01/14/2019 12:20 PM    Hematocrit 41 3 02/20/2019 07:13 AM    Hematocrit 43 2 01/14/2019 12:20 PM    MCV 87 02/20/2019 07:13 AM    MCV 85 01/14/2019 12:20 PM    Platelets 544 36/15/8857 07:13 AM    Platelets 680 18/53/4014 06:20 PM    Platelets 142 66/39/8138 12:20 PM    BUN 17 03/18/2019 07:49 AM    BUN 17 02/20/2019 07:27 AM    BUN 19 01/14/2019 12:20 PM    Potassium 3 9 03/18/2019 07:49 AM    Potassium 3 8 02/20/2019 07:27 AM Potassium 3 8 01/14/2019 12:20 PM    Chloride 101 03/18/2019 07:49 AM    Chloride 105 02/20/2019 07:27 AM    Chloride 104 01/14/2019 12:20 PM    CO2 27 03/18/2019 07:49 AM    CO2 22 02/20/2019 07:27 AM    CO2 22 01/14/2019 12:20 PM    Creatinine 0 54 (L) 03/18/2019 07:49 AM    Creatinine 0 46 (L) 02/20/2019 07:27 AM    Creatinine 0 63 01/14/2019 12:20 PM    AST 19 03/18/2019 07:49 AM    AST 28 01/14/2019 12:20 PM    AST 31 12/17/2018 08:45 AM    ALT 20 03/18/2019 07:49 AM    ALT 46 01/14/2019 12:20 PM    ALT 41 12/17/2018 08:45 AM    Albumin 4 5 03/18/2019 07:49 AM    Albumin 3 6 01/14/2019 12:20 PM    Albumin 4 0 12/17/2018 08:45 AM    HDL, Direct 38 (L) 11/12/2018 07:28 AM    Triglycerides 103 11/12/2018 07:28 AM           Portions of the record may have been created with voice recognition software  Occasional wrong word or "sound a like" substitutions may have occurred due to the inherent limitations of voice recognition software  Read the chart carefully and recognize, using context, where substitutions have occurred

## 2019-09-16 NOTE — ASSESSMENT & PLAN NOTE
Lab Results   Component Value Date    HGBA1C 7 4 (H) 06/17/2019     Will restart ozempic,  discontinue actomet and start her on metformin 1000 mg twice daily with meals  Continue jardiance   Suggested follow-up with the nutritionist that she has currently declined  Monitor blood sugar once a day at different times and send over log in 2 weeks  No results for input(s): POCGLU in the last 72 hours      Blood Sugar Average: Last 72 hrs:

## 2019-09-16 NOTE — LETTER
September 16, 2019     Moisés Chawla PA-C  [address]  [place]  [phone]    Patient: Marilia Patrick   YOB: 1955   Date of Visit: 9/16/2019       Dear Dr Brian Ramirez: Thank you for referring Jody Robles to me for evaluation  Below are my notes for this consultation  If you have questions, please do not hesitate to call me  I look forward to following your patient along with you  Sincerely,        Margoth Garcia MD        CC: MD Diane Choi CRNP Ellie Bloodgood, MD Cozetta Parkins, MD  9/16/2019  8:50 AM  Sign at close encounter     Surgical Oncology Follow Up       [address] [facility]  [address] [place] [id number] [address]  1955  [id number]  [address]  [facility]  [address] [facility]  [phone]    Chief Complaint   Patient presents with    Follow-up     6 month follow up  Assessment/Plan:    No problem-specific Assessment & Plan notes found for this encounter  Diagnoses and all orders for this visit:    Encounter for follow-up surveillance of thyroid cancer  -     Thyroglobulin, Quant w/ AB ([place]); Future  -     US head neck lymph node mapping; Future    History of thyroid cancer        Advance Care Planning/Advance Directives:  Discussed disease status, cancer treatment plans and/or cancer treatment goals with the patient          History of thyroid cancer    1/7/2019 Biopsy     Fine needle aspiration of right thyroid  Malignant ([place] category VI) papillary thyroid carcinoma      2/19/2019 Surgery     Total thyroidectomy  Multifocal tumor  -largest tumor focus 1 7cm (T1b  -Tumor laterality: right lobe  -Papillary thyroid carcinoma  -margins: carcinoma less than 0 1cm from inked resection surface  -regional lymph nodes-2 lymph nodes negative for carcinoma (0/2)    8th Ed AJCC Stage: at least stage I pT1b(m), pN0, pMx         History of Present Illness:  Patient is here for 6 month follow-up status post total thyroidectomy for stage I thyroid cancer  -Interval History:  No major issues or complaints since her last visit  She follows closely with the Endocrinology team   She was not deemed a candidate for radioactive iodine therapy  Review of Systems:  Review of Systems   Constitutional: Negative  HENT: Negative  Eyes: Negative  Respiratory: Negative  Cardiovascular: Negative  Gastrointestinal: Negative  Endocrine: Negative  Genitourinary: Negative  Musculoskeletal: Negative  Skin: Negative  Allergic/Immunologic: Negative  Neurological: Negative  Hematological: Negative  Psychiatric/Behavioral: Negative  Patient Active Problem List   Diagnosis    Essential hypertension    Hyperlipidemia    Arthritis    History of myocardial infarction    Insomnia    Cardiac pacemaker in situ    Chronic nonalcoholic liver disease    Coronary atherosclerosis    Diabetes mellitus type 2, uncontrolled, without complications (HCC)    Disorder of bilirubin excretion    Elective replacement indicated for pacemaker    Failed total left knee replacement (HCC)    Lumbar back pain with radiculopathy affecting left lower extremity    Metabolic syndrome    Osteoarthritis, generalized    Overweight (BMI 25 0-29  9)    Atrophic vaginitis    Seasonal allergic rhinitis    Abnormal thyroid blood test    Acute maxillary sinusitis    History of thyroid cancer    Type 2 diabetes mellitus with hyperglycemia, without long-term current use of insulin (HCC)    Postoperative hypothyroidism    Encounter for follow-up surveillance of thyroid cancer     Past Medical History:   Diagnosis Date    Diabetes mellitus ([address] )     History of staph infection     Hypertension     My[facility] ([facility]     Varicella Past Surgical History:   Procedure Laterality Date    APPENDECTOMY      CARDIAC PACEMAKER PLACEMENT      CARPAL TUNNEL RELEASE      CHOLECYSTECTOMY      HYSTERECTOMY      REPLACEMENT TOTAL KNEE Left     THYROIDECTOMY Bilateral 2/19/2019    Procedure: TOTAL THYROIDECTOMY;  Surgeon: Angella Dial MD;  [facility];  Service: Surgical Oncology    US GUIDED THYROID BIOPSY  1/7/2019     Family History   Problem Relation Age of Onset    Diabetes unspecified Maternal Aunt     Colon cancer Father     Cancer Father     Heart disease Mother         Cardiac disorder, congenital aortic stenosis    Diabetes Other      Social History     Socioeconomic History    Marital status: /Civil Union     Spouse name: Not on file    Number of children: Not on file    Years of education: Not on file    Highest education level: Not on file   Occupational History    Not on file   Social Needs    Financial resource strain: Not on file    Food insecurity:     Worry: Not on file     Inability: Not on file    Transportation needs:     Medical: Not on file     Non-medical: Not on file   Tobacco Use    Smoking status: Never Smoker    Smokeless tobacco: Never Used   Substance and Sexual Activity    Alcohol use: No    Drug use: No    Sexual activity: Not on file   Lifestyle    Physical activity:     Days per week: Not on file     Minutes per session: Not on file    Stress: Not on file   Relationships    Social connections:     Talks on phone: Not on file     Gets together: Not on file     Attends Restorationist service: Not on file     Active member of club or organization: Not on file     Attends meetings of clubs or organizations: Not on file     Relationship status: Not on file    Intimate partner violence:     Fear of current or ex partner: Not on file     Emotionally abused: Not on file     Physically abused: Not on file     Forced sexual activity: Not on file   Other Topics Concern    Not on file Social History Narrative    Always uses seat belt     No caffeine use        Current Outpatient Medications:     aspirin 81 MG tablet, Take 81 mg by mouth daily at bedtime , Disp: , Rfl:     Calcium Polycarbophil (FIBER-CAPS PO), Take 2 capsules by mouth 2 (two) times a day , Disp: , Rfl:     carvedilol (COREG) 25 mg tablet, Take 25 mg by mouth 2 (two) times a day with meals , Disp: , Rfl:     cetirizine (ZyrTEC) 10 mg tablet, Take 10 mg by mouth daily , Disp: , Rfl:     Empagliflozin (JARDIANCE) 25 MG TABS, Take 1 tablet (25 mg total) by mouth every morning, Disp: 90 tablet, Rfl: 3    ezetimibe (ZETIA) 10 mg tablet, Take 1 tablet (10 mg total) by mouth daily, Disp: 90 tablet, Rfl: 1    fexofenadine (MUCINEX ALLERGY) 180 MG tablet, Take 180 mg by mouth daily, Disp: , Rfl:     levothyroxine 100 mcg tablet, Take 1 pill daily, Disp: 60 tablet, Rfl: 3    losartan (COZAAR) 50 mg tablet, Take 1 tablet (50 mg total) by mouth daily, Disp: 90 tablet, Rfl: 1    Multiple Vitamin (MULTI-VITAMIN DAILY) TABS, Take by mouth daily , Disp: , Rfl:     Omega-3 Fatty Acids (FISH OIL) 1200 MG CAPS, Take 1 capsule by mouth 2 (two) times a day, Disp: , Rfl:     pioglitazone-metFORMIN (ACTOPLUS MET)  MG per tablet, 1 tab with breakfast and 2 with dinner, Disp: 270 tablet, Rfl: 1    rosuvastatin (CRESTOR) 20 MG tablet, 20 mg daily at bedtime , Disp: , Rfl:   Allergies   Allergen Reactions    Penicillins Rash    Sulfa Antibiotics Rash     Vitals:    09/16/19 0826   BP: 144/86   Pulse: 71   Resp: 16   Temp: 98 5 °F (36 9 °C)       Physical Exam   Constitutional: She is oriented to person, place, and time  She appears well-developed and well-nourished  HENT:   Head: Normocephalic and atraumatic  Right Ear: External ear normal    Left Ear: External ear normal    Eyes: Pupils are equal, round, and reactive to light  EOM are normal    Neck: Normal range of motion  Neck supple     Incision clean dry intact Cardiovascular: Normal rate and regular rhythm  Pulmonary/Chest: Effort normal and breath sounds normal    Abdominal: Soft  Bowel sounds are normal    Musculoskeletal: Normal range of motion  Lymphadenopathy:     She has no cervical adenopathy  Neurological: She is alert and oriented to person, place, and time  Skin: Skin is warm and dry  Psychiatric: She has a normal mood and affect  Her behavior is normal  Judgment and thought content normal          Results:  Labs:  Pending (drawn yesterday)     Imaging  Pending (to be done this afternoon)  I reviewed the above laboratory and imaging data  Discussion/Summary:  History of stage I thyroid cancer  Clinically [facility]  Blood work and ultrasound still pending  Assuming all is well, will plan on 1 year follow-up with blood work and ultrasound at that time for surveillance

## 2019-09-16 NOTE — ASSESSMENT & PLAN NOTE
Status post total thyroidectomy-thyroglobulin level is pending    She has an upcoming neck ultrasound with lymph node mapping

## 2019-09-18 LAB — SCAN RESULT: NORMAL

## 2019-09-29 ENCOUNTER — APPOINTMENT (OUTPATIENT)
Dept: LAB | Age: 64
End: 2019-09-29
Payer: COMMERCIAL

## 2019-09-29 LAB — HEMOCCULT STL QL IA: NEGATIVE

## 2019-09-29 PROCEDURE — G0328 FECAL BLOOD SCRN IMMUNOASSAY: HCPCS | Performed by: NURSE PRACTITIONER

## 2019-10-01 DIAGNOSIS — E78.5 HYPERLIPIDEMIA, UNSPECIFIED HYPERLIPIDEMIA TYPE: ICD-10-CM

## 2019-10-01 RX ORDER — EZETIMIBE 10 MG/1
10 TABLET ORAL DAILY
Qty: 90 TABLET | Refills: 1 | Status: SHIPPED | OUTPATIENT
Start: 2019-10-01 | End: 2020-02-10 | Stop reason: SDUPTHER

## 2019-10-18 PROBLEM — E11.3291 TYPE 2 DIABETES MELLITUS WITH MILD NONPROLIFERATIVE RETINOPATHY OF RIGHT EYE WITHOUT MACULAR EDEMA (HCC): Status: ACTIVE | Noted: 2019-09-16

## 2019-10-21 ENCOUNTER — OFFICE VISIT (OUTPATIENT)
Dept: FAMILY MEDICINE CLINIC | Facility: CLINIC | Age: 64
End: 2019-10-21
Payer: COMMERCIAL

## 2019-10-21 VITALS
SYSTOLIC BLOOD PRESSURE: 124 MMHG | DIASTOLIC BLOOD PRESSURE: 80 MMHG | HEART RATE: 107 BPM | TEMPERATURE: 98.1 F | RESPIRATION RATE: 17 BRPM | WEIGHT: 157 LBS | BODY MASS INDEX: 26.95 KG/M2 | OXYGEN SATURATION: 98 %

## 2019-10-21 DIAGNOSIS — I10 ESSENTIAL HYPERTENSION: ICD-10-CM

## 2019-10-21 DIAGNOSIS — Z91.89 PNEUMOCOCCAL VACCINATION INDICATED: Primary | ICD-10-CM

## 2019-10-21 DIAGNOSIS — E66.3 OVERWEIGHT (BMI 25.0-29.9): ICD-10-CM

## 2019-10-21 DIAGNOSIS — Z85.850 HISTORY OF THYROID CANCER: ICD-10-CM

## 2019-10-21 DIAGNOSIS — E11.65 TYPE 2 DIABETES MELLITUS WITH HYPERGLYCEMIA, WITHOUT LONG-TERM CURRENT USE OF INSULIN (HCC): ICD-10-CM

## 2019-10-21 DIAGNOSIS — R93.7 ABNORMAL BONE DENSITY SCREENING: ICD-10-CM

## 2019-10-21 DIAGNOSIS — Z12.39 BREAST CANCER SCREENING: ICD-10-CM
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
## 2019-10-21 PROCEDURE — 1100F PTFALLS ASSESS-DOCD GE2>/YR: CPT | Performed by: PHYSICIAN ASSISTANT

## 2019-10-21 PROCEDURE — 3074F SYST BP LT 130 MM HG: CPT | Performed by: PHYSICIAN ASSISTANT

## 2019-10-21 PROCEDURE — 3288F FALL RISK ASSESSMENT DOCD: CPT | Performed by: PHYSICIAN ASSISTANT

## 2019-10-21 PROCEDURE — 90670 PCV13 VACCINE IM: CPT

## 2019-10-21 PROCEDURE — 99213 OFFICE O/P EST LOW 20 MIN: CPT | Performed by: PHYSICIAN ASSISTANT

## 2019-10-21 PROCEDURE — 90471 IMMUNIZATION ADMIN: CPT

## 2019-10-21 PROCEDURE — 3079F DIAST BP 80-89 MM HG: CPT | Performed by: PHYSICIAN ASSISTANT

## 2019-10-21 NOTE — PROGRESS NOTES
Routine Follow-up    Kay Rushing 71 y o  female   Date:  10/21/2019      Assessment and Plan:    Clayton Espinoza was seen today for follow-up  Diagnoses and all orders for this visit:    Pneumococcal vaccination indicated  -     PNEUMOCOCCAL CONJUGATE VACCINE 13-VALENT GREATER THAN 6 MONTHS    Essential hypertension  - stable, no changes    Overweight (BMI 25 0-29  9)    Accident due to mechanical fall without injury, initial encounter    Breast cancer screening  -     Mammo screening bilateral w 3d & cad; Future    Abnormal bone density screening  -     DXA bone density spine hip and pelvis; Future  - vit d and calcium supplement encouraged    Type 2 diabetes mellitus with hyperglycemia, without long-term current use of insulin ([address] )  -     DXA bone density spine hip and pelvis; Future    History of thyroid cancer  -     DXA bone density spine hip and pelvis; Future        BMI Counseling: Body mass index is 26 95 kg/m²  The BMI is above normal  Nutrition recommendations include encouraging healthy choices of fruits and vegetables, moderation in carbohydrate intake and reducing intake of cholesterol  Exercise recommendations include exercising 3-5 times per week  Falls Plan of Care: balance, strength, and gait training instructions were provided  Mechanical fall without injury, no other problems with balance           HPI:  Chief Complaint   Patient presents with    Follow-up     HPI   Patient is a 70 yo female who presents for yearly check up  Her BP is stable  Over the year she has followed with endocrinology for management of thyroid, cholesterol and diabetes  No recent changes  She went to plant based eating classes with [facility] and has helped her with meal planning  She did have 1 fall but it was mechanical - no problems with falls or balance otherwise  She has no concerns  ROS: Review of Systems   Constitutional: Negative  Respiratory: Negative  Cardiovascular: Negative      Gastrointestinal: Negative  Genitourinary: Negative  Skin: Negative  Neurological: Negative  Psychiatric/Behavioral: Negative  Past Medical History:   Diagnosis Date    Diabetes mellitus [facility]     History of staph infection     Hypertension     Myocardial infarction ([facility] )     Varicella      Patient Active Problem List   Diagnosis    Essential hypertension    Hyperlipidemia    Arthritis    History of myocardial infarction    Insomnia    Cardiac pacemaker in situ    Chronic nonalcoholic liver disease    Coronary atherosclerosis    Diabetes mellitus type [facility], uncontrolled, without complications [facility]    Disorder of bilirubin excretion    Elective replacement indicated for pacemaker    Failed total left knee replacement (HCC)    Lumbar back pain with radiculopathy affecting left lower extremity    Metabolic syndrome    Osteoarthritis, generalized    Overweight (BMI 25 0-29  9)    Atrophic vaginitis    Seasonal allergic rhinitis    Abnormal thyroid blood test    Acute maxillary sinusitis    History of thyroid cancer    Type 2 diabetes mellitus with hyperglycemia, without long-term current use of insulin (HCC)    Postoperative hypothyroidism    Encounter for follow-up surveillance of thyroid cancer    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       Past Surgical History:   Procedure Laterality Date    APPENDECTOMY      CARDIAC PACEMAKER PLACEMENT      CARPAL TUNNEL RELEASE      CHOLECYSTECTOMY      HYSTERECTOMY      REPLACEMENT TOTAL KNEE Left     THYROIDECTOMY Bilateral 2/19/2019    Procedure: TOTAL THYROIDECTOMY;  Surgeon: Rao Syed MD;  [facility];  Service: Surgical Oncology    US GUIDED THYROID BIOPSY  1/7/2019       Social History     Socioeconomic History    Marital status: /Civil Union     Spouse name: None    Number of children: None    Years of education: None    Highest education level: None   Occupational History    None   Social Needs    Financial resource strain: None    Food insecurity:     Worry: None     Inability: None    Transportation needs:     Medical: None     Non-medical: None   Tobacco Use    Smoking status: Never Smoker    Smokeless tobacco: Never Used   Substance and Sexual Activity    Alcohol use: No    Drug use: No    Sexual activity: None   Lifestyle    Physical activity:     Days per week: None     Minutes per session: None    Stress: None   Relationships    Social connections:     Talks on phone: None     Gets together: None     Attends [employer] service: None     Active member of club or organization: None     Attends meetings of clubs or organizations: None     Relationship status: None    Intimate partner violence:     Fear of current or ex partner: None     Emotionally abused: None     Physically abused: None     Forced sexual activity: None   Other Topics Concern    None   Social History Narrative    Always uses seat belt     No caffeine use        Family History   Problem Relation Age of Onset    Diabetes unspecified Maternal Aunt     Colon cancer Father     Cancer Father     Heart disease Mother         Cardiac disorder, congenital aortic stenosis    Diabetes Other        Allergies   Allergen Reactions    Penicillins Rash    Sulfa Antibiotics Rash         Current Outpatient Medications:     aspirin 81 MG tablet, Take 81 mg by mouth daily at bedtime , Disp: , Rfl:     Calcium Polycarbophil (FIBER-CAPS PO), Take 2 capsules by mouth 2 (two) times a day , Disp: , Rfl:     carvedilol (COREG) 25 mg tablet, Take 25 mg by mouth 2 (two) times a day with meals , Disp: , Rfl:     cetirizine (ZyrTEC) 10 mg tablet, Take 10 mg by mouth daily , Disp: , Rfl:     Empagliflozin (JARDIANCE) 25 MG TABS, Take 1 tablet (25 mg total) by mouth every morning, Disp: 90 tablet, Rfl: 3    ezetimibe (ZETIA) 10 mg tablet, Take 1 tablet (10 mg total) by mouth daily, Disp: 90 tablet, Rfl: 1    fexofenadine (MUCINEX ALLERGY) 180 MG tablet, Take 180 mg by mouth daily, Disp: , Rfl:     levothyroxine 100 mcg tablet, Take 1 pill daily, Disp: 60 tablet, Rfl: 3    losartan (COZAAR) 50 mg tablet, Take 1 tablet (50 mg total) by mouth daily, Disp: 90 tablet, Rfl: 1    metFORMIN (GLUCOPHAGE) 1000 MG tablet, Take 1 tablet (1,000 mg total) by mouth 2 (two) times a day with meals, Disp: 60 tablet, Rfl: 3    Multiple Vitamin (MULTI-VITAMIN DAILY) TABS, Take by mouth daily , Disp: , Rfl:     Omega-3 Fatty Acids (FISH OIL) 1200 MG CAPS, Take 1 capsule by mouth 2 (two) times a day, Disp: , Rfl:     rosuvastatin (CRESTOR) 20 MG tablet, 20 mg daily at bedtime , Disp: , Rfl:     Semaglutide (OZEMPIC) 0 25 or 0 5 MG/DOSE SOPN, Inject 0 25 mg under the skin once a week, Disp: 2 pen, Rfl: 3      Physical Exam:  /80   Pulse (!) 107   Temp 98 1 °F (36 7 °C)   Resp 17   Wt 71 2 kg (157 lb)   SpO2 98%   BMI 26 95 kg/m²     Physical Exam   Constitutional: She is oriented to person, place, and time  She appears well-developed and well-nourished  No distress  HENT:   Head: Normocephalic and atraumatic  Mouth/Throat: Oropharynx is clear and moist    Eyes: Pupils are equal, round, and reactive to light  Conjunctivae are normal    Neck: Normal range of motion  Neck supple  Cardiovascular: Normal rate, regular rhythm and normal heart sounds  No murmur heard  HR 90 manually on recheck   Pulmonary/Chest: Effort normal and breath sounds normal  She has no wheezes  Musculoskeletal: She exhibits no edema or deformity  Neurological: She is alert and oriented to person, place, and time  No cranial nerve deficit  Skin: Skin is warm and dry  No rash noted  Psychiatric: She has a normal mood and affect   Her behavior is normal          Labs:  Lab Results   Component Value Date    WBC 9 29 02/20/2019    HGB 13 6 02/20/2019    HCT 41 3 02/20/2019    MCV 87 02/20/2019    PLT 233 02/20/2019     Lab Results   Component Value Date     04/03/2018    K 3 9 03/18/2019     03/18/2019    CO2 27 03/18/2019    ANIONGAP 12 2 04/03/2018    BUN 17 03/18/2019    CREATININE 0 54 (L) 03/18/2019    GLUF 201 (H) 03/18/2019    CALCIUM 9 3 03/18/2019    AST 19 03/18/2019    ALT 20 03/18/2019    ALKPHOS 90 03/18/2019    PROT 7 3 04/03/2018    BILITOT 1 4 (H) 04/03/2018    EGFR 100 03/18/2019

## 2019-11-04 ENCOUNTER — REMOTE DEVICE CLINIC VISIT (OUTPATIENT)
Dept: CARDIOLOGY CLINIC | Facility: CLINIC | Age: 64
End: 2019-11-04
Payer: COMMERCIAL

## 2019-11-04 DIAGNOSIS — Z95.0 CARDIAC PACEMAKER IN SITU: Primary | ICD-10-CM

## 2019-11-04 PROCEDURE — 93296 REM INTERROG EVL PM/IDS: CPT | Performed by: INTERNAL MEDICINE

## 2019-11-04 PROCEDURE — 93294 REM INTERROG EVL PM/LDLS PM: CPT | Performed by: INTERNAL MEDICINE

## 2019-11-04 NOTE — PROGRESS NOTES
Results for orders placed or performed in visit on 11/04/19   Cardiac EP device report    Narrative    SJM-DUAL CHAMBER PPM (DDD MODE)  MERLIN TRANSMISSION: BATTERY STATUS "OK"  AP 0%  0%  ALL AVAILABLE LEAD PARAMETERS WITHIN NORMAL LIMITS  NO SIGNIFICANT HIGH RATE EPISODES  NORMAL DEVICE FUNCTION   NC

## 2020-01-06 ENCOUNTER — HOSPITAL ENCOUNTER (OUTPATIENT)
Dept: RADIOLOGY | Age: 65
Discharge: HOME | End: 2020-01-06
Payer: COMMERCIAL

## 2020-01-06 VITALS — BODY MASS INDEX: 26.12 KG/M2 | HEIGHT: 64 IN | WEIGHT: 153 LBS

## 2020-01-06 DIAGNOSIS — Z12.39 BREAST CANCER SCREENING: ICD-10-CM

## 2020-01-06 DIAGNOSIS — R93.7 ABNORMAL BONE DENSITY SCREENING: ICD-10-CM

## 2020-01-06 DIAGNOSIS — E11.65 TYPE 2 DIABETES MELLITUS WITH HYPERGLYCEMIA, WITHOUT LONG-TERM CURRENT USE OF INSULIN (HCC): ICD-10-CM

## 2020-01-06 DIAGNOSIS — Z85.850 HISTORY OF THYROID CANCER: ICD-10-CM

## 2020-01-06 PROCEDURE — 77067 SCR MAMMO BI INCL CAD: CPT

## 2020-01-06 PROCEDURE — 77063 BREAST TOMOSYNTHESIS BI: CPT

## 2020-01-06 PROCEDURE — 77080 DXA BONE DENSITY AXIAL: CPT

## 2020-01-13 ENCOUNTER — OFFICE VISIT (OUTPATIENT)
Dept: FAMILY MEDICINE CLINIC | Facility: CLINIC | Age: 65
End: 2020-01-13
Payer: COMMERCIAL

## 2020-01-13 VITALS
SYSTOLIC BLOOD PRESSURE: 120 MMHG | RESPIRATION RATE: 16 BRPM | DIASTOLIC BLOOD PRESSURE: 80 MMHG | HEART RATE: 91 BPM | WEIGHT: 155 LBS | BODY MASS INDEX: 26.46 KG/M2 | HEIGHT: 64 IN | TEMPERATURE: 98.1 F | OXYGEN SATURATION: 97 %

## 2020-01-13 DIAGNOSIS — M81.0 AGE-RELATED OSTEOPOROSIS WITHOUT CURRENT PATHOLOGICAL FRACTURE: Primary | ICD-10-CM

## 2020-01-13 DIAGNOSIS — E11.65 TYPE 2 DIABETES MELLITUS WITH HYPERGLYCEMIA, WITHOUT LONG-TERM CURRENT USE OF INSULIN (HCC): ICD-10-CM

## 2020-01-13 PROCEDURE — 99213 OFFICE O/P EST LOW 20 MIN: CPT | Performed by: PHYSICIAN ASSISTANT

## 2020-01-13 RX ORDER — ALENDRONATE SODIUM 70 MG/1
70 TABLET ORAL
Qty: 12 TABLET | Refills: 1 | Status: SHIPPED | OUTPATIENT
Start: 2020-01-13 | End: 2020-07-20 | Stop reason: SDUPTHER

## 2020-01-13 RX ORDER — MELATONIN
2000 DAILY
Qty: 180 TABLET | Refills: 1 | Status: SHIPPED | OUTPATIENT
Start: 2020-01-13 | End: 2020-04-20 | Stop reason: SDUPTHER

## 2020-01-13 NOTE — PROGRESS NOTES
Cici Castro 71 y o  female   Date:  1/13/2020      Assessment and Plan:    Cameron was seen today for follow-up  Diagnoses and all orders for this visit:    Age-related osteoporosis without current pathological fracture  -     cholecalciferol (VITAMIN D3) 1,000 units tablet; Take 2 tablets (2,000 Units total) by mouth daily  -     alendronate (FOSAMAX) 70 mg tablet; Take 1 tablet (70 mg total) by mouth every 7 days  - caution with reflux s/e of fosamax  - vit d and calcium   - exercise    Type 2 diabetes mellitus with hyperglycemia, without long-term current use of insulin (HCC)  -     metFORMIN (GLUCOPHAGE) 1000 MG tablet; Take 1 tablet (1,000 mg total) by mouth 2 (two) times a day with meals  - given refill, normally controlled by endocrinology but completely out              HPI:  Chief Complaint   Patient presents with    Follow-up     review DEXA results- pt asked if we can fill her metformin while she is here--DM eye 12/19 @ Dr Josh Alas-      HPI   Patient is a 70 yo female who presents to discuss osteoporosis found on dexa which was her first  She has been taking calcium supplement  She has lost about 1 inch in height  She has not had any fractures  She does need a refill of her metformin, normally filled by endocrinology but is completely out  She went for eye exam, will request records  ROS: Review of Systems   Constitutional: Negative for activity change, chills and unexpected weight change  Respiratory: Negative  Cardiovascular: Negative  Gastrointestinal:        Heart burn - takes pepcid bid prn    Musculoskeletal: Positive for arthralgias  Negative for gait problem and joint swelling  Skin: Negative  Neurological: Negative          Past Medical History:   Diagnosis Date    Diabetes mellitus [facility] )     History of staph infection     Hypertension     [facility]     Varicella      Patient Active Problem List   Diagnosis    Essential hypertension    Hyperlipidemia    Arthritis    History of myocardial infarction    Insomnia    Cardiac pacemaker in situ    Chronic nonalcoholic liver disease    Coronary atherosclerosis    Diabetes mellitus type 2, uncontrolled, without complications (HCC)    Disorder of bilirubin excretion    Elective replacement indicated for pacemaker    Failed total left knee replacement (HCC)    Lumbar back pain with radiculopathy affecting left lower extremity    Metabolic syndrome    Osteoarthritis, generalized    Overweight (BMI 25 0-29  9)    Atrophic vaginitis    Seasonal allergic rhinitis    Abnormal thyroid blood test    Acute maxillary sinusitis    History of thyroid cancer    Type 2 diabetes mellitus with hyperglycemia, without long-term current use of insulin (HCC)    Postoperative hypothyroidism    Encounter for follow-up surveillance of thyroid cancer    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)    Thyroid cancer (HCC)       Past Surgical History:   Procedure Laterality Date    APPENDECTOMY      CARDIAC PACEMAKER PLACEMENT      CARPAL TUNNEL RELEASE      CHOLECYSTECTOMY      HYSTERECTOMY      OOPHORECTOMY Bilateral     REPLACEMENT TOTAL KNEE Left     THYROIDECTOMY Bilateral 2/19/2019    Procedure: TOTAL THYROIDECTOMY;  Surgeon: Aditi Saunders MD;  [facility];  Service: Surgical Oncology    US GUIDED THYROID BIOPSY  1/7/2019       Social History     Socioeconomic History    Marital status: /Civil Union     Spouse name: None    Number of children: None    Years of education: None    Highest education level: None   Occupational History    None   Social Needs    Financial resource strain: None    Food insecurity:     Worry: None     Inability: None    Transportation needs:     Medical: None     Non-medical: None   Tobacco Use    Smoking status: Never Smoker    Smokeless tobacco: Never Used   Substance and Sexual Activity    Alcohol use: No    Drug use: No    Sexual activity: None   Lifestyle    Physical activity:     Days per week: None     Minutes per session: None    Stress: None   Relationships    Social connections:     Talks on phone: None     Gets together: None     Attends Denominational service: None     Active member of club or organization: None     Attends meetings of clubs or organizations: None     Relationship status: None    Intimate partner violence:     Fear of current or ex partner: None     Emotionally abused: None     Physically abused: None     Forced sexual activity: None   Other Topics Concern    None   Social History Narrative    Always uses seat belt     No caffeine use        Family History   Problem Relation Age of Onset    Diabetes unspecified Maternal Aunt     Colon cancer Father     Cancer Father     Heart disease Mother         Cardiac disorder, congenital aortic stenosis    Diabetes Other     No Known Problems Sister     No Known Problems Daughter     No Known Problems Maternal Grandmother     No Known Problems Maternal Grandfather     No Known Problems Paternal Grandmother     No Known Problems Paternal Grandfather     No Known Problems Daughter     No Known Problems Maternal Aunt     No Known Problems Maternal Aunt     No Known Problems Paternal Aunt        Allergies   Allergen Reactions    Penicillins Rash    Sulfa Antibiotics Rash         Current Outpatient Medications:     aspirin 81 MG tablet, Take 81 mg by mouth daily at bedtime , Disp: , Rfl:     Calcium Polycarbophil (FIBER-CAPS PO), Take 2 capsules by mouth 2 (two) times a day , Disp: , Rfl:     carvedilol (COREG) 25 mg tablet, Take 25 mg by mouth 2 (two) times a day with meals , Disp: , Rfl:     cetirizine (ZyrTEC) 10 mg tablet, Take 10 mg by mouth daily , Disp: , Rfl:     Empagliflozin (JARDIANCE) 25 MG TABS, Take 1 tablet (25 mg total) by mouth every morning, Disp: 90 tablet, Rfl: 3    ezetimibe (Margaretta Kawasaki) 10 mg tablet, Take 1 tablet (10 mg total) by mouth daily, Disp: 90 tablet, Rfl: 1    fexofenadine (MUCINEX ALLERGY) 180 MG tablet, Take 180 mg by mouth daily, Disp: , Rfl:     levothyroxine 100 mcg tablet, Take 1 pill daily, Disp: 60 tablet, Rfl: 3    losartan (COZAAR) 50 mg tablet, Take 1 tablet (50 mg total) by mouth daily, Disp: 90 tablet, Rfl: 1    metFORMIN (GLUCOPHAGE) 1000 MG tablet, Take 1 tablet (1,000 mg total) by mouth 2 (two) times a day with meals, Disp: 180 tablet, Rfl: 0    Multiple Vitamin (MULTI-VITAMIN DAILY) TABS, Take by mouth daily , Disp: , Rfl:     Omega-3 Fatty Acids (FISH OIL) 1200 MG CAPS, Take 1 capsule by mouth 2 (two) times a day, Disp: , Rfl:     rosuvastatin (CRESTOR) 20 MG tablet, 20 mg daily at bedtime , Disp: , Rfl:     Semaglutide (OZEMPIC) 0 25 or 0 5 MG/DOSE SOPN, Inject 0 25 mg under the skin once a week, Disp: 2 pen, Rfl: 3    alendronate (FOSAMAX) 70 mg tablet, Take 1 tablet (70 mg total) by mouth every 7 days, Disp: 12 tablet, Rfl: 1    cholecalciferol (VITAMIN D3) 1,000 units tablet, Take 2 tablets (2,000 Units total) by mouth daily, Disp: 180 tablet, Rfl: 1      Physical Exam:  /80   Pulse 91   Temp 98 1 °F (36 7 °C)   Resp 16   Ht 5' 4" (1 626 m)   Wt 70 3 kg (155 lb)   SpO2 97%   BMI 26 61 kg/m²     Physical Exam   Constitutional: She is oriented to person, place, and time  She appears well-developed and well-nourished  No distress  HENT:   Head: Normocephalic and atraumatic  Eyes: Pupils are equal, round, and reactive to light  Conjunctivae are normal    Neck: Normal range of motion  Neck supple  Cardiovascular: Normal rate and regular rhythm  Pulmonary/Chest: Effort normal and breath sounds normal  No respiratory distress  Musculoskeletal: She exhibits no edema or deformity  Neurological: She is alert and oriented to person, place, and time  No cranial nerve deficit  Skin: Skin is warm and dry  No rash noted     Psychiatric: She has a normal mood and affect   Her behavior is normal          Labs:  Lab Results   Component Value Date    WBC 9 29 02/20/2019    HGB 13 6 02/20/2019    HCT 41 3 02/20/2019    MCV 87 02/20/2019     02/20/2019     Lab Results   Component Value Date     04/03/2018    K 3 9 03/18/2019     03/18/2019    CO2 27 03/18/2019    ANIONGAP 12 2 04/03/2018    BUN 17 03/18/2019    CREATININE 0 54 (L) 03/18/2019    GLUF 201 (H) 03/18/2019    CALCIUM 9 3 03/18/2019    AST 19 03/18/2019    ALT 20 03/18/2019    ALKPHOS 90 03/18/2019    PROT 7 3 04/03/2018    BILITOT 1 4 (H) 04/03/2018    EGFR 100 03/18/2019

## 2020-01-13 NOTE — PATIENT INSTRUCTIONS
Osteoporosis   WHAT YOU NEED TO KNOW:   Osteoporosis is a long-term medical condition that causes your bones to become weak, brittle, and more likely to fracture  Osteoporosis occurs when your body absorbs more bone than it makes  It is also caused by a lack of calcium and estrogen (female hormone)  DISCHARGE INSTRUCTIONS:   Return to the emergency department if:   · You have severe pain  Contact your healthcare provider if:   · You have increasing pain after a fall  · You have pain when you do your daily activities  · You have questions or concerns about your condition or care  Medicines:   · Medicines  may be given to prevent bone loss, build new bone, and increase estrogen  These medicines may be given as a pill or injection  Ask your healthcare provider for more information  · Take your medicine as directed  Contact your healthcare provider if you think your medicine is not helping or if you have side effects  Tell him of her if you are allergic to any medicine  Keep a list of the medicines, vitamins, and herbs you take  Include the amounts, and when and why you take them  Bring the list or the pill bottles to follow-up visits  Carry your medicine list with you in case of an emergency  Prevent bone loss:   · Eat healthy foods that are high in calcium  This helps keep your bones strong  Good sources of calcium are milk, cheese, broccoli, tofu, almonds, and canned salmon and sardines  · Increase your vitamin D intake  Vitamin D is in fish oils, some vegetables, and fortified milk, cereal, and bread  Vitamin D is also formed in the skin when it is exposed to the sun  Ask your healthcare provider how much sunlight is safe for you  · Drink liquids as directed  Ask your healthcare provider how much liquid to drink each day and which liquids are best for you  Do not have alcohol or caffeine  They decrease bone mineral density, which can weaken your bones  · Exercise regularly    Ask your healthcare provider about the best exercise plan for you  Weight bearing exercise for 30 minutes, 3 times a week can help build and strengthen bone  · Do not smoke  Nicotine and other chemicals in cigarettes and cigars can cause lung damage  Ask your healthcare provider for information if you currently smoke and need help to quit  E-cigarettes or smokeless tobacco still contain nicotine  Talk to your healthcare provider before you use these products  · Go to physical therapy as directed  A physical therapist teaches you exercises to help improve movement and muscle strength  Follow up with your healthcare provider as directed:  Write down your questions so you remember to ask them during your visits  © 2017 [address]  Information is for End User's use only and may not be sold, redistributed or otherwise used for commercial purposes  All illustrations and images included in CareNotes® are the copyrighted property of [employer]  or Melec[employer] Pinto  The above information is an  only  It is not intended as medical advice for individual conditions or treatments  Talk to your doctor, nurse or pharmacist before following any medical regimen to see if it is safe and effective for you

## 2020-01-15 ENCOUNTER — OFFICE VISIT (OUTPATIENT)
Dept: CARDIOLOGY CLINIC | Facility: CLINIC | Age: 65
End: 2020-01-15
Payer: COMMERCIAL

## 2020-01-15 VITALS
WEIGHT: 156.8 LBS | DIASTOLIC BLOOD PRESSURE: 82 MMHG | BODY MASS INDEX: 26.77 KG/M2 | HEART RATE: 76 BPM | SYSTOLIC BLOOD PRESSURE: 134 MMHG | HEIGHT: 64 IN

## 2020-01-15 DIAGNOSIS — E78.5 HYPERLIPIDEMIA, UNSPECIFIED HYPERLIPIDEMIA TYPE: ICD-10-CM

## 2020-01-15 DIAGNOSIS — I25.2 HISTORY OF MYOCARDIAL INFARCTION: ICD-10-CM

## 2020-01-15 DIAGNOSIS — I25.10 ATHEROSCLEROSIS OF NATIVE CORONARY ARTERY OF NATIVE HEART WITHOUT ANGINA PECTORIS: ICD-10-CM

## 2020-01-15 DIAGNOSIS — Z95.0 CARDIAC PACEMAKER IN SITU: ICD-10-CM

## 2020-01-15 DIAGNOSIS — I10 ESSENTIAL HYPERTENSION: Primary | ICD-10-CM

## 2020-01-15 PROCEDURE — 93000 ELECTROCARDIOGRAM COMPLETE: CPT | Performed by: INTERNAL MEDICINE

## 2020-01-15 PROCEDURE — 99214 OFFICE O/P EST MOD 30 MIN: CPT | Performed by: INTERNAL MEDICINE

## 2020-01-15 NOTE — PROGRESS NOTES
Cardiology Follow Up    Alma Juarez  1955  [id number]  [address]  [facility] [address]   [phone]    1  Essential hypertension     2  Atherosclerosis of native coronary artery of native heart without angina pectoris     3  Hyperlipidemia, unspecified hyperlipidemia type     4  History of myocardial infarction     5  Cardiac pacemaker in situ         Interval History:   Cardiology follow-up  Patient doing well from the cardiac point of view denies any chest pain or dyspnea  Compliant with low-cholesterol diet, I do not have a recent fractionation, LDL over a year ago was 54  She is on high-intensity statin therapy plus Zetia  Patient working properly on recent duration  Compliant with low-sodium diet, blood pressures been well control as well  Patient Active Problem List   Diagnosis    Essential hypertension    Hyperlipidemia    Arthritis    History of myocardial infarction    Insomnia    Cardiac pacemaker in situ    Chronic nonalcoholic liver disease    Coronary atherosclerosis    Diabetes mellitus type 2, uncontrolled, without complications (HCC)    Disorder of bilirubin excretion    Elective replacement indicated for pacemaker    Failed total left knee replacement (HCC)    Lumbar back pain with radiculopathy affecting left lower extremity    Metabolic syndrome    Osteoarthritis, generalized    Overweight (BMI 25 0-29  9)    Atrophic vaginitis    Seasonal allergic rhinitis    Abnormal thyroid blood test    Acute maxillary sinusitis    History of thyroid cancer    Type 2 diabetes mellitus with hyperglycemia, without long-term current use of insulin (HCC)    Postoperative hypothyroidism    Encounter for follow-up surveillance of thyroid cancer    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)    Thyroid cancer [facility])     Past Medical History:   Diagnosis Date    Diabetes mellitus ([facility] [address] )     History of staph infection     Hypertension     [facility]     Varicella      Social History     Socioeconomic History    Marital status: /Civil Union     Spouse name: Not on file    Number of children: Not on file    Years of education: Not on file    Highest education level: Not on file   Occupational History    Not on file   Social Needs    Financial resource strain: Not on file    Food insecurity:     Worry: Not on file     Inability: Not on file    Transportation needs:     Medical: Not on file     Non-medical: Not on file   Tobacco Use    Smoking status: Never Smoker    Smokeless tobacco: Never Used   Substance and Sexual Activity    Alcohol use: No    Drug use: No    Sexual activity: Not on file   Lifestyle    Physical activity:     Days per week: Not on file     Minutes per session: Not on file    Stress: Not on file   Relationships    Social connections:     Talks on phone: Not on file     Gets together: Not on file     Attends Orthodox service: Not on file     Active member of club or organization: Not on file     Attends meetings of clubs or organizations: Not on file     Relationship status: Not on file    Intimate partner violence:     Fear of current or ex partner: Not on file     Emotionally abused: Not on file     Physically abused: Not on file     Forced sexual activity: Not on file   Other Topics Concern    Not on file   Social History Narrative    Always uses seat belt     No caffeine use       Family History   Problem Relation Age of Onset    Diabetes unspecified Maternal Aunt     Colon cancer Father     Cancer Father     Heart disease Mother         Cardiac disorder, congenital aortic stenosis    Diabetes Other     No Known Problems Sister     No Known Problems Daughter     No Known Problems Maternal Grandmother     No Known Problems Maternal Grandfather  No Known Problems Paternal Grandmother     No Known Problems Paternal Grandfather     No Known Problems Daughter     No Known Problems Maternal Aunt     No Known Problems Maternal Aunt     No Known Problems Paternal Aunt      Past Surgical History:   Procedure Laterality Date    APPENDECTOMY      CARDIAC PACEMAKER PLACEMENT      CARPAL TUNNEL RELEASE      CHOLECYSTECTOMY      HYSTERECTOMY      OOPHORECTOMY Bilateral     REPLACEMENT TOTAL KNEE Left     THYROIDECTOMY Bilateral 2/19/2019    Procedure: TOTAL THYROIDECTOMY;  Surgeon: Tj Medeiros MD;  Location: [facility];  Service: Surgical Oncology    US GUIDED THYROID BIOPSY  1/7/2019       Current Outpatient Medications:     aspirin 81 MG tablet, Take 81 mg by mouth daily at bedtime , Disp: , Rfl:     Calcium Polycarbophil (FIBER-CAPS PO), Take 2 capsules by mouth 2 (two) times a day , Disp: , Rfl:     carvedilol (COREG) 25 mg tablet, Take 25 mg by mouth 2 (two) times a day with meals , Disp: , Rfl:     cetirizine (ZyrTEC) 10 mg tablet, Take 10 mg by mouth daily , Disp: , Rfl:     cholecalciferol (VITAMIN D3) 1,000 units tablet, Take 2 tablets (2,000 Units total) by mouth daily, Disp: 180 tablet, Rfl: 1    Empagliflozin (JARDIANCE) 25 MG TABS, Take 1 tablet (25 mg total) by mouth every morning, Disp: 90 tablet, Rfl: 3    ezetimibe (ZETIA) 10 mg tablet, Take 1 tablet (10 mg total) by mouth daily, Disp: 90 tablet, Rfl: 1    fexofenadine (MUCINEX ALLERGY) 180 MG tablet, Take 180 mg by mouth daily, Disp: , Rfl:     levothyroxine 100 mcg tablet, Take 1 pill daily, Disp: 60 tablet, Rfl: 3    losartan (COZAAR) 50 mg tablet, Take 1 tablet (50 mg total) by mouth daily, Disp: 90 tablet, Rfl: 1    metFORMIN (GLUCOPHAGE) 1000 MG tablet, Take 1 tablet (1,000 mg total) by mouth 2 (two) times a day with meals, Disp: 180 tablet, Rfl: 0    Multiple Vitamin (MULTI-VITAMIN DAILY) TABS, Take by mouth daily , Disp: , Rfl:     Omega-3 Fatty Acids (FISH OIL) 1200 MG CAPS, Take 1 capsule by mouth 2 (two) times a day, Disp: , Rfl:     rosuvastatin (CRESTOR) 20 MG tablet, 20 mg daily at bedtime , Disp: , Rfl:     Semaglutide (OZEMPIC) 0 25 or 0 5 MG/DOSE SOPN, Inject 0 25 mg under the skin once a week, Disp: 2 pen, Rfl: 3    alendronate (FOSAMAX) 70 mg tablet, Take 1 tablet (70 mg total) by mouth every 7 days, Disp: 12 tablet, Rfl: 1  Allergies   Allergen Reactions    Penicillins Rash    Sulfa Antibiotics Rash       Labs:  Orders Only on 11/18/2019   Component Date Value    Severity 11/18/2019 Normal     Right Eye Diabetic Retin* 11/18/2019 None     Left Eye Diabetic Retino* 11/18/2019 None    Appointment on 09/15/2019   Component Date Value    Scan Result 09/15/2019 SEE WRITTEN REPORT     T3 Uptake Ratio 09/15/2019 27     TSH 3RD GENERATON 09/15/2019 0 553     T4 TOTAL 09/15/2019 12 3     Free T4 09/15/2019 1 27    Lab on 09/04/2019   Component Date Value    TSH 3RD GENERATON 09/04/2019 0 554     Free T4 09/04/2019 1 15    Office Visit on 09/02/2019   Component Date Value    LEUKOCYTE ESTERASE,UA 09/02/2019 neg     NITRITE,UA 09/02/2019 neg     SL AMB POCT UROBILINOGEN 09/02/2019 0 2     POCT URINE PROTEIN 09/02/2019 neg      PH,UA 09/02/2019 6 0     BLOOD,UA 09/02/2019 neg     SPECIFIC GRAVITY,UA 09/02/2019 1 010     KETONES,UA 09/02/2019 neg     BILIRUBIN,UA 09/02/2019 neg     GLUCOSE, UA 09/02/2019 250      COLOR,UA 09/02/2019 yellow     CLARITY,UA 09/02/2019 hazy     OCCULT BLD, FECAL IMMUNO* 09/29/2019 Negative      Imaging: Dxa Bone Density Spine Hip And Pelvis    Result Date: 1/7/2020  Narrative: CENTRAL  DXA SCAN CLINICAL HISTORY:   71year old post-menopausal  female  History of hypothyroidism and diabetes  R93 7: Abnormal findings on diagnostic imaging of other parts of musculoskeletal system E11 65: Type 2 diabetes mellitus with hyperglycemia Z85 850: Personal history of malignant neoplasm of thyroid   TECHNIQUE: Bone densitometry was performed using a Horizon A bone densitometer  Regions of interest appear properly placed  There are no obvious fractures or other confounding variables which could limit the study  COMPARISON:  None  RESULTS: LUMBAR SPINE:  L3-L4: BMD 0 780 gm/cm2 T-score -2 9 Z-score -1 0 LEFT TOTAL HIP: BMD 0 719 gm/cm2 T-score -1 8 Z-score -0 6 LEFT FEMORAL NECK: BMD 0 551 gm/cm2 T-score -2 7 Z-score -1 2     Impression: 1  Based on the [facility] classification, the T-score of -2 9 in the lumbar spine and -2 7 at the left femoral neck are consistent with osteoporosis  2   According to the [address] [place] [address], prescription therapy is recommended with a T-score of -2 5 or less in the spine or hip after appropriate evaluation to exclude secondary causes  3   A daily intake of at least 1200 mg Calcium and 800 to 1000 IU of Vitamin D, as well as weight bearing and muscle strengthening exercise, fall prevention and avoidance of tobacco and excessive alcohol intake as  basic preventive measures are suggested  4   Repeat DXA  in 18 - 24 months, on the same machine, as clinically indicated  The 10 year risk of hip fracture is 3 1%, with the 10 year risk of major osteoporotic fracture being 14%, as calculated by the [facility] fracture risk assessment tool (FRAX)  The current NOF guidelines recommend treating patients with FRAX 10 year risk score of >3% for hip fracture and >20% for major osteoporotic fracture  WHO CLASSIFICATION: Normal (a T-score of -1 0 or higher) Low bone mineral density (a T-score of less than -1 0 but higher than -2 5) Osteoporosis (a T-score of -2 5 or less) Severe osteoporosis (a T-score of -2 5 or less with a fragility fracture)   Workstation performed: [id number]     Mammo Screening Bilateral W 3d & Cad    Result Date: 1/8/2020  Narrative: DIAGNOSIS: Breast cancer screening TECHNIQUE: Digital screening mammography was performed  Computer Aided Detection (CAD) analyzed all applicable images  COMPARISONS: Prior breast imaging dated: 07/23/2014, 10/20/2009, 03/20/2008, and 10/10/2006 RELEVANT HISTORY: Family Breast Cancer History: No known family history of breast cancer  Family Medical History: Family medical history includes colon cancer in father  Personal History: No known relevant hormone history  Surgical history includes hysterectomy and oophorectomy  No known relevant medical history  The patient is scheduled in a reminder system for screening mammography  8-10% of cancers will be missed on mammography  Management of a palpable abnormality must be based on clinical grounds  Patients will be notified of their results via letter from our facility  Accredited by [employer] of Radiology and FDA  RISK ASSESSMENT: 5 Year Tyrer-Cuzick: 1 02 % 10 Year Tyrer-Cuzick: 1 89 % Lifetime Tyrer-Cuzick: 3 99 % TISSUE DENSITY: There are scattered areas of fibroglandular density  INDICATION: Jovanny Calzada is a 71 y o  female presenting for screening mammography  FINDINGS: There are no suspicious masses, grouped microcalcifications or areas of architectural distortion  The skin and nipple areolar complex are unremarkable  Pacemaker noted on left  Impression: No mammographic evidence of malignancy  ASSESSMENT/BI-RADS CATEGORY: Left: 1 - Negative Right: 1 - Negative Overall: 1 - Negative RECOMMENDATION:      - Routine screening mammogram in 1 year for both breasts  Workstation ID: [id number]       Review of Systems:  Review of Systems   Constitutional: Negative for activity change and fatigue  HENT: Negative for nosebleeds  Eyes: Negative for visual disturbance  Respiratory: Negative for apnea, shortness of breath, wheezing and stridor  Cardiovascular: Negative for chest pain, palpitations and leg swelling  Gastrointestinal: Negative for abdominal pain, anal bleeding and blood in stool  Endocrine: Negative for cold intolerance  Genitourinary: Negative for hematuria     Musculoskeletal: Negative for gait problem and myalgias  Skin: Negative for pallor and rash  Allergic/Immunologic: Negative for immunocompromised state  Neurological: Negative for syncope and weakness  Hematological: Does not bruise/bleed easily  Psychiatric/Behavioral: Negative for sleep disturbance  The patient is not nervous/anxious  Physical Exam:  Physical Exam   Constitutional: She appears well-developed and well-nourished  No distress  Neck: No JVD present  Cardiovascular: Normal rate, regular rhythm, normal heart sounds and intact distal pulses  Exam reveals no gallop and no friction rub  No murmur heard  Pulmonary/Chest: Effort normal and breath sounds normal  No stridor  No respiratory distress  She has no wheezes  She has no rales  Musculoskeletal: She exhibits no edema  Neurological: She is alert  Skin: Skin is warm and dry  Capillary refill takes less than 2 seconds  She is not diaphoretic  Psychiatric: She has a normal mood and affect  Vitals reviewed  Discussion/Summary:  Coronary artery disease, history of lateral MI in the distant past, catheterization report at that time revealed an occluded marginal could not be revascularized  She did have atrial asystole during that time receiving a dual-chamber pacemaker  She has had 3 pulse generator changes  Last 1 in 17 most recent duration revealed no pacing whatsoever, adequate function  Pharmacological stress test report from 2017 revealed anterolateral infarct interestingly so but minimal surrounding ischemia managed medically unchanged from previous 1  Left systolic function remains normal on reported echocardiogram   No recent testing  She is status post thyroidectomy for papillary carcinoma  Continue current medications  Will check lipid profile  This note was completed in part utilizing m-modal fluency direct voice recognition software     Grammatical errors, random word insertion, spelling mistakes, and incomplete sentences may be an occasional consequence of the system secondary to software limitations, ambient noise and hardware issues  At the time of dictation, efforts were made to edit, clarify and /or correct errors  Please read the chart carefully and recognize, using context, where substitutions have occurred  If you have any questions or concerns about the context, text or information contained within the body of this dictation, please contact myself, the provider, for further clarification

## 2020-01-19 ENCOUNTER — APPOINTMENT (OUTPATIENT)
Dept: LAB | Facility: MEDICAL CENTER | Age: 65
End: 2020-01-19
Payer: COMMERCIAL

## 2020-01-19 DIAGNOSIS — I25.10 ATHEROSCLEROSIS OF NATIVE CORONARY ARTERY OF NATIVE HEART WITHOUT ANGINA PECTORIS: ICD-10-CM

## 2020-01-19 DIAGNOSIS — E78.5 HYPERLIPIDEMIA, UNSPECIFIED HYPERLIPIDEMIA TYPE: ICD-10-CM

## 2020-01-19 DIAGNOSIS — E11.65 TYPE 2 DIABETES MELLITUS WITH HYPERGLYCEMIA, WITHOUT LONG-TERM CURRENT USE OF INSULIN (HCC): ICD-10-CM

## 2020-01-19 DIAGNOSIS — E66.3 OVERWEIGHT (BMI 25.0-29.9): ICD-10-CM

## 2020-01-19 LAB
ALBUMIN SERPL BCP-MCNC: 4 G/DL (ref 3.5–5)
ALP SERPL-CCNC: 74 U/L (ref 46–116)
ALT SERPL W P-5'-P-CCNC: 48 U/L (ref 12–78)
ANION GAP SERPL CALCULATED.3IONS-SCNC: 5 MMOL/L (ref 4–13)
AST SERPL W P-5'-P-CCNC: 30 U/L (ref 5–45)
BILIRUB SERPL-MCNC: 2.27 MG/DL (ref 0.2–1)
BUN SERPL-MCNC: 8 MG/DL (ref 5–25)
CALCIUM SERPL-MCNC: 8.5 MG/DL (ref 8.3–10.1)
CHLORIDE SERPL-SCNC: 106 MMOL/L (ref 100–108)
CHOLEST SERPL-MCNC: 138 MG/DL (ref 50–200)
CO2 SERPL-SCNC: 27 MMOL/L (ref 21–32)
CREAT SERPL-MCNC: 0.61 MG/DL (ref 0.6–1.3)
CREAT UR-MCNC: 39.4 MG/DL
EST. AVERAGE GLUCOSE BLD GHB EST-MCNC: 200 MG/DL
GFR SERPL CREATININE-BSD FRML MDRD: 95 ML/MIN/1.73SQ M
GLUCOSE P FAST SERPL-MCNC: 170 MG/DL (ref 65–99)
HBA1C MFR BLD: 8.6 % (ref 4.2–6.3)
HDLC SERPL-MCNC: 40 MG/DL
LDLC SERPL CALC-MCNC: 59 MG/DL (ref 0–100)
MICROALBUMIN UR-MCNC: <5 MG/L (ref 0–20)
MICROALBUMIN/CREAT 24H UR: <13 MG/G CREATININE (ref 0–30)
POTASSIUM SERPL-SCNC: 3.9 MMOL/L (ref 3.5–5.3)
PROT SERPL-MCNC: 7.7 G/DL (ref 6.4–8.2)
SODIUM SERPL-SCNC: 138 MMOL/L (ref 136–145)
TRIGL SERPL-MCNC: 196 MG/DL

## 2020-01-19 PROCEDURE — 82570 ASSAY OF URINE CREATININE: CPT

## 2020-01-19 PROCEDURE — 80053 COMPREHEN METABOLIC PANEL: CPT

## 2020-01-19 PROCEDURE — 83036 HEMOGLOBIN GLYCOSYLATED A1C: CPT

## 2020-01-19 PROCEDURE — 82043 UR ALBUMIN QUANTITATIVE: CPT

## 2020-01-19 PROCEDURE — 80061 LIPID PANEL: CPT

## 2020-01-19 PROCEDURE — 36415 COLL VENOUS BLD VENIPUNCTURE: CPT

## 2020-01-20 ENCOUNTER — OFFICE VISIT (OUTPATIENT)
Dept: ENDOCRINOLOGY | Facility: CLINIC | Age: 65
End: 2020-01-20
Payer: COMMERCIAL

## 2020-01-20 VITALS
WEIGHT: 157 LBS | BODY MASS INDEX: 27.38 KG/M2 | DIASTOLIC BLOOD PRESSURE: 70 MMHG | HEART RATE: 60 BPM | SYSTOLIC BLOOD PRESSURE: 110 MMHG

## 2020-01-20 DIAGNOSIS — E11.65 TYPE 2 DIABETES MELLITUS WITH HYPERGLYCEMIA, WITHOUT LONG-TERM CURRENT USE OF INSULIN (HCC): ICD-10-CM

## 2020-01-20 DIAGNOSIS — E03.9 HYPOTHYROIDISM, UNSPECIFIED TYPE: Primary | ICD-10-CM

## 2020-01-20 DIAGNOSIS — I10 ESSENTIAL HYPERTENSION: ICD-10-CM

## 2020-01-20 DIAGNOSIS — C73 THYROID CANCER (HCC): ICD-10-CM

## 2020-01-20 DIAGNOSIS — E78.5 HYPERLIPIDEMIA, UNSPECIFIED HYPERLIPIDEMIA TYPE: ICD-10-CM

## 2020-01-20 DIAGNOSIS — E89.0 POSTOPERATIVE HYPOTHYROIDISM: ICD-10-CM

## 2020-01-20 DIAGNOSIS — IMO0001 DIABETES MELLITUS TYPE 2, UNCONTROLLED, WITHOUT COMPLICATIONS: ICD-10-CM

## 2020-01-20 PROCEDURE — 99214 OFFICE O/P EST MOD 30 MIN: CPT | Performed by: PHYSICIAN ASSISTANT

## 2020-01-20 NOTE — ASSESSMENT & PLAN NOTE
Poorly Controlled/Not at goal/Worsening with A1C of 8 6  Increase ozempic to 1mg weekly  Check BG at least 1x per day and send log for review- if blood sugars high will switch Metformin back to Actos/Metformin Combination  Focus on dietary improvements she declines referral to dietician  Plans to start going to gym with coworkers

## 2020-01-20 NOTE — PROGRESS NOTES
Established Patient Progress Note      Chief Complaint   Patient presents with    Diabetes Type 2    Hypothyroidism        History of Present Illness:   Kim Goncalves is a 71 y o  female with a history of type 2 diabetes without long term use of insulin since 2005  Ellie Verdin Denies recent illness or hospitalizations  Denies recent severe hypoglycemic or severe hyperglycemic episodes  Denies any issues with her current regimen  home glucose monitoring: are performed sporadically-- today 244  Last night had ice cream    Otherwise mostly less than 200  Went to gym only once last year- plans to start going with coworkers  Eats a salad at work but at home diet not so good  Doesn't think going to dietician would help  At last visit started ozempic and switched actos/metformin to metformin  Hasn't noticed any weight change since med changes  Reports stress at work and home    Current regimen:   OZempic 0 5mg weekly increased a few weeks ago   Metformin 1000mg twice per day  Jardiance 25mg daily     Last Eye Exam: UTD, mild and stable on 11/2019 exam    Last Foot Exam: UTD, and today  Has hypertension: Taking losartan, carvedilol  Has hyperlipidemia: Taking rosuvastatin, Omega 3, zetia  Thyroid disorders: hypothyroidism- levothyroxine 100mcg    Hyperthyrodism/papillary thyroid CA sp thyroidectomy  Also hx hyperthyroidism/graves  Thyroidectomy performed 2/19/2019 did not meet criteria for GALLEGOS therapy    Vit D 2000 daily just started, calcium, fosamax new dx osteoporosis, per PCP     No fractures    Patient Active Problem List   Diagnosis    Essential hypertension    Hyperlipidemia    Arthritis    History of myocardial infarction    Insomnia    Cardiac pacemaker in situ    Chronic nonalcoholic liver disease    Coronary atherosclerosis    Diabetes mellitus type 2, uncontrolled, without complications ([facility]    Disorder of bilirubin excretion    Elective replacement indicated for pacemaker    Failed total left knee replacement (HCC)    Lumbar back pain with radiculopathy affecting left lower extremity    Metabolic syndrome    Osteoarthritis, generalized    Overweight (BMI 25 0-29  9)    Atrophic vaginitis    Seasonal allergic rhinitis    Abnormal thyroid blood test    Acute maxillary sinusitis    History of thyroid cancer    Type 2 diabetes mellitus with hyperglycemia, without long-term current use of insulin (HCC)    Postoperative hypothyroidism    Encounter for follow-up surveillance of thyroid cancer    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 ([facility])    Thyroid cancer ([facility]      Past Medical History:   Diagnosis Date    Diabetes mellitus ([facility]     History of staph infection     Hypertension     Myocardial infarction ([facility]     Varicella       Past Surgical History:   Procedure Laterality Date    APPENDECTOMY      CARDIAC PACEMAKER PLACEMENT      CARPAL TUNNEL RELEASE      CHOLECYSTECTOMY      HYSTERECTOMY      OOPHORECTOMY Bilateral     REPLACEMENT TOTAL KNEE Left     THYROIDECTOMY Bilateral 2/19/2019    Procedure: TOTAL THYROIDECTOMY;  Surgeon: Chip Barrera MD;  [facility];  Service: Surgical Oncology    US GUIDED THYROID BIOPSY  1/7/2019      Family History   Problem Relation Age of Onset    Diabetes unspecified Maternal Aunt     Colon cancer Father     Cancer Father     Heart disease Mother         Cardiac disorder, congenital aortic stenosis    Diabetes Other     No Known Problems Sister     No Known Problems Daughter     No Known Problems Maternal Grandmother     No Known Problems Maternal Grandfather     No Known Problems Paternal Grandmother     No Known Problems Paternal Grandfather     No Known Problems Daughter     No Known Problems Maternal Aunt     No Known Problems Maternal Aunt     No Known Problems Paternal Aunt      Social History     Tobacco Use    Smoking status: Never Smoker    Smokeless tobacco: Never Used   Substance Use Topics    Alcohol use: No     Allergies   Allergen Reactions    Penicillins Rash    Sulfa Antibiotics Rash         Current Outpatient Medications:     alendronate (FOSAMAX) 70 mg tablet, Take 1 tablet (70 mg total) by mouth every 7 days, Disp: 12 tablet, Rfl: 1    aspirin 81 MG tablet, Take 81 mg by mouth daily at bedtime , Disp: , Rfl:     Calcium Polycarbophil (FIBER-CAPS PO), Take 2 capsules by mouth 2 (two) times a day , Disp: , Rfl:     carvedilol (COREG) 25 mg tablet, Take 25 mg by mouth 2 (two) times a day with meals , Disp: , Rfl:     cetirizine (ZyrTEC) 10 mg tablet, Take 10 mg by mouth daily , Disp: , Rfl:     cholecalciferol (VITAMIN D3) 1,000 units tablet, Take 2 tablets (2,000 Units total) by mouth daily, Disp: 180 tablet, Rfl: 1    Empagliflozin (JARDIANCE) 25 MG TABS, Take 1 tablet (25 mg total) by mouth every morning, Disp: 90 tablet, Rfl: 3    ezetimibe (ZETIA) 10 mg tablet, Take 1 tablet (10 mg total) by mouth daily, Disp: 90 tablet, Rfl: 1    levothyroxine 100 mcg tablet, Take 1 pill daily, Disp: 60 tablet, Rfl: 3    losartan (COZAAR) 50 mg tablet, Take 1 tablet (50 mg total) by mouth daily, Disp: 90 tablet, Rfl: 1    metFORMIN (GLUCOPHAGE) 1000 MG tablet, Take 1 tablet (1,000 mg total) by mouth 2 (two) times a day with meals, Disp: 180 tablet, Rfl: 0    Multiple Vitamin (MULTI-VITAMIN DAILY) TABS, Take by mouth daily , Disp: , Rfl:     Omega-3 Fatty Acids (FISH OIL) 1200 MG CAPS, Take 1 capsule by mouth 2 (two) times a day, Disp: , Rfl:     rosuvastatin (CRESTOR) 20 MG tablet, 20 mg daily at bedtime , Disp: , Rfl:     fexofenadine (MUCINEX ALLERGY) 180 MG tablet, Take 180 mg by mouth daily, Disp: , Rfl:     Semaglutide, 1 MG/DOSE, (OZEMPIC, 1 MG/DOSE,) 2 MG/1 5ML SOPN, Inject 1mg weekly, Disp: 6 pen, Rfl: 1    Review of Systems   Constitutional: Negative for activity change, appetite change, chills, diaphoresis, fatigue, fever and unexpected weight change  HENT: Negative for trouble swallowing and voice change  Eyes: Negative for visual disturbance  Respiratory: Negative for shortness of breath  Cardiovascular: Negative for chest pain and palpitations  Gastrointestinal: Negative for abdominal pain, constipation and diarrhea  Endocrine: Negative for cold intolerance, heat intolerance, polydipsia, polyphagia and polyuria  Genitourinary: Negative for frequency and menstrual problem  Musculoskeletal: Negative for arthralgias and myalgias  Skin: Negative for rash  Allergic/Immunologic: Negative for food allergies  Neurological: Positive for headaches  Negative for dizziness and tremors  Hematological: Negative for adenopathy  Psychiatric/Behavioral: Negative for sleep disturbance  All other systems reviewed and are negative  Physical Exam:  Body mass index is 27 38 kg/m²  /70   Pulse 60   Wt 71 2 kg (157 lb)   BMI 27 38 kg/m²    Wt Readings from Last 3 Encounters:   01/20/20 71 2 kg (157 lb)   01/15/20 71 1 kg (156 lb 12 8 oz)   01/13/20 70 3 kg (155 lb)       Physical Exam   Constitutional: She is oriented to person, place, and time  She appears well-developed and well-nourished  No distress  HENT:   Head: Normocephalic and atraumatic  Eyes: Pupils are equal, round, and reactive to light  Conjunctivae are normal    Neck: Normal range of motion  Neck supple  No thyromegaly present  Cardiovascular: Normal rate, regular rhythm and normal heart sounds  Pulmonary/Chest: Effort normal and breath sounds normal  No respiratory distress  She has no wheezes  She has no rales  Abdominal: Soft  Bowel sounds are normal  She exhibits no distension  There is no tenderness  Musculoskeletal: Normal range of motion  She exhibits no edema  Neurological: She is alert and oriented to person, place, and time  Skin: Skin is warm and dry  Psychiatric: She has a normal mood and affect     Vitals reviewed  Labs:   Lab Results   Component Value Date    HGBA1C 8 6 (H) 01/19/2020    HGBA1C 7 4 (H) 06/17/2019    HGBA1C 7 7 (H) 03/18/2019     Lab Results   Component Value Date    CREATININE 0 61 01/19/2020    CREATININE 0 54 (L) 03/18/2019    CREATININE 0 46 (L) 02/20/2019    BUN 8 01/19/2020     04/03/2018    K 3 9 01/19/2020     01/19/2020    CO2 27 01/19/2020     eGFR   Date Value Ref Range Status   01/19/2020 95 ml/min/1 73sq m Final     Lab Results   Component Value Date    CHOL 184 04/03/2018    HDL 40 01/19/2020    TRIG 196 (H) 01/19/2020     Lab Results   Component Value Date    ALT 48 01/19/2020    AST 30 01/19/2020    ALKPHOS 74 01/19/2020    BILITOT 1 4 (H) 04/03/2018     Lab Results   Component Value Date    KVG5QTRWXSDR 0 553 09/15/2019    YOA0EIVHHEPN 0 554 09/04/2019    YFL9IYFWHUMA 0 040 (L) 06/17/2019     Lab Results   Component Value Date    FREET4 1 27 09/15/2019       Impression & Plan:    Problem List Items Addressed This Visit        Endocrine    Diabetes mellitus type 2, uncontrolled, without complications ([facility] )    Relevant Medications    Semaglutide, 1 MG/DOSE, (OZEMPIC, 1 MG/DOSE,) 2 MG/1 5ML SOPN    Type 2 diabetes mellitus with hyperglycemia, without long-term current use of insulin (HCC)       Poorly Controlled/Not at goal/Worsening with A1C of 8 6  Increase ozempic to 1mg weekly  Check BG at least 1x per day and send log for review- if blood sugars high will switch Metformin back to Actos/Metformin Combination  Focus on dietary improvements she declines referral to dietician  Plans to start going to gym with coworkers  Relevant Medications    Semaglutide, 1 MG/DOSE, (OZEMPIC, 1 MG/DOSE,) 2 MG/1 5ML SOPN    Other Relevant Orders    Hemoglobin A1C    Comprehensive metabolic panel    TSH, 3rd generation    Postoperative hypothyroidism     Continue levothyroxine check TSH/Free T4 before next visit            Thyroid cancer ([facility] )     NO evidence of recurrence based on thyroglobulin panel/ultrasound from 9/2019  Cardiovascular and Mediastinum    Essential hypertension     Well controlled  Other    Hyperlipidemia     LDL at goal continue rosuvastatin, zetia, omega3  Focus on lifestyle modification  Other Visit Diagnoses     Hypothyroidism, unspecified type    -  Primary    Relevant Orders    TSH, 3rd generation    T4, free          Orders Placed This Encounter   Procedures    Hemoglobin A1C     Standing Status:   Future     Standing Expiration Date:   1/20/2021    Comprehensive metabolic panel     This is a patient instruction: Patient fasting for 8 hours or longer recommended  * don't need to fast     Standing Status:   Future     Standing Expiration Date:   1/20/2021    TSH, 3rd generation     This is a patient instruction: This test is non-fasting  Please drink two glasses of water morning of bloodwork  Standing Status:   Future     Standing Expiration Date:   1/20/2021    T4, free     Standing Status:   Future     Standing Expiration Date:   1/20/2021       There are no Patient Instructions on file for this visit  Discussed with the patient and all questioned fully answered  She will call me if any problems arise  Follow-up appointment in 3 months       Counseled patient on diagnostic results, prognosis, risk and benefit of treatment options, instruction for management, importance of treatment compliance, Risk  factor reduction and impressions    Meredith Mantilla PA-C

## 2020-02-10 ENCOUNTER — IN-CLINIC DEVICE VISIT (OUTPATIENT)
Dept: CARDIOLOGY CLINIC | Facility: CLINIC | Age: 65
End: 2020-02-10
Payer: COMMERCIAL

## 2020-02-10 DIAGNOSIS — Z95.0 CARDIAC PACEMAKER IN SITU: Primary | ICD-10-CM

## 2020-02-10 DIAGNOSIS — E78.5 HYPERLIPIDEMIA, UNSPECIFIED HYPERLIPIDEMIA TYPE: ICD-10-CM

## 2020-02-10 DIAGNOSIS — Z95.0 PACEMAKER: Primary | ICD-10-CM

## 2020-02-10 PROCEDURE — 93280 PM DEVICE PROGR EVAL DUAL: CPT | Performed by: INTERNAL MEDICINE

## 2020-02-10 RX ORDER — EZETIMIBE 10 MG/1
10 TABLET ORAL DAILY
Qty: 90 TABLET | Refills: 3 | Status: CANCELLED | OUTPATIENT
Start: 2020-02-10

## 2020-02-10 RX ORDER — CARVEDILOL 25 MG/1
25 TABLET ORAL 2 TIMES DAILY WITH MEALS
Status: CANCELLED
Start: 2020-02-10

## 2020-02-10 RX ORDER — CARVEDILOL 25 MG/1
25 TABLET ORAL 2 TIMES DAILY WITH MEALS
Qty: 180 TABLET | Refills: 3 | Status: SHIPPED | OUTPATIENT
Start: 2020-02-10 | End: 2020-09-23

## 2020-02-10 RX ORDER — EZETIMIBE 10 MG/1
10 TABLET ORAL DAILY
Qty: 90 TABLET | Refills: 3 | Status: SHIPPED | OUTPATIENT
Start: 2020-02-10 | End: 2020-10-28 | Stop reason: SDUPTHER

## 2020-02-10 RX ORDER — EZETIMIBE 10 MG/1
10 TABLET ORAL DAILY
Qty: 90 TABLET | Refills: 1 | Status: CANCELLED | OUTPATIENT
Start: 2020-02-10

## 2020-02-10 RX ORDER — CARVEDILOL 25 MG/1
25 TABLET ORAL 2 TIMES DAILY WITH MEALS
Qty: 180 TABLET | Refills: 3 | Status: CANCELLED | OUTPATIENT
Start: 2020-02-10

## 2020-02-10 NOTE — PROGRESS NOTES
Results for orders placed or performed in visit on 02/10/20   Cardiac EP device report    Narrative    SJM-DUAL CHAMBER PPM (DDD MODE)/ NOT MRI CONDITIONAL  DEVICE INTERROGATED IN THE [place] OFFICE  BATTERY ADEQUATE (10 1 YRS)  AP- 0%  ALL LEAD PARAMETERS WITHIN NORMAL LIMITS  NO NEW EPISODES  NORMAL DEVICE FUNCTION   PL

## 2020-02-11 DIAGNOSIS — E05.90 HYPERTHYROIDISM: ICD-10-CM

## 2020-02-11 RX ORDER — LEVOTHYROXINE SODIUM 0.1 MG/1
100 TABLET ORAL DAILY
Qty: 90 TABLET | Refills: 0 | Status: SHIPPED | OUTPATIENT
Start: 2020-02-11 | End: 2020-04-20 | Stop reason: SDUPTHER

## 2020-04-20 ENCOUNTER — APPOINTMENT (OUTPATIENT)
Dept: LAB | Age: 65
End: 2020-04-20
Payer: COMMERCIAL

## 2020-04-20 DIAGNOSIS — E05.90 HYPERTHYROIDISM: ICD-10-CM

## 2020-04-20 DIAGNOSIS — E11.65 TYPE 2 DIABETES MELLITUS WITH HYPERGLYCEMIA, WITHOUT LONG-TERM CURRENT USE OF INSULIN (HCC): ICD-10-CM

## 2020-04-20 DIAGNOSIS — E03.9 HYPOTHYROIDISM, UNSPECIFIED TYPE: ICD-10-CM

## 2020-04-20 DIAGNOSIS — M81.0 AGE-RELATED OSTEOPOROSIS WITHOUT CURRENT PATHOLOGICAL FRACTURE: ICD-10-CM

## 2020-04-20 LAB
ALBUMIN SERPL BCP-MCNC: 4 G/DL (ref 3.5–5)
ALP SERPL-CCNC: 83 U/L (ref 46–116)
ALT SERPL W P-5'-P-CCNC: 39 U/L (ref 12–78)
ANION GAP SERPL CALCULATED.3IONS-SCNC: 7 MMOL/L (ref 4–13)
AST SERPL W P-5'-P-CCNC: 26 U/L (ref 5–45)
BILIRUB SERPL-MCNC: 1.09 MG/DL (ref 0.2–1)
BUN SERPL-MCNC: 12 MG/DL (ref 5–25)
CALCIUM SERPL-MCNC: 8.9 MG/DL (ref 8.3–10.1)
CHLORIDE SERPL-SCNC: 104 MMOL/L (ref 100–108)
CO2 SERPL-SCNC: 26 MMOL/L (ref 21–32)
CREAT SERPL-MCNC: 0.71 MG/DL (ref 0.6–1.3)
EST. AVERAGE GLUCOSE BLD GHB EST-MCNC: 148 MG/DL
GFR SERPL CREATININE-BSD FRML MDRD: 90 ML/MIN/1.73SQ M
GLUCOSE P FAST SERPL-MCNC: 169 MG/DL (ref 65–99)
HBA1C MFR BLD: 6.8 %
POTASSIUM SERPL-SCNC: 3.6 MMOL/L (ref 3.5–5.3)
PROT SERPL-MCNC: 7.7 G/DL (ref 6.4–8.2)
SODIUM SERPL-SCNC: 137 MMOL/L (ref 136–145)
T4 FREE SERPL-MCNC: 0.83 NG/DL (ref 0.76–1.46)
TSH SERPL DL<=0.05 MIU/L-ACNC: 8.49 UIU/ML (ref 0.36–3.74)

## 2020-04-20 PROCEDURE — 36415 COLL VENOUS BLD VENIPUNCTURE: CPT

## 2020-04-20 PROCEDURE — 84443 ASSAY THYROID STIM HORMONE: CPT

## 2020-04-20 PROCEDURE — 83036 HEMOGLOBIN GLYCOSYLATED A1C: CPT

## 2020-04-20 PROCEDURE — 84439 ASSAY OF FREE THYROXINE: CPT

## 2020-04-20 PROCEDURE — 80053 COMPREHEN METABOLIC PANEL: CPT

## 2020-04-20 RX ORDER — MELATONIN
2000 DAILY
Qty: 180 TABLET | Refills: 1 | Status: SHIPPED | OUTPATIENT
Start: 2020-04-20 | End: 2020-07-20 | Stop reason: SDUPTHER

## 2020-04-21 RX ORDER — LEVOTHYROXINE SODIUM 0.1 MG/1
100 TABLET ORAL DAILY
Qty: 90 TABLET | Refills: 0 | Status: SHIPPED | OUTPATIENT
Start: 2020-04-21 | End: 2020-08-24 | Stop reason: SDUPTHER

## 2020-05-11 ENCOUNTER — TELEMEDICINE (OUTPATIENT)
Dept: ENDOCRINOLOGY | Facility: CLINIC | Age: 65
End: 2020-05-11
Payer: COMMERCIAL

## 2020-05-11 DIAGNOSIS — E11.3291 TYPE 2 DIABETES MELLITUS WITH RIGHT EYE AFFECTED BY MILD NONPROLIFERATIVE RETINOPATHY WITHOUT MACULAR EDEMA, WITHOUT LONG-TERM CURRENT USE OF INSULIN (HCC): ICD-10-CM

## 2020-05-11 DIAGNOSIS — E11.65 TYPE 2 DIABETES MELLITUS WITH HYPERGLYCEMIA, WITHOUT LONG-TERM CURRENT USE OF INSULIN (HCC): ICD-10-CM

## 2020-05-11 DIAGNOSIS — E89.0 POSTOPERATIVE HYPOTHYROIDISM: Primary | ICD-10-CM

## 2020-05-11 DIAGNOSIS — I10 ESSENTIAL HYPERTENSION: ICD-10-CM

## 2020-05-11 DIAGNOSIS — C73 THYROID CANCER (HCC): ICD-10-CM

## 2020-05-11 PROCEDURE — 99214 OFFICE O/P EST MOD 30 MIN: CPT | Performed by: INTERNAL MEDICINE

## 2020-05-13 ENCOUNTER — REMOTE DEVICE CLINIC VISIT (OUTPATIENT)
Dept: CARDIOLOGY CLINIC | Facility: CLINIC | Age: 65
End: 2020-05-13
Payer: COMMERCIAL

## 2020-05-13 DIAGNOSIS — Z95.0 CARDIAC PACEMAKER IN SITU: Primary | ICD-10-CM

## 2020-05-13 PROCEDURE — 93294 REM INTERROG EVL PM/LDLS PM: CPT | Performed by: INTERNAL MEDICINE

## 2020-05-13 PROCEDURE — 93296 REM INTERROG EVL PM/IDS: CPT | Performed by: INTERNAL MEDICINE

## 2020-07-20 ENCOUNTER — OFFICE VISIT (OUTPATIENT)
Dept: FAMILY MEDICINE CLINIC | Facility: CLINIC | Age: 65
End: 2020-07-20
Payer: COMMERCIAL

## 2020-07-20 VITALS
WEIGHT: 153 LBS | HEIGHT: 64 IN | RESPIRATION RATE: 17 BRPM | HEART RATE: 88 BPM | OXYGEN SATURATION: 97 % | BODY MASS INDEX: 26.12 KG/M2 | TEMPERATURE: 99 F | SYSTOLIC BLOOD PRESSURE: 142 MMHG | DIASTOLIC BLOOD PRESSURE: 82 MMHG

## 2020-07-20 DIAGNOSIS — M81.0 AGE-RELATED OSTEOPOROSIS WITHOUT CURRENT PATHOLOGICAL FRACTURE: ICD-10-CM

## 2020-07-20 DIAGNOSIS — Z00.00 ANNUAL PHYSICAL EXAM: Primary | ICD-10-CM

## 2020-07-20 DIAGNOSIS — I10 ESSENTIAL HYPERTENSION: ICD-10-CM

## 2020-07-20 DIAGNOSIS — B35.1 FUNGAL INFECTION OF TOENAIL: ICD-10-CM

## 2020-07-20 DIAGNOSIS — E11.65 TYPE 2 DIABETES MELLITUS WITH HYPERGLYCEMIA, WITHOUT LONG-TERM CURRENT USE OF INSULIN (HCC): ICD-10-CM

## 2020-07-20 DIAGNOSIS — Z13.31 NEGATIVE DEPRESSION SCREENING: ICD-10-CM

## 2020-07-20 DIAGNOSIS — Z12.39 BREAST CANCER SCREENING: ICD-10-CM

## 2020-07-20 PROBLEM — H35.379 EPIRETINAL MEMBRANE: Status: ACTIVE | Noted: 2017-09-08

## 2020-07-20 PROBLEM — H25.13 AGE-RELATED NUCLEAR CATARACT OF BOTH EYES: Status: ACTIVE | Noted: 2017-09-08

## 2020-07-20 PROBLEM — H43.819 POSTERIOR VITREOUS DETACHMENT: Status: ACTIVE | Noted: 2018-01-15

## 2020-07-20 PROCEDURE — 99397 PER PM REEVAL EST PAT 65+ YR: CPT | Performed by: PHYSICIAN ASSISTANT

## 2020-07-20 PROCEDURE — 3044F HG A1C LEVEL LT 7.0%: CPT | Performed by: PHYSICIAN ASSISTANT

## 2020-07-20 RX ORDER — ALENDRONATE SODIUM 70 MG/1
70 TABLET ORAL
Qty: 12 TABLET | Refills: 1 | Status: SHIPPED | OUTPATIENT
Start: 2020-07-20 | End: 2020-10-28 | Stop reason: SDUPTHER

## 2020-07-20 RX ORDER — MELATONIN
2000 DAILY
Qty: 180 TABLET | Refills: 1 | Status: SHIPPED | OUTPATIENT
Start: 2020-07-20 | End: 2020-10-28 | Stop reason: SDUPTHER

## 2020-07-20 RX ORDER — LOSARTAN POTASSIUM 50 MG/1
50 TABLET ORAL DAILY
Qty: 90 TABLET | Refills: 1 | Status: SHIPPED | OUTPATIENT
Start: 2020-07-20 | End: 2020-10-28 | Stop reason: SDUPTHER

## 2020-07-20 NOTE — PROGRESS NOTES
Cecilio Cea 71 y o  female   Date:  7/31/2020      Assessment and Plan:    Vinita Padilla was seen today for follow-up  Diagnoses and all orders for this visit:    Annual physical exam    Negative depression screening    BMI 26 0-26 9,adult    Age-related osteoporosis without current pathological fracture  -     alendronate (Fosamax) 70 mg tablet; Take 1 tablet (70 mg total) by mouth every 7 days  -     cholecalciferol (VITAMIN D3) 1,000 units tablet; Take 2 tablets (2,000 Units total) by mouth daily    Essential hypertension  -     losartan (COZAAR) 50 mg tablet; Take 1 tablet (50 mg total) by mouth daily    Breast cancer screening  -     Mammo screening bilateral w 3d & cad; Future    Type 2 diabetes mellitus with hyperglycemia, without long-term current use of insulin ([facility] )  -     Ambulatory referral to Podiatry; Future    Fungal infection of toenail  -     Ambulatory referral to Podiatry; Future               HPI:  Chief Complaint   Patient presents with    Follow-up     HPI  Patient is a 72 yo female who presents for routine well check  She continues to follow with endocrinology for diabetes and hypothyroidism  She was given repeat routine labs from them for next visit  Her Bp is stable  She also continues to follow up with cardiology, pacemaker stable  She is tolerating fosamax for osteoporosis  She does not have a podiatrist  She has fungal toenail infection - has been trying topical without improvement  She admits that she and  are not getting along - "he is a crotchety old man"  She explains  that they are looking to move down to [place], have a house down there  ROS: Review of Systems   Constitutional: Negative for appetite change, diaphoresis and fever  Respiratory: Negative  Cardiovascular: Negative  Neurological: Negative  Psychiatric/Behavioral: Positive for dysphoric mood (her and her  are not getting along, "i should have never remarried")  Negative for sleep disturbance  The patient is not nervous/anxious  Past Medical History:   Diagnosis Date    Diabetes mellitus [facility]     History of staph infection     Hypertension     Myocardial infarction ([facility]     Varicella      Patient Active Problem List   Diagnosis    Essential hypertension    Hyperlipidemia    Arthritis    History of myocardial infarction    Insomnia    Cardiac pacemaker in situ    Chronic nonalcoholic liver disease    Coronary atherosclerosis    Disorder of bilirubin excretion    Elective replacement indicated for pacemaker    Failed total left knee replacement (HCC)    Lumbar back pain with radiculopathy affecting left lower extremity    Metabolic syndrome    Osteoarthritis, generalized    Overweight (BMI 25 0-29  9)    Atrophic vaginitis    Seasonal allergic rhinitis    Abnormal thyroid blood test    History of thyroid cancer    Type 2 diabetes mellitus with hyperglycemia, without long-term current use of insulin (HCC)    Postoperative hypothyroidism    Encounter for follow-up surveillance of thyroid cancer    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)    Thyroid cancer (HCC)    Age-related nuclear cataract of both eyes    Epiretinal membrane    Posterior vitreous detachment       Past Surgical History:   Procedure Laterality Date    APPENDECTOMY      CARDIAC PACEMAKER PLACEMENT      CARPAL TUNNEL RELEASE      CHOLECYSTECTOMY      HYSTERECTOMY      OOPHORECTOMY Bilateral     REPLACEMENT TOTAL KNEE Left     THYROIDECTOMY Bilateral 2/19/2019    Procedure: TOTAL THYROIDECTOMY;  Surgeon: Usha Peoples MD;  [facility];  Service: Surgical Oncology    US GUIDED THYROID BIOPSY  1/7/2019       Social History     Socioeconomic History    Marital status: /Civil Union     Spouse name: None    Number of children: None    Years of education: None    Highest education level: None   Occupational History  Occupation: medical records   Social Needs    Financial resource strain: None    Food insecurity:     Worry: None     Inability: None    Transportation needs:     Medical: None     Non-medical: None   Tobacco Use    Smoking status: Never Smoker    Smokeless tobacco: Never Used   Substance and Sexual Activity    Alcohol use: No    Drug use: No    Sexual activity: None   Lifestyle    Physical activity:     Days per week: None     Minutes per session: None    Stress: None   Relationships    Social connections:     Talks on phone: None     Gets together: None     Attends [employer] service: None     Active member of club or organization: None     Attends meetings of clubs or organizations: None     Relationship status: None    Intimate partner violence:     Fear of current or ex partner: None     Emotionally abused: None     Physically abused: None     Forced sexual activity: None   Other Topics Concern    None   Social History Narrative    Always uses seat belt     No caffeine use        Family History   Problem Relation Age of Onset    Diabetes unspecified Maternal Aunt     Colon cancer Father     Cancer Father     Heart disease Mother         Cardiac disorder, congenital aortic stenosis    Diabetes Other     No Known Problems Sister     No Known Problems Daughter     No Known Problems Maternal Grandmother     No Known Problems Maternal Grandfather     No Known Problems Paternal Grandmother     No Known Problems Paternal Grandfather     No Known Problems Daughter     No Known Problems Maternal Aunt     No Known Problems Maternal Aunt     No Known Problems Paternal Aunt        Allergies   Allergen Reactions    Penicillins Rash    Sulfa Antibiotics Rash         Current Outpatient Medications:     alendronate (Fosamax) 70 mg tablet, Take 1 tablet (70 mg total) by mouth every 7 days, Disp: 12 tablet, Rfl: 1    aspirin 81 MG tablet, Take 81 mg by mouth daily at bedtime , Disp: , Rfl:    Calcium Polycarbophil (FIBER-CAPS PO), Take 2 capsules by mouth 2 (two) times a day , Disp: , Rfl:     carvedilol (COREG) 25 mg tablet, Take 1 tablet (25 mg total) by mouth 2 (two) times a day with meals, Disp: 180 tablet, Rfl: 3    cetirizine (ZyrTEC) 10 mg tablet, Take 10 mg by mouth daily , Disp: , Rfl:     cholecalciferol (VITAMIN D3) 1,000 units tablet, Take 2 tablets (2,000 Units total) by mouth daily, Disp: 180 tablet, Rfl: 1    Empagliflozin (JARDIANCE) 25 MG TABS, Take 1 tablet (25 mg total) by mouth every morning, Disp: 90 tablet, Rfl: 3    ezetimibe (ZETIA) 10 mg tablet, Take 1 tablet (10 mg total) by mouth daily, Disp: 90 tablet, Rfl: 3    fexofenadine (MUCINEX ALLERGY) 180 MG tablet, Take 180 mg by mouth daily, Disp: , Rfl:     levothyroxine 100 mcg tablet, Take 1 tablet (100 mcg total) by mouth daily, Disp: 90 tablet, Rfl: 0    losartan (COZAAR) 50 mg tablet, Take 1 tablet (50 mg total) by mouth daily, Disp: 90 tablet, Rfl: 1    metFORMIN (GLUCOPHAGE) 1000 MG tablet, Take 1 tablet (1,000 mg total) by mouth 2 (two) times a day with meals, Disp: 180 tablet, Rfl: 3    Multiple Vitamin (MULTI-VITAMIN DAILY) TABS, Take by mouth daily , Disp: , Rfl:     Omega-3 Fatty Acids (FISH OIL) 1200 MG CAPS, Take 1 capsule by mouth 2 (two) times a day, Disp: , Rfl:     rosuvastatin (CRESTOR) 20 MG tablet, 20 mg daily at bedtime , Disp: , Rfl:     Semaglutide, 1 MG/DOSE, (OZEMPIC, 1 MG/DOSE,) 2 MG/1 5ML SOPN, Inject 1mg weekly, Disp: 6 pen, Rfl: 1      Physical Exam:  /82   Pulse 88   Temp 99 °F (37 2 °C)   Resp 17   Ht 5' 3 5" (1 613 m)   Wt 69 4 kg (153 lb)   SpO2 97%   BMI 26 68 kg/m²     Physical Exam   Constitutional: She is oriented to person, place, and time  She appears well-developed and well-nourished  No distress  HENT:   Head: Normocephalic and atraumatic  Right Ear: External ear normal    Left Ear: External ear normal    Eyes: Pupils are equal, round, and reactive to light  Conjunctivae are normal    Neck: Normal range of motion  No JVD present  Cardiovascular: Normal rate, regular rhythm and normal heart sounds  Pulses are no weak pulses  Pulses:       Dorsalis pedis pulses are 2+ on the right side, and 2+ on the left side  Posterior tibial pulses are 2+ on the right side, and 2+ on the left side  Pulmonary/Chest: Effort normal and breath sounds normal  No respiratory distress  She has no wheezes  Abdominal:   Central adiposity     Musculoskeletal: She exhibits no edema  Feet:   Right Foot:   Skin Integrity: Negative for ulcer, skin breakdown, erythema, warmth, callus or dry skin  Left Foot:   Skin Integrity: Negative for ulcer, skin breakdown, erythema, warmth, callus or dry skin  Neurological: She is alert and oriented to person, place, and time  Skin: Skin is warm and dry  No rash noted  Psychiatric: She has a normal mood and affect  Patient's shoes and socks removed  Right Foot/Ankle   Right Foot Inspection  Skin Exam: skin normal and skin intact no dry skin, no warmth, no callus, no erythema, no maceration, no abnormal color, no pre-ulcer, no ulcer and no callus                          Toe Exam: ROM and strength within normal limits and right toe deformity (fungal big toenail)  Sensory       Monofilament testing: intact  Vascular    The right DP pulse is 2+  The right PT pulse is 2+  Left Foot/Ankle  Left Foot Inspection  Skin Exam: skin normal and skin intactno dry skin, no warmth, no erythema, no maceration, normal color, no pre-ulcer, no ulcer and no callus                         Toe Exam: ROM and strength within normal limitsno left toe deformity                   Sensory       Monofilament: intact  Vascular    The left DP pulse is 2+  The left PT pulse is 2+  Assign Risk Category:  No deformity present; No loss of protective sensation;  No weak pulses       Risk: 0      Labs:  Lab Results   Component Value Date    WBC 9 29 02/20/2019 HGB 13 6 02/20/2019    HCT 41 3 02/20/2019    MCV 87 02/20/2019     02/20/2019     Lab Results   Component Value Date     04/03/2018    K 3 6 04/20/2020     04/20/2020    CO2 26 04/20/2020    ANIONGAP 12 2 04/03/2018    BUN 12 04/20/2020    CREATININE 0 71 04/20/2020    GLUF 169 (H) 04/20/2020    CALCIUM 8 9 04/20/2020    AST 26 04/20/2020    ALT 39 04/20/2020    ALKPHOS 83 04/20/2020    PROT 7 3 04/03/2018    BILITOT 1 4 (H) 04/03/2018    EGFR 90 04/20/2020

## 2020-07-20 NOTE — PATIENT INSTRUCTIONS

## 2020-08-24 ENCOUNTER — OFFICE VISIT (OUTPATIENT)
Dept: ENDOCRINOLOGY | Facility: CLINIC | Age: 65
End: 2020-08-24
Payer: COMMERCIAL

## 2020-08-24 ENCOUNTER — TRANSCRIBE ORDERS (OUTPATIENT)
Dept: ADMINISTRATIVE | Age: 65
End: 2020-08-24

## 2020-08-24 ENCOUNTER — APPOINTMENT (OUTPATIENT)
Dept: LAB | Age: 65
End: 2020-08-24
Payer: COMMERCIAL

## 2020-08-24 VITALS
BODY MASS INDEX: 27.82 KG/M2 | DIASTOLIC BLOOD PRESSURE: 100 MMHG | WEIGHT: 157 LBS | HEART RATE: 100 BPM | SYSTOLIC BLOOD PRESSURE: 128 MMHG | HEIGHT: 63 IN

## 2020-08-24 DIAGNOSIS — C73 THYROID CANCER (HCC): ICD-10-CM

## 2020-08-24 DIAGNOSIS — I10 ESSENTIAL HYPERTENSION: ICD-10-CM

## 2020-08-24 DIAGNOSIS — E89.0 POSTOPERATIVE HYPOTHYROIDISM: ICD-10-CM

## 2020-08-24 DIAGNOSIS — E11.65 TYPE 2 DIABETES MELLITUS WITH HYPERGLYCEMIA, WITHOUT LONG-TERM CURRENT USE OF INSULIN (HCC): ICD-10-CM

## 2020-08-24 DIAGNOSIS — E05.90 HYPERTHYROIDISM: ICD-10-CM

## 2020-08-24 DIAGNOSIS — E11.65 TYPE 2 DIABETES MELLITUS WITH HYPERGLYCEMIA, WITHOUT LONG-TERM CURRENT USE OF INSULIN (HCC): Primary | ICD-10-CM

## 2020-08-24 DIAGNOSIS — E78.5 HYPERLIPIDEMIA, UNSPECIFIED HYPERLIPIDEMIA TYPE: ICD-10-CM

## 2020-08-24 LAB
ALBUMIN SERPL BCP-MCNC: 4.2 G/DL (ref 3.5–5)
ALP SERPL-CCNC: 85 U/L (ref 46–116)
ALT SERPL W P-5'-P-CCNC: 58 U/L (ref 12–78)
ANION GAP SERPL CALCULATED.3IONS-SCNC: 8 MMOL/L (ref 4–13)
AST SERPL W P-5'-P-CCNC: 37 U/L (ref 5–45)
BILIRUB SERPL-MCNC: 1.38 MG/DL (ref 0.2–1)
BUN SERPL-MCNC: 11 MG/DL (ref 5–25)
CALCIUM SERPL-MCNC: 8.7 MG/DL (ref 8.3–10.1)
CHLORIDE SERPL-SCNC: 100 MMOL/L (ref 100–108)
CHOLEST SERPL-MCNC: 314 MG/DL (ref 50–200)
CO2 SERPL-SCNC: 28 MMOL/L (ref 21–32)
CREAT SERPL-MCNC: 0.65 MG/DL (ref 0.6–1.3)
CREAT UR-MCNC: <13 MG/DL
EST. AVERAGE GLUCOSE BLD GHB EST-MCNC: 183 MG/DL
GFR SERPL CREATININE-BSD FRML MDRD: 93 ML/MIN/1.73SQ M
GLUCOSE P FAST SERPL-MCNC: 202 MG/DL (ref 65–99)
HBA1C MFR BLD: 8 %
HDLC SERPL-MCNC: 39 MG/DL
LDLC SERPL DIRECT ASSAY-MCNC: 168 MG/DL (ref 0–100)
MICROALBUMIN UR-MCNC: 8.5 MG/L (ref 0–20)
MICROALBUMIN/CREAT 24H UR: >65 MG/G CREATININE (ref 0–30)
POTASSIUM SERPL-SCNC: 3.5 MMOL/L (ref 3.5–5.3)
PROT SERPL-MCNC: 8 G/DL (ref 6.4–8.2)
SODIUM SERPL-SCNC: 136 MMOL/L (ref 136–145)
T4 FREE SERPL-MCNC: 1.2 NG/DL (ref 0.76–1.46)
TRIGL SERPL-MCNC: 537 MG/DL
TSH SERPL DL<=0.05 MIU/L-ACNC: 5.14 UIU/ML (ref 0.36–3.74)

## 2020-08-24 PROCEDURE — 80061 LIPID PANEL: CPT

## 2020-08-24 PROCEDURE — 3052F HG A1C>EQUAL 8.0%<EQUAL 9.0%: CPT | Performed by: PHYSICIAN ASSISTANT

## 2020-08-24 PROCEDURE — 1036F TOBACCO NON-USER: CPT | Performed by: PHYSICIAN ASSISTANT

## 2020-08-24 PROCEDURE — 83036 HEMOGLOBIN GLYCOSYLATED A1C: CPT

## 2020-08-24 PROCEDURE — 99214 OFFICE O/P EST MOD 30 MIN: CPT | Performed by: PHYSICIAN ASSISTANT

## 2020-08-24 PROCEDURE — 3008F BODY MASS INDEX DOCD: CPT | Performed by: PHYSICIAN ASSISTANT

## 2020-08-24 PROCEDURE — 80053 COMPREHEN METABOLIC PANEL: CPT

## 2020-08-24 PROCEDURE — 84443 ASSAY THYROID STIM HORMONE: CPT

## 2020-08-24 PROCEDURE — 4040F PNEUMOC VAC/ADMIN/RCVD: CPT | Performed by: PHYSICIAN ASSISTANT

## 2020-08-24 PROCEDURE — 86800 THYROGLOBULIN ANTIBODY: CPT

## 2020-08-24 PROCEDURE — 82570 ASSAY OF URINE CREATININE: CPT

## 2020-08-24 PROCEDURE — 3080F DIAST BP >= 90 MM HG: CPT | Performed by: PHYSICIAN ASSISTANT

## 2020-08-24 PROCEDURE — 82043 UR ALBUMIN QUANTITATIVE: CPT

## 2020-08-24 PROCEDURE — 84432 ASSAY OF THYROGLOBULIN: CPT

## 2020-08-24 PROCEDURE — 83721 ASSAY OF BLOOD LIPOPROTEIN: CPT

## 2020-08-24 PROCEDURE — 2022F DILAT RTA XM EVC RTNOPTHY: CPT | Performed by: PHYSICIAN ASSISTANT

## 2020-08-24 PROCEDURE — 3074F SYST BP LT 130 MM HG: CPT | Performed by: PHYSICIAN ASSISTANT

## 2020-08-24 PROCEDURE — 84439 ASSAY OF FREE THYROXINE: CPT

## 2020-08-24 PROCEDURE — 36415 COLL VENOUS BLD VENIPUNCTURE: CPT

## 2020-08-24 RX ORDER — SEMAGLUTIDE 1.34 MG/ML
INJECTION, SOLUTION SUBCUTANEOUS
Qty: 6 PEN | Refills: 1 | Status: SHIPPED | OUTPATIENT
Start: 2020-08-24 | End: 2020-12-02 | Stop reason: SDUPTHER

## 2020-08-24 RX ORDER — ROSUVASTATIN CALCIUM 20 MG/1
20 TABLET, COATED ORAL
Qty: 90 TABLET | Refills: 1 | Status: SHIPPED | OUTPATIENT
Start: 2020-08-24 | End: 2021-11-10

## 2020-08-24 RX ORDER — EMPAGLIFLOZIN 25 MG/1
25 TABLET, FILM COATED ORAL EVERY MORNING
Qty: 90 TABLET | Refills: 3 | Status: SHIPPED | OUTPATIENT
Start: 2020-08-24 | End: 2020-10-28 | Stop reason: SDUPTHER

## 2020-08-24 RX ORDER — LEVOTHYROXINE SODIUM 112 UG/1
112 TABLET ORAL DAILY
Qty: 90 TABLET | Refills: 1 | Status: SHIPPED | OUTPATIENT
Start: 2020-08-24 | End: 2020-10-28 | Stop reason: SDUPTHER

## 2020-08-24 NOTE — PROGRESS NOTES
Established Patient Progress Note      Chief Complaint   Patient presents with    Diabetes Type 2    Thyroid Cancer    Hypothyroidism        History of Present Illness:   Raj Leonard is a 71 y o  female with a history of type 2 diabetes without long term use of insulin since 2005  Denies recent illness or hospitalizations  Denies recent severe hypoglycemic or severe hyperglycemic episodes  Denies any issues with her current regimen  home glucose monitoring: are performed sporadically  Blood sugars have been up and down, highest 220  Current regimen:   Ozempic 1mg weekly  Metformin 1000mg twice per day  Jardiance 25mg daily, but has been out of Jardiance for a while    Last Eye Exam: UTD  Last Foot Exam: UTD    Has hypertension: Taking losartan and carvedilol  Has hyperlipidemia: Taking crestor-- has been out of it  Taking ezetimibe  Thyroid disorders: Hyperthyrodism/papillary thyroid CA sp thyroidectomy  Also hx hyperthyroidism/graves  Thyroidectomy performed 2/19/2019 did not meet criteria for GALLEGOS therapy  Reports taking levothyroxine 100mcg daily and properly since the last visit (ran out prior to last visit)     Following with PCP, taking fosamax for osteoporosis  Patient Active Problem List   Diagnosis    Essential hypertension    Hyperlipidemia    Arthritis    History of myocardial infarction    Insomnia    Cardiac pacemaker in situ    Chronic nonalcoholic liver disease    Coronary atherosclerosis    Disorder of bilirubin excretion    Elective replacement indicated for pacemaker    Failed total left knee replacement (HCC)    Lumbar back pain with radiculopathy affecting left lower extremity    Metabolic syndrome    Osteoarthritis, generalized    Overweight (BMI 25 0-29  9)    Atrophic vaginitis    Seasonal allergic rhinitis    Abnormal thyroid blood test    History of thyroid cancer    Type 2 diabetes mellitus with hyperglycemia, without long-term current use of insulin ([facility]    Postoperative hypothyroidism    Encounter for follow-up surveillance of thyroid cancer    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)    Thyroid cancer ([facility]    Age-related nuclear cataract of both eyes    Epiretinal membrane    Posterior vitreous detachment      Past Medical History:   Diagnosis Date    Diabetes mellitus ([facility] [facility]     History of staph infection     Hypertension     [facility]     Varicella       Past Surgical History:   Procedure Laterality Date    APPENDECTOMY      CARDIAC PACEMAKER PLACEMENT      CARPAL TUNNEL RELEASE      CHOLECYSTECTOMY      HYSTERECTOMY      OOPHORECTOMY Bilateral     REPLACEMENT TOTAL KNEE Left     THYROIDECTOMY Bilateral 2/19/2019    Procedure: TOTAL THYROIDECTOMY;  Surgeon: Basilia Morrison MD;  [facility];  Service: Surgical Oncology    US GUIDED THYROID BIOPSY  1/7/2019      Family History   Problem Relation Age of Onset    Diabetes unspecified Maternal Aunt     Colon cancer Father     Cancer Father     Heart disease Mother         Cardiac disorder, congenital aortic stenosis    Diabetes Other     No Known Problems Sister     No Known Problems Daughter     No Known Problems Maternal Grandmother     No Known Problems Maternal Grandfather     No Known Problems Paternal Grandmother     No Known Problems Paternal Grandfather     No Known Problems Daughter     No Known Problems Maternal Aunt     No Known Problems Maternal Aunt     No Known Problems Paternal Aunt      Social History     Tobacco Use    Smoking status: Never Smoker    Smokeless tobacco: Never Used   Substance Use Topics    Alcohol use: No     Allergies   Allergen Reactions    Penicillins Rash    Sulfa Antibiotics Rash         Current Outpatient Medications:     alendronate (Fosamax) 70 mg tablet, Take 1 tablet (70 mg total) by mouth every 7 days, Disp: 12 tablet, Rfl: 1   aspirin 81 MG tablet, Take 81 mg by mouth daily at bedtime , Disp: , Rfl:     Calcium Polycarbophil (FIBER-CAPS PO), Take 2 capsules by mouth 2 (two) times a day , Disp: , Rfl:     carvedilol (COREG) 25 mg tablet, Take 1 tablet (25 mg total) by mouth 2 (two) times a day with meals, Disp: 180 tablet, Rfl: 3    cetirizine (ZyrTEC) 10 mg tablet, Take 10 mg by mouth daily , Disp: , Rfl:     cholecalciferol (VITAMIN D3) 1,000 units tablet, Take 2 tablets (2,000 Units total) by mouth daily, Disp: 180 tablet, Rfl: 1    Empagliflozin (Jardiance) 25 MG TABS, Take 1 tablet (25 mg total) by mouth every morning, Disp: 90 tablet, Rfl: 3    ezetimibe (ZETIA) 10 mg tablet, Take 1 tablet (10 mg total) by mouth daily, Disp: 90 tablet, Rfl: 3    levothyroxine 112 mcg tablet, Take 1 tablet (112 mcg total) by mouth daily, Disp: 90 tablet, Rfl: 1    losartan (COZAAR) 50 mg tablet, Take 1 tablet (50 mg total) by mouth daily, Disp: 90 tablet, Rfl: 1    metFORMIN (GLUCOPHAGE) 1000 MG tablet, Take 1 tablet (1,000 mg total) by mouth 2 (two) times a day with meals, Disp: 180 tablet, Rfl: 3    Multiple Vitamin (MULTI-VITAMIN DAILY) TABS, Take by mouth daily , Disp: , Rfl:     Omega-3 Fatty Acids (FISH OIL) 1200 MG CAPS, Take 1 capsule by mouth 2 (two) times a day, Disp: , Rfl:     rosuvastatin (CRESTOR) 20 MG tablet, Take 1 tablet (20 mg total) by mouth daily at bedtime, Disp: 90 tablet, Rfl: 1    Semaglutide, 1 MG/DOSE, (Ozempic, 1 MG/DOSE,) 2 MG/1 5ML SOPN, Inject 1mg weekly, Disp: 6 pen, Rfl: 1    fexofenadine (MUCINEX ALLERGY) 180 MG tablet, Take 180 mg by mouth daily, Disp: , Rfl:     Review of Systems   Constitutional: Negative for activity change, appetite change, chills, diaphoresis, fatigue, fever and unexpected weight change  HENT: Negative for trouble swallowing and voice change  Eyes: Negative for visual disturbance  Respiratory: Negative for shortness of breath      Cardiovascular: Negative for chest pain and palpitations  Gastrointestinal: Negative for abdominal pain, constipation and diarrhea  Endocrine: Positive for polyuria  Negative for cold intolerance, heat intolerance, polydipsia and polyphagia  Genitourinary: Negative for frequency and menstrual problem  Musculoskeletal: Negative for arthralgias and myalgias  Skin: Negative for rash  Allergic/Immunologic: Negative for food allergies  Neurological: Negative for dizziness and tremors  Hematological: Negative for adenopathy  Psychiatric/Behavioral: Negative for sleep disturbance  All other systems reviewed and are negative  Physical Exam:  Body mass index is 27 81 kg/m²  /100   Pulse 100   Ht 5' 3" (1 6 m)   Wt 71 2 kg (157 lb)   BMI 27 81 kg/m²    Wt Readings from Last 3 Encounters:   08/24/20 71 2 kg (157 lb)   07/20/20 69 4 kg (153 lb)   01/20/20 71 2 kg (157 lb)       Physical Exam  Vitals signs reviewed  Constitutional:       General: She is not in acute distress  Appearance: She is well-developed  HENT:      Head: Normocephalic and atraumatic  Eyes:      Conjunctiva/sclera: Conjunctivae normal       Pupils: Pupils are equal, round, and reactive to light  Neck:      Musculoskeletal: Normal range of motion and neck supple  Thyroid: No thyromegaly  Cardiovascular:      Rate and Rhythm: Normal rate and regular rhythm  Heart sounds: Normal heart sounds  Pulmonary:      Effort: Pulmonary effort is normal  No respiratory distress  Breath sounds: Normal breath sounds  No wheezing or rales  Abdominal:      General: Bowel sounds are normal  There is no distension  Palpations: Abdomen is soft  Tenderness: There is no abdominal tenderness  Musculoskeletal: Normal range of motion  Skin:     General: Skin is warm and dry  Neurological:      Mental Status: She is alert and oriented to person, place, and time           Labs:   Lab Results   Component Value Date    HGBA1C 8 0 (H) 08/24/2020 HGBA1C 6 8 (H) 04/20/2020    HGBA1C 8 6 (H) 01/19/2020     Lab Results   Component Value Date    CREATININE 0 65 08/24/2020    CREATININE 0 71 04/20/2020    CREATININE 0 61 01/19/2020    BUN 11 08/24/2020     04/03/2018    K 3 5 08/24/2020     08/24/2020    CO2 28 08/24/2020     eGFR   Date Value Ref Range Status   08/24/2020 93 ml/min/1 73sq m Final     Lab Results   Component Value Date    CHOL 184 04/03/2018    HDL 39 (L) 08/24/2020    TRIG 537 (H) 08/24/2020     Lab Results   Component Value Date    ALT 58 08/24/2020    AST 37 08/24/2020    ALKPHOS 85 08/24/2020    BILITOT 1 4 (H) 04/03/2018     Lab Results   Component Value Date    RBD6NJPHJWAL 5 140 (H) 08/24/2020    PWT8JOEZDUJP 8 490 (H) 04/20/2020    TJM0CEJUHYTE 0 553 09/15/2019     Lab Results   Component Value Date    FREET4 1 20 08/24/2020       Impression & Plan:    Problem List Items Addressed This Visit        Endocrine    Type 2 diabetes mellitus with hyperglycemia, without long-term current use of insulin ([facility] ) - Primary     Poorly Controlled/not at goal and A1C has worsened from 6 8 to 8 0  She has been out of the [facility] for a while, which is likely the reason for worsening control of Diabetes  Resume Jardiance 25mg daily  Reminded her to call us for refills  Discussed importance of Compliance with Medications and glucose monitoring and need for better control to reduce risk of Diabetes complications  Suggest referral to MNT/DSMT but she declines  Advised her to check blood sugars 1-2x per day and send log for review  Lab Results   Component Value Date    HGBA1C 8 0 (H) 08/24/2020            Relevant Medications    Empagliflozin (Jardiance) 25 MG TABS    Semaglutide, 1 MG/DOSE, (Ozempic, 1 MG/DOSE,) 2 MG/1 5ML SOPN    Other Relevant Orders    Hemoglobin A1C    Microalbumin / creatinine urine ratio    Postoperative hypothyroidism     TSH above goal- she reports compliance with levothyroxine   Increase levothyroxine from 100mcg to 112mcg daily  Check TSH/Free T4 in 6 weeks  Relevant Medications    levothyroxine 112 mcg tablet    Other Relevant Orders    TSH, 3rd generation    T4, free    Thyroid cancer ([address] )     Thyroglobulin level pending  She has order for ultrasound scheduled through Dr Sivan Clayton  Relevant Medications    levothyroxine 112 mcg tablet       Cardiovascular and Mediastinum    Essential hypertension       Other    Hyperlipidemia     LDL and triglycerides high, she has ran out of her statin  Refilled the rosuvastatin  Check CMP/Fasting Lipid Panel with next labs  Reminded her to call us if she ever runs out of medications so we can refill ASAP         Relevant Medications    rosuvastatin (CRESTOR) 20 MG tablet    Other Relevant Orders    Comprehensive metabolic panel    Lipid Panel with Direct LDL reflex      Other Visit Diagnoses     Hyperthyroidism        Relevant Medications    levothyroxine 112 mcg tablet          Orders Placed This Encounter   Procedures    Hemoglobin A1C     Standing Status:   Future     Standing Expiration Date:   8/24/2021    Comprehensive metabolic panel     This is a patient instruction: Patient fasting for 8 hours or longer recommended  Standing Status:   Future     Standing Expiration Date:   8/24/2021    Lipid Panel with Direct LDL reflex     This is a patient instruction: This test requires patient fasting for 10-12 hours or longer  Drinking of black coffee or black tea is acceptable  Standing Status:   Future     Standing Expiration Date:   8/24/2021    Microalbumin / creatinine urine ratio     Standing Status:   Future     Standing Expiration Date:   8/24/2021    TSH, 3rd generation     This is a patient instruction: This test is non-fasting  Please drink two glasses of water morning of bloodwork          Standing Status:   Future     Standing Expiration Date:   8/24/2021    T4, free     Standing Status:   Future     Standing Expiration Date:   8/24/2021       There are no Patient Instructions on file for this visit  Discussed with the patient and all questioned fully answered  She will call me if any problems arise  Follow-up appointment in 3 months       Counseled patient on diagnostic results, prognosis, risk and benefit of treatment options, instruction for management, importance of treatment compliance, Risk  factor reduction and impressions    Brittny Pearce PA-C

## 2020-08-25 LAB
THYROGLOB AB SERPL-ACNC: <1 IU/ML (ref 0–0.9)
THYROGLOB SERPL-MCNC: 0.2 NG/ML (ref 1.5–38.5)

## 2020-08-25 NOTE — ASSESSMENT & PLAN NOTE
Poorly Controlled/not at goal and A1C has worsened from 6 8 to 8 0  She has been out of the [employer] [place] for a while, which is likely the reason for worsening control of Diabetes  Resume Jardiance 25mg daily  Reminded her to call us for refills  Discussed importance of Compliance with Medications and glucose monitoring and need for better control to reduce risk of Diabetes complications  Suggest referral to MNT/DSMT but she declines  Advised her to check blood sugars 1-2x per day and send log for review     Lab Results   Component Value Date    HGBA1C 8 0 (H) 08/24/2020

## 2020-08-25 NOTE — ASSESSMENT & PLAN NOTE
TSH above goal- she reports compliance with levothyroxine  Increase levothyroxine from 100mcg to 112mcg daily  Check TSH/Free T4 in 6 weeks

## 2020-08-25 NOTE — ASSESSMENT & PLAN NOTE
LDL and triglycerides high, she has ran out of her statin  Refilled the rosuvastatin  Check CMP/Fasting Lipid Panel with next labs   Reminded her to call us if she ever runs out of medications so we can refill ASAP

## 2020-09-09 ENCOUNTER — TELEMEDICINE (OUTPATIENT)
Dept: FAMILY MEDICINE CLINIC | Facility: CLINIC | Age: 65
End: 2020-09-09
Payer: COMMERCIAL

## 2020-09-09 DIAGNOSIS — Z71.89 ADVICE GIVEN ABOUT COVID-19 VIRUS INFECTION: ICD-10-CM

## 2020-09-09 DIAGNOSIS — R51.9 FRONTAL HEADACHE: Primary | ICD-10-CM

## 2020-09-09 DIAGNOSIS — R11.2 NON-INTRACTABLE VOMITING WITH NAUSEA, UNSPECIFIED VOMITING TYPE: ICD-10-CM

## 2020-09-09 PROCEDURE — 99213 OFFICE O/P EST LOW 20 MIN: CPT | Performed by: PHYSICIAN ASSISTANT
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
## 2020-09-21 ENCOUNTER — HOSPITAL ENCOUNTER (OUTPATIENT)
Dept: RADIOLOGY | Facility: HOSPITAL | Age: 65
Discharge: HOME | End: 2020-09-21
Attending: SURGERY
Payer: COMMERCIAL

## 2020-09-21 DIAGNOSIS — Z85.850 ENCOUNTER FOR FOLLOW-UP SURVEILLANCE OF THYROID CANCER: ICD-10-CM

## 2020-09-21 DIAGNOSIS — Z08 ENCOUNTER FOR FOLLOW-UP SURVEILLANCE OF THYROID CANCER: ICD-10-CM

## 2020-09-21 PROCEDURE — 76536 US EXAM OF HEAD AND NECK: CPT

## 2020-09-22 ENCOUNTER — ANESTHESIA EVENT (OUTPATIENT)
Dept: GASTROENTEROLOGY | Facility: HOSPITAL | Age: 65
End: 2020-09-22

## 2020-09-23 ENCOUNTER — ANESTHESIA (OUTPATIENT)
Dept: GASTROENTEROLOGY | Facility: HOSPITAL | Age: 65
End: 2020-09-23

## 2020-09-23 ENCOUNTER — HOSPITAL ENCOUNTER (OUTPATIENT)
Dept: GASTROENTEROLOGY | Facility: HOSPITAL | Age: 65
Setting detail: OUTPATIENT SURGERY
Discharge: HOME | End: 2020-09-23
Attending: INTERNAL MEDICINE | Admitting: INTERNAL MEDICINE
Payer: COMMERCIAL

## 2020-09-23 VITALS
RESPIRATION RATE: 18 BRPM | BODY MASS INDEX: 27.82 KG/M2 | WEIGHT: 157 LBS | TEMPERATURE: 97.1 F | OXYGEN SATURATION: 98 % | HEIGHT: 63 IN | HEART RATE: 70 BPM | DIASTOLIC BLOOD PRESSURE: 72 MMHG | SYSTOLIC BLOOD PRESSURE: 124 MMHG

## 2020-09-23 VITALS — HEART RATE: 92 BPM

## 2020-09-23 DIAGNOSIS — Z80.0 FAMILY HISTORY OF MALIGNANT NEOPLASM OF DIGESTIVE ORGANS: ICD-10-CM

## 2020-09-23 DIAGNOSIS — Z86.010 PERSONAL HISTORY OF COLONIC POLYPS: ICD-10-CM

## 2020-09-23 PROCEDURE — 88305 TISSUE EXAM BY PATHOLOGIST: CPT | Performed by: PATHOLOGY

## 2020-09-23 RX ORDER — SODIUM CHLORIDE, SODIUM LACTATE, POTASSIUM CHLORIDE, CALCIUM CHLORIDE 600; 310; 30; 20 MG/100ML; MG/100ML; MG/100ML; MG/100ML
125 INJECTION, SOLUTION INTRAVENOUS CONTINUOUS
Status: CANCELLED | OUTPATIENT
Start: 2020-09-23

## 2020-09-23 RX ORDER — PROPOFOL 10 MG/ML
INJECTION, EMULSION INTRAVENOUS AS NEEDED
Status: DISCONTINUED | OUTPATIENT
Start: 2020-09-23 | End: 2020-09-23

## 2020-09-23 RX ORDER — LIDOCAINE HYDROCHLORIDE 20 MG/ML
INJECTION, SOLUTION EPIDURAL; INFILTRATION; INTRACAUDAL; PERINEURAL AS NEEDED
Status: DISCONTINUED | OUTPATIENT
Start: 2020-09-23 | End: 2020-09-23

## 2020-09-23 RX ORDER — SODIUM CHLORIDE, SODIUM LACTATE, POTASSIUM CHLORIDE, CALCIUM CHLORIDE 600; 310; 30; 20 MG/100ML; MG/100ML; MG/100ML; MG/100ML
125 INJECTION, SOLUTION INTRAVENOUS CONTINUOUS
Status: DISCONTINUED | OUTPATIENT
Start: 2020-09-23 | End: 2020-09-27 | Stop reason: HOSPADM

## 2020-09-23 RX ADMIN — PROPOFOL 30 MG: 10 INJECTION, EMULSION INTRAVENOUS at 08:32

## 2020-09-23 RX ADMIN — PROPOFOL 30 MG: 10 INJECTION, EMULSION INTRAVENOUS at 08:30

## 2020-09-23 RX ADMIN — PROPOFOL 30 MG: 10 INJECTION, EMULSION INTRAVENOUS at 08:19

## 2020-09-23 RX ADMIN — PROPOFOL 30 MG: 10 INJECTION, EMULSION INTRAVENOUS at 08:27

## 2020-09-23 RX ADMIN — PROPOFOL 30 MG: 10 INJECTION, EMULSION INTRAVENOUS at 08:39

## 2020-09-23 RX ADMIN — LIDOCAINE HYDROCHLORIDE 100 MG: 20 INJECTION, SOLUTION EPIDURAL; INFILTRATION; INTRACAUDAL; PERINEURAL at 08:13

## 2020-09-23 RX ADMIN — PROPOFOL 30 MG: 10 INJECTION, EMULSION INTRAVENOUS at 08:36

## 2020-09-23 RX ADMIN — SODIUM CHLORIDE, SODIUM LACTATE, POTASSIUM CHLORIDE, AND CALCIUM CHLORIDE 125 ML/HR: .6; .31; .03; .02 INJECTION, SOLUTION INTRAVENOUS at 07:55

## 2020-09-23 RX ADMIN — PROPOFOL 120 MG: 10 INJECTION, EMULSION INTRAVENOUS at 08:13

## 2020-09-23 RX ADMIN — PROPOFOL 30 MG: 10 INJECTION, EMULSION INTRAVENOUS at 08:17

## 2020-09-23 RX ADMIN — PHENYLEPHRINE HYDROCHLORIDE 100 MCG: 10 INJECTION INTRAVENOUS at 08:22

## 2020-09-23 NOTE — ANESTHESIA PREPROCEDURE EVALUATION
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)      GI/HEPATIC   (+) Chronic nonalcoholic liver disease      MUSCULOSKELETAL   (+) Arthritis   (+) Osteoarthritis, generalized      NEURO/PSYCH   (+) Encounter for follow-up surveillance of thyroid cancer   (+) History of myocardial infarction   (+) History of thyroid cancer        Physical Exam    Airway    Mallampati score: II  TM Distance: >3 FB  Neck ROM: full     Dental   No notable dental hx     Cardiovascular      Pulmonary      Other Findings        Anesthesia Plan  ASA Score- 3     Anesthesia Type- IV sedation with anesthesia with ASA Monitors  Additional Monitors:   Airway Plan:           Plan Factors-Exercise tolerance (METS): >4 METS  Chart reviewed  Patient summary reviewed  Patient is not a current smoker  Obstructive sleep apnea risk education given perioperatively  Induction- intravenous  Postoperative Plan-     Informed Consent- Anesthetic plan and risks discussed with patient  I personally reviewed this patient with the CRNA  Discussed and agreed on the Anesthesia Plan with the CRNA  Dara Soulier

## 2020-09-23 NOTE — DISCHARGE INSTRUCTIONS
Colonoscopy   WHAT YOU NEED TO KNOW:   A colonoscopy is a procedure to examine the inside of your colon (intestine) with a scope  Polyps or tissue growths may have been removed during your colonoscopy  It is normal to feel bloated and to have some abdominal discomfort  You should be passing gas  If you have hemorrhoids or you had polyps removed, you may have a small amount of bleeding  DISCHARGE INSTRUCTIONS:   Seek care immediately if:   · You have a large amount of bright red blood in your bowel movements  · Your abdomen is hard and firm and you have severe pain  · You have sudden trouble breathing  Contact your healthcare provider if:   · You develop a rash or hives  · You have a fever within 24 hours of your procedure       · You have not had a bowel movement for 3 days after your procedure  · You have questions or concerns about your condition or care  Activity:   · Do not lift, strain, or run  for 3 days after your procedure  · Rest after your procedure  You have been given medicine to relax you  Do not  drive or make important decisions until the day after your procedure  Return to your normal activity as directed  · Relieve gas and discomfort from bloating  by lying on your right side with a heating pad on your abdomen  You may need to take short walks to help the gas move out  Eat small meals until bloating is relieved  If you had polyps removed: For 7 days after your procedure:  · Do not  take aspirin  · Do not  go on long car rides  Follow up with your healthcare provider as directed:  Write down your questions so you remember to ask them during your visits  © 2017 [address] [employer] is for End User's use only and may not be sold, redistributed or otherwise used for commercial purposes  All illustrations and images included in CareNotes® are the copyrighted property of [employer]  or [employer]le[employer]    The above information is an  only  It is not intended as medical advice for individual conditions or treatments  Talk to your doctor, nurse or pharmacist before following any medical regimen to see if it is safe and effective for you  Colorectal Polyps   WHAT YOU NEED TO KNOW:   Colorectal polyps are small growths of tissue in the lining of the colon and rectum  Most polyps are hyperplastic polyps and are usually benign (noncancerous)  Certain types of polyps, called adenomatous polyps, may turn into cancer  DISCHARGE INSTRUCTIONS:   Follow up with your healthcare provider or gastroenterologist as directed: You may need to return for more tests, such as another colonoscopy  Write down your questions so you remember to ask them during your visits  Reduce your risk for colorectal polyps:   · Eat a variety of healthy foods:  Healthy foods include fruit, vegetables, whole-grain breads, low-fat dairy products, beans, lean meat, and fish  Ask if you need to be on a special diet  · Maintain a healthy weight:  Ask your healthcare provider if you need to lose weight and how much you need to lose  Ask for help with a weight loss program     · Exercise:  Begin to exercise slowly and do more as you get stronger  Talk with your healthcare provider before you start an exercise program      · Limit alcohol:  Your risk for polyps increases the more you drink  · Do not smoke: If you smoke, it is never too late to quit  Ask for information about how to stop  For support and more information:   · Jerry Bourgeois (Levine, Susan. \[facility].\"") [address], [place] [address]  Phone: [phone] - [phone]  Web Address: www digestive  niddk nih gov  Contact your healthcare provider or gastroenterologist if:   · You have a fever  · You have chills, a cough, or feel weak and achy  · You have abdominal pain that does not go away or gets worse after you take medicine  · Your abdomen is swollen  · You are losing weight without trying  · You have questions or concerns about your condition or care  Seek care immediately or call 911 if:   · You have sudden shortness of breath  · You have a fast heart rate, fast breathing, or are too dizzy to stand up  · You have severe abdominal pain  · You see blood in your bowel movement  © 2017 [address] Information is for End User's use only and may not be sold, redistributed or otherwise used for commercial purposes  All illustrations and images included in CareNotes® are the copyrighted property of [employer]  or [employer] Pinto  The above information is an  only  It is not intended as medical advice for individual conditions or treatments  Talk to your doctor, nurse or pharmacist before following any medical regimen to see if it is safe and effective for you  Diverticulosis   WHAT YOU NEED TO KNOW:   What is diverticulosis? Diverticulosis is a condition that causes small pockets called diverticula to form in your intestine  These pockets make it difficult for bowel movements to pass through your digestive system  What causes diverticulosis? Diverticula form when muscles have to work hard to move bowel movements through the intestine  The force causes bulges to form at weak areas in the intestine  This may happen if you eat foods that are low in fiber  Fiber helps give your bowel movements more bulk so they are larger and easier to move through your colon  The following may increase your risk of diverticulosis:  · A history of constipation    · Age 36 or older    · Obesity    · Lack of exercise  What are the signs and symptoms of diverticulosis? Diverticulosis usually does not cause any signs or symptoms  It may cause any of the following in some people:  · Pain or discomfort in your lower abdomen    · Abdominal bloating    · Constipation or diarrhea  How is diverticulosis diagnosed?   Your healthcare provider will examine you and ask about your bowel movements, diet, and symptoms  He or she will also ask about any medical conditions you have or medicines you take  You may need any of the following:  · Blood tests  may be done to check for signs of inflammation  · A barium enema  is an x-ray of your colon that may show diverticula  A tube is put into your anus, and a liquid called barium is put through the tube  Barium is used so that healthcare providers can see your colon more clearly  · Flexible sigmoidoscopy  is a test to look for any changes in your lower intestines and rectum  It may also show the cause of any bleeding or pain  A soft, bendable tube with a light on the end will be put into your anus  It will then be moved forward into your intestine  · A colonoscopy  is used to look at your whole colon  A scope (long bendable tube with a light on the end) is used to take pictures  This test may show diverticula  · A CT scan , or CAT scan, may show diverticula  You may be given contrast liquid before the scan  Tell the healthcare provider if you have ever had an allergic reaction to contrast liquid  How is diverticulosis managed? The goal of treatment is to manage any symptoms you have and prevent other problems such as diverticulitis  Diverticulitis is swelling or infection of the diverticula  Your healthcare provider may recommend any of the following:  · Eat a variety of high-fiber foods  High-fiber foods help you have regular bowel movements  High-fiber foods include cooked beans, fruits, vegetables, and some cereals  Most adults need 25 to 35 grams of fiber each day  Your healthcare provider may recommend that you have more  Ask your healthcare provider how much fiber you need  Increase fiber slowly  You may have abdominal discomfort, bloating, and gas if you add fiber to your diet too quickly   You may need to take a fiber supplement if you are not getting enough fiber from food            · Medicines  to soften your bowel movements may be given  You may also need medicines to treat symptoms such as bloating and pain  · Drink liquids as directed  You may need to drink 2 to 3 liters (8 to 12 cups) of liquids every day  Ask your healthcare provider how much liquid to drink each day and which liquids are best for you  · Apply heat  on your abdomen for 20 to 30 minutes every 2 hours for as many days as directed  Heat helps decrease pain and muscle spasms  How can I help prevent diverticulitis or other symptoms? The following may help decrease your risk for diverticulitis or symptoms, such as bleeding  Talk to your provider about these or other things you can do to prevent problems that may occur with diverticulosis  · Exercise regularly  Ask your healthcare provider about the best exercise plan for you  Exercise can help you have regular bowel movements  Get 30 minutes of exercise on most days of the week  · Maintain a healthy weight  Ask your healthcare provider how much you should weigh  Ask him or her to help you create a weight loss plan if you are overweight  · Do not smoke  Nicotine and other chemicals in cigarettes increase your risk for diverticulitis  Ask your healthcare provider for information if you currently smoke and need help to quit  E-cigarettes or smokeless tobacco still contain nicotine  Talk to your healthcare provider before you use these products  · Ask your healthcare provider if it is safe to take NSAIDs  NSAIDs may increase your risk of diverticulitis  When should I seek immediate care? · You have severe pain on the left side of your lower abdomen  · Your bowel movements are bright or dark red  When should I contact my healthcare provider? · You have a fever and chills  · You feel dizzy or lightheaded  · You have nausea, or you are vomiting  · You have a change in your bowel movements      · You have questions or concerns about your condition or care  CARE AGREEMENT:   You have the right to help plan your care  Learn about your health condition and how it may be treated  Discuss treatment options with your caregivers to decide what care you want to receive  You always have the right to refuse treatment  The above information is an  only  It is not intended as medical advice for individual conditions or treatments  Talk to your doctor, nurse or pharmacist before following any medical regimen to see if it is safe and effective for you  © 2017 [address] Information is for End User's use only and may not be sold, redistributed or otherwise used for commercial purposes  All illustrations and images included in CareNotes® are the copyrighted property of [employer]  or [employer]  High Fiber Diet   WHAT YOU NEED TO KNOW:   What is a high-fiber diet? A high-fiber diet includes foods that have a high amount of fiber  Fiber is the part of fruits, vegetables, and grains that is not broken down by your body  Fiber keeps your bowel movements regular  Fiber can also help lower your cholesterol level, control blood sugar in people with diabetes, and relieve constipation  Fiber can also help you control your weight because it helps you feel full faster  Most adults should eat 25 to 35 grams of fiber each day  Talk to your dietitian or healthcare provider about the amount of fiber you need  What foods are good sources of fiber?    · Foods with at least 4 grams of fiber per serving:      ¨ ? to ½ cup of high-fiber cereal (check the nutrition label on the box)    ¨ ½ cup of blackberries or raspberries    ¨ 4 dried prunes    ¨ 1 cooked artichoke    ¨ ½ cup of cooked legumes, such as lentils, or red, kidney, and childress beans    · Foods with 1 to 3 grams of fiber per serving:      ¨ 1 slice of whole-wheat, pumpernickel, or rye bread    ¨ ½ cup of cooked brown rice    ¨ 4 whole-wheat crackers    ¨ 1 cup of oatmeal    ¨ ½ cup of cereal with 1 to 3 grams of fiber per serving (check the nutrition label on the box)    ¨ 1 small piece of fruit, such as an apple, banana, pear, kiwi, or orange    ¨ 3 dates    ¨ ½ cup of canned apricots, fruit cocktail, peaches, or pears    ¨ ½ cup of raw or cooked vegetables, such as carrots, cauliflower, cabbage, spinach, squash, or corn  What are some ways that I can increase fiber in my diet? · Choose brown or wild rice instead of white rice  · Use whole wheat flour in recipes instead of white or all-purpose flour  · Add beans and peas to casseroles or soups  · Choose fresh fruit and vegetables with peels or skins on instead of juices  What other guidelines should I follow? · Add fiber to your diet slowly  You may have abdominal discomfort, bloating, and gas if you add fiber to your diet too quickly  · Drink plenty of liquids as you add fiber to your diet  You may have nausea or develop constipation if you do not drink enough water  Ask how much liquid to drink each day and which liquids are best for you  CARE AGREEMENT:   You have the right to help plan your care  Discuss treatment options with your caregivers to decide what care you want to receive  You always have the right to refuse treatment  The above information is an  only  It is not intended as medical advice for individual conditions or treatments  Talk to your doctor, nurse or pharmacist before following any medical regimen to see if it is safe and effective for you  © 2017 [address] Information is for End User's use only and may not be sold, redistributed or otherwise used for commercial purposes  All illustrations and images included in CareNotes® are the copyrighted property of [employer]  or [employer]

## 2020-09-23 NOTE — INTERVAL H&P NOTE
H&P reviewed  After examining the patient I find no changes in the patients condition since the H&P had been written      Vitals:    09/23/20 0730   BP: 140/76   Pulse: 99   Resp: 18   Temp: 97 9 °F (36 6 °C)   SpO2: 97%

## 2020-09-24 ENCOUNTER — TELEPHONE (OUTPATIENT)
Dept: SURGICAL ONCOLOGY | Facility: CLINIC | Age: 65
End: 2020-09-24

## 2020-09-28 ENCOUNTER — OFFICE VISIT (OUTPATIENT)
Dept: SURGICAL ONCOLOGY | Facility: CLINIC | Age: 65
End: 2020-09-28
Payer: COMMERCIAL

## 2020-09-28 VITALS
SYSTOLIC BLOOD PRESSURE: 150 MMHG | HEART RATE: 95 BPM | DIASTOLIC BLOOD PRESSURE: 80 MMHG | TEMPERATURE: 98 F | BODY MASS INDEX: 26.22 KG/M2 | HEIGHT: 63 IN | WEIGHT: 148 LBS | RESPIRATION RATE: 16 BRPM

## 2020-09-28 DIAGNOSIS — Z08 ENCOUNTER FOR FOLLOW-UP SURVEILLANCE OF THYROID CANCER: Primary | ICD-10-CM

## 2020-09-28 DIAGNOSIS — Z85.850 HISTORY OF THYROID CANCER: ICD-10-CM

## 2020-09-28 DIAGNOSIS — Z85.850 ENCOUNTER FOR FOLLOW-UP SURVEILLANCE OF THYROID CANCER: Primary | ICD-10-CM

## 2020-09-28 PROCEDURE — 99214 OFFICE O/P EST MOD 30 MIN: CPT | Performed by: SURGERY

## 2020-09-28 NOTE — PROGRESS NOTES
Surgical Oncology Follow Up       [address] [facility] [place]  [address] [facility] [address]  [place] [address]  [phone]    Anne Hernandez  1955  [id number]  [address] [facility] CANCER [facility] SURGICAL ONCOLOGY [address] [address]  [phone]    Chief Complaint   Patient presents with    Follow-up     1 year follow up       Assessment/Plan:    No problem-specific Assessment & Plan notes found for this encounter  Diagnoses and all orders for this visit:    Encounter for follow-up surveillance of thyroid cancer    History of thyroid cancer        Advance Care Planning/Advance Directives:  Discussed disease status, cancer treatment plans and/or cancer treatment goals with the patient  Oncology History   History of thyroid cancer   1/7/2019 Biopsy    Fine needle aspiration of right thyroid  Malignant ([place] category VI) papillary thyroid carcinoma     2/19/2019 Surgery    Total thyroidectomy  Multifocal tumor  -largest tumor focus 1 7cm (T1b  -Tumor laterality: right lobe  -Papillary thyroid carcinoma  -margins: carcinoma less than 0 1cm from inked resection surface  -regional lymph nodes-2 lymph nodes negative for carcinoma (0/2)    8th Ed AJCC Stage: at least stage I pT1b(m), pN0, pMx         History of Present Illness:  Patient is status post total thyroidectomy for stage I thyroid cancer, 1 1/2 years ago   -Interval History:  She is here for surveillance with no complaints report  She had blood work and ultrasound done in anticipation of today's visit  Review of Systems:  Review of Systems   Constitutional: Negative  HENT: Negative  Negative for trouble swallowing and voice change  Eyes: Negative  Respiratory: Negative  Cardiovascular: Negative  Gastrointestinal: Negative  Endocrine: Negative  Genitourinary: Negative  Musculoskeletal: Negative  Skin: Negative  Allergic/Immunologic: Negative  Neurological: Negative  Hematological: Negative  Psychiatric/Behavioral: Negative  Patient Active Problem List   Diagnosis    Essential hypertension    Hyperlipidemia    Arthritis    History of myocardial infarction    Insomnia    Cardiac pacemaker in situ    Chronic nonalcoholic liver disease    Coronary atherosclerosis    Disorder of bilirubin excretion    Elective replacement indicated for pacemaker    Failed total left knee replacement (HCC)    Lumbar back pain with radiculopathy affecting left lower extremity    Metabolic syndrome    Osteoarthritis, generalized    Overweight (BMI 25 0-29  9)    Atrophic vaginitis    Seasonal allergic rhinitis    Abnormal thyroid blood test    History of thyroid cancer    Type 2 diabetes mellitus with hyperglycemia, without long-term current use of insulin (HCC)    Postoperative hypothyroidism    Encounter for follow-up surveillance of thyroid cancer    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)    Thyroid cancer (HCC)    Age-related nuclear cataract of both eyes    Epiretinal membrane    Posterior vitreous detachment     Past Medical History:   Diagnosis Date    Diabetes mellitus ([facility] )     History of staph infection     Hyperlipidemia     Hypertension     Myocardial infarction ([facility] ) 1998    Varicella      Past Surgical History:   Procedure Laterality Date    APPENDECTOMY      CARDIAC PACEMAKER PLACEMENT      CARPAL TUNNEL RELEASE Bilateral     CHOLECYSTECTOMY      HYSTERECTOMY      OOPHORECTOMY Bilateral     REPLACEMENT TOTAL KNEE Left     THYROIDECTOMY Bilateral 2/19/2019    Procedure: TOTAL THYROIDECTOMY;  Surgeon: Marito Greenfield MD;  [facility];  Service: Surgical Oncology    US GUIDED THYROID BIOPSY  1/7/2019     Family History   Problem Relation Age of Onset    Diabetes unspecified Maternal Aunt     Colon cancer Father     Cancer Father     Heart disease Mother         Cardiac disorder, congenital aortic stenosis    Diabetes Other     No Known Problems Sister     No Known Problems Daughter     No Known Problems Maternal Grandmother     No Known Problems Maternal Grandfather     No Known Problems Paternal Grandmother     No Known Problems Paternal Grandfather     No Known Problems Daughter     No Known Problems Maternal Aunt     No Known Problems Maternal Aunt     No Known Problems Paternal Aunt      Social History     Socioeconomic History    Marital status: /Civil Union     Spouse name: Not on file    Number of children: Not on file    Years of education: Not on file    Highest education level: Not on file   Occupational History    Occupation: medical records   Social Needs    Financial resource strain: Not on file    Food insecurity     Worry: Not on file     Inability: Not on file    Transportation needs     Medical: Not on file     Non-medical: Not on file   Tobacco Use    Smoking status: Never Smoker    Smokeless tobacco: Never Used   Substance and Sexual Activity    Alcohol use: No    Drug use: No    Sexual activity: Not on file   Lifestyle    Physical activity     Days per week: Not on file     Minutes per session: Not on file    Stress: Not on file   Relationships    Social connections     Talks on phone: Not on file     Gets together: Not on file     Attends Scientologist service: Not on file     Active member of club or organization: Not on file     Attends meetings of clubs or organizations: Not on file     Relationship status: Not on file    Intimate partner violence     Fear of current or ex partner: Not on file     Emotionally abused: Not on file     Physically abused: Not on file     Forced sexual activity: Not on file   Other Topics Concern    Not on file   Social History Narrative    Always uses seat belt     No caffeine use        Current Outpatient Medications:    alendronate (Fosamax) 70 mg tablet, Take 1 tablet (70 mg total) by mouth every 7 days, Disp: 12 tablet, Rfl: 1    aspirin 81 MG tablet, Take 81 mg by mouth daily at bedtime , Disp: , Rfl:     Calcium Polycarbophil (FIBER-CAPS PO), Take 2 capsules by mouth 2 (two) times a day , Disp: , Rfl:     cetirizine (ZyrTEC) 10 mg tablet, Take 10 mg by mouth daily , Disp: , Rfl:     cholecalciferol (VITAMIN D3) 1,000 units tablet, Take 2 tablets (2,000 Units total) by mouth daily (Patient taking differently: Take 2,000 Units by mouth once a week ), Disp: 180 tablet, Rfl: 1    Empagliflozin (Jardiance) 25 MG TABS, Take 1 tablet (25 mg total) by mouth every morning, Disp: 90 tablet, Rfl: 3    ezetimibe (ZETIA) 10 mg tablet, Take 1 tablet (10 mg total) by mouth daily, Disp: 90 tablet, Rfl: 3    fexofenadine (MUCINEX ALLERGY) 180 MG tablet, Take 180 mg by mouth daily, Disp: , Rfl:     levothyroxine 112 mcg tablet, Take 1 tablet (112 mcg total) by mouth daily, Disp: 90 tablet, Rfl: 1    losartan (COZAAR) 50 mg tablet, Take 1 tablet (50 mg total) by mouth daily, Disp: 90 tablet, Rfl: 1    metFORMIN (GLUCOPHAGE) 1000 MG tablet, Take 1 tablet (1,000 mg total) by mouth 2 (two) times a day with meals (Patient taking differently: Take 1,000 mg by mouth daily with breakfast ), Disp: 180 tablet, Rfl: 3    Multiple Vitamin (MULTI-VITAMIN DAILY) TABS, Take by mouth daily , Disp: , Rfl:     rosuvastatin (CRESTOR) 20 MG tablet, Take 1 tablet (20 mg total) by mouth daily at bedtime, Disp: 90 tablet, Rfl: 1    Semaglutide, 1 MG/DOSE, (Ozempic, 1 MG/DOSE,) 2 MG/1 5ML SOPN, Inject 1mg weekly, Disp: 6 pen, Rfl: 1  Allergies   Allergen Reactions    Penicillins Rash    Sulfa Antibiotics Rash     Vitals:    09/28/20 1445   BP: 150/80   Pulse: 95   Resp: 16   Temp: 98 °F (36 7 °C)       Physical Exam  Constitutional:       Appearance: Normal appearance  HENT:      Head: Normocephalic and atraumatic        Nose: Nose normal    Eyes: General: No scleral icterus  Neck:      Musculoskeletal: No neck rigidity or muscular tenderness  Cardiovascular:      Rate and Rhythm: Normal rate and regular rhythm  Heart sounds: Normal heart sounds  Pulmonary:      Effort: Pulmonary effort is normal       Breath sounds: Normal breath sounds  Abdominal:      General: Abdomen is flat  There is no distension  Palpations: Abdomen is soft  There is no mass  Musculoskeletal: Normal range of motion  Lymphadenopathy:      Cervical: No cervical adenopathy  Skin:     General: Skin is warm and dry  Neurological:      General: No focal deficit present  Mental Status: She is alert  Psychiatric:         Mood and Affect: Mood normal          Behavior: Behavior normal            Results:  Labs:  Ref Range & Units  8/24/20 6:43 AM    Thyroglobulin-VICKIE  1 5 - 38 5 ng/mL  0 2Low      Comment: According to the [employer] of Clinical Biochemistry, the   reference interval for Thyroglobulin (TG) should be related to   euthyroid patients and not for patients who underwent thyroidectomy  TG reference intervals for these patients depend on the residual   mass of the thyroid tissue left after surgery  Establishing a   post-operative baseline is recommended  The assay limit of quantitation is 0 1 ng/mL   Thyroglobulin measured by [facility] Immunometric Assay       Narrative     Performed at: [address] [place] [address]  [id number]   : Elkin Engel MD, Phone:  [phone]       Specimen Collected: 08/24/20  6:43 AM    Last Resulted: 08/25/20  9:06 AM         Lab Flowsheet      Order Details      View Encounter      Lab and Collection Details      Routing      Result History               Thyroglobulin w/ab Lab Collect   Order: [id number]   Status:  Final result   Visible to patient:  Yes (MyChart)   Next appt:  10/28/2020 at 08:00 AM in [facility] ([place], [place])   Dx:   Thyroid cancer ([facility] [address] ) Ref Range & Units  8/24/20 6:43 AM   Thyroglobulin Ab  0 0 - 0 9 IU/mL  <1 0    Comment: Thyroglobulin Antibody measured by [facility] Methodology                Imaging  Us Head Neck Lymph Node Mapping    Result Date: 9/23/2020  Narrative: NECK ULTRASOUND INDICATION:     Z08: Encounter for follow-up examination after completed treatment for malignant neoplasm Z85 850: Personal history of malignant neoplasm of thyroid  COMPARISON:  9/16/2019 FINDINGS: Ultrasound of the thyroidectomy bed and cervical lymph node chains was performed with a high frequency linear transducer  There is no suspicion of recurrent mass in the thyroidectomy bed  Lymph nodes maintain normal morphologic contour, echogenicity and short axis dimensions of less than 0 7 cm  No evidence for microcalcification or focal nodularity  Impression: No evidence of recurrent or metastatic disease  Workstation performed: [id number]     I reviewed the above laboratory and imaging data  Discussion/Summary:  History of stage I thyroid cancer, now AZAM 1 1/2 years post surgery  Follow-up in 1 year with blood work and ultrasound at that time for surveillance

## 2020-09-28 NOTE — LETTER
September 28, 2020     Cleo Alanis PA-C  [address] [facility]  [address]  [address] [place] [address]    Patient: Deb Castillo   YOB: 1955   Date of Visit: 9/28/2020       Dear Dr Elsa Tadeo: Thank you for referring Dinah Barry to me for evaluation  Below are my notes for this consultation  If you have questions, please do not hesitate to call me  I look forward to following your patient along with you  Sincerely,        Basilia Morrison MD        CC: MD Ellen Gould CRNP Sandee Squires, MD Veneda Fluke, MD  9/28/2020  3:10 PM  Sign when Signing Visit     Surgical Oncology Follow Up       [address] [facility] [address] [address] [address]  1955  [id number]  [address] [facility] SURGICAL ONCOLOGY [facility]  [address] [facility] [address] [facility]  [phone]    Chief Complaint   Patient presents with    Follow-up     1 year follow up       Assessment/Plan:    No problem-specific Assessment & Plan notes found for this encounter  Diagnoses and all orders for this visit:    Encounter for follow-up surveillance of thyroid cancer    History of thyroid cancer        Advance Care Planning/Advance Directives:  Discussed disease status, cancer treatment plans and/or cancer treatment goals with the patient       Oncology History   History of thyroid cancer   1/7/2019 Biopsy    Fine needle aspiration of right thyroid  Malignant ([place] category VI) papillary thyroid carcinoma     2/19/2019 Surgery    Total thyroidectomy  Multifocal tumor  -largest tumor focus 1 7cm (T1b  -Tumor laterality: right lobe  -Papillary thyroid carcinoma  -margins: carcinoma less than 0 1cm from inked resection surface  -regional lymph nodes-2 lymph nodes negative for carcinoma (0/2)    8th Ed AJCC Stage: at least stage I pT1b(m), pN0, pMx History of Present Illness:  Patient is status post total thyroidectomy for stage I thyroid cancer, 1 1/2 years ago   -Interval History:  She is here for surveillance with no complaints report  She had blood work and ultrasound done in anticipation of today's visit  Review of Systems:  Review of Systems   Constitutional: Negative  HENT: Negative  Negative for trouble swallowing and voice change  Eyes: Negative  Respiratory: Negative  Cardiovascular: Negative  Gastrointestinal: Negative  Endocrine: Negative  Genitourinary: Negative  Musculoskeletal: Negative  Skin: Negative  Allergic/Immunologic: Negative  Neurological: Negative  Hematological: Negative  Psychiatric/Behavioral: Negative  Patient Active Problem List   Diagnosis    Essential hypertension    Hyperlipidemia    Arthritis    History of myocardial infarction    Insomnia    Cardiac pacemaker in situ    Chronic nonalcoholic liver disease    Coronary atherosclerosis    Disorder of bilirubin excretion    Elective replacement indicated for pacemaker    Failed total left knee replacement (HCC)    Lumbar back pain with radiculopathy affecting left lower extremity    Metabolic syndrome    Osteoarthritis, generalized    Overweight (BMI 25 0-29  9)    Atrophic vaginitis    Seasonal allergic rhinitis    Abnormal thyroid blood test    History of thyroid cancer    Type 2 diabetes mellitus with hyperglycemia, without long-term current use of insulin (HCC)    Postoperative hypothyroidism    Encounter for follow-up surveillance of thyroid cancer    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)    Thyroid cancer (HCC)    Age-related nuclear cataract of both eyes    Epiretinal membrane    Posterior vitreous detachment     Past Medical History:   Diagnosis Date    Diabetes mellitus ([address] )     History of staph infection     Hyperlipidemia     Hypertension     Myocardial infarction ([facility] [address] ) 1998    Varicella      Past Surgical History:   Procedure Laterality Date    APPENDECTOMY      CARDIAC PACEMAKER PLACEMENT      CARPAL TUNNEL RELEASE Bilateral     CHOLECYSTECTOMY      HYSTERECTOMY      OOPHORECTOMY Bilateral     REPLACEMENT TOTAL KNEE Left     THYROIDECTOMY Bilateral 2/19/2019    Procedure: TOTAL THYROIDECTOMY;  Surgeon: Maurice Escobar MD;  [facility];  Service: Surgical Oncology    US GUIDED THYROID BIOPSY  1/7/2019     Family History   Problem Relation Age of Onset    Diabetes unspecified Maternal Aunt     Colon cancer Father     Cancer Father     Heart disease Mother         Cardiac disorder, congenital aortic stenosis    Diabetes Other     No Known Problems Sister     No Known Problems Daughter     No Known Problems Maternal Grandmother     No Known Problems Maternal Grandfather     No Known Problems Paternal Grandmother     No Known Problems Paternal Grandfather     No Known Problems Daughter     No Known Problems Maternal Aunt     No Known Problems Maternal Aunt     No Known Problems Paternal Aunt      Social History     Socioeconomic History    Marital status: /Civil Union     Spouse name: Not on file    Number of children: Not on file    Years of education: Not on file    Highest education level: Not on file   Occupational History    Occupation: medical records   Social Needs    Financial resource strain: Not on file    Food insecurity     Worry: Not on file     Inability: Not on file    Transportation needs     Medical: Not on file     Non-medical: Not on file   Tobacco Use    Smoking status: Never Smoker    Smokeless tobacco: Never Used   Substance and Sexual Activity    Alcohol use: No    Drug use: No    Sexual activity: Not on file   Lifestyle    Physical activity     Days per week: Not on file     Minutes per session: Not on file    Stress: Not on file Relationships    Social connections     Talks on phone: Not on file     Gets together: Not on file     Attends Buddhism service: Not on file     Active member of club or organization: Not on file     Attends meetings of clubs or organizations: Not on file     Relationship status: Not on file    Intimate partner violence     Fear of current or ex partner: Not on file     Emotionally abused: Not on file     Physically abused: Not on file     Forced sexual activity: Not on file   Other Topics Concern    Not on file   Social History Narrative    Always uses seat belt     No caffeine use        Current Outpatient Medications:     alendronate (Fosamax) 70 mg tablet, Take 1 tablet (70 mg total) by mouth every 7 days, Disp: 12 tablet, Rfl: 1    aspirin 81 MG tablet, Take 81 mg by mouth daily at bedtime , Disp: , Rfl:     Calcium Polycarbophil (FIBER-CAPS PO), Take 2 capsules by mouth 2 (two) times a day , Disp: , Rfl:     cetirizine (ZyrTEC) 10 mg tablet, Take 10 mg by mouth daily , Disp: , Rfl:     cholecalciferol (VITAMIN D3) 1,000 units tablet, Take 2 tablets (2,000 Units total) by mouth daily (Patient taking differently: Take 2,000 Units by mouth once a week ), Disp: 180 tablet, Rfl: 1    Empagliflozin (Jardiance) 25 MG TABS, Take 1 tablet (25 mg total) by mouth every morning, Disp: 90 tablet, Rfl: 3    ezetimibe (ZETIA) 10 mg tablet, Take 1 tablet (10 mg total) by mouth daily, Disp: 90 tablet, Rfl: 3    fexofenadine (MUCINEX ALLERGY) 180 MG tablet, Take 180 mg by mouth daily, Disp: , Rfl:     levothyroxine 112 mcg tablet, Take 1 tablet (112 mcg total) by mouth daily, Disp: 90 tablet, Rfl: 1    losartan (COZAAR) 50 mg tablet, Take 1 tablet (50 mg total) by mouth daily, Disp: 90 tablet, Rfl: 1    metFORMIN (GLUCOPHAGE) 1000 MG tablet, Take 1 tablet (1,000 mg total) by mouth 2 (two) times a day with meals (Patient taking differently: Take 1,000 mg by mouth daily with breakfast ), Disp: 180 tablet, Rfl: 3    Multiple Vitamin (MULTI-VITAMIN DAILY) TABS, Take by mouth daily , Disp: , Rfl:     rosuvastatin (CRESTOR) 20 MG tablet, Take 1 tablet (20 mg total) by mouth daily at bedtime, Disp: 90 tablet, Rfl: 1    Semaglutide, 1 MG/DOSE, (Ozempic, 1 MG/DOSE,) 2 MG/1 5ML SOPN, Inject 1mg weekly, Disp: 6 pen, Rfl: 1  Allergies   Allergen Reactions    Penicillins Rash    Sulfa Antibiotics Rash     Vitals:    09/28/20 1445   BP: 150/80   Pulse: 95   Resp: 16   Temp: 98 °F (36 7 °C)       Physical Exam  Constitutional:       Appearance: Normal appearance  HENT:      Head: Normocephalic and atraumatic  Nose: Nose normal    Eyes:      General: No scleral icterus  Neck:      Musculoskeletal: No neck rigidity or muscular tenderness  Cardiovascular:      Rate and Rhythm: Normal rate and regular rhythm  Heart sounds: Normal heart sounds  Pulmonary:      Effort: Pulmonary effort is normal       Breath sounds: Normal breath sounds  Abdominal:      General: Abdomen is flat  There is no distension  Palpations: Abdomen is soft  There is no mass  Musculoskeletal: Normal range of motion  Lymphadenopathy:      Cervical: No cervical adenopathy  Skin:     General: Skin is warm and dry  Neurological:      General: No focal deficit present  Mental Status: She is alert  Psychiatric:         Mood and Affect: Mood normal          Behavior: Behavior normal            Results:  Labs:  Ref Range & Units  8/24/20 6:43 AM    Thyroglobulin-VICKIE  1 5 - 38 5 ng/mL  0 2Low      Comment: According to the [employer] of Clinical Biochemistry, the   reference interval for Thyroglobulin (TG) should be related to   euthyroid patients and not for patients who underwent thyroidectomy  TG reference intervals for these patients depend on the residual   mass of the thyroid tissue left after surgery  Establishing a   post-operative baseline is recommended     The assay limit of quantitation is 0 1 ng/mL   Thyroglobulin measured by [facility] Immunometric Assay       Narrative     Performed at: [address]  [id number]   : Jakob Banks MD, Phone:  [phone]       Specimen Collected: 08/24/20  6:43 AM    Last Resulted: 08/25/20  9:06 AM         Lab Flowsheet      Order Details      View Encounter      Lab and Collection Details      Routing      Result History               Thyroglobulin w/ab Lab Collect   Order: [id number]   Status:  Final result   Visible to patient:  Yes (MyChart)   Next appt:  10/28/2020 at 08:00 AM in Family [facility] ([place], [place])   Dx: Thyroid cancer ([address] )    Ref Range & Units  8/24/20 6:43 AM   Thyroglobulin Ab  0 0 - 0 9 IU/mL  <1 0    Comment: Thyroglobulin Antibody measured by [facility] Methodology                Imaging  Us Head Neck Lymph Node Mapping    Result Date: 9/23/2020  Narrative: NECK ULTRASOUND INDICATION:     Z08: Encounter for follow-up examination after completed treatment for malignant neoplasm Z85 850: Personal history of malignant neoplasm of thyroid  COMPARISON:  9/16/2019 FINDINGS: Ultrasound of the thyroidectomy bed and cervical lymph node chains was performed with a high frequency linear transducer  There is no suspicion of recurrent mass in the thyroidectomy bed  Lymph nodes maintain normal morphologic contour, echogenicity and short axis dimensions of less than 0 7 cm  No evidence for microcalcification or focal nodularity  Impression: No evidence of recurrent or metastatic disease  Workstation performed: [id number]     I reviewed the above laboratory and imaging data  Discussion/Summary:  History of stage I thyroid cancer, now AZAM 1 1/2 years post surgery  Follow-up in 1 year with blood work and ultrasound at that time for surveillance

## 2020-09-29 ENCOUNTER — TELEPHONE (OUTPATIENT)
Dept: INFUSION CENTER | Facility: HOSPITAL | Age: 65
End: 2020-09-29

## 2020-09-29 NOTE — TELEPHONE ENCOUNTER
MSW received a Distress Thermometer from Surg Onc, where the pt self scored an 8 to indicate her level of stress  She marked off depression, nervousness and worry as her psychosocial stressors  She marked off 5 out of 21 physical stressors  MSW reached out to the pt and received her voicemail  MSW left a detailed message to describe the role of the cancer counselor, provided a contact number and encouraged the pt to return the call

## 2020-10-28 ENCOUNTER — IMMUNIZATIONS (OUTPATIENT)
Dept: FAMILY MEDICINE CLINIC | Facility: CLINIC | Age: 65
End: 2020-10-28
Payer: COMMERCIAL

## 2020-10-28 DIAGNOSIS — I10 ESSENTIAL HYPERTENSION: ICD-10-CM

## 2020-10-28 DIAGNOSIS — E05.90 HYPERTHYROIDISM: ICD-10-CM

## 2020-10-28 DIAGNOSIS — E78.5 HYPERLIPIDEMIA, UNSPECIFIED HYPERLIPIDEMIA TYPE: ICD-10-CM

## 2020-10-28 DIAGNOSIS — E55.9 VITAMIN D DEFICIENCY: Primary | ICD-10-CM

## 2020-10-28 DIAGNOSIS — M81.0 AGE-RELATED OSTEOPOROSIS WITHOUT CURRENT PATHOLOGICAL FRACTURE: ICD-10-CM

## 2020-10-28 DIAGNOSIS — Z23 NEED FOR INFLUENZA VACCINATION: Primary | ICD-10-CM

## 2020-10-28 DIAGNOSIS — E11.65 TYPE 2 DIABETES MELLITUS WITH HYPERGLYCEMIA, WITHOUT LONG-TERM CURRENT USE OF INSULIN (HCC): ICD-10-CM

## 2020-10-28 PROCEDURE — 90662 IIV NO PRSV INCREASED AG IM: CPT

## 2020-10-28 PROCEDURE — 90471 IMMUNIZATION ADMIN: CPT

## 2020-10-28 RX ORDER — ALENDRONATE SODIUM 70 MG/1
70 TABLET ORAL
Qty: 12 TABLET | Refills: 2 | Status: SHIPPED | OUTPATIENT
Start: 2020-10-28

## 2020-10-28 RX ORDER — LOSARTAN POTASSIUM 50 MG/1
50 TABLET ORAL DAILY
Qty: 30 TABLET | Refills: 1 | Status: SHIPPED | OUTPATIENT
Start: 2020-10-28

## 2020-10-28 RX ORDER — LEVOTHYROXINE SODIUM 112 UG/1
112 TABLET ORAL DAILY
Qty: 30 TABLET | Refills: 1 | Status: SHIPPED | OUTPATIENT
Start: 2020-10-28 | End: 2021-03-09 | Stop reason: SDUPTHER

## 2020-10-28 RX ORDER — EZETIMIBE 10 MG/1
10 TABLET ORAL DAILY
Qty: 30 TABLET | Refills: 3 | Status: SHIPPED | OUTPATIENT
Start: 2020-10-28

## 2020-10-28 RX ORDER — MELATONIN
2000 WEEKLY
Qty: 30 TABLET | Refills: 3 | Status: SHIPPED | OUTPATIENT
Start: 2020-10-28

## 2020-10-28 RX ORDER — EMPAGLIFLOZIN 25 MG/1
25 TABLET, FILM COATED ORAL EVERY MORNING
Qty: 30 TABLET | Refills: 3 | Status: SHIPPED | OUTPATIENT
Start: 2020-10-28 | End: 2021-03-09 | Stop reason: SDUPTHER

## 2020-11-11 ENCOUNTER — REMOTE DEVICE CLINIC VISIT (OUTPATIENT)
Dept: CARDIOLOGY CLINIC | Facility: CLINIC | Age: 65
End: 2020-11-11
Payer: MEDICARE

## 2020-11-11 DIAGNOSIS — Z95.0 PRESENCE OF CARDIAC PACEMAKER: Primary | ICD-10-CM

## 2020-11-11 PROCEDURE — 93294 REM INTERROG EVL PM/LDLS PM: CPT | Performed by: INTERNAL MEDICINE

## 2020-11-11 PROCEDURE — 93296 REM INTERROG EVL PM/IDS: CPT | Performed by: INTERNAL MEDICINE

## 2020-12-02 ENCOUNTER — OFFICE VISIT (OUTPATIENT)
Dept: ENDOCRINOLOGY | Facility: CLINIC | Age: 65
End: 2020-12-02
Payer: MEDICARE

## 2020-12-02 VITALS — DIASTOLIC BLOOD PRESSURE: 76 MMHG | SYSTOLIC BLOOD PRESSURE: 136 MMHG | BODY MASS INDEX: 23.82 KG/M2 | WEIGHT: 138.8 LBS

## 2020-12-02 DIAGNOSIS — E89.0 POSTOPERATIVE HYPOTHYROIDISM: Primary | ICD-10-CM

## 2020-12-02 DIAGNOSIS — E11.65 TYPE 2 DIABETES MELLITUS WITH HYPERGLYCEMIA, WITHOUT LONG-TERM CURRENT USE OF INSULIN (HCC): ICD-10-CM

## 2020-12-02 DIAGNOSIS — E78.5 HYPERLIPIDEMIA, UNSPECIFIED HYPERLIPIDEMIA TYPE: ICD-10-CM

## 2020-12-02 DIAGNOSIS — I10 ESSENTIAL HYPERTENSION: ICD-10-CM

## 2020-12-02 PROCEDURE — 99214 OFFICE O/P EST MOD 30 MIN: CPT | Performed by: NURSE PRACTITIONER

## 2020-12-02 RX ORDER — SEMAGLUTIDE 1.34 MG/ML
INJECTION, SOLUTION SUBCUTANEOUS
Qty: 6 PEN | Refills: 1 | Status: SHIPPED | OUTPATIENT
Start: 2020-12-02 | End: 2021-03-09 | Stop reason: SDUPTHER

## 2021-02-24 ENCOUNTER — TELEPHONE (OUTPATIENT)
Dept: FAMILY MEDICINE CLINIC | Facility: CLINIC | Age: 66
End: 2021-02-24

## 2021-03-09 ENCOUNTER — OFFICE VISIT (OUTPATIENT)
Dept: ENDOCRINOLOGY | Facility: CLINIC | Age: 66
End: 2021-03-09
Payer: MEDICARE

## 2021-03-09 VITALS
BODY MASS INDEX: 24.45 KG/M2 | HEIGHT: 63 IN | WEIGHT: 138 LBS | SYSTOLIC BLOOD PRESSURE: 134 MMHG | OXYGEN SATURATION: 98 % | DIASTOLIC BLOOD PRESSURE: 76 MMHG | HEART RATE: 119 BPM

## 2021-03-09 DIAGNOSIS — Z08 ENCOUNTER FOR FOLLOW-UP SURVEILLANCE OF THYROID CANCER: ICD-10-CM

## 2021-03-09 DIAGNOSIS — I10 ESSENTIAL HYPERTENSION: ICD-10-CM

## 2021-03-09 DIAGNOSIS — E11.3291 TYPE 2 DIABETES MELLITUS WITH RIGHT EYE AFFECTED BY MILD NONPROLIFERATIVE RETINOPATHY WITHOUT MACULAR EDEMA, WITHOUT LONG-TERM CURRENT USE OF INSULIN (HCC): ICD-10-CM

## 2021-03-09 DIAGNOSIS — E78.5 HYPERLIPIDEMIA, UNSPECIFIED HYPERLIPIDEMIA TYPE: ICD-10-CM

## 2021-03-09 DIAGNOSIS — E03.9 ACQUIRED HYPOTHYROIDISM: ICD-10-CM

## 2021-03-09 DIAGNOSIS — E89.0 POSTOPERATIVE HYPOTHYROIDISM: ICD-10-CM

## 2021-03-09 DIAGNOSIS — E05.90 HYPERTHYROIDISM: ICD-10-CM

## 2021-03-09 DIAGNOSIS — Z85.850 ENCOUNTER FOR FOLLOW-UP SURVEILLANCE OF THYROID CANCER: ICD-10-CM

## 2021-03-09 DIAGNOSIS — E11.65 TYPE 2 DIABETES MELLITUS WITH HYPERGLYCEMIA, WITHOUT LONG-TERM CURRENT USE OF INSULIN (HCC): Primary | ICD-10-CM

## 2021-03-09 DIAGNOSIS — M15.9 OSTEOARTHRITIS, GENERALIZED: ICD-10-CM

## 2021-03-09 DIAGNOSIS — C73 THYROID CANCER (HCC): ICD-10-CM

## 2021-03-09 PROCEDURE — 99214 OFFICE O/P EST MOD 30 MIN: CPT | Performed by: NURSE PRACTITIONER

## 2021-03-09 NOTE — PROGRESS NOTES
Established Patient Progress Note      Chief Complaint   Patient presents with    Diabetes Type 2    Hypothyroidism        History of Present Illness:   Jayson Baldwin is a 76 y o  female with   Hypertension, hyperlipidemia, vitamin-D deficiency, osteoporosis, postsurgical hypothyroidism, and type 2 diabetes without long-term use of insulin  Denies complications related to diabetes including neuropathy, nephropathy, and retinopathy  Denies recent illness or hospitalizations  Denies recent severe hypoglycemic or severe hyperglycemic episodes  Denies any issues with her current regimen  Home glucose monitoring is performed sporadically, approximately 3-4 times weekly in the morning  Fasting sugar: 130-200     Current regimen:   Jardiance 25 mg daily  Metformin 1000 mg BID  Semaglutide 1mg weekly      Overall, patient reports feeling well  She denies polydipsia, polyuria, and polyphagia  She recently retired from her work in medical records for [address]  She has not been as physically active over the winter but intends to continue exercising as weather improves  Last Eye Exam:  Patient is due; history of mild retinopathy in both eyes  Last Foot Exam:  Perform self-care; denies numbness and tingling       patient has history of hyperthyroidism/papillary thyroid cancer  She underwent a thyroidectomy in March of 2019  She did not meet criteria for GALLEGOS therapy  She reports taking levothyroxine 112 mcg daily  She takes this consistently and properly  She has not completed her labs since her last visit  She denies any symptoms of hyper or hypothyroidism  For her osteoporosis, she is taking 70 mg of alendronate weekly  She denies any GI upset  For hyperlipidemia, she is taking 10 mg of ezetimibe daily  For hypertension, she is taking 50 mg of losartan daily  She denies headache and cough      Patient Active Problem List   Diagnosis    Essential hypertension    Hyperlipidemia    Arthritis    History of myocardial infarction    Insomnia    Cardiac pacemaker in situ    Chronic nonalcoholic liver disease    Coronary atherosclerosis    Disorder of bilirubin excretion    Elective replacement indicated for pacemaker    Failed total left knee replacement (HCC)    Lumbar back pain with radiculopathy affecting left lower extremity    Metabolic syndrome    Osteoarthritis, generalized    Overweight (BMI 25 0-29  9)    Atrophic vaginitis    Seasonal allergic rhinitis    Abnormal thyroid blood test    History of thyroid cancer    Diabetes mellitus type 2, uncontrolled, without complications    Postoperative hypothyroidism    Encounter for follow-up surveillance of thyroid cancer    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)    Thyroid cancer (HCC)    Age-related nuclear cataract of both eyes    Epiretinal membrane    Posterior vitreous detachment      Past Medical History:   Diagnosis Date    Diabetes mellitus ([address] )     History of staph infection     Hyperlipidemia     Hypertension     Myocardial infarction ([facility] [address] ) 1998    Varicella       Past Surgical History:   Procedure Laterality Date    APPENDECTOMY      CARDIAC PACEMAKER PLACEMENT      CARPAL TUNNEL RELEASE Bilateral     CHOLECYSTECTOMY      HYSTERECTOMY      OOPHORECTOMY Bilateral     REPLACEMENT TOTAL KNEE Left     THYROIDECTOMY Bilateral 2/19/2019    Procedure: TOTAL THYROIDECTOMY;  Surgeon: Gabrielle Silver MD;  Location: [facility];  Service: Surgical Oncology    US GUIDED THYROID BIOPSY  1/7/2019      Family History   Problem Relation Age of Onset    Diabetes unspecified Maternal Aunt     Colon cancer Father     Cancer Father     Heart disease Mother         Cardiac disorder, congenital aortic stenosis    Diabetes Other     No Known Problems Sister     No Known Problems Daughter     No Known Problems Maternal Grandmother     No Known Problems Maternal Grandfather     No Known Problems Paternal Grandmother     No Known Problems Paternal Grandfather     No Known Problems Daughter     No Known Problems Maternal Aunt     No Known Problems Maternal Aunt     No Known Problems Paternal Aunt      Social History     Tobacco Use    Smoking status: Never Smoker    Smokeless tobacco: Never Used   Substance Use Topics    Alcohol use: No     Allergies   Allergen Reactions    Penicillins Rash    Sulfa Antibiotics Rash         Current Outpatient Medications:     alendronate (Fosamax) 70 mg tablet, Take 1 tablet (70 mg total) by mouth every 7 days, Disp: 12 tablet, Rfl: 2    aspirin 81 MG tablet, Take 81 mg by mouth daily at bedtime , Disp: , Rfl:     Calcium Polycarbophil (FIBER-CAPS PO), Take 2 capsules by mouth 2 (two) times a day , Disp: , Rfl:     cetirizine (ZyrTEC) 10 mg tablet, Take 10 mg by mouth daily , Disp: , Rfl:     cholecalciferol (VITAMIN D3) 1,000 units tablet, Take 2 tablets (2,000 Units total) by mouth once a week, Disp: 30 tablet, Rfl: 3    Empagliflozin (Jardiance) 25 MG TABS, Take 1 tablet (25 mg total) by mouth every morning, Disp: 30 tablet, Rfl: 3    ezetimibe (Zetia) 10 mg tablet, Take 1 tablet (10 mg total) by mouth daily, Disp: 30 tablet, Rfl: 3    fexofenadine (MUCINEX ALLERGY) 180 MG tablet, Take 180 mg by mouth daily, Disp: , Rfl:     levothyroxine 112 mcg tablet, Take 1 tablet (112 mcg total) by mouth daily, Disp: 30 tablet, Rfl: 1    losartan (COZAAR) 50 mg tablet, Take 1 tablet (50 mg total) by mouth daily, Disp: 30 tablet, Rfl: 1    metFORMIN (GLUCOPHAGE) 1000 MG tablet, Take 1 tablet (1,000 mg total) by mouth 2 (two) times a day with meals, Disp: 180 tablet, Rfl: 3    Multiple Vitamin (MULTI-VITAMIN DAILY) TABS, Take by mouth daily , Disp: , Rfl:     rosuvastatin (CRESTOR) 20 MG tablet, Take 1 tablet (20 mg total) by mouth daily at bedtime, Disp: 90 tablet, Rfl: 1   Semaglutide, 1 MG/DOSE, (Ozempic, 1 MG/DOSE,) 2 MG/1 5ML SOPN, Inject 1mg weekly, Disp: 6 pen, Rfl: 1    Review of Systems   Constitutional: Negative for activity change, appetite change, fatigue and unexpected weight change  HENT: Negative for sore throat, trouble swallowing and voice change  Eyes: Negative for visual disturbance  Respiratory: Negative for chest tightness and shortness of breath  Cardiovascular: Negative for chest pain, palpitations and leg swelling  Gastrointestinal: Negative for constipation, diarrhea, nausea and vomiting  Endocrine: Negative for polydipsia, polyphagia and polyuria  Genitourinary: Negative for frequency  Musculoskeletal: Negative for arthralgias, back pain, gait problem, joint swelling and myalgias  Skin: Negative for wound  Neurological: Negative for dizziness, weakness, light-headedness, numbness and headaches  Hematological: Does not bruise/bleed easily  Psychiatric/Behavioral: Negative for decreased concentration, dysphoric mood and sleep disturbance  The patient is not nervous/anxious  Physical Exam:  Body mass index is 24 45 kg/m²  /76 (BP Location: Left arm, Cuff Size: Adult)   Pulse (!) 119   Ht 5' 3" (1 6 m)   Wt 62 6 kg (138 lb)   SpO2 98%   BMI 24 45 kg/m²    Wt Readings from Last 3 Encounters:   03/09/21 62 6 kg (138 lb)   12/02/20 63 kg (138 lb 12 8 oz)   10/28/20 64 4 kg (142 lb)       Physical Exam  Vitals signs reviewed  Constitutional:       General: She is not in acute distress  Appearance: She is well-developed and normal weight  She is not ill-appearing  HENT:      Head: Normocephalic and atraumatic  Comments: Mask in place  Eyes:      Pupils: Pupils are equal, round, and reactive to light  Neck:      Musculoskeletal: Normal range of motion and neck supple  Thyroid: No thyromegaly  Cardiovascular:      Rate and Rhythm: Normal rate and regular rhythm  Pulses: Normal pulses        Heart sounds: Normal heart sounds  Pulmonary:      Effort: Pulmonary effort is normal       Breath sounds: Normal breath sounds  Abdominal:      General: Bowel sounds are normal  There is no distension  Palpations: Abdomen is soft  Tenderness: There is no abdominal tenderness  Musculoskeletal:      Right lower leg: No edema  Left lower leg: No edema  Lymphadenopathy:      Cervical: No cervical adenopathy  Skin:     General: Skin is warm and dry  Capillary Refill: Capillary refill takes less than 2 seconds  Neurological:      Mental Status: She is alert and oriented to person, place, and time  Gait: Gait normal    Psychiatric:         Mood and Affect: Mood normal          Behavior: Behavior normal          Thought Content: Thought content normal          Judgment: Judgment normal            Labs:   Lab Results   Component Value Date    HGBA1C 8 0 (H) 08/24/2020    HGBA1C 6 8 (H) 04/20/2020    HGBA1C 8 6 (H) 01/19/2020     Lab Results   Component Value Date    CREATININE 0 65 08/24/2020    CREATININE 0 71 04/20/2020    CREATININE 0 61 01/19/2020    BUN 11 08/24/2020     04/03/2018    K 3 5 08/24/2020     08/24/2020    CO2 28 08/24/2020     eGFR   Date Value Ref Range Status   08/24/2020 93 ml/min/1 73sq m Final     Lab Results   Component Value Date    CHOL 184 04/03/2018    HDL 39 (L) 08/24/2020    TRIG 537 (H) 08/24/2020     Lab Results   Component Value Date    ALT 58 08/24/2020    AST 37 08/24/2020    ALKPHOS 85 08/24/2020    BILITOT 1 4 (H) 04/03/2018     Lab Results   Component Value Date    EFV1KFFJUYVE 5 140 (H) 08/24/2020    BDZ8QNVSCRKM 8 490 (H) 04/20/2020    NVH5VOEAWLXL 0 553 09/15/2019     Lab Results   Component Value Date    FREET4 1 20 08/24/2020       Impression & Plan:    Problem List Items Addressed This Visit        Endocrine    Diabetes mellitus type 2, uncontrolled, without complications - Primary      Patient is overdue for routine labs    Encouraged her to have these completed in the near future  Fasting sugars are slightly elevated  Recommended that she continue to focus on healthy diet and increase physical activity  Once labs are reviewed, will consider current medication regimen  If necessary, can increase dose of   Ozempic  Lab Results   Component Value Date    HGBA1C 8 0 (H) 08/24/2020            Postoperative hypothyroidism       Complete TSH and free T4  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)      Encouraged patient to make an appointment to have her diabetic eye exam   Lab Results   Component Value Date    HGBA1C 8 0 (H) 08/24/2020            Thyroid cancer ([address] )      Last thyroglobulin level was undetectable indicating no recurrence of disease  Repeat thyroglobulin level  Reviewed ultrasound from September of 2020  No evidence of recurrence  Relevant Orders    Thyroglobulin w/ab Lab Collect       Cardiovascular and Mediastinum    Essential hypertension       BP stable 134/76  Continue current regimen  Musculoskeletal and Integument    Osteoarthritis, generalized       Continue Fosamax  Continue weight-bearing exercise  Other    Hyperlipidemia       Check fasting lipid panel  Continue   Zetia  Encounter for follow-up surveillance of thyroid cancer       Repeat thyroglobulin level  Other Visit Diagnoses     Acquired hypothyroidism        Relevant Orders    TSH, 3rd generation Lab Collect    T4, free Lab Collect    Hyperthyroidism              Orders Placed This Encounter   Procedures    HEMOGLOBIN A1C W/ EAG ESTIMATION Lab Collect     Standing Status:   Future     Standing Expiration Date:   3/9/2022    Microalbumin / creatinine urine ratio Lab Collect     Standing Status:   Future     Standing Expiration Date:   3/9/2022    TSH, 3rd generation Lab Collect     This is a patient instruction: This test is non-fasting  Please drink two glasses of water morning of bloodwork  Standing Status:   Future     Standing Expiration Date:   3/9/2022    T4, free Lab Collect     Standing Status:   Future     Standing Expiration Date:   3/9/2022    Comprehensive metabolic panel Lab Collect     This is a patient instruction: Patient fasting for 8 hours or longer recommended  Standing Status:   Future     Standing Expiration Date:   3/9/2022    CBC and differential Lab Collect     This is a patient instruction: This test is non-fasting  Please drink two glasses of water morning of bloodwork  Standing Status:   Future     Standing Expiration Date:   3/9/2022    Lipid panel Lab Collect Lab Collect     This is a patient instruction: This test requires patient fasting for 10-12 hours or longer  Drinking of black coffee or black tea is acceptable  Standing Status:   Future     Standing Expiration Date:   3/9/2022    Thyroglobulin w/ab Lab Collect     Standing Status:   Future     Standing Expiration Date:   3/9/2022       There are no Patient Instructions on file for this visit  Discussed with the patient and all questioned fully answered  She will call me if any problems arise  Follow-up appointment in 3 months       Counseled patient on diagnostic results, prognosis, risk and benefit of treatment options, instruction for management, importance of treatment compliance, Risk  factor reduction and impressions    FANNY Garcia

## 2021-03-10 RX ORDER — LEVOTHYROXINE SODIUM 112 UG/1
112 TABLET ORAL DAILY
Qty: 30 TABLET | Refills: 1 | Status: SHIPPED | OUTPATIENT
Start: 2021-03-10 | End: 2021-07-12 | Stop reason: SDUPTHER

## 2021-03-10 RX ORDER — EMPAGLIFLOZIN 25 MG/1
25 TABLET, FILM COATED ORAL EVERY MORNING
Qty: 90 TABLET | Refills: 3 | Status: SHIPPED | OUTPATIENT
Start: 2021-03-10 | End: 2021-07-12 | Stop reason: SDUPTHER

## 2021-03-10 RX ORDER — SEMAGLUTIDE 1.34 MG/ML
INJECTION, SOLUTION SUBCUTANEOUS
Qty: 6 PEN | Refills: 3 | Status: SHIPPED | OUTPATIENT
Start: 2021-03-10 | End: 2021-07-12 | Stop reason: SDUPTHER

## 2021-03-10 NOTE — ASSESSMENT & PLAN NOTE
Patient is overdue for routine labs  Encouraged her to have these completed in the near future  Fasting sugars are slightly elevated  Recommended that she continue to focus on healthy diet and increase physical activity  Once labs are reviewed, will consider current medication regimen  If necessary, can increase dose of   Ozempic    Lab Results   Component Value Date    HGBA1C 8 0 (H) 08/24/2020

## 2021-03-10 NOTE — ASSESSMENT & PLAN NOTE
Encouraged patient to make an appointment to have her diabetic eye exam   Lab Results   Component Value Date    HGBA1C 8 0 (H) 08/24/2020

## 2021-03-10 NOTE — ASSESSMENT & PLAN NOTE
Last thyroglobulin level was undetectable indicating no recurrence of disease  Repeat thyroglobulin level  Reviewed ultrasound from September of 2020  No evidence of recurrence

## 2021-05-14 ENCOUNTER — REMOTE DEVICE CLINIC VISIT (OUTPATIENT)
Dept: CARDIOLOGY CLINIC | Facility: CLINIC | Age: 66
End: 2021-05-14
Payer: MEDICARE

## 2021-05-14 DIAGNOSIS — Z95.0 PRESENCE OF PERMANENT CARDIAC PACEMAKER: Primary | ICD-10-CM

## 2021-05-14 PROCEDURE — 93296 REM INTERROG EVL PM/IDS: CPT | Performed by: INTERNAL MEDICINE

## 2021-05-14 PROCEDURE — 93294 REM INTERROG EVL PM/LDLS PM: CPT | Performed by: INTERNAL MEDICINE

## 2021-05-14 NOTE — PROGRESS NOTES
Results for orders placed or performed in visit on 05/14/21   Cardiac EP device report    Narrative    SJM-DUAL CHAMBER PPM (DDD MODE)/ NOT MRI CONDITIONAL  MERLIN TRANSMISSION: BATTERY VOLTAGE ADEQUATE  (10 YRS) AP AND  1%  ALL AVAILABLE LEAD PARAMETERS WITHIN NORMAL LIMITS  5 AMS EPISODES DETECTED (<1% OF TIME) < 6 MIN  3 VHR EPISODES DETECTED  ST AND PAT NOTED  HISTORY OF THE SAME  NORMAL DEVICE FUNCTION  ---ELLISON

## 2021-06-03 ENCOUNTER — TELEPHONE (OUTPATIENT)
Dept: FAMILY MEDICINE CLINIC | Facility: CLINIC | Age: 66
End: 2021-06-03

## 2021-06-03 NOTE — TELEPHONE ENCOUNTER
Kelly Comer of [facility] called asking for recent office notes and lab work for this patient  Patient has an appointment 06/04/2021 and they would like some background on the patient  Faxed labs from 08/24/2021 and office notes from 09/09/2020 and 07/20/2020      Phone:  [phone]  Fax:  [phone]

## 2021-07-06 ENCOUNTER — TELEPHONE (OUTPATIENT)
Dept: GASTROENTEROLOGY | Facility: CLINIC | Age: 66
End: 2021-07-06

## 2021-07-09 ENCOUNTER — TELEPHONE (OUTPATIENT)
Dept: ENDOCRINOLOGY | Facility: CLINIC | Age: 66
End: 2021-07-09

## 2021-07-09 NOTE — PROGRESS NOTES
Established Patient Progress Note      Chief Complaint   Patient presents with    Diabetes Type 2        History of Present Illness:   Bang Lagunas is a 76 y o  female with hypertension, hyperlipidemia, vitamin-D deficiency, osteoporosis, postsurgical hypothyroidism, and type 2 diabetes without long-term use of insulin  Denies complications related to diabetes including neuropathy, nephropathy, and retinopathy  Denies recent illness or hospitalizations  Denies recent severe hypoglycemic or severe hyperglycemic episodes  Denies any issues with her current regimen  Home glucose monitoring is performed sporadically, approximately 3-4 times weekly in the morning  Patient reports feeling well  She has lost approximately 8 lbs since her last visit  She is eating healthfully and exercising daily  Home blood glucose readings:        Current regimen:   Metformin 1,000 mg BID  Jardiance 25 mg daily  Semaglutide 1 mg weekly    Last Eye Exam: Will schedule  Last Foot Exam: UTD    Patient has history of hyperthyroidism/papillary thyroid cancer  She underwent a thyroidectomy in March of 2019  She did not meet criteria for GALLEGOS therapy  She reports taking levothyroxine 112 mcg daily  She has been out of the medication for "a little while now " She has not completed her labs since her last visit  She reports an increase in fatigue       For her osteoporosis, she is taking 70 mg of alendronate weekly  She denies any GI upset  For hyperlipidemia, she is taking 10 mg of ezetimibe daily  For hypertension, she is taking 50 mg of losartan daily  She denies headache and cough    Patient Active Problem List   Diagnosis    Essential hypertension    Hyperlipidemia    Arthritis    History of myocardial infarction    Insomnia    Cardiac pacemaker in situ    Chronic nonalcoholic liver disease    Coronary atherosclerosis    Disorder of bilirubin excretion    Elective replacement indicated for pacemaker    Failed total left knee replacement (HCC)    Lumbar back pain with radiculopathy affecting left lower extremity    Metabolic syndrome    Osteoarthritis, generalized    Atrophic vaginitis    Seasonal allergic rhinitis    Abnormal thyroid blood test    History of thyroid cancer    Diabetes mellitus type 2, uncontrolled, without complications    Acquired hypothyroidism    Encounter for follow-up surveillance of thyroid cancer    Type 2 diabetes mellitus with hyperglycemia, without long-term current use of insulin (HCC)    [facility]roid [facility] ([facility] )    Age-related nuclear cataract of both eyes    Epiretinal membrane    Posterior vitreous detachment      Past Medical History:   Diagnosis Date    Diabetes mellitus ([facility] )     History of staph infection     Hyperlipidemia     Hypertension     Myocardial infarction ([facility] ) 1998    Varicella       Past Surgical History:   Procedure Laterality Date    APPENDECTOMY      CARDIAC PACEMAKER PLACEMENT      CARPAL TUNNEL RELEASE Bilateral     CHOLECYSTECTOMY      HYSTERECTOMY      OOPHORECTOMY Bilateral     REPLACEMENT TOTAL KNEE Left     THYROIDECTOMY Bilateral 2/19/2019    Procedure: TOTAL THYROIDECTOMY;  Surgeon: Armond Corbett MD;  [facility];  Service: Surgical Oncology    US GUIDED THYROID BIOPSY  1/7/2019      Family History   Problem Relation Age of Onset    Diabetes unspecified Maternal Aunt     Colon cancer Father     Cancer Father     Heart disease Mother         Cardiac disorder, congenital aortic stenosis    Diabetes Other     No Known Problems Sister     No Known Problems Daughter     No Known Problems Maternal Grandmother     No Known Problems Maternal Grandfather     No Known Problems Paternal Grandmother     No Known Problems Paternal Grandfather     No Known Problems Daughter     No Known Problems Maternal Aunt     No Known Problems Maternal Aunt     No Known Problems Paternal Aunt      Social History     Tobacco Use  Smoking status: Never Smoker    Smokeless tobacco: Never Used   Substance Use Topics    Alcohol use: No     Allergies   Allergen Reactions    Penicillins Rash    Sulfa Antibiotics Rash         Current Outpatient Medications:     alendronate (Fosamax) 70 mg tablet, Take 1 tablet (70 mg total) by mouth every 7 days, Disp: 12 tablet, Rfl: 2    aspirin 81 MG tablet, Take 81 mg by mouth daily at bedtime , Disp: , Rfl:     Calcium Polycarbophil (FIBER-CAPS PO), Take 2 capsules by mouth 2 (two) times a day , Disp: , Rfl:     cetirizine (ZyrTEC) 10 mg tablet, Take 10 mg by mouth daily , Disp: , Rfl:     cholecalciferol (VITAMIN D3) 1,000 units tablet, Take 2 tablets (2,000 Units total) by mouth once a week, Disp: 30 tablet, Rfl: 3    Empagliflozin (Jardiance) 25 MG TABS, Take 1 tablet (25 mg total) by mouth every morning, Disp: 90 tablet, Rfl: 3    ezetimibe (Zetia) 10 mg tablet, Take 1 tablet (10 mg total) by mouth daily, Disp: 30 tablet, Rfl: 3    fexofenadine (MUCINEX ALLERGY) 180 MG tablet, Take 180 mg by mouth daily, Disp: , Rfl:     losartan (COZAAR) 50 mg tablet, Take 1 tablet (50 mg total) by mouth daily, Disp: 30 tablet, Rfl: 1    metFORMIN (GLUCOPHAGE) 1000 MG tablet, Take 1 tablet (1,000 mg total) by mouth 2 (two) times a day with meals, Disp: 180 tablet, Rfl: 3    Multiple Vitamin (MULTI-VITAMIN DAILY) TABS, Take by mouth daily , Disp: , Rfl:     rosuvastatin (CRESTOR) 20 MG tablet, Take 1 tablet (20 mg total) by mouth daily at bedtime, Disp: 90 tablet, Rfl: 1    Semaglutide, 1 MG/DOSE, (Ozempic, 1 MG/DOSE,) 2 MG/1 5ML SOPN, Inject 1 mg once weekly  , Disp: 4 5 mL, Rfl: 1    levothyroxine 112 mcg tablet, Take 1 tablet (112 mcg total) by mouth daily, Disp: 90 tablet, Rfl: 1    Review of Systems   Constitutional: Positive for fatigue  Negative for activity change, appetite change and unexpected weight change  HENT: Negative for sore throat, trouble swallowing and voice change      Eyes: Negative for visual disturbance  Respiratory: Negative for cough, chest tightness and shortness of breath  Cardiovascular: Negative for chest pain, palpitations and leg swelling  Gastrointestinal: Negative for constipation, diarrhea, nausea and vomiting  Endocrine: Negative for cold intolerance, heat intolerance, polydipsia, polyphagia and polyuria  Genitourinary: Negative for frequency  Musculoskeletal: Negative for arthralgias, back pain, gait problem and myalgias  Skin: Negative for wound  Allergic/Immunologic: Positive for environmental allergies  Negative for food allergies  Neurological: Negative for dizziness, weakness, light-headedness, numbness and headaches  Hematological: Does not bruise/bleed easily  Psychiatric/Behavioral: Negative for decreased concentration, dysphoric mood and sleep disturbance  The patient is not nervous/anxious  Physical Exam:  Body mass index is 23 21 kg/m²  /88   Pulse 97   Temp 98 7 °F (37 1 °C)   Resp 20   Ht 5' 3" (1 6 m)   Wt 59 4 kg (131 lb)   BMI 23 21 kg/m²    Wt Readings from Last 3 Encounters:   07/12/21 59 4 kg (131 lb)   03/09/21 62 6 kg (138 lb)   12/02/20 63 kg (138 lb 12 8 oz)       Physical Exam  Vitals reviewed  Constitutional:       General: She is not in acute distress  Appearance: She is well-developed and normal weight  She is not ill-appearing  HENT:      Head: Normocephalic and atraumatic  Comments: Mask in place  Eyes:      Pupils: Pupils are equal, round, and reactive to light  Neck:      Thyroid: No thyromegaly  Cardiovascular:      Rate and Rhythm: Normal rate and regular rhythm  Pulses: Normal pulses  Heart sounds: Normal heart sounds  Pulmonary:      Effort: Pulmonary effort is normal       Breath sounds: Normal breath sounds  Abdominal:      General: Bowel sounds are normal  There is no distension  Palpations: Abdomen is soft  Tenderness:  There is no abdominal tenderness  Musculoskeletal:      Cervical back: Normal range of motion and neck supple  Right lower leg: No edema  Left lower leg: No edema  Lymphadenopathy:      Cervical: No cervical adenopathy  Skin:     General: Skin is warm and dry  Capillary Refill: Capillary refill takes less than 2 seconds  Neurological:      Mental Status: She is alert and oriented to person, place, and time  Gait: Gait normal    Psychiatric:         Mood and Affect: Mood normal          Behavior: Behavior normal          Thought Content: Thought content normal            Labs:   Lab Results   Component Value Date    HGBA1C 8 0 (H) 08/24/2020    HGBA1C 6 8 (H) 04/20/2020    HGBA1C 8 6 (H) 01/19/2020     Lab Results   Component Value Date    CREATININE 0 65 08/24/2020    CREATININE 0 71 04/20/2020    CREATININE 0 61 01/19/2020    BUN 11 08/24/2020     04/03/2018    K 3 5 08/24/2020     08/24/2020    CO2 28 08/24/2020     eGFR   Date Value Ref Range Status   08/24/2020 93 ml/min/1 73sq m Final     Lab Results   Component Value Date    CHOL 184 04/03/2018    HDL 39 (L) 08/24/2020    TRIG 537 (H) 08/24/2020     Lab Results   Component Value Date    ALT 58 08/24/2020    AST 37 08/24/2020    ALKPHOS 85 08/24/2020    BILITOT 1 4 (H) 04/03/2018     Lab Results   Component Value Date    LCA3XNWUDNJD 5 140 (H) 08/24/2020    XXJ0QVGGEMIE 8 490 (H) 04/20/2020    TYO1QDTOEDHX 0 553 09/15/2019     Lab Results   Component Value Date    FREET4 1 20 08/24/2020       Impression & Plan:    Problem List Items Addressed This Visit        Endocrine    Acquired hypothyroidism     Continue levothyroxine 112 mcg daily  Reiterated the importance of taking this medication consistently  She is to call the office when she is running out of her medication            Relevant Medications    levothyroxine 112 mcg tablet    Type 2 diabetes mellitus with hyperglycemia, without long-term current use of insulin ([address] ) - Primary Patient control continues to improve  I anticipate the next HgA1C will be at goal  Continue current regimen  Continue to focus on diet and lifestyle modification  Lab Results   Component Value Date    HGBA1C 8 0 (H) 08/24/2020            Relevant Medications    Semaglutide, 1 MG/DOSE, (Ozempic, 1 MG/DOSE,) 2 MG/1 5ML SOPN    metFORMIN (GLUCOPHAGE) 1000 MG tablet    Empagliflozin (Jardiance) 25 MG TABS       Cardiovascular and Mediastinum    Essential hypertension     BP stable at 138/88  Continue current regimen  Other    Hyperlipidemia     Complete labs  Continue statin  Discussed with the patient and all questioned fully answered  She will call me if any problems arise  Follow-up appointment in 4 months       Counseled patient on diagnostic results, prognosis, risk and benefit of treatment options, instruction for management, importance of treatment compliance, Risk  factor reduction and impressions    FANNY Parker

## 2021-07-12 ENCOUNTER — OFFICE VISIT (OUTPATIENT)
Dept: ENDOCRINOLOGY | Facility: CLINIC | Age: 66
End: 2021-07-12
Payer: MEDICARE

## 2021-07-12 VITALS
SYSTOLIC BLOOD PRESSURE: 138 MMHG | HEART RATE: 97 BPM | WEIGHT: 131 LBS | BODY MASS INDEX: 23.21 KG/M2 | RESPIRATION RATE: 20 BRPM | TEMPERATURE: 98.7 F | DIASTOLIC BLOOD PRESSURE: 88 MMHG | HEIGHT: 63 IN

## 2021-07-12 DIAGNOSIS — E78.5 HYPERLIPIDEMIA, UNSPECIFIED HYPERLIPIDEMIA TYPE: ICD-10-CM

## 2021-07-12 DIAGNOSIS — E03.9 ACQUIRED HYPOTHYROIDISM: ICD-10-CM

## 2021-07-12 DIAGNOSIS — E11.65 TYPE 2 DIABETES MELLITUS WITH HYPERGLYCEMIA, WITHOUT LONG-TERM CURRENT USE OF INSULIN (HCC): Primary | ICD-10-CM

## 2021-07-12 DIAGNOSIS — I10 ESSENTIAL HYPERTENSION: ICD-10-CM

## 2021-07-12 PROCEDURE — 99214 OFFICE O/P EST MOD 30 MIN: CPT | Performed by: NURSE PRACTITIONER

## 2021-07-12 RX ORDER — SEMAGLUTIDE 1.34 MG/ML
INJECTION, SOLUTION SUBCUTANEOUS
Qty: 4.5 ML | Refills: 1 | Status: SHIPPED | OUTPATIENT
Start: 2021-07-12 | End: 2021-11-10

## 2021-07-12 RX ORDER — EMPAGLIFLOZIN 25 MG/1
25 TABLET, FILM COATED ORAL EVERY MORNING
Qty: 90 TABLET | Refills: 3 | Status: SHIPPED | OUTPATIENT
Start: 2021-07-12

## 2021-07-12 RX ORDER — LEVOTHYROXINE SODIUM 112 UG/1
112 TABLET ORAL DAILY
Qty: 90 TABLET | Refills: 1 | Status: SHIPPED | OUTPATIENT
Start: 2021-07-12 | End: 2022-05-16

## 2021-07-12 NOTE — ASSESSMENT & PLAN NOTE
Continue levothyroxine 112 mcg daily  Reiterated the importance of taking this medication consistently  She is to call the office when she is running out of her medication

## 2021-07-12 NOTE — ASSESSMENT & PLAN NOTE
Patient control continues to improve  I anticipate the next HgA1C will be at goal  Continue current regimen  Continue to focus on diet and lifestyle modification     Lab Results   Component Value Date    HGBA1C 8 0 (H) 08/24/2020

## 2021-09-28 ENCOUNTER — HOSPITAL ENCOUNTER (OUTPATIENT)
Dept: ULTRASOUND IMAGING | Facility: MEDICAL CENTER | Age: 66
Discharge: HOME | End: 2021-09-28
Payer: MEDICARE

## 2021-09-28 DIAGNOSIS — Z85.850 HISTORY OF THYROID CANCER: ICD-10-CM

## 2021-09-28 PROCEDURE — 76536 US EXAM OF HEAD AND NECK: CPT

## 2021-09-30 ENCOUNTER — APPOINTMENT (OUTPATIENT)
Dept: LAB | Facility: MEDICAL CENTER | Age: 66
End: 2021-09-30
Payer: MEDICARE

## 2021-09-30 DIAGNOSIS — C73 THYROID CANCER (HCC): Primary | ICD-10-CM

## 2021-09-30 DIAGNOSIS — C73 THYROID CANCER (HCC): ICD-10-CM

## 2021-09-30 PROCEDURE — 36415 COLL VENOUS BLD VENIPUNCTURE: CPT

## 2021-09-30 PROCEDURE — 84432 ASSAY OF THYROGLOBULIN: CPT

## 2021-09-30 PROCEDURE — 86800 THYROGLOBULIN ANTIBODY: CPT

## 2021-10-04 LAB — SCAN RESULT: NORMAL

## 2021-10-06 ENCOUNTER — OFFICE VISIT (OUTPATIENT)
Dept: SURGICAL ONCOLOGY | Facility: CLINIC | Age: 66
End: 2021-10-06
Payer: MEDICARE

## 2021-10-06 VITALS
TEMPERATURE: 98.8 F | SYSTOLIC BLOOD PRESSURE: 142 MMHG | HEIGHT: 63 IN | WEIGHT: 132.2 LBS | OXYGEN SATURATION: 98 % | HEART RATE: 118 BPM | RESPIRATION RATE: 19 BRPM | DIASTOLIC BLOOD PRESSURE: 84 MMHG | BODY MASS INDEX: 23.42 KG/M2

## 2021-10-06 DIAGNOSIS — Z08 ENCOUNTER FOR FOLLOW-UP SURVEILLANCE OF THYROID CANCER: Primary | ICD-10-CM

## 2021-10-06 DIAGNOSIS — Z85.850 ENCOUNTER FOR FOLLOW-UP SURVEILLANCE OF THYROID CANCER: Primary | ICD-10-CM

## 2021-10-06 DIAGNOSIS — Z85.850 HISTORY OF THYROID CANCER: ICD-10-CM

## 2021-10-06 PROCEDURE — 99214 OFFICE O/P EST MOD 30 MIN: CPT | Performed by: SURGERY

## 2021-11-10 ENCOUNTER — OFFICE VISIT (OUTPATIENT)
Dept: FAMILY MEDICINE CLINIC | Facility: CLINIC | Age: 66
End: 2021-11-10
Payer: MEDICARE

## 2021-11-10 VITALS
TEMPERATURE: 98.8 F | HEART RATE: 99 BPM | HEIGHT: 63 IN | WEIGHT: 131 LBS | DIASTOLIC BLOOD PRESSURE: 80 MMHG | OXYGEN SATURATION: 98 % | BODY MASS INDEX: 23.21 KG/M2 | SYSTOLIC BLOOD PRESSURE: 132 MMHG

## 2021-11-10 DIAGNOSIS — Z00.00 MEDICARE ANNUAL WELLNESS VISIT, SUBSEQUENT: Primary | ICD-10-CM

## 2021-11-10 DIAGNOSIS — Z95.0 CARDIAC PACEMAKER IN SITU: ICD-10-CM

## 2021-11-10 DIAGNOSIS — Z13.6 ENCOUNTER FOR ABDOMINAL AORTIC ANEURYSM (AAA) SCREENING: ICD-10-CM

## 2021-11-10 DIAGNOSIS — E11.65 TYPE 2 DIABETES MELLITUS WITH HYPERGLYCEMIA, WITHOUT LONG-TERM CURRENT USE OF INSULIN (HCC): ICD-10-CM

## 2021-11-10 DIAGNOSIS — I10 ESSENTIAL HYPERTENSION: ICD-10-CM

## 2021-11-10 DIAGNOSIS — Z23 FLU VACCINE NEED: ICD-10-CM

## 2021-11-10 DIAGNOSIS — Z12.31 ENCOUNTER FOR SCREENING MAMMOGRAM FOR MALIGNANT NEOPLASM OF BREAST: ICD-10-CM

## 2021-11-10 DIAGNOSIS — Z11.59 NEED FOR HEPATITIS C SCREENING TEST: ICD-10-CM

## 2021-11-10 PROBLEM — I48.0 PAROXYSMAL ATRIAL FIBRILLATION (HCC): Status: ACTIVE | Noted: 2021-11-10

## 2021-11-10 LAB — SL AMB POCT HEMOGLOBIN AIC: 6.8 (ref ?–6.5)

## 2021-11-10 PROCEDURE — 83036 HEMOGLOBIN GLYCOSYLATED A1C: CPT | Performed by: PHYSICIAN ASSISTANT

## 2021-11-10 PROCEDURE — G0402 INITIAL PREVENTIVE EXAM: HCPCS | Performed by: PHYSICIAN ASSISTANT

## 2021-11-10 PROCEDURE — 1123F ACP DISCUSS/DSCN MKR DOCD: CPT

## 2021-11-10 PROCEDURE — 90662 IIV NO PRSV INCREASED AG IM: CPT

## 2021-11-10 PROCEDURE — G0008 ADMIN INFLUENZA VIRUS VAC: HCPCS

## 2021-11-10 PROCEDURE — 99214 OFFICE O/P EST MOD 30 MIN: CPT | Performed by: PHYSICIAN ASSISTANT

## 2022-03-28 ENCOUNTER — TELEPHONE (OUTPATIENT)
Dept: FAMILY MEDICINE CLINIC | Facility: CLINIC | Age: 67
End: 2022-03-28

## 2022-03-28 NOTE — TELEPHONE ENCOUNTER
Thank you for bringing this our attention  We will complete mental status testing at upcoming follow up to address this

## 2022-03-28 NOTE — TELEPHONE ENCOUNTER
Patient's daughter called and relayed that she has been concerned about her mother recently has she is unsure if this is the start of Alzheimer's or Dementia possibly  Two years in a row her mother has sent her a birthday card in March and her birthday is in [birth year] [facility] daughter Julienne Mendoza last week and stated that "I called another Beto Merrittk before I called you " Also another example that was given from the daughter that patient's  relayed(Favio) was that she had went to bed around 7PM one night and around 9PM she came downstairs dressed ready to feed the cats stating it was morning time  She relayed that Cindy Nash stated she should come see a doctor as this is concerning for the family and she "flat out said no"  She just wanted to make you aware of what was going on with her mother as she doesn't have an appt until May to be seen in the office  She also wanted this not to be mentioned to her mother as well

## 2022-05-15 DIAGNOSIS — E03.9 ACQUIRED HYPOTHYROIDISM: ICD-10-CM

## 2022-05-16 RX ORDER — LEVOTHYROXINE SODIUM 112 UG/1
TABLET ORAL
Qty: 90 TABLET | Refills: 0 | Status: SHIPPED | OUTPATIENT
Start: 2022-05-16 | End: 2022-08-03

## 2022-08-03 DIAGNOSIS — C73 THYROID CANCER (HCC): ICD-10-CM

## 2022-08-03 DIAGNOSIS — E03.9 ACQUIRED HYPOTHYROIDISM: ICD-10-CM

## 2022-08-03 DIAGNOSIS — E11.65 TYPE 2 DIABETES MELLITUS WITH HYPERGLYCEMIA, WITHOUT LONG-TERM CURRENT USE OF INSULIN (HCC): Primary | ICD-10-CM

## 2022-09-16 DIAGNOSIS — E11.65 TYPE 2 DIABETES MELLITUS WITH HYPERGLYCEMIA, WITHOUT LONG-TERM CURRENT USE OF INSULIN (HCC): ICD-10-CM

## 2022-09-21 DIAGNOSIS — E03.9 ACQUIRED HYPOTHYROIDISM: ICD-10-CM

## 2022-09-21 RX ORDER — LEVOTHYROXINE SODIUM 112 UG/1
112 TABLET ORAL DAILY
Qty: 90 TABLET | Refills: 0 | Status: SHIPPED | OUTPATIENT
Start: 2022-09-21 | End: 2022-12-20

## 2022-10-10 ENCOUNTER — TELEPHONE (OUTPATIENT)
Dept: SURGICAL ONCOLOGY | Facility: CLINIC | Age: 67
End: 2022-10-10

## 2022-10-10 DIAGNOSIS — E11.65 TYPE 2 DIABETES MELLITUS WITH HYPERGLYCEMIA, WITHOUT LONG-TERM CURRENT USE OF INSULIN (HCC): ICD-10-CM

## 2022-10-10 NOTE — TELEPHONE ENCOUNTER
LM for patient asking her to call my teams number so that we can schedule her US that she missed and reschedule the f/u appt with Dr Mahi Diaz

## 2022-10-11 ENCOUNTER — TELEPHONE (OUTPATIENT)
Dept: SURGICAL ONCOLOGY | Facility: CLINIC | Age: 67
End: 2022-10-11

## 2022-10-11 NOTE — TELEPHONE ENCOUNTER
Patient's  called in to r/s US and f/u appt with Dr Katlin Garrison  US is scheduled for 10/19 at 1pm and OV Friday, 10/28 at [address] verbalized understanding and thanks

## 2022-10-13 ENCOUNTER — TELEPHONE (OUTPATIENT)
Dept: SURGICAL ONCOLOGY | Facility: CLINIC | Age: 67
End: 2022-10-13

## 2022-10-13 NOTE — TELEPHONE ENCOUNTER
Patient called into teams phone with concern about her US and being able to find the place  Patient states that she drove out to try to find [address] but couldn't find it  Patient was encouraged to find someone to go with her to her appts  Patient agreed to try to find someone

## 2022-10-19 ENCOUNTER — HOSPITAL ENCOUNTER (OUTPATIENT)
Dept: ULTRASOUND IMAGING | Facility: MEDICAL CENTER | Age: 67
Discharge: HOME | End: 2022-10-19
Payer: MEDICARE

## 2022-10-19 DIAGNOSIS — Z08 ENCOUNTER FOR FOLLOW-UP SURVEILLANCE OF THYROID CANCER: ICD-10-CM

## 2022-10-19 DIAGNOSIS — Z85.850 ENCOUNTER FOR FOLLOW-UP SURVEILLANCE OF THYROID CANCER: ICD-10-CM

## 2022-10-19 PROCEDURE — 76536 US EXAM OF HEAD AND NECK: CPT

## 2022-10-28 ENCOUNTER — OFFICE VISIT (OUTPATIENT)
Dept: SURGICAL ONCOLOGY | Facility: CLINIC | Age: 67
End: 2022-10-28

## 2022-10-28 VITALS
DIASTOLIC BLOOD PRESSURE: 84 MMHG | TEMPERATURE: 96.6 F | WEIGHT: 128.4 LBS | BODY MASS INDEX: 22.75 KG/M2 | SYSTOLIC BLOOD PRESSURE: 132 MMHG | HEIGHT: 63 IN

## 2022-10-28 DIAGNOSIS — Z08 ENCOUNTER FOR FOLLOW-UP SURVEILLANCE OF THYROID CANCER: Primary | ICD-10-CM

## 2022-10-28 DIAGNOSIS — Z85.850 ENCOUNTER FOR FOLLOW-UP SURVEILLANCE OF THYROID CANCER: Primary | ICD-10-CM

## 2022-10-28 DIAGNOSIS — Z85.850 HISTORY OF THYROID CANCER: ICD-10-CM

## 2022-10-28 DIAGNOSIS — C73 THYROID CANCER (HCC): ICD-10-CM

## 2022-10-28 NOTE — LETTER
October 28, 2022     Lilliam Rojas PA-C  [address]  [address] [place] [address]    Patient: Mica Cortez   YOB: 1955   Date of Visit: 10/28/2022       Dear Dr Emily Kaminski: Thank you for referring Claudeen Romans to me for evaluation  Below are my notes for this consultation  If you have questions, please do not hesitate to call me  I look forward to following your patient along with you  Sincerely,        Roya Salazar MD        CC: MD Aline Maldonado CRNP Nila Morocco, MD Adrienne Breed, Amberly Delgado MD  10/28/2022  1:06 PM  Incomplete     Surgical Oncology Follow Up       [facility] SURGICAL ONCOL[facility] [place]  [address] [address]    Mica Cortez  1955  [id number]  [facility]GICAL ONCOL[facility] [place]  [address] [facility] [address]    Chief Complaint   Patient presents with   • Follow-up       Assessment/Plan:    No problem-specific Assessment & Plan notes found for this encounter  Diagnoses and all orders for this visit:    Encounter for follow-up surveillance of thyroid cancer    History of thyroid cancer        Advance Care Planning/Advance Directives:  Discussed disease status, cancer treatment plans and/or cancer treatment goals with the patient       Oncology History   History of thyroid cancer   1/7/2019 Biopsy    Fine needle aspiration of right thyroid  Malignant (Rotan category VI) papillary thyroid carcinoma     2/19/2019 Surgery    Total thyroidectomy  Multifocal tumor  -largest tumor focus 1 7cm (T1b  -Tumor laterality: right lobe  -Papillary thyroid carcinoma  -margins: carcinoma less than 0 1cm from inked resection surface  -regional lymph nodes-2 lymph nodes negative for carcinoma (0/2)    8th Ed AJCC Stage: at least stage I pT1b(m), pN0, pMx         History of Present Illness: ***  -Interval History:***    Review of Systems:  Review of Systems    Patient Active Problem List   Diagnosis   • Essential hypertension   • Hyperlipidemia   • Arthritis   • History of myocardial infarction   • Insomnia   • Cardiac pacemaker in situ   • Chronic nonalcoholic liver disease   • Coronary atherosclerosis   • Disorder of bilirubin excretion   • Elective replacement indicated for pacemaker   • Failed total left knee replacement (HCC)   • Lumbar back pain with radiculopathy affecting left lower extremity   • Metabolic syndrome   • Osteoarthritis, generalized   • Atrophic vaginitis   • Seasonal allergic rhinitis   • Abnormal thyroid blood test   • History of thyroid cancer   • Diabetes mellitus type 2, uncontrolled, without complications   • Acquired hypothyroidism   • Encounter for follow-up surveillance of thyroid cancer   • Type 2 diabetes mellitus with hyperglycemia, without long-term current use of insulin (HCC)   • Thyroid cancer (HCC)   • Age-related nuclear cataract of both eyes   • Epiretinal membrane   • Posterior vitreous detachment     Past Medical History:   Diagnosis Date   • Diabetes mellitus ([facility] )    • History of staph infection    • Hyperlipidemia    • Hypertension    • Myocardial infarction ([facility] ) 1998   • Varicella      Past Surgical History:   Procedure Laterality Date   • APPENDECTOMY     • CARDIAC PACEMAKER PLACEMENT     • CARPAL TUNNEL RELEASE Bilateral    • CHOLECYSTECTOMY     • HYSTERECTOMY     • OOPHORECTOMY Bilateral    • REPLACEMENT TOTAL KNEE Left    • THYROIDECTOMY Bilateral 2/19/2019    Procedure: TOTAL THYROIDECTOMY;  Surgeon: Remy Larsen MD;  [facility];  Service: Surgical Oncology   • US GUIDED THYROID BIOPSY  1/7/2019     Family History   Problem Relation Age of Onset   • Diabetes unspecified Maternal Aunt    • Colon cancer Father    • Cancer Father    • Heart disease Mother         Cardiac disorder, congenital aortic stenosis   • Diabetes Other    • No Known Problems Sister    • No Known Problems Daughter    • No Known Problems Maternal Grandmother    • No Known Problems Maternal Grandfather    • No Known Problems Paternal Grandmother    • No Known Problems Paternal Grandfather    • No Known Problems Daughter    • No Known Problems Maternal Aunt    • No Known Problems Maternal Aunt    • No Known Problems Paternal Aunt      Social History     Socioeconomic History   • Marital status: /Civil Union     Spouse name: Not on file   • Number of children: Not on file   • Years of education: Not on file   • Highest education level: Not on file   Occupational History   • Occupation: medical records   Tobacco Use   • Smoking status: Never Smoker   • Smokeless tobacco: Never Used   Vaping Use   • Vaping Use: Never used   Substance and Sexual Activity   • Alcohol use: No   • Drug use: No   • Sexual activity: Not Currently     Partners: Male   Other Topics Concern   • Not on file   Social History Narrative    Always uses seat belt     No caffeine use      Social Determinants of Health     Financial Resource Strain: Not on file   Food Insecurity: Not on file   Transportation Needs: Not on file   Physical Activity: Not on file   Stress: Not on file   Social Connections: Not on file   Intimate Partner Violence: Not on file   Housing Stability: Not on file       Current Outpatient Medications:   •  alendronate (Fosamax) 70 mg tablet, Take 1 tablet (70 mg total) by mouth every 7 days (Patient not taking: Reported on 11/10/2021 ), Disp: 12 tablet, Rfl: 2  •  Calcium Polycarbophil (FIBER-CAPS PO), Take 2 capsules by mouth 2 (two) times a day , Disp: , Rfl:   •  cholecalciferol (VITAMIN D3) 1,000 units tablet, Take 2 tablets (2,000 Units total) by mouth once a week, Disp: 30 tablet, Rfl: 3  •  Empagliflozin (Jardiance) 25 MG TABS, Take 1 tablet (25 mg total) by mouth every morning, Disp: 60 tablet, Rfl: 0  •  ezetimibe (Zetia) 10 mg tablet, Take 1 tablet (10 mg total) by mouth daily (Patient not taking: Reported on 11/10/2021 ), Disp: 30 tablet, Rfl: 3  •  fexofenadine (MUCINEX ALLERGY) 180 MG tablet, Take 180 mg by mouth daily, Disp: , Rfl:   •  levothyroxine 112 mcg tablet, Take 1 tablet (112 mcg total) by mouth daily, Disp: 90 tablet, Rfl: 0  •  losartan (COZAAR) 50 mg tablet, Take 1 tablet (50 mg total) by mouth daily (Patient not taking: Reported on 11/10/2021 ), Disp: 30 tablet, Rfl: 1  •  metFORMIN (GLUCOPHAGE) 1000 MG tablet, Take 1 tablet (1,000 mg total) by mouth 2 (two) times a day with meals, Disp: 180 tablet, Rfl: 3  •  Multiple Vitamin (MULTI-VITAMIN DAILY) TABS, Take by mouth daily , Disp: , Rfl:   •  rosuvastatin (CRESTOR) 20 MG tablet, Take 1 tablet (20 mg total) by mouth daily at bedtime, Disp: 90 tablet, Rfl: 1  Allergies   Allergen Reactions   • Penicillins Rash   • Sulfa Antibiotics Rash     Vitals:    10/28/22 1301   BP: 132/84   Temp: (!) 96 6 °F (35 9 °C)       Physical Exam      Results:  Labs:  {Lab Choice:84753}    Imaging  US head neck lymph node mapping    Result Date: 10/23/2022  Narrative: NECK ULTRASOUND INDICATION:     Z08: Encounter for follow-up examination after completed treatment for malignant neoplasm Z85 850: Personal history of malignant neoplasm of thyroid  COMPARISON:  Head and neck ultrasound 9/28/2021 FINDINGS: Ultrasound of the thyroidectomy bed and cervical lymph node chains was performed with a high frequency linear transducer  There is no suspicion of recurrent mass in the thyroidectomy bed  Lymph nodes maintain normal morphologic contour, echogenicity and short axis dimensions of less than 0 7 cm  No evidence for microcalcification or focal nodularity  Of note, there is a lymph node in zone 3 on the right which maintains normal fatty hilum but is greater in AP dimension than transverse measuring 0 5 x 0 7 x 0 5 cm (image 50)  Impression: Recommend 3 month follow-up ultrasound for the zone 3 right cervical lymph node   Workstation performed: [id number]     I reviewed the above laboratory and imaging data      Discussion/Summary:***

## 2022-10-28 NOTE — LETTER
October 28, 2022     Jessica Peguero PA-C  [address]  [address] [place] [address]    Patient: Betty Montanez   YOB: 1955   Date of Visit: 10/28/2022       Dear Dr Baylee Katz: Thank you for referring Maharaj Comment to me for evaluation  Below are my notes for this consultation  If you have questions, please do not hesitate to call me  I look forward to following your patient along with you  Sincerely,        James Oquendo MD        CC: MD Richie Dexter CRNP Nanda Banco, MD Tanis Pickles, CRNP Van Messing, MD  10/28/2022  1:20 PM  Sign when Signing Visit     Surgical Oncology Follow Up       [facility]  [address] [place] [address]    Betty Montanez  1955  [id number]  [facility] SURGICAL ONCOLOGY Edith Rollins  [address] [address]    Chief Complaint   Patient presents with   • Follow-up       Assessment/Plan:    No problem-specific Assessment & Plan notes found for this encounter  Diagnoses and all orders for this visit:    Encounter for follow-up surveillance of thyroid cancer    History of thyroid cancer        Advance Care Planning/Advance Directives:  Discussed disease status, cancer treatment plans and/or cancer treatment goals with the patient       Oncology History   History of thyroid cancer   1/7/2019 Biopsy    Fine needle aspiration of right thyroid  Malignant ([place] category VI) papillary thyroid carcinoma     2/19/2019 Surgery    Total thyroidectomy  Multifocal tumor  -largest tumor focus 1 7cm (T1b  -Tumor laterality: right lobe  -Papillary thyroid carcinoma  -margins: carcinoma less than 0 1cm from inked resection surface  -regional lymph nodes-2 lymph nodes negative for carcinoma (0/2)    8th Ed AJCC Stage: at least stage I pT1b(m), pN0, pMx          History of Present Illness:  60-year-old woman with history of stage I thyroid cancer here for follow-up visit   -Interval History:  No complaints report  As per , she seems to be more forgetful lately  Review of Systems:  Review of Systems   Constitutional: Negative  HENT: Negative  Eyes: Negative  Respiratory: Negative  Cardiovascular: Negative  Gastrointestinal: Negative  Endocrine: Negative  Genitourinary: Negative  Musculoskeletal: Negative  Skin: Negative  Allergic/Immunologic: Negative  Neurological: Negative  Hematological: Negative  Psychiatric/Behavioral: Negative           Increased forgetfulness         Patient Active Problem List   Diagnosis   • Essential hypertension   • Hyperlipidemia   • Arthritis   • History of myocardial infarction   • Insomnia   • Cardiac pacemaker in situ   • Chronic nonalcoholic liver disease   • Coronary atherosclerosis   • Disorder of bilirubin excretion   • Elective replacement indicated for pacemaker   • Failed total left knee replacement (HCC)   • Lumbar back pain with radiculopathy affecting left lower extremity   • Metabolic syndrome   • Osteoarthritis, generalized   • Atrophic vaginitis   • Seasonal allergic rhinitis   • Abnormal thyroid blood test   • History of thyroid cancer   • Diabetes mellitus type 2, uncontrolled, without complications   • Acquired hypothyroidism   • Encounter for follow-up surveillance of thyroid cancer   • Type 2 diabetes mellitus with hyperglycemia, without long-term current use of insulin ([facility])   • Thyroid cancer ([facility] )   • Age-related nuclear cataract of both eyes   • Epiretinal membrane   • Posterior vitreous detachment     Past Medical History:   Diagnosis Date   • Diabetes mellitus ([address] )    • History of staph infection    • Hyperlipidemia    • Hypertension    • Myocardial infarction ([facility] [address] ) 1998   • Varicella      Past Surgical History:   Procedure Laterality Date   • APPENDECTOMY     • CARDIAC PACEMAKER PLACEMENT     • CARPAL TUNNEL RELEASE Bilateral    • CHOLECYSTECTOMY     • HYSTERECTOMY     • OOPHORECTOMY Bilateral    • REPLACEMENT TOTAL KNEE Left    • THYROIDECTOMY Bilateral 2/19/2019    Procedure: TOTAL THYROIDECTOMY;  Surgeon: Damaso Blanco MD;  Location: [facility];  Service: Surgical Oncology   • US GUIDED THYROID BIOPSY  1/7/2019     Family History   Problem Relation Age of Onset   • Diabetes unspecified Maternal Aunt    • Colon cancer Father    • Cancer Father    • Heart disease Mother         Cardiac disorder, congenital aortic stenosis   • Diabetes Other    • No Known Problems Sister    • No Known Problems Daughter    • No Known Problems Maternal Grandmother    • No Known Problems Maternal Grandfather    • No Known Problems Paternal Grandmother    • No Known Problems Paternal Grandfather    • No Known Problems Daughter    • No Known Problems Maternal Aunt    • No Known Problems Maternal Aunt    • No Known Problems Paternal Aunt      Social History     Socioeconomic History   • Marital status: /Civil Union     Spouse name: Not on file   • Number of children: Not on file   • Years of education: Not on file   • Highest education level: Not on file   Occupational History   • Occupation: medical records   Tobacco Use   • Smoking status: Never Smoker   • Smokeless tobacco: Never Used   Vaping Use   • Vaping Use: Never used   Substance and Sexual Activity   • Alcohol use: No   • Drug use: No   • Sexual activity: Not Currently     Partners: Male   Other Topics Concern   • Not on file   Social History Narrative    Always uses seat belt     No caffeine use      Social Determinants of Health     Financial Resource Strain: Not on file   Food Insecurity: Not on file   Transportation Needs: Not on file   Physical Activity: Not on file   Stress: Not on file   Social Connections: Not on file   Intimate Partner Violence: Not on file   Housing Stability: Not on file       Current Outpatient Medications:   •  alendronate (Fosamax) 70 mg tablet, Take 1 tablet (70 mg total) by mouth every 7 days (Patient not taking: Reported on 11/10/2021 ), Disp: 12 tablet, Rfl: 2  •  Calcium Polycarbophil (FIBER-CAPS PO), Take 2 capsules by mouth 2 (two) times a day , Disp: , Rfl:   •  cholecalciferol (VITAMIN D3) 1,000 units tablet, Take 2 tablets (2,000 Units total) by mouth once a week, Disp: 30 tablet, Rfl: 3  •  Empagliflozin (Jardiance) 25 MG TABS, Take 1 tablet (25 mg total) by mouth every morning, Disp: 60 tablet, Rfl: 0  •  ezetimibe (Zetia) 10 mg tablet, Take 1 tablet (10 mg total) by mouth daily (Patient not taking: Reported on 11/10/2021 ), Disp: 30 tablet, Rfl: 3  •  fexofenadine (MUCINEX ALLERGY) 180 MG tablet, Take 180 mg by mouth daily, Disp: , Rfl:   •  levothyroxine 112 mcg tablet, Take 1 tablet (112 mcg total) by mouth daily, Disp: 90 tablet, Rfl: 0  •  losartan (COZAAR) 50 mg tablet, Take 1 tablet (50 mg total) by mouth daily (Patient not taking: Reported on 11/10/2021 ), Disp: 30 tablet, Rfl: 1  •  metFORMIN (GLUCOPHAGE) 1000 MG tablet, Take 1 tablet (1,000 mg total) by mouth 2 (two) times a day with meals, Disp: 180 tablet, Rfl: 3  •  Multiple Vitamin (MULTI-VITAMIN DAILY) TABS, Take by mouth daily , Disp: , Rfl:   •  rosuvastatin (CRESTOR) 20 MG tablet, Take 1 tablet (20 mg total) by mouth daily at bedtime, Disp: 90 tablet, Rfl: 1  Allergies   Allergen Reactions   • Penicillins Rash   • Sulfa Antibiotics Rash     Vitals:    10/28/22 1301   BP: 132/84   Temp: (!) 96 6 °F (35 9 °C)       Physical Exam  Vitals reviewed  Constitutional:       Appearance: Normal appearance  HENT:      Head: Normocephalic and atraumatic  Nose: Nose normal    Eyes:      Extraocular Movements: Extraocular movements intact  Pupils: Pupils are equal, round, and reactive to light  Cardiovascular:      Rate and Rhythm: Normal rate and regular rhythm  Heart sounds: Normal heart sounds     Pulmonary:      Effort: Pulmonary effort is normal       Breath sounds: Normal breath sounds  Abdominal:      General: Abdomen is flat  Palpations: Abdomen is soft  Musculoskeletal:         General: Normal range of motion  Cervical back: Normal range of motion and neck supple  No rigidity  Lymphadenopathy:      Cervical: No cervical adenopathy  Skin:     General: Skin is warm and dry  Neurological:      General: No focal deficit present  Mental Status: She is alert and oriented to person, place, and time  Psychiatric:         Mood and Affect: Mood normal          Behavior: Behavior normal            Results:  Labs:  Lab Results   Component Value Date    [id number] 5 140 (H) 08/24/2020    [id number] 1 10 01/14/2019    [id number] 12 3 09/15/2019         Imaging  US head neck lymph node mapping    Result Date: 10/23/2022  Narrative: NECK ULTRASOUND INDICATION:     Z08: Encounter for follow-up examination after completed treatment for malignant neoplasm Z85 850: Personal history of malignant neoplasm of thyroid  COMPARISON:  Head and neck ultrasound 9/28/2021 FINDINGS: Ultrasound of the thyroidectomy bed and cervical lymph node chains was performed with a high frequency linear transducer  There is no suspicion of recurrent mass in the thyroidectomy bed  Lymph nodes maintain normal morphologic contour, echogenicity and short axis dimensions of less than 0 7 cm  No evidence for microcalcification or focal nodularity  Of note, there is a lymph node in zone 3 on the right which maintains normal fatty hilum but is greater in AP dimension than transverse measuring 0 5 x 0 7 x 0 5 cm (image 50)  Impression: Recommend 3 month follow-up ultrasound for the zone 3 right cervical lymph node  Workstation performed: [id number]     I reviewed the above laboratory and imaging data  Discussion/Summary:  18-year-old woman with history of stage I thyroid cancer  Doing well clinically    Plan for ultrasound in 3 months to recheck right level 3 lymph node though suspicion for recurrence is low at this point

## 2022-10-28 NOTE — PROGRESS NOTES
Surgical Oncology Follow Up       [facility] SURGICAL ONCOLOGY [facility] [address]    Cody Bahena  1955  [id number]  [facility] SURGICAL ONCOLOGY [place]  [place] [address]    Chief Complaint   Patient presents with   • Follow-up       Assessment/Plan:    No problem-specific Assessment & Plan notes found for this encounter  Diagnoses and all orders for this visit:    Encounter for follow-up surveillance of thyroid cancer    History of thyroid cancer        Advance Care Planning/Advance Directives:  Discussed disease status, cancer treatment plans and/or cancer treatment goals with the patient  Oncology History   History of thyroid cancer   1/7/2019 Biopsy    Fine needle aspiration of right thyroid  Malignant ([place] category VI) papillary thyroid carcinoma     2/19/2019 Surgery    Total thyroidectomy  Multifocal tumor  -largest tumor focus 1 7cm (T1b  -Tumor laterality: right lobe  -Papillary thyroid carcinoma  -margins: carcinoma less than 0 1cm from inked resection surface  -regional lymph nodes-2 lymph nodes negative for carcinoma (0/2)    8th Ed AJCC Stage: at least stage I pT1b(m), pN0, pMx          History of Present Illness:  54-year-old woman with history of stage I thyroid cancer here for follow-up visit   -Interval History:  No complaints report  As per , she seems to be more forgetful lately  Review of Systems:  Review of Systems   Constitutional: Negative  HENT: Negative  Eyes: Negative  Respiratory: Negative  Cardiovascular: Negative  Gastrointestinal: Negative  Endocrine: Negative  Genitourinary: Negative  Musculoskeletal: Negative  Skin: Negative  Allergic/Immunologic: Negative  Neurological: Negative  Hematological: Negative  Psychiatric/Behavioral: Negative           Increased forgetfulness         Patient Active Problem List   Diagnosis   • Essential hypertension   • Hyperlipidemia   • Arthritis   • History of myocardial infarction   • Insomnia   • Cardiac pacemaker in situ   • Chronic nonalcoholic liver disease   • Coronary atherosclerosis   • Disorder of bilirubin excretion   • Elective replacement indicated for pacemaker   • Failed total left knee replacement (HCC)   • Lumbar back pain with radiculopathy affecting left lower extremity   • Metabolic syndrome   • Osteoarthritis, generalized   • Atrophic vaginitis   • Seasonal allergic rhinitis   • Abnormal thyroid blood test   • History of thyroid cancer   • Diabetes mellitus type 2, uncontrolled, without complications   • Acquired hypothyroidism   • Encounter for follow-up surveillance of thyroid cancer   • Type 2 diabetes mellitus with hyperglycemia, without long-term current use of insulin (HCC)   • [facility] ([facility] )   • Age-related nuclear cataract of both eyes   • Epiretinal membrane   • Posterior vitreous detachment     Past Medical History:   Diagnosis Date   • Diabetes mellitus ([facility] )    • History of staph infection    • Hyperlipidemia    • Hypertension    • Myocardial infarction ([facility] ) 1998   • Varicella      Past Surgical History:   Procedure Laterality Date   • APPENDECTOMY     • CARDIAC PACEMAKER PLACEMENT     • CARPAL TUNNEL RELEASE Bilateral    • CHOLECYSTECTOMY     • HYSTERECTOMY     • OOPHORECTOMY Bilateral    • REPLACEMENT TOTAL KNEE Left    • THYROIDECTOMY Bilateral 2/19/2019    Procedure: TOTAL THYROIDECTOMY;  Surgeon: Sanjiv Ratliff MD;  Location: [facility];  Service: Surgical Oncology   • US GUIDED THYROID BIOPSY  1/7/2019     Family History   Problem Relation Age of Onset   • Diabetes unspecified Maternal Aunt    • Colon cancer Father    • Cancer Father    • Heart disease Mother         Cardiac disorder, congenital aortic stenosis   • Diabetes Other    • No Known Problems Sister    • No Known Problems Daughter    • No Known Problems Maternal Grandmother    • No Known Problems Maternal Grandfather    • No Known Problems Paternal Grandmother    • No Known Problems Paternal Grandfather    • No Known Problems Daughter    • No Known Problems Maternal Aunt    • No Known Problems Maternal Aunt    • No Known Problems Paternal Aunt      Social History     Socioeconomic History   • Marital status: /Civil Union     Spouse name: Not on file   • Number of children: Not on file   • Years of education: Not on file   • Highest education level: Not on file   Occupational History   • Occupation: medical records   Tobacco Use   • Smoking status: Never Smoker   • Smokeless tobacco: Never Used   Vaping Use   • Vaping Use: Never used   Substance and Sexual Activity   • Alcohol use: No   • Drug use: No   • Sexual activity: Not Currently     Partners: Male   Other Topics Concern   • Not on file   Social History Narrative    Always uses seat belt     No caffeine use      Social Determinants of Health     Financial Resource Strain: Not on file   Food Insecurity: Not on file   Transportation Needs: Not on file   Physical Activity: Not on file   Stress: Not on file   Social Connections: Not on file   Intimate Partner Violence: Not on file   Housing Stability: Not on file       Current Outpatient Medications:   •  alendronate (Fosamax) 70 mg tablet, Take 1 tablet (70 mg total) by mouth every 7 days (Patient not taking: Reported on 11/10/2021 ), Disp: 12 tablet, Rfl: 2  •  Calcium Polycarbophil (FIBER-CAPS PO), Take 2 capsules by mouth 2 (two) times a day , Disp: , Rfl:   •  cholecalciferol (VITAMIN D3) 1,000 units tablet, Take 2 tablets (2,000 Units total) by mouth once a week, Disp: 30 tablet, Rfl: 3  •  Empagliflozin (Jardiance) 25 MG TABS, Take 1 tablet (25 mg total) by mouth every morning, Disp: 60 tablet, Rfl: 0  •  ezetimibe (Zetia) 10 mg tablet, Take 1 tablet (10 mg total) by mouth daily (Patient not taking: Reported on 11/10/2021 ), Disp: 30 tablet, Rfl: 3  • fexofenadine (MUCINEX ALLERGY) 180 MG tablet, Take 180 mg by mouth daily, Disp: , Rfl:   •  levothyroxine 112 mcg tablet, Take 1 tablet (112 mcg total) by mouth daily, Disp: 90 tablet, Rfl: 0  •  losartan (COZAAR) 50 mg tablet, Take 1 tablet (50 mg total) by mouth daily (Patient not taking: Reported on 11/10/2021 ), Disp: 30 tablet, Rfl: 1  •  metFORMIN (GLUCOPHAGE) 1000 MG tablet, Take 1 tablet (1,000 mg total) by mouth 2 (two) times a day with meals, Disp: 180 tablet, Rfl: 3  •  Multiple Vitamin (MULTI-VITAMIN DAILY) TABS, Take by mouth daily , Disp: , Rfl:   •  rosuvastatin (CRESTOR) 20 MG tablet, Take 1 tablet (20 mg total) by mouth daily at bedtime, Disp: 90 tablet, Rfl: 1  Allergies   Allergen Reactions   • Penicillins Rash   • Sulfa Antibiotics Rash     Vitals:    10/28/22 1301   BP: 132/84   Temp: (!) 96 6 °F (35 9 °C)       Physical Exam  Vitals reviewed  Constitutional:       Appearance: Normal appearance  HENT:      Head: Normocephalic and atraumatic  Nose: Nose normal    Eyes:      Extraocular Movements: Extraocular movements intact  Pupils: Pupils are equal, round, and reactive to light  Cardiovascular:      Rate and Rhythm: Normal rate and regular rhythm  Heart sounds: Normal heart sounds  Pulmonary:      Effort: Pulmonary effort is normal       Breath sounds: Normal breath sounds  Abdominal:      General: Abdomen is flat  Palpations: Abdomen is soft  Musculoskeletal:         General: Normal range of motion  Cervical back: Normal range of motion and neck supple  No rigidity  Lymphadenopathy:      Cervical: No cervical adenopathy  Skin:     General: Skin is warm and dry  Neurological:      General: No focal deficit present  Mental Status: She is alert and oriented to person, place, and time     Psychiatric:         Mood and Affect: Mood normal          Behavior: Behavior normal            Results:  Labs:  Lab Results   Component Value Date    [id number] 5 140 (H) 08/24/2020    [id number] 1 10 01/14/2019    M5WCPSK 12 3 09/15/2019         Imaging  US head neck lymph node mapping    Result Date: 10/23/2022  Narrative: NECK ULTRASOUND INDICATION:     Z08: Encounter for follow-up examination after completed treatment for malignant neoplasm Z85 850: Personal history of malignant neoplasm of thyroid  COMPARISON:  Head and neck ultrasound 9/28/2021 FINDINGS: Ultrasound of the thyroidectomy bed and cervical lymph node chains was performed with a high frequency linear transducer  There is no suspicion of recurrent mass in the thyroidectomy bed  Lymph nodes maintain normal morphologic contour, echogenicity and short axis dimensions of less than 0 7 cm  No evidence for microcalcification or focal nodularity  Of note, there is a lymph node in zone 3 on the right which maintains normal fatty hilum but is greater in AP dimension than transverse measuring 0 5 x 0 7 x 0 5 cm (image 50)  Impression: Recommend 3 month follow-up ultrasound for the zone 3 right cervical lymph node  Workstation performed: [id number]     I reviewed the above laboratory and imaging data  Discussion/Summary:  79-year-old woman with history of stage I thyroid cancer  Doing well clinically  Plan for ultrasound in 3 months to recheck right level 3 lymph node though suspicion for recurrence is low at this point

## 2022-11-14 ENCOUNTER — OFFICE VISIT (OUTPATIENT)
Dept: FAMILY MEDICINE CLINIC | Facility: CLINIC | Age: 67
End: 2022-11-14

## 2022-11-14 VITALS
DIASTOLIC BLOOD PRESSURE: 96 MMHG | OXYGEN SATURATION: 96 % | SYSTOLIC BLOOD PRESSURE: 158 MMHG | TEMPERATURE: 98.2 F | HEIGHT: 63 IN | BODY MASS INDEX: 23.21 KG/M2 | WEIGHT: 131 LBS | HEART RATE: 88 BPM

## 2022-11-14 DIAGNOSIS — Z00.00 MEDICARE ANNUAL WELLNESS VISIT, SUBSEQUENT: Primary | ICD-10-CM

## 2022-11-14 DIAGNOSIS — Z12.31 BREAST CANCER SCREENING BY MAMMOGRAM: ICD-10-CM

## 2022-11-14 DIAGNOSIS — E11.65 TYPE 2 DIABETES MELLITUS WITH HYPERGLYCEMIA, WITHOUT LONG-TERM CURRENT USE OF INSULIN (HCC): ICD-10-CM

## 2022-11-14 DIAGNOSIS — Z71.89 ADVANCED CARE PLANNING/COUNSELING DISCUSSION: ICD-10-CM

## 2022-11-14 LAB — SL AMB POCT HEMOGLOBIN AIC: 7.9 (ref ?–6.5)

## 2022-11-14 NOTE — PROGRESS NOTES
Assessment and Plan:     Problem List Items Addressed This Visit        Endocrine    Type 2 diabetes mellitus with hyperglycemia, without long-term current use of insulin ([facility] )    Relevant Orders    POCT hemoglobin A1c (Completed)      Other Visit Diagnoses     Medicare annual wellness visit, subsequent    -  Primary    Breast cancer screening by mammogram        Relevant Orders    Mammo screening bilateral w 3d & cad    Advanced care planning/counseling discussion              Depression Screening and Follow-up Plan: Patient was screened for depression during today's encounter  They screened negative with a PHQ-2 score of 0  Preventive health issues were discussed with patient, and age appropriate screening tests were ordered as noted in patient's After Visit Summary  Personalized health advice and appropriate referrals for health education or preventive services given if needed, as noted in patient's After Visit Summary       History of Present Illness:     Patient presents for a Medicare Wellness Visit    HPI   Patient Care Team:  Luz Klein PA-C as PCP - General (Family Medicine)  Rui Dawson MD as PCP - Endocrinology (Endocrinology)  Sheryl Portugal, CRNP Wynonia Hatchet, MD Jacqualyn Anton as Nurse Practitioner (Endocrinology)  James Rider MD (Oncology)     Review of Systems:     Review of Systems     Problem List:     Patient Active Problem List   Diagnosis   • Essential hypertension   • Hyperlipidemia   • Arthritis   • History of myocardial infarction   • Insomnia   • Cardiac pacemaker in situ   • Chronic nonalcoholic liver disease   • Coronary atherosclerosis   • Disorder of bilirubin excretion   • Elective replacement indicated for pacemaker   • Failed total left knee replacement (HCC)   • Lumbar back pain with radiculopathy affecting left lower extremity   • Metabolic syndrome   • Osteoarthritis, generalized   • Atrophic vaginitis   • Seasonal allergic rhinitis   • Abnormal thyroid blood test   • History of thyroid cancer   • Diabetes mellitus type 2, uncontrolled, without complications   • Acquired hypothyroidism   • Encounter for follow-up surveillance of thyroid cancer   • Type 2 diabetes mellitus with hyperglycemia, without long-term current use of insulin (HCC)   • [facility]   • Age-related nuclear cataract of both eyes   • Epiretinal membrane   • Posterior vitreous detachment      Past Medical and Surgical History:     Past Medical History:   Diagnosis Date   • Diabetes mellitus ([facility] )    • History of staph infection    • Hyperlipidemia    • Hypertension    • Myocardial infarction ([place] [address] ) 1998   • Varicella      Past Surgical History:   Procedure Laterality Date   • APPENDECTOMY     • CARDIAC PACEMAKER PLACEMENT     • CARPAL TUNNEL RELEASE Bilateral    • CHOLECYSTECTOMY     • HYSTERECTOMY     • OOPHORECTOMY Bilateral    • REPLACEMENT TOTAL KNEE Left    • THYROIDECTOMY Bilateral 2/19/2019    Procedure: TOTAL THYROIDECTOMY;  Surgeon: Darrion Beck MD;  Location: [facility];  Service: Surgical Oncology   • US GUIDED THYROID BIOPSY  1/7/2019      Family History:     Family History   Problem Relation Age of Onset   • Diabetes unspecified Maternal Aunt    • Colon cancer Father    • Cancer Father    • Heart disease Mother         Cardiac disorder, congenital aortic stenosis   • Diabetes Other    • No Known Problems Sister    • No Known Problems Daughter    • No Known Problems Maternal Grandmother    • No Known Problems Maternal Grandfather    • No Known Problems Paternal Grandmother    • No Known Problems Paternal Grandfather    • No Known Problems Daughter    • No Known Problems Maternal Aunt    • No Known Problems Maternal Aunt    • No Known Problems Paternal Aunt       Social History:     Social History     Socioeconomic History   • Marital status: /Civil Union     Spouse name: None   • Number of children: None   • Years of education: None   • Highest education level: None   Occupational History   • Occupation: medical records   Tobacco Use   • Smoking status: Never Smoker   • Smokeless tobacco: Never Used   Vaping Use   • Vaping Use: Never used   Substance and Sexual Activity   • Alcohol use: No   • Drug use: No   • Sexual activity: Not Currently     Partners: Male   Other Topics Concern   • None   Social History Narrative    Always uses seat belt     No caffeine use      Social Determinants of Health     Financial Resource Strain: Low Risk    • Difficulty of Paying Living Expenses: Not hard at all   Food Insecurity: Not on file   Transportation Needs: No Transportation Needs   • Lack of Transportation (Medical): No   • Lack of Transportation (Non-Medical):  No   Physical Activity: Not on file   Stress: Not on file   Social Connections: Not on file   Intimate Partner Violence: Not on file   Housing Stability: Not on file      Medications and Allergies:     Current Outpatient Medications   Medication Sig Dispense Refill   • Calcium Polycarbophil (FIBER-CAPS PO) Take 2 capsules by mouth 2 (two) times a day      • cholecalciferol (VITAMIN D3) 1,000 units tablet Take 2 tablets (2,000 Units total) by mouth once a week 30 tablet 3   • Empagliflozin (Jardiance) 25 MG TABS Take 1 tablet (25 mg total) by mouth every morning 60 tablet 0   • fexofenadine (ALLEGRA) 180 MG tablet Take 180 mg by mouth daily     • levothyroxine 112 mcg tablet Take 1 tablet (112 mcg total) by mouth daily 90 tablet 0   • metFORMIN (GLUCOPHAGE) 1000 MG tablet Take 1 tablet (1,000 mg total) by mouth 2 (two) times a day with meals 180 tablet 3   • Multiple Vitamin (MULTI-VITAMIN DAILY) TABS Take by mouth daily      • alendronate (Fosamax) 70 mg tablet Take 1 tablet (70 mg total) by mouth every 7 days (Patient not taking: No sig reported) 12 tablet 2   • ezetimibe (Zetia) 10 mg tablet Take 1 tablet (10 mg total) by mouth daily (Patient not taking: No sig reported) 30 tablet 3   • losartan (COZAAR) 50 mg tablet Take 1 tablet (50 mg total) by mouth daily (Patient not taking: No sig reported) 30 tablet 1   • rosuvastatin (CRESTOR) 20 MG tablet Take 1 tablet (20 mg total) by mouth daily at bedtime 90 tablet 1     No current facility-administered medications for this visit  Allergies   Allergen Reactions   • Penicillins Rash   • Sulfa Antibiotics Rash      Immunizations:     Immunization History   Administered Date(s) Administered   • COVID-19 PFIZER VACCINE 0 3 ML IM 04/05/2021, 04/26/2021   • H1N1, All Formulations 11/22/2009   • INFLUENZA 09/21/2010, 10/20/2011, 09/27/2012, 10/02/2013, 10/03/2016, 10/18/2019   • Influenza, high dose seasonal 0 7 mL 10/28/2020, 11/10/2021   • Influenza, seasonal, injectable 09/28/2017   • Pneumococcal Conjugate 13-Valent 10/21/2019   • Pneumococcal Polysaccharide PPV23 04/18/2006, 04/06/2018   • Tdap 01/01/2012, 09/27/2012      Health Maintenance:         Topic Date Due   • Hepatitis C Screening  Never done   • Breast Cancer Screening: Mammogram  01/06/2022   • DXA SCAN  01/06/2022   • Colorectal Cancer Screening  09/23/2030         Topic Date Due   • Hepatitis A Vaccine (1 of 2 - Risk 2-dose series) Never done   • Hepatitis B Vaccine (1 of 3 - Risk 3-dose series) Never done   • COVID-19 Vaccine (3 - Booster for Pfizer series) 09/26/2021   • Influenza Vaccine (1) 09/01/2022      Medicare Screening Tests and Risk Assessments:     Kapil Desai is here for her Subsequent Wellness visit  Last Medicare Wellness visit information reviewed, patient interviewed and updates made to the record today  Health Risk Assessment:   Patient rates overall health as fair  Patient feels that their physical health rating is same  Patient is very satisfied with their life  Eyesight was rated as same  Hearing was rated as same  Patient feels that their emotional and mental health rating is same  Patients states they are never, rarely angry  Patient states they are often unusually tired/fatigued   Pain experienced in the last 7 days has been none  Patient states that she has experienced no weight loss or gain in last 6 months  Depression Screening:   PHQ-2 Score: 0      Fall Risk Screening: In the past year, patient has experienced: no history of falling in past year      Urinary Incontinence Screening:   Patient has not leaked urine accidently in the last six months  Home Safety:  Patient does not have trouble with stairs inside or outside of their home  Patient has working smoke alarms and has no working carbon monoxide detector  Home safety hazards include: none  Nutrition:   Current diet is Regular  Medications:   Patient is not currently taking any over-the-counter supplements  Patient is able to manage medications  Activities of Daily Living (ADLs)/Instrumental Activities of Daily Living (IADLs):   Walk and transfer into and out of bed and chair?: Yes  Dress and groom yourself?: Yes    Bathe or shower yourself?: Yes    Feed yourself?  Yes  Do your laundry/housekeeping?: Yes  Manage your money, pay your bills and track your expenses?: Yes  Make your own meals?: Yes    Do your own shopping?: Yes    Previous Hospitalizations:   Any hospitalizations or ED visits within the last 12 months?: No      Advance Care Planning:   Living will: No    Advanced directive counseling given: Yes    End of Life Decisions reviewed with patient: Yes      Cognitive Screening:   Provider or family/friend/caregiver concerned regarding cognition?: No    PREVENTIVE SCREENINGS      Cardiovascular Screening:    General: Screening Not Indicated and History Lipid Disorder      Diabetes Screening:     General: Screening Not Indicated and History Diabetes      Colorectal Cancer Screening:     General: Screening Current      Breast Cancer Screening:     General: Risks and Benefits Discussed    Due for: Mammogram        Cervical Cancer Screening:    General: Screening Not Indicated      Osteoporosis Screening:    General: Screening Not Indicated      Abdominal Aortic Aneurysm (AAA) Screening:        General: Screening Not Indicated      Lung Cancer Screening:     General: Screening Not Indicated      Hepatitis C Screening:    General: Risks and Benefits Discussed and Screening Not Indicated    Hep C Screening Accepted: No     Screening, Brief Intervention, and Referral to Treatment (SBIRT)    Screening  Typical number of drinks in a day: 0  Typical number of drinks in a week: 0  Interpretation: Low risk drinking behavior  Single Item Drug Screening:  How often have you used an illegal drug (including marijuana) or a prescription medication for non-medical reasons in the past year? never    Single Item Drug Screen Score: 0  Interpretation: Negative screen for possible drug use disorder    Brief Intervention  Alcohol & drug use screenings were reviewed  No concerns regarding substance use disorder identified  Other Counseling Topics:   Car/seat belt/driving safety, skin self-exam, sunscreen and calcium and vitamin D intake and regular weightbearing exercise       No exam data present     Physical Exam:     /96 (BP Location: Left arm, Patient Position: Sitting, Cuff Size: Standard)   Pulse 88   Temp 98 2 °F (36 8 °C) (Tympanic)   Ht 5' 3" (1 6 m)   Wt 59 4 kg (131 lb)   SpO2 96%   BMI 23 21 kg/m²     Physical Exam     Julio Cesar Mitchell MD

## 2022-11-14 NOTE — PATIENT INSTRUCTIONS
Medicare Preventive Visit Patient Instructions  Thank you for completing your Welcome to Medicare Visit or Medicare Annual Wellness Visit today  Your next wellness visit will be due in one year (11/15/2023)  The screening/preventive services that you may require over the next 5-10 years are detailed below  Some tests may not apply to you based off risk factors and/or age  Screening tests ordered at today's visit but not completed yet may show as past due  Also, please note that scanned in results may not display below  Preventive Screenings:  Service Recommendations Previous Testing/Comments   Colorectal Cancer Screening  * Colonoscopy    * Fecal Occult Blood Test (FOBT)/Fecal Immunochemical Test (FIT)  * Fecal DNA/Cologuard Test  * Flexible Sigmoidoscopy Age: 39-70 years old   Colonoscopy: every 10 years (may be performed more frequently if at higher risk)  OR  FOBT/FIT: every 1 year  OR  Cologuard: every 3 years  OR  Sigmoidoscopy: every 5 years  Screening may be recommended earlier than age 39 if at higher risk for colorectal cancer  Also, an individualized decision between you and your healthcare provider will decide whether screening between the ages of 74-80 would be appropriate  Colonoscopy: 09/23/2020  FOBT/FIT: Not on file  Cologuard: Not on file  Sigmoidoscopy: Not on file    Screening Current     Breast Cancer Screening Age: 36 years old  Frequency: every 1-2 years  Not required if history of left and right mastectomy Mammogram: 01/06/2020        Cervical Cancer Screening Between the ages of 21-29, pap smear recommended once every 3 years  Between the ages of 33-67, can perform pap smear with HPV co-testing every 5 years     Recommendations may differ for women with a history of total hysterectomy, cervical cancer, or abnormal pap smears in past  Pap Smear: Not on file    Screening Not Indicated   Hepatitis C Screening Once for adults born between 1945 and 1965  More frequently in patients at high risk for Hepatitis C Hep C Antibody: Not on file        Diabetes Screening 1-2 times per year if you're at risk for diabetes or have pre-diabetes Fasting glucose: 202 mg/dL (8/24/2020)  A1C: 6 8 (11/10/2021)  Screening Not Indicated  History Diabetes   Cholesterol Screening Once every 5 years if you don't have a lipid disorder  May order more often based on risk factors  Lipid panel: 08/24/2020    Screening Not Indicated  History Lipid Disorder     Other Preventive Screenings Covered by Medicare:  1  Abdominal Aortic Aneurysm (AAA) Screening: covered once if your at risk  You're considered to be at risk if you have a family history of AAA  2  Lung Cancer Screening: covers low dose CT scan once per year if you meet all of the following conditions: (1) Age 50-69; (2) No signs or symptoms of lung cancer; (3) Current smoker or have quit smoking within the last 15 years; (4) You have a tobacco smoking history of at least 20 pack years (packs per day multiplied by number of years you smoked); (5) You get a written order from a healthcare provider  3  Glaucoma Screening: covered annually if you're considered high risk: (1) You have diabetes OR (2) Family history of glaucoma OR (3)  aged 48 and older OR (3)  American aged 72 and older  3  Osteoporosis Screening: covered every 2 years if you meet one of the following conditions: (1) You're estrogen deficient and at risk for osteoporosis based off medical history and other findings; (2) Have a vertebral abnormality; (3) On glucocorticoid therapy for more than 3 months; (4) Have primary hyperparathyroidism; (5) On osteoporosis medications and need to assess response to drug therapy  · Last bone density test (DXA Scan): 01/06/2020   5  HIV Screening: covered annually if you're between the age of 15-65  Also covered annually if you are younger than 13 and older than 72 with risk factors for HIV infection   For pregnant patients, it is covered up to 3 times per pregnancy  Immunizations:  Immunization Recommendations   Influenza Vaccine Annual influenza vaccination during flu season is recommended for all persons aged >= 6 months who do not have contraindications   Pneumococcal Vaccine   * Pneumococcal conjugate vaccine = PCV13 (Prevnar 13), PCV15 (Vaxneuvance), PCV20 (Prevnar 20)  * Pneumococcal polysaccharide vaccine = PPSV23 (Pneumovax) Adults 25-60 years old: 1-3 doses may be recommended based on certain risk factors  Adults 72 years old: 1-2 doses may be recommended based off what pneumonia vaccine you previously received   Hepatitis B Vaccine 3 dose series if at intermediate or high risk (ex: diabetes, end stage renal disease, liver disease)   Tetanus (Td) Vaccine - COST NOT COVERED BY MEDICARE PART B Following completion of primary series, a booster dose should be given every 10 years to maintain immunity against tetanus  Td may also be given as tetanus wound prophylaxis  Tdap Vaccine - COST NOT COVERED BY MEDICARE PART B Recommended at least once for all adults  For pregnant patients, recommended with each pregnancy  Shingles Vaccine (Shingrix) - COST NOT COVERED BY MEDICARE PART B  2 shot series recommended in those aged 48 and above     Health Maintenance Due:      Topic Date Due   • Hepatitis C Screening  Never done   • Breast Cancer Screening: Mammogram  01/06/2022   • DXA SCAN  01/06/2022   • Colorectal Cancer Screening  09/23/2030     Immunizations Due:      Topic Date Due   • Hepatitis A Vaccine (1 of 2 - Risk 2-dose series) Never done   • Hepatitis B Vaccine (1 of 3 - Risk 3-dose series) Never done   • COVID-19 Vaccine (3 - Booster for Pfizer series) 09/26/2021   • Influenza Vaccine (1) 09/01/2022     Advance Directives   What are advance directives? Advance directives are legal documents that state your wishes and plans for medical care  These plans are made ahead of time in case you lose your ability to make decisions for yourself  Advance directives can apply to any medical decision, such as the treatments you want, and if you want to donate organs  What are the types of advance directives? There are many types of advance directives, and each state has rules about how to use them  You may choose a combination of any of the following:  · Living will: This is a written record of the treatment you want  You can also choose which treatments you do not want, which to limit, and which to stop at a certain time  This includes surgery, medicine, IV fluid, and tube feedings  · Durable power of  for healthcare [facility]): This is a written record that states who you want to make healthcare choices for you when you are unable to make them for yourself  This person, called a proxy, is usually a family member or a friend  You may choose more than 1 proxy  · Do not resuscitate (DNR) order:  A DNR order is used in case your heart stops beating or you stop breathing  It is a request not to have certain forms of treatment, such as CPR  A DNR order may be included in other types of advance directives  · Medical directive: This covers the care that you want if you are in a coma, near death, or unable to make decisions for yourself  You can list the treatments you want for each condition  Treatment may include pain medicine, surgery, blood transfusions, dialysis, IV or tube feedings, and a ventilator (breathing machine)  · Values history: This document has questions about your views, beliefs, and how you feel and think about life  This information can help others choose the care that you would choose  Why are advance directives important? An advance directive helps you control your care  Although spoken wishes may be used, it is better to have your wishes written down  Spoken wishes can be misunderstood, or not followed  Treatments may be given even if you do not want them   An advance directive may make it easier for your family to make difficult choices about your care  © Copyright [employer] 2018 Information is for End User's use only and may not be sold, redistributed or otherwise used for commercial purposes   All illustrations and images included in CareNotes® are the copyrighted property of A D A M , [employer]  or [address] [place], [place] [address]cristiane [email or web address]

## 2022-11-25 DIAGNOSIS — E11.65 TYPE 2 DIABETES MELLITUS WITH HYPERGLYCEMIA, WITHOUT LONG-TERM CURRENT USE OF INSULIN (HCC): ICD-10-CM

## 2022-12-01 ENCOUNTER — OFFICE VISIT (OUTPATIENT)
Dept: ENDOCRINOLOGY | Facility: CLINIC | Age: 67
End: 2022-12-01

## 2022-12-01 VITALS
BODY MASS INDEX: 23.21 KG/M2 | WEIGHT: 131 LBS | HEART RATE: 112 BPM | HEIGHT: 63 IN | OXYGEN SATURATION: 96 % | SYSTOLIC BLOOD PRESSURE: 140 MMHG | DIASTOLIC BLOOD PRESSURE: 72 MMHG

## 2022-12-01 DIAGNOSIS — C73 PAPILLARY THYROID CARCINOMA (HCC): ICD-10-CM

## 2022-12-01 DIAGNOSIS — E11.65 TYPE 2 DIABETES MELLITUS WITH HYPERGLYCEMIA, WITHOUT LONG-TERM CURRENT USE OF INSULIN (HCC): Primary | ICD-10-CM

## 2022-12-01 DIAGNOSIS — M81.0 AGE-RELATED OSTEOPOROSIS WITHOUT CURRENT PATHOLOGICAL FRACTURE: ICD-10-CM

## 2022-12-01 RX ORDER — ALENDRONATE SODIUM 70 MG/1
70 TABLET ORAL
Qty: 4 TABLET | Refills: 2 | Status: SHIPPED | OUTPATIENT
Start: 2022-12-01 | End: 2023-03-01

## 2022-12-01 NOTE — PROGRESS NOTES
Luis Clock 75 y o  female MRN: [id number]    Encounter: [id number]      Assessment/Plan     Type 2 diabetes without current use of insulin:  Uncontrolled with the most recent A1c of 7 9%   The goal is less than 7%   She is not on Ozempic 1 mg once weekly she is not sure what was the reason she is not taking it anymore  I ordered semaglutide, she was instructed to take 0 25 mg once weekly for 4 weeks and then increase to 0 5 mg once weekly  She is not checking her blood sugars as she does not have glucometer at home  A glucometer with test strips was provided in the office  I have asked the patient to check blood sugars 2-3 daily and send a record to the office in few weeks for review so that changes can be made to regimen, if applicable  We provided information on basic nutrition for diabetics  Extended counseling on disease management  Emphasized importance of daily exercise, weight loss, caloric reduction, small meals, consumption of multiple servings of fruits and vegetables and avoidance of concentrated sweets such as juice and soda  Reviewed concepts of diabetes self-management stressing the primary role of the patient in monitoring and maintaining control of diabetes  She was instructed to follow-up with ophthalmologist     labs as ordered  Return back in 3 months  Multifocal papillary thyroid carcinoma:  Status post total thyroidectomy in 2019, AJCC at least stage I   Did not meet the criteria for radioactive iodine ablation  Recent neck ultrasound showed a zone 3 suspicious lymph node which was advised to repeat ultrasound in 3 months, has not been done yet  Her most recent thyroid globulin antibody was negative which was checked in 2021 and thyroglobulin was 0 2  I ordered thyroglobulin antibody and thyroglobulin  She is on levothyroxine 112 mcg once daily,  I ordered TSH and free T4  She is following with Surgical Oncology  Further evaluation pending above results  Postoperative hypothyroidism: On levothyroxine 112 mcg once daily, questionable compliance  Check TSH and free T4  TSH goal is 0 5-2  Osteoporosis:   Without history of fragility fracture  She is was on Fosamax 70 once weekly which was discontinued with unknown reason  I ordered alendronate 70 mg once weekly and she was advised to take with plenty of water and do not lie down for half an hour to an hour  DEXA scan was ordered  Fall percussion was given  Take calcium 800-1000 mg once daily and vitamin-D 1000 units once daily  Check vitamin-D level  CC:   Thyroid cancer    History of Present Illness      HPI:  Gris Reis is a 75year-old female who presented for follow up  She had thyroid US in 2019 which showed right sided thyroid nodule for which FNA was done and showed Cooter Category VI, malignant, PTC, total thyroidectomy was done and final pathology showed multifocal papillary thyroid carcinoma largest dimension 1 7 cm, with no growth extrathyroidal extension, with lymph node negative for carcinoma  AJCC tumor Stage at least stage I - pT1b (m), pN0,pMx  Thyroid US in October 2022 showed There is no suspicion of recurrent mass in the thyroidectomy bed but a zone 3 lymph node which needed follow up US in 3 months,Tg antibodies negative and thyroglobulin 0 2 which was checked last year  She is taking levothyroxine 112 mcg once daily  She denies palpitations, tremor, diarrhea, constipation, nervousness, anxiety, sleep disturbances  She has type 2 diabetes for many years, on metformin 1000 b i d , Jardiance 25 mg once daily, not taking semaglutide  She has history of osteoporosis, she used to take Fosamax 70 mg once weekly however she has not taken for a while, she is not sure the reason  She is also due for DEXA scan    RESULTS:  DEXA scan in 2020  LUMBAR SPINE:  L3-L4:  BMD 0 780 gm/cm2  T-score -2 9  Z-score -1 0     LEFT TOTAL HIP:  BMD 0 719 gm/cm2  T-score -1 8  Z-score -0 6     LEFT FEMORAL NECK:  BMD 0 551 gm/cm2  T-score -2 7  Z-score -1 2          Review of Systems   Constitutional: Negative for activity change, appetite change, chills, diaphoresis, fatigue, fever and unexpected weight change  HENT: Negative for congestion, drooling, ear discharge, ear pain, trouble swallowing and voice change  Eyes: Negative for photophobia, pain, discharge, redness, itching and visual disturbance  Respiratory: Negative for cough, chest tightness, shortness of breath and wheezing  Cardiovascular: Negative for chest pain, palpitations and leg swelling  Gastrointestinal: Negative for abdominal distention, abdominal pain, blood in stool, diarrhea, nausea and vomiting  Endocrine: Negative for cold intolerance, heat intolerance, polydipsia, polyphagia and polyuria  Genitourinary: Negative for dysuria, flank pain, frequency and hematuria  Musculoskeletal: Negative for back pain, gait problem, joint swelling, myalgias, neck pain and neck stiffness  Skin: Negative for color change, pallor, rash and wound  Neurological: Negative for dizziness, tremors, seizures, syncope, speech difficulty, weakness, numbness and headaches  Psychiatric/Behavioral: Negative for agitation  All other systems reviewed and are negative        Historical Information   Past Medical History:   Diagnosis Date   • Diabetes mellitus ([facility] )    • History of staph infection    • Hyperlipidemia    • Hypertension    • Myocardial infarction ([facility] [address] ) 1998   • Varicella      Past Surgical History:   Procedure Laterality Date   • APPENDECTOMY     • CARDIAC PACEMAKER PLACEMENT     • CARPAL TUNNEL RELEASE Bilateral    • CHOLECYSTECTOMY     • HYSTERECTOMY     • OOPHORECTOMY Bilateral    • REPLACEMENT TOTAL KNEE Left    • THYROIDECTOMY Bilateral 2/19/2019    Procedure: TOTAL THYROIDECTOMY;  Surgeon: Rd Durand MD;  Location: [facility];  Service: Surgical Oncology   • [place] [address] THYROID BIOPSY  1/7/2019 Social History   Social History     Substance and Sexual Activity   Alcohol Use No     Social History     Substance and Sexual Activity   Drug Use No     Social History     Tobacco Use   Smoking Status Never   Smokeless Tobacco Never     Family History:   Family History   Problem Relation Age of Onset   • Diabetes unspecified Maternal Aunt    • Colon cancer Father    • Cancer Father    • Heart disease Mother         Cardiac disorder, congenital aortic stenosis   • Diabetes Other    • No Known Problems Sister    • No Known Problems Daughter    • No Known Problems Maternal Grandmother    • No Known Problems Maternal Grandfather    • No Known Problems Paternal Grandmother    • No Known Problems Paternal Grandfather    • No Known Problems Daughter    • No Known Problems Maternal Aunt    • No Known Problems Maternal Aunt    • No Known Problems Paternal Aunt        Meds/Allergies   Current Outpatient Medications   Medication Sig Dispense Refill   • alendronate (Fosamax) 70 mg tablet Take 1 tablet (70 mg total) by mouth every 7 days (Patient not taking: No sig reported) 12 tablet 2   • Calcium Polycarbophil (FIBER-CAPS PO) Take 2 capsules by mouth 2 (two) times a day      • cholecalciferol (VITAMIN D3) 1,000 units tablet Take 2 tablets (2,000 Units total) by mouth once a week 30 tablet 3   • Empagliflozin (Jardiance) 25 MG TABS Take 1 tablet (25 mg total) by mouth every morning 60 tablet 0   • ezetimibe (Zetia) 10 mg tablet Take 1 tablet (10 mg total) by mouth daily (Patient not taking: No sig reported) 30 tablet 3   • fexofenadine (ALLEGRA) 180 MG tablet Take 180 mg by mouth daily     • levothyroxine 112 mcg tablet Take 1 tablet (112 mcg total) by mouth daily 90 tablet 0   • losartan (COZAAR) 50 mg tablet Take 1 tablet (50 mg total) by mouth daily (Patient not taking: No sig reported) 30 tablet 1   • metFORMIN (GLUCOPHAGE) 1000 MG tablet Take 1 tablet (1,000 mg total) by mouth 2 (two) times a day with meals 60 tablet 0   • Multiple Vitamin (MULTI-VITAMIN DAILY) TABS Take by mouth daily      • rosuvastatin (CRESTOR) 20 MG tablet Take 1 tablet (20 mg total) by mouth daily at bedtime 90 tablet 1     No current facility-administered medications for this visit  Allergies   Allergen Reactions   • Penicillins Rash   • Sulfa Antibiotics Rash       Objective   Vitals: There were no vitals taken for this visit  Physical Exam  Constitutional:       Appearance: She is well-developed  HENT:      Head: Normocephalic and atraumatic  Nose: Nose normal    Eyes:      Extraocular Movements: EOM normal       Pupils: Pupils are equal, round, and reactive to light  Neck:      Thyroid: No thyromegaly  Vascular: No JVD  Comments: Well healed thyroidectomy scar    Cardiovascular:      Rate and Rhythm: Normal rate and regular rhythm  Pulses: Intact distal pulses  no weak pulses          Dorsalis pedis pulses are 2+ on the right side and 2+ on the left side  Posterior tibial pulses are 2+ on the right side and 2+ on the left side  Heart sounds: Normal heart sounds  No murmur heard  No friction rub  No gallop  Pulmonary:      Effort: Pulmonary effort is normal  No respiratory distress  Breath sounds: Normal breath sounds  No stridor  No wheezing or rales  Chest:      Chest wall: No tenderness  Abdominal:      General: Bowel sounds are normal  There is no distension  Palpations: Abdomen is soft  There is no mass  Tenderness: There is no abdominal tenderness  There is no guarding  Musculoskeletal:         General: No deformity or edema  Normal range of motion  Cervical back: Normal range of motion  Feet:      Right foot:      Skin integrity: No ulcer, skin breakdown, erythema, warmth, callus or dry skin  Left foot:      Skin integrity: No ulcer, skin breakdown, erythema, warmth, callus or dry skin  Skin:     General: Skin is warm     Neurological:      Mental Status: She is alert and oriented to person, place, and time  Patient's shoes and socks removed  Right Foot/Ankle   Right Foot Inspection  Skin Exam: skin normal  Skin not intact, no dry skin, no warmth, no callus, no erythema, no maceration, no abnormal color, no pre-ulcer, no ulcer and no callus  Toe Exam: No swelling, no tenderness, erythema and  no right toe deformity    Sensory   Vibration: intact  Proprioception: intact  Monofilament testing: intact    Vascular  Capillary refills: < 3 seconds  The right DP pulse is 2+  The right PT pulse is 2+  Left Foot/Ankle  Left Foot Inspection  Skin Exam: skin normal  Skin not intact, no dry skin, no warmth, no erythema, no maceration, normal color, no pre-ulcer, no ulcer and no callus  Toe Exam: No swelling, no tenderness, no erythema and no left toe deformity  Sensory   Vibration: intact  Proprioception: intact  Monofilament testing: intact    Vascular  Capillary refills: < 3 seconds  The left DP pulse is 2+  The left PT pulse is 2+  Assign Risk Category  No deformity present  No loss of protective sensation  No weak pulses  Risk: 0      The history was obtained from the review of the chart, patient      Lab Results:       Component      Latest Ref Rng & Units 8/24/2020 9/30/2021   Cholesterol      50 - 200 mg/dL 314 (H)    Triglycerides      <=150 mg/dL 537 (H)    HDL      >=40 mg/dL 39 (L)    LDL Calculated           EXT Creatinine Urine      mg/dL <13 0    MICROALBUM ,U,RANDOM      0 0 - 20 0 mg/L 8 5    MICROALBUMIN/CREATININE RATIO      0 - 30 mg/g creatinine >65 (H)    Hemoglobin A1C      6 5 8 0 (H)    eAG, EST AVG Glucose      mg/dl 183    Free T4      0 76 - 1 46 ng/dL 1 20    TSH 3RD GENERATON      0 358 - 3 740 uIU/mL 5 140 (H)    LDL CHOLESTEROL DIRECT      0 - 100 mg/dl 168 (H)    Thyroglobulin-VICKIE      1 5 - 38 5 ng/mL 0 2 (L)    SCAN RESULT        SEE WRITTEN REPORT     Component      Latest Ref Rng & Units 11/10/2021   Cholesterol      50 - 200 mg/dL    Triglycerides      <=150 mg/dL    HDL      >=40 mg/dL    LDL Calculated          EXT Creatinine Urine      mg/dL    MICROALBUM ,U,RANDOM      0 0 - 20 0 mg/L    MICROALBUMIN/CREATININE RATIO      0 - 30 mg/g creatinine    Hemoglobin A1C      6 5 6 8 (A)   eAG, EST AVG Glucose      mg/dl    Free T4      0 76 - 1 46 ng/dL    TSH 3RD GENERATON      0 358 - 3 740 uIU/mL    LDL CHOLESTEROL DIRECT      0 - 100 mg/dl    Thyroglobulin-VICKIE      1 5 - 38 5 ng/mL    SCAN RESULT            Imaging Studies:  Results for orders placed during the hospital encounter of 10/19/22    US head neck lymph node mapping    Impression  Recommend 3 month follow-up ultrasound for the zone 3 right cervical lymph node  Workstation performed: [id number]      CENTRAL  DXA SCAN     CLINICAL HISTORY:   71year old post-menopausal  female  History of hypothyroidism and diabetes  R93 7: Abnormal findings on diagnostic imaging of other parts of musculoskeletal system  E11 65: Type 2 diabetes mellitus with hyperglycemia  Z85 850: Personal history of malignant neoplasm of thyroid      TECHNIQUE: Bone densitometry was performed using a Horizon A bone densitometer  Regions of interest appear properly placed  There are no obvious fractures or other confounding variables which could limit the study      COMPARISON:  None      RESULTS:   LUMBAR SPINE:  L3-L4:  BMD 0 780 gm/cm2  T-score -2 9  Z-score -1 0     LEFT TOTAL HIP:  BMD 0 719 gm/cm2  T-score -1 8  Z-score -0 6     LEFT FEMORAL NECK:  BMD 0 551 gm/cm2  T-score -2 7  Z-score -1 2           IMPRESSION:  1  Based on the [facility] classification, the T-score of -2 9 in the lumbar spine and -2 7 at the left femoral neck are consistent with osteoporosis  2   According to the [facility], prescription therapy is recommended with a T-score of -2 5 or less in the spine or hip after appropriate evaluation to exclude secondary causes    3   A daily intake of at least 1200 mg Calcium and 800 to 1000 IU of Vitamin D, as well as weight bearing and muscle strengthening exercise, fall prevention and avoidance of tobacco and excessive alcohol intake as  basic preventive measures are   suggested  4   Repeat DXA  in 18 - 24 months, on the same machine, as clinically indicated         The 10 year risk of hip fracture is 3 1%, with the 10 year risk of major osteoporotic fracture being 14%, as calculated by the WHO fracture risk assessment tool (FRAX)  The current NOF guidelines recommend treating patients with FRAX 10 year risk score   of >3% for hip fracture and >20% for major osteoporotic fracture       WHO CLASSIFICATION:  Normal (a T-score of -1 0 or higher)  Low bone mineral density (a T-score of less than -1 0 but higher than -2 5)  Osteoporosis (a T-score of -2 5 or less)  Severe osteoporosis (a T-score of -2 5 or less with a fragility fracture)                I have personally reviewed pertinent reports  Portions of the record may have been created with voice recognition software  Occasional wrong word or "sound a like" substitutions may have occurred due to the inherent limitations of voice recognition software  Read the chart carefully and recognize, using context, where substitutions have occurred

## 2022-12-02 ENCOUNTER — TELEPHONE (OUTPATIENT)
Dept: GASTROENTEROLOGY | Facility: CLINIC | Age: 67
End: 2022-12-02

## 2022-12-21 ENCOUNTER — APPOINTMENT (OUTPATIENT)
Dept: LAB | Age: 67
End: 2022-12-21

## 2022-12-21 DIAGNOSIS — C73 PAPILLARY THYROID CARCINOMA (HCC): ICD-10-CM

## 2022-12-21 DIAGNOSIS — C73 THYROID CANCER (HCC): ICD-10-CM

## 2022-12-21 DIAGNOSIS — E11.65 TYPE 2 DIABETES MELLITUS WITH HYPERGLYCEMIA, WITHOUT LONG-TERM CURRENT USE OF INSULIN (HCC): ICD-10-CM

## 2022-12-21 DIAGNOSIS — E03.9 ACQUIRED HYPOTHYROIDISM: ICD-10-CM

## 2022-12-21 DIAGNOSIS — C73 PAPILLARY THYROID CARCINOMA (HCC): Primary | ICD-10-CM

## 2022-12-21 LAB
ALBUMIN SERPL BCP-MCNC: 3.8 G/DL (ref 3.5–5)
ALP SERPL-CCNC: 104 U/L (ref 46–116)
ALT SERPL W P-5'-P-CCNC: 22 U/L (ref 12–78)
ANION GAP SERPL CALCULATED.3IONS-SCNC: 9 MMOL/L (ref 4–13)
AST SERPL W P-5'-P-CCNC: 12 U/L (ref 5–45)
BILIRUB SERPL-MCNC: 1.19 MG/DL (ref 0.2–1)
BUN SERPL-MCNC: 17 MG/DL (ref 5–25)
CALCIUM SERPL-MCNC: 9.2 MG/DL (ref 8.3–10.1)
CHLORIDE SERPL-SCNC: 103 MMOL/L (ref 96–108)
CHOLEST SERPL-MCNC: 256 MG/DL
CO2 SERPL-SCNC: 26 MMOL/L (ref 21–32)
CREAT SERPL-MCNC: 0.69 MG/DL (ref 0.6–1.3)
CREAT UR-MCNC: 29.7 MG/DL
EST. AVERAGE GLUCOSE BLD GHB EST-MCNC: 177 MG/DL
GFR SERPL CREATININE-BSD FRML MDRD: 89 ML/MIN/1.73SQ M
GLUCOSE P FAST SERPL-MCNC: 195 MG/DL (ref 65–99)
HBA1C MFR BLD: 7.8 %
HDLC SERPL-MCNC: 52 MG/DL
LDLC SERPL CALC-MCNC: 156 MG/DL (ref 0–100)
MICROALBUMIN UR-MCNC: 6.4 MG/L (ref 0–20)
MICROALBUMIN/CREAT 24H UR: 22 MG/G CREATININE (ref 0–30)
NONHDLC SERPL-MCNC: 204 MG/DL
POTASSIUM SERPL-SCNC: 3.8 MMOL/L (ref 3.5–5.3)
PROT SERPL-MCNC: 7.6 G/DL (ref 6.4–8.4)
SODIUM SERPL-SCNC: 138 MMOL/L (ref 135–147)
T4 FREE SERPL-MCNC: 1.5 NG/DL (ref 0.76–1.46)
TRIGL SERPL-MCNC: 239 MG/DL
TSH SERPL DL<=0.05 MIU/L-ACNC: 0.02 UIU/ML (ref 0.45–4.5)

## 2022-12-21 RX ORDER — LEVOTHYROXINE SODIUM 0.1 MG/1
100 TABLET ORAL DAILY
Qty: 30 TABLET | Refills: 2 | Status: ON HOLD | OUTPATIENT
Start: 2022-12-21 | End: 2023-03-21

## 2022-12-22 DIAGNOSIS — E11.65 TYPE 2 DIABETES MELLITUS WITH HYPERGLYCEMIA, WITHOUT LONG-TERM CURRENT USE OF INSULIN (HCC): ICD-10-CM

## 2022-12-22 LAB
THYROGLOB AB SERPL-ACNC: <1 IU/ML (ref 0–0.9)
THYROGLOB SERPL-MCNC: 0.2 NG/ML (ref 1.5–38.5)

## 2022-12-30 ENCOUNTER — APPOINTMENT (EMERGENCY)
Dept: CT IMAGING | Facility: HOSPITAL | Age: 67
End: 2022-12-30

## 2022-12-30 ENCOUNTER — APPOINTMENT (EMERGENCY)
Dept: RADIOLOGY | Facility: HOSPITAL | Age: 67
End: 2022-12-30

## 2022-12-30 ENCOUNTER — HOSPITAL ENCOUNTER (INPATIENT)
Facility: HOSPITAL | Age: 67
LOS: 7 days | Discharge: HOME | End: 2023-01-06
Attending: EMERGENCY MEDICINE | Admitting: INTERNAL MEDICINE

## 2022-12-30 DIAGNOSIS — R41.82 ALTERED MENTAL STATUS: Primary | ICD-10-CM

## 2022-12-30 DIAGNOSIS — F03.90 DEMENTIA (HCC): ICD-10-CM

## 2022-12-30 LAB
ALBUMIN SERPL BCP-MCNC: 3.7 G/DL (ref 3.5–5)
ALP SERPL-CCNC: 110 U/L (ref 46–116)
ALT SERPL W P-5'-P-CCNC: 26 U/L (ref 12–78)
ANION GAP SERPL CALCULATED.3IONS-SCNC: 12 MMOL/L (ref 4–13)
AST SERPL W P-5'-P-CCNC: 26 U/L (ref 5–45)
BASOPHILS # BLD AUTO: 0.03 THOUSANDS/ÂΜL (ref 0–0.1)
BASOPHILS NFR BLD AUTO: 0 % (ref 0–1)
BILIRUB SERPL-MCNC: 1.48 MG/DL (ref 0.2–1)
BUN SERPL-MCNC: 24 MG/DL (ref 5–25)
CALCIUM SERPL-MCNC: 9.1 MG/DL (ref 8.3–10.1)
CHLORIDE SERPL-SCNC: 100 MMOL/L (ref 96–108)
CO2 SERPL-SCNC: 26 MMOL/L (ref 21–32)
CREAT SERPL-MCNC: 0.73 MG/DL (ref 0.6–1.3)
EOSINOPHIL # BLD AUTO: 0.08 THOUSAND/ÂΜL (ref 0–0.61)
EOSINOPHIL NFR BLD AUTO: 1 % (ref 0–6)
ERYTHROCYTE [DISTWIDTH] IN BLOOD BY AUTOMATED COUNT: 12.6 % (ref 11.6–15.1)
FLUAV RNA RESP QL NAA+PROBE: NEGATIVE
FLUBV RNA RESP QL NAA+PROBE: NEGATIVE
GFR SERPL CREATININE-BSD FRML MDRD: 84 ML/MIN/1.73SQ M
GLUCOSE SERPL-MCNC: 145 MG/DL (ref 65–140)
GLUCOSE SERPL-MCNC: 270 MG/DL (ref 65–140)
HCT VFR BLD AUTO: 46.5 % (ref 34.8–46.1)
HGB BLD-MCNC: 16.1 G/DL (ref 11.5–15.4)
IMM GRANULOCYTES # BLD AUTO: 0.03 THOUSAND/UL (ref 0–0.2)
IMM GRANULOCYTES NFR BLD AUTO: 0 % (ref 0–2)
LYMPHOCYTES # BLD AUTO: 2.69 THOUSANDS/ÂΜL (ref 0.6–4.47)
LYMPHOCYTES NFR BLD AUTO: 37 % (ref 14–44)
MAGNESIUM SERPL-MCNC: 1.8 MG/DL (ref 1.6–2.6)
MCH RBC QN AUTO: 29.5 PG (ref 26.8–34.3)
MCHC RBC AUTO-ENTMCNC: 34.6 G/DL (ref 31.4–37.4)
MCV RBC AUTO: 85 FL (ref 82–98)
MONOCYTES # BLD AUTO: 0.8 THOUSAND/ÂΜL (ref 0.17–1.22)
MONOCYTES NFR BLD AUTO: 11 % (ref 4–12)
NEUTROPHILS # BLD AUTO: 3.72 THOUSANDS/ÂΜL (ref 1.85–7.62)
NEUTS SEG NFR BLD AUTO: 51 % (ref 43–75)
NRBC BLD AUTO-RTO: 0 /100 WBCS
PLATELET # BLD AUTO: 272 THOUSANDS/UL (ref 149–390)
PMV BLD AUTO: 8.3 FL (ref 8.9–12.7)
POTASSIUM SERPL-SCNC: 3.3 MMOL/L (ref 3.5–5.3)
PROT SERPL-MCNC: 7.7 G/DL (ref 6.4–8.4)
RBC # BLD AUTO: 5.46 MILLION/UL (ref 3.81–5.12)
RSV RNA RESP QL NAA+PROBE: NEGATIVE
SARS-COV-2 RNA RESP QL NAA+PROBE: NEGATIVE
SODIUM SERPL-SCNC: 138 MMOL/L (ref 135–147)
TSH SERPL DL<=0.05 MIU/L-ACNC: 0.13 UIU/ML (ref 0.45–4.5)
WBC # BLD AUTO: 7.35 THOUSAND/UL (ref 4.31–10.16)

## 2022-12-30 RX ORDER — POTASSIUM CHLORIDE 20 MEQ/1
20 TABLET, EXTENDED RELEASE ORAL DAILY
Status: COMPLETED | OUTPATIENT
Start: 2022-12-31 | End: 2023-01-02

## 2022-12-30 RX ORDER — LORAZEPAM 2 MG/ML
1 INJECTION INTRAMUSCULAR ONCE
Status: COMPLETED | OUTPATIENT
Start: 2022-12-30 | End: 2022-12-30

## 2022-12-30 RX ORDER — MAGNESIUM HYDROXIDE/ALUMINUM HYDROXICE/SIMETHICONE 120; 1200; 1200 MG/30ML; MG/30ML; MG/30ML
30 SUSPENSION ORAL EVERY 6 HOURS PRN
Status: DISCONTINUED | OUTPATIENT
Start: 2022-12-30 | End: 2023-01-06 | Stop reason: HOSPADM

## 2022-12-30 RX ORDER — SODIUM CHLORIDE 9 MG/ML
75 INJECTION, SOLUTION INTRAVENOUS CONTINUOUS
Status: DISCONTINUED | OUTPATIENT
Start: 2022-12-30 | End: 2022-12-31

## 2022-12-30 RX ORDER — INSULIN LISPRO 100 [IU]/ML
1-5 INJECTION, SOLUTION INTRAVENOUS; SUBCUTANEOUS
Status: DISCONTINUED | OUTPATIENT
Start: 2022-12-31 | End: 2023-01-06 | Stop reason: HOSPADM

## 2022-12-30 RX ORDER — LEVOTHYROXINE SODIUM 88 UG/1
88 TABLET ORAL
Status: DISCONTINUED | OUTPATIENT
Start: 2022-12-31 | End: 2023-01-06 | Stop reason: HOSPADM

## 2022-12-30 RX ORDER — INSULIN LISPRO 100 [IU]/ML
1-5 INJECTION, SOLUTION INTRAVENOUS; SUBCUTANEOUS
Status: DISCONTINUED | OUTPATIENT
Start: 2022-12-30 | End: 2023-01-06 | Stop reason: HOSPADM

## 2022-12-30 RX ORDER — HALOPERIDOL 5 MG/ML
2 INJECTION INTRAMUSCULAR ONCE
Status: COMPLETED | OUTPATIENT
Start: 2022-12-30 | End: 2022-12-30

## 2022-12-30 RX ORDER — ACETAMINOPHEN 325 MG/1
650 TABLET ORAL EVERY 6 HOURS PRN
Status: DISCONTINUED | OUTPATIENT
Start: 2022-12-30 | End: 2023-01-06 | Stop reason: HOSPADM

## 2022-12-30 RX ORDER — LORAZEPAM 2 MG/ML
1 INJECTION INTRAMUSCULAR EVERY 4 HOURS PRN
Status: DISCONTINUED | OUTPATIENT
Start: 2022-12-30 | End: 2023-01-04

## 2022-12-30 RX ORDER — LEVOTHYROXINE SODIUM 0.1 MG/1
100 TABLET ORAL
Status: DISCONTINUED | OUTPATIENT
Start: 2022-12-31 | End: 2022-12-30

## 2022-12-30 RX ORDER — ATORVASTATIN CALCIUM 40 MG/1
40 TABLET, FILM COATED ORAL
Status: DISCONTINUED | OUTPATIENT
Start: 2022-12-31 | End: 2023-01-06 | Stop reason: HOSPADM

## 2022-12-30 RX ORDER — HALOPERIDOL 5 MG/ML
5 INJECTION INTRAMUSCULAR ONCE
Status: COMPLETED | OUTPATIENT
Start: 2022-12-30 | End: 2022-12-30

## 2022-12-30 RX ORDER — ONDANSETRON 2 MG/ML
4 INJECTION INTRAMUSCULAR; INTRAVENOUS EVERY 6 HOURS PRN
Status: DISCONTINUED | OUTPATIENT
Start: 2022-12-30 | End: 2023-01-04

## 2022-12-30 RX ADMIN — HALOPERIDOL LACTATE 2 MG: 5 INJECTION, SOLUTION INTRAMUSCULAR at 21:27

## 2022-12-30 RX ADMIN — SODIUM CHLORIDE 75 ML/HR: 0.9 INJECTION, SOLUTION INTRAVENOUS at 23:31

## 2022-12-30 RX ADMIN — SODIUM CHLORIDE 1000 ML: 0.9 INJECTION, SOLUTION INTRAVENOUS at 20:27

## 2022-12-30 RX ADMIN — LORAZEPAM 1 MG: 2 INJECTION INTRAMUSCULAR; INTRAVENOUS at 20:26

## 2022-12-30 RX ADMIN — LORAZEPAM 1 MG: 2 INJECTION, SOLUTION INTRAMUSCULAR; INTRAVENOUS at 22:19

## 2022-12-30 RX ADMIN — HALOPERIDOL LACTATE 5 MG: 5 INJECTION, SOLUTION INTRAMUSCULAR at 22:19

## 2022-12-31 LAB
2HR DELTA HS TROPONIN: 1 NG/L
4HR DELTA HS TROPONIN: 0 NG/L
ANION GAP SERPL CALCULATED.3IONS-SCNC: 11 MMOL/L (ref 4–13)
ATRIAL RATE: 126 BPM
BACTERIA UR QL AUTO: NORMAL /HPF
BILIRUB UR QL STRIP: NEGATIVE
BUN SERPL-MCNC: 18 MG/DL (ref 5–25)
CALCIUM SERPL-MCNC: 8 MG/DL (ref 8.3–10.1)
CARDIAC TROPONIN I PNL SERPL HS: 6 NG/L
CARDIAC TROPONIN I PNL SERPL HS: 6 NG/L
CARDIAC TROPONIN I PNL SERPL HS: 7 NG/L
CHLORIDE SERPL-SCNC: 107 MMOL/L (ref 96–108)
CLARITY UR: CLEAR
CO2 SERPL-SCNC: 23 MMOL/L (ref 21–32)
COLOR UR: ABNORMAL
CREAT SERPL-MCNC: 0.46 MG/DL (ref 0.6–1.3)
ERYTHROCYTE [DISTWIDTH] IN BLOOD BY AUTOMATED COUNT: 12.9 % (ref 11.6–15.1)
GFR SERPL CREATININE-BSD FRML MDRD: 102 ML/MIN/1.73SQ M
GLUCOSE SERPL-MCNC: 105 MG/DL (ref 65–140)
GLUCOSE SERPL-MCNC: 112 MG/DL (ref 65–140)
GLUCOSE SERPL-MCNC: 122 MG/DL (ref 65–140)
GLUCOSE SERPL-MCNC: 131 MG/DL (ref 65–140)
GLUCOSE SERPL-MCNC: 148 MG/DL (ref 65–140)
GLUCOSE SERPL-MCNC: 160 MG/DL (ref 65–140)
GLUCOSE UR STRIP-MCNC: ABNORMAL MG/DL
HCT VFR BLD AUTO: 43.1 % (ref 34.8–46.1)
HGB BLD-MCNC: 14.7 G/DL (ref 11.5–15.4)
HGB UR QL STRIP.AUTO: NEGATIVE
KETONES UR STRIP-MCNC: ABNORMAL MG/DL
LEUKOCYTE ESTERASE UR QL STRIP: ABNORMAL
MCH RBC QN AUTO: 29.2 PG (ref 26.8–34.3)
MCHC RBC AUTO-ENTMCNC: 34.1 G/DL (ref 31.4–37.4)
MCV RBC AUTO: 86 FL (ref 82–98)
NITRITE UR QL STRIP: NEGATIVE
NON-SQ EPI CELLS URNS QL MICRO: NORMAL /HPF
P AXIS: 62 DEGREES
PH UR STRIP.AUTO: 6 [PH]
PLATELET # BLD AUTO: 228 THOUSANDS/UL (ref 149–390)
PMV BLD AUTO: 8.5 FL (ref 8.9–12.7)
POTASSIUM SERPL-SCNC: 3.4 MMOL/L (ref 3.5–5.3)
PR INTERVAL: 170 MS
PROT UR STRIP-MCNC: NEGATIVE MG/DL
QRS AXIS: -16 DEGREES
QRSD INTERVAL: 108 MS
QT INTERVAL: 282 MS
QTC INTERVAL: 408 MS
RBC # BLD AUTO: 5.03 MILLION/UL (ref 3.81–5.12)
RBC #/AREA URNS AUTO: NORMAL /HPF
SODIUM SERPL-SCNC: 141 MMOL/L (ref 135–147)
SP GR UR STRIP.AUTO: 1.02 (ref 1–1.03)
T WAVE AXIS: 68 DEGREES
UROBILINOGEN UR QL STRIP.AUTO: 0.2 E.U./DL
VENTRICULAR RATE: 126 BPM
WBC # BLD AUTO: 5.17 THOUSAND/UL (ref 4.31–10.16)
WBC #/AREA URNS AUTO: NORMAL /HPF

## 2022-12-31 RX ADMIN — POTASSIUM CHLORIDE 20 MEQ: 1500 TABLET, EXTENDED RELEASE ORAL at 09:27

## 2022-12-31 RX ADMIN — ATORVASTATIN CALCIUM 40 MG: 40 TABLET, FILM COATED ORAL at 17:28

## 2022-12-31 RX ADMIN — LEVOTHYROXINE SODIUM 88 MCG: 88 TABLET ORAL at 05:24

## 2022-12-31 RX ADMIN — LORAZEPAM 1 MG: 2 INJECTION INTRAMUSCULAR; INTRAVENOUS at 05:24

## 2022-12-31 RX ADMIN — Medication 1 TABLET: at 09:26

## 2022-12-31 NOTE — PROGRESS NOTES
[facility]  Progress Note - Lexii Lawson 1955, 75 y o  female MRN: [id number]  Unit/Bed#: E4 -01 Encounter: [id number]  Primary Care Provider: Darien Liao PA-C   Date and time admitted to hospital: 2022  7:54 PM    * AMS (altered mental status)  Assessment & Plan  · Secondary to advancing dementia  · Patient's  is currently admitted here to hospital  Dept of Aging was called for well check because he was concerned about wife being home alone  · Likely will need placement as she is unsafe to be home alone at this time unless  is discharged at the same time    Type 2 diabetes mellitus with hyperglycemia, without long-term current use of insulin [facility] )  Assessment & Plan  Lab Results   Component Value Date    HGBA1C 7 8 (H) 2022       · Hold oral agents  · Order sliding scale with coverage    Acquired hypothyroidism  Assessment & Plan  · Med list reports levothyroxine 100 mcg daily  · TSH low at 0 134  · decreased dose to 88 mcg  · Will need follow up repeat labs in approx 6 weeks    Essential hypertension  Assessment & Plan  · Reports not taking losartan at home  · Monitor blood pressures while admitted        VTE Pharmacologic Prophylaxis:   Pharmacologic: low risk, early ambulation    Patient Centered Rounds: I have performed bedside rounds with nursing staff today  Time Spent for Care: 20 minutes  More than 50% of total time spent on counseling and coordination of care as described above      Current Length of Stay: 1 day(s)    Current Patient Status: Inpatient   Certification Statement: The patient will continue to require additional inpatient hospital stay due to pending safe discharge    Discharge Plan / Estimated Discharge Date: TBD    Code Status: Level 1 - Full Code      Subjective:   Patient seen and examined at bedside, comfortable, denies any complaints    Objective:     Vitals:   Temp (24hrs), Av 3 °F (36 8 °C), Min:98 °F (36 7 °C), Max:98 6 °F (37 °C)    Temp:  [98 °F (36 7 °C)-98 6 °F (37 °C)] 98 °F (36 7 °C)  HR:  [102-139] 102  Resp:  [16-18] 18  BP: (102-149)/(73-93) 125/84  SpO2:  [97 %-99 %] 99 %  There is no height or weight on file to calculate BMI  Input and Output Summary (last 24 hours): Intake/Output Summary (Last 24 hours) at 12/31/2022 1501  Last data filed at 12/30/2022 2331  Gross per 24 hour   Intake 240 ml   Output --   Net 240 ml       Physical Exam:    Constitutional: Patient is in no acute distress  HEENT:  Normocephalic, atraumatic  Cardiovascular: Normal S1S2, RRR, No murmurs/rubs/gallops appreciated  Pulmonary:  Bilateral air entry, No rhonchi/rales/wheezing appreciated  Abdominal: Soft, Bowel sounds present, Non-tender, Non-distended  Extremities:  No cyanosis, clubbing or edema  Neurological: Awake, alert  Skin:  Warm, dry    Additional Data:     Labs:    Results from last 7 days   Lab Units 12/31/22  0528 12/30/22 2013   WBC Thousand/uL 5 17 7 35   HEMOGLOBIN g/dL 14 7 16 1*   HEMATOCRIT % 43 1 46 5*   PLATELETS Thousands/uL 228 272   NEUTROS PCT %  --  51   LYMPHS PCT %  --  37   MONOS PCT %  --  11   EOS PCT %  --  1     Results from last 7 days   Lab Units 12/31/22  0528 12/30/22 2013   POTASSIUM mmol/L 3 4* 3 3*   CHLORIDE mmol/L 107 100   CO2 mmol/L 23 26   BUN mg/dL 18 24   CREATININE mg/dL 0 46* 0 73   CALCIUM mg/dL 8 0* 9 1   ALK PHOS U/L  --  110   ALT U/L  --  26   AST U/L  --  26            I Have Reviewed All Lab Data Listed Above      Invasive Devices     Peripheral Intravenous Line  Duration           Peripheral IV 12/30/22 Left Antecubital <1 day                     Recent Cultures (last 7 days):           Last 24 Hours Medication List:   Current Facility-Administered Medications   Medication Dose Route Frequency Provider Last Rate   • acetaminophen  650 mg Oral Q6H PRN Bry Nguyen PA-C     • aluminum-magnesium hydroxide-simethicone  30 mL Oral Q6H PRN Bry Nguyen, SAQIB     • atorvastatin  40 mg Oral Daily With SAQIB David     • insulin lispro  1-5 Units Subcutaneous TID AC Miriam Nixon PA-C     • insulin lispro  1-5 Units Subcutaneous HS Miriam Nixon PA-C     • levothyroxine  88 mcg Oral Early Morning Miriam Nixon PA-C     • LORazepam  1 mg Intravenous Q4H PRN Miriam Nixon PA-C     • multivitamin-minerals  1 tablet Oral Daily Miriam Nixon PA-C     • ondansetron  4 mg Intravenous Q6H PRN Miriam Nixon PA-C     • potassium chloride  20 mEq Oral Daily Miriam Nixon PA-C          Today, Patient Was Seen By: Luann Jackson MD

## 2022-12-31 NOTE — ASSESSMENT & PLAN NOTE
Lab Results   Component Value Date    HGBA1C 7 8 (H) 12/21/2022       · Hold oral agents  · Order sliding scale with coverage

## 2022-12-31 NOTE — ASSESSMENT & PLAN NOTE
Admit to med/surg  Patient's  is currently admitted here to hospital  Dept of Aging was called for well check because he was concerned about wife being home alone  Patient states she "feels fine and wants to go home so the social security people stop bothering her"  She has been difficult to re-direct and has needed ativan and haldol in the ED  Likely will need placement as she is unsafe to be home alone at this time

## 2022-12-31 NOTE — ED PROVIDER NOTES
History  Chief Complaint   Patient presents with   • Altered Mental Status     Per EMS, pt brought in from home after neighbor was concerned for patient's decline in mental status for dementia  Patient's  is currently admitted for throat CA and pt has been home alone   spoke with [facility] case mgmt who then contacted [facility] to do welfare check  Pt calm and cooperative, disoriented to time and situation     Patient Is a 57-year-old female brought in by EMS  Patient has no complaints and states she does not really understand why she is here  Reports that her 's getting discharged from the hospital today and coming to pick her up  We have patient's neighbor's phone as well [facility]  Review shows patient does have a history of third-degree heart block status post pacemaker, Salter Bears syndrome, coronary artery disease, diabetes osteoarthritis, hypertension and hyperlipidemia      History provided by:  Patient  History limited by:  Dementia   used: No          Prior to Admission Medications   Prescriptions Last Dose Informant Patient Reported? Taking?    Calcium Polycarbophil (FIBER-CAPS PO)   Yes No   Sig: Take 2 capsules by mouth 2 (two) times a day    Empagliflozin (Jardiance) 25 MG TABS   No No   Sig: Take 1 tablet (25 mg total) by mouth every morning   Multiple Vitamin (MULTI-VITAMIN DAILY) TABS   Yes No   Sig: Take by mouth daily    Semaglutide,0 25 or 0 5MG/DOS, 2 MG/1 5ML SOPN   No No   Sig: Take 0 25 mg once weekly for 4 weeks and then 0 5 mg weekly   alendronate (FOSAMAX) 70 mg tablet   No No   Sig: Take 1 tablet (70 mg total) by mouth every 7 days   cholecalciferol (VITAMIN D3) 1,000 units tablet   No No   Sig: Take 2 tablets (2,000 Units total) by mouth once a week   Patient not taking: Reported on 12/1/2022   ezetimibe (Zetia) 10 mg tablet   No No   Sig: Take 1 tablet (10 mg total) by mouth daily   Patient not taking: Reported on 11/10/2021 fexofenadine (ALLEGRA) 180 MG tablet   Yes No   Sig: Take 180 mg by mouth daily   Patient not taking: Reported on 12/1/2022   levothyroxine 100 mcg tablet   No No   Sig: Take 1 tablet (100 mcg total) by mouth daily   losartan (COZAAR) 50 mg tablet   No No   Sig: Take 1 tablet (50 mg total) by mouth daily   Patient not taking: Reported on 11/10/2021   metFORMIN (GLUCOPHAGE) 1000 MG tablet   No No   Sig: Take 1 tablet (1,000 mg total) by mouth 2 (two) times a day with meals   rosuvastatin (CRESTOR) 20 MG tablet   No No   Sig: Take 1 tablet (20 mg total) by mouth daily at bedtime      Facility-Administered Medications: None       Past Medical History:   Diagnosis Date   • Diabetes mellitus ([facility] [address] )    • History of staph infection    • Hyperlipidemia    • Hypertension    • Myocardial infarction ([facility] [address] ) 1998   • Varicella        Past Surgical History:   Procedure Laterality Date   • APPENDECTOMY     • CARDIAC PACEMAKER PLACEMENT     • CARPAL TUNNEL RELEASE Bilateral    • CHOLECYSTECTOMY     • HYSTERECTOMY     • OOPHORECTOMY Bilateral    • REPLACEMENT TOTAL KNEE Left    • THYROIDECTOMY Bilateral 2/19/2019    Procedure: TOTAL THYROIDECTOMY;  Surgeon: Yifan Jackson MD;  Location: [facility];  Service: Surgical Oncology   • US GUIDED THYROID BIOPSY  1/7/2019       Family History   Problem Relation Age of Onset   • Diabetes unspecified Maternal Aunt    • Colon cancer Father    • Cancer Father    • Heart disease Mother         Cardiac disorder, congenital aortic stenosis   • Diabetes Other    • No Known Problems Sister    • No Known Problems Daughter    • No Known Problems Maternal Grandmother    • No Known Problems Maternal Grandfather    • No Known Problems Paternal Grandmother    • No Known Problems Paternal Grandfather    • No Known Problems Daughter    • No Known Problems Maternal Aunt    • No Known Problems Maternal Aunt    • No Known Problems Paternal Aunt      I have reviewed and agree with the history as documented  E-Cigarette/Vaping   • E-Cigarette Use Never User      E-Cigarette/Vaping Substances   • Nicotine No    • THC No    • CBD No    • Flavoring No    • Other No    • Unknown No      Social History     Tobacco Use   • Smoking status: Never   • Smokeless tobacco: Never   Vaping Use   • Vaping Use: Never used   Substance Use Topics   • Alcohol use: No   • Drug use: No       Review of Systems   Unable to perform ROS: Dementia       Physical Exam  Physical Exam  Vitals and nursing note reviewed  Constitutional:       General: She is not in acute distress  Appearance: She is well-developed  HENT:      Head: Normocephalic and atraumatic  Comments: Patient maintaining airway and secretions  No stridor   No brawniness under tongue  Mouth/Throat:      Mouth: Mucous membranes are moist    Eyes:      Extraocular Movements: Extraocular movements intact  Conjunctiva/sclera: Conjunctivae normal       Pupils: Pupils are equal, round, and reactive to light  Cardiovascular:      Rate and Rhythm: Regular rhythm  Tachycardia present  Pulses:           Radial pulses are 2+ on the right side and 2+ on the left side  Dorsalis pedis pulses are 2+ on the right side and 2+ on the left side  Heart sounds: Normal heart sounds, S1 normal and S2 normal  No murmur heard  Pulmonary:      Effort: Pulmonary effort is normal  No respiratory distress  Breath sounds: Normal breath sounds  Abdominal:      General: Abdomen is flat  Bowel sounds are normal       Palpations: Abdomen is soft  Tenderness: There is no abdominal tenderness  Musculoskeletal:         General: No swelling  Cervical back: Normal range of motion and neck supple  Right lower leg: No edema  Left lower leg: No edema  Skin:     General: Skin is warm and dry  Capillary Refill: Capillary refill takes less than 2 seconds  Neurological:      General: No focal deficit present        Mental Status: She is alert  GCS: GCS eye subscore is 4  GCS verbal subscore is 4  GCS motor subscore is 6  Cranial Nerves: Cranial nerves 2-12 are intact  Sensory: Sensation is intact  Motor: Motor function is intact  Coordination: Coordination is intact  Comments: Patient is able to tell me her name and date of birth  She is confused to place  She is confused to situation and time she cannot remember 3 objects (Apple bag and cat) after 5 minutes  She has no slurred speech  No facial asymmetry  There is no tongue deviation  She can move bilateral upper extremities and lower extremities independently of each other and pain-free  Psychiatric:         Mood and Affect: Mood normal          Vital Signs  ED Triage Vitals [12/30/22 1957]   Temperature Pulse Respirations Blood Pressure SpO2   98 6 °F (37 °C) (!) 134 17 149/93 97 %      Temp Source Heart Rate Source Patient Position - Orthostatic VS BP Location FiO2 (%)   Oral Monitor -- -- --      Pain Score       --           Vitals:    12/30/22 1957   BP: 149/93   Pulse: (!) 134         Visual Acuity      ED Medications  Medications   sodium chloride 0 9 % bolus 1,000 mL (1,000 mL Intravenous New Bag 12/30/22 2027)   LORazepam (ATIVAN) injection 1 mg (1 mg Intravenous Given 12/30/22 2026)   haloperidol lactate (HALDOL) injection 2 mg (2 mg Intravenous Given 12/30/22 2127)       Diagnostic Studies  Results Reviewed     Procedure Component Value Units Date/Time    FLU/RSV/COVID - if FLU/RSV clinically relevant [[id number]]  (Normal) Collected: 12/30/22 2013    Lab Status: Final result Specimen: Nares from Nose Updated: 12/30/22 2059     SARS-CoV-2 Negative     INFLUENZA A PCR Negative     INFLUENZA B PCR Negative     RSV PCR Negative    Narrative:      FOR PEDIATRIC PATIENTS - copy/paste COVID Guidelines URL to browser: [email or web address]    SARS-CoV-2 assay is a Nucleic Acid Amplification assay intended for the  qualitative detection of nucleic acid from SARS-CoV-2 in nasopharyngeal  swabs  Results are for the presumptive identification of SARS-CoV-2 RNA  Positive results are indicative of infection with SARS-CoV-2, the virus  causing COVID-19, but do not rule out bacterial infection or co-infection  with other viruses  Laboratories within the [place] and its  territories are required to report all positive results to the appropriate  public health authorities  Negative results do not preclude SARS-CoV-2  infection and should not be used as the sole basis for treatment or other  patient management decisions  Negative results must be combined with  clinical observations, patient history, and epidemiological information  This test has not been FDA cleared or approved  This test has been authorized by FDA under an Emergency Use Authorization  (EUA)  This test is only authorized for the duration of time the  declaration that circumstances exist justifying the authorization of the  emergency use of an in vitro diagnostic tests for detection of SARS-CoV-2  virus and/or diagnosis of COVID-19 infection under section 564(b)(1) of  the Act, 21 U  S C  743PIH-4(E)(2), unless the authorization is terminated  or revoked sooner  The test has been validated but independent review by FDA  and CLIA is pending  Test performed using [employer] GeneXpert: This RT-PCR assay targets N2,  a region unique to SARS-CoV-2  A conserved region in the E-gene was chosen  for pan-Sarbecovirus detection which includes SARS-CoV-2  According to CMS-2020-01-R, this platform meets the definition of high-throughput technology      TSH [[id number]]  (Abnormal) Collected: 12/30/22 2013    Lab Status: Final result Specimen: Blood from Arm, Left Updated: 12/30/22 2047     TSH [address] [place] [address] 0 134 uIU/mL     Narrative:      Patients undergoing fluorescein dye angiography may retain small amounts of fluorescein in the body for 48-72 hours post procedure  Samples containing fluorescein can produce falsely depressed TSH values  If the patient had this procedure,a specimen should be resubmitted post fluorescein clearance        Magnesium [[id number]]  (Normal) Collected: 12/30/22 2013    Lab Status: Final result Specimen: Blood from Arm, Left Updated: 12/30/22 2047     Magnesium 1 8 mg/dL     Comprehensive metabolic panel [[id number]]  (Abnormal) Collected: 12/30/22 2013    Lab Status: Final result Specimen: Blood from Arm, Left Updated: 12/30/22 2038     Sodium 138 mmol/L      Potassium 3 3 mmol/L      Chloride 100 mmol/L      CO2 26 mmol/L      ANION GAP 12 mmol/L      BUN 24 mg/dL      Creatinine 0 73 mg/dL      Glucose 270 mg/dL      Calcium 9 1 mg/dL      AST 26 U/L      ALT 26 U/L      Alkaline Phosphatase 110 U/L      Total Protein 7 7 g/dL      Albumin 3 7 g/dL      Total Bilirubin 1 48 mg/dL      eGFR 84 ml/min/1 73sq m     Narrative:      [facility] guidelines for Chronic Kidney Disease (CKD):   •  Stage 1 with normal or high GFR (GFR > 90 mL/min/1 73 square meters)  •  Stage 2 Mild CKD (GFR = 60-89 mL/min/1 73 square meters)  •  Stage 3A Moderate CKD (GFR = 45-59 mL/min/1 73 square meters)  •  Stage 3B Moderate CKD (GFR = 30-44 mL/min/1 73 square meters)  •  Stage 4 Severe CKD (GFR = 15-29 mL/min/1 73 square meters)  •  Stage 5 End Stage CKD (GFR <15 mL/min/1 73 square meters)  Note: GFR calculation is accurate only with a steady state creatinine    CBC and differential [[id number]]  (Abnormal) Collected: 12/30/22 2013    Lab Status: Final result Specimen: Blood from Arm, Left Updated: 12/30/22 2021     WBC 7 35 Thousand/uL      RBC 5 46 Million/uL      Hemoglobin 16 1 g/dL      Hematocrit 46 5 %      MCV 85 fL      MCH 29 5 pg      MCHC 34 6 g/dL      RDW 12 6 %      MPV 8 3 fL      Platelets 827 Thousands/uL      nRBC 0 /100 WBCs      Neutrophils Relative 51 %      Immat GRANS % 0 %      Lymphocytes Relative 37 % Monocytes Relative 11 %      Eosinophils Relative 1 %      Basophils Relative 0 %      Neutrophils Absolute 3 72 Thousands/µL      Immature Grans Absolute 0 03 Thousand/uL      Lymphocytes Absolute 2 69 Thousands/µL      Monocytes Absolute 0 80 Thousand/µL      Eosinophils Absolute 0 08 Thousand/µL      Basophils Absolute 0 03 Thousands/µL     UA (URINE) with reflex to Scope [[id number]]     Lab Status: No result Specimen: Urine                  CT head without contrast   Final Result by Timmy Levy MD (12/30 2049)      No acute intracranial abnormality  Workstation performed: [id number]         XR chest 1 view portable    (Results Pending)              Procedures  Procedures         ED Course  ED Course as of 12/30/22 2134   Fri Dec 30, 2022   2010 Patient is a 75 year female was brought in via EMS  History is primarily them that patient's  is admitted and office of aging was contacted after her  was concerned she was staying by herself  Patient is baseline confused but calm and cooperative  We will reach out to neighbor as well as office of aging  She has used but otherwise neurologically intact  She is tachycardic but afebrile  We will start medical work-up      Disclosure: Voice to text software was used in the preparation of this document and could have resulted in translational errors       Occasional wrong word or "sound a like" substitutions may have occurred due to the inherent limitations of voice recognition software   Read the chart carefully and recognize, using context, where substitutions have occurred         2023 Spoke with Gurvinder 34 [phone] - patient's neighbor  Patient's neighbor is a nurse and noted significant increase in memory loss over the past year  Larae Bernheim came over to neighbor's house and reported "there was a " on the phone  Patient had increase in confusion   Department of aging    2048 Left message for Gaston Wilson at department of aging at    2049 Patient's labs reveal mild hemoconcentration and hypokalemia  Glucose 270 but no anion gap acidosis  Continue with IV fluids  2050  Mayra Rawls - patient's daughter - patient with increase in forgetfulness and worsening memory loss  2127 Patient admitted  Patient states her  went to get her  Discussed with her that her  is admitted  She states that she is going home  She was asking for mental services to help unhook her  Patient is very confused  Patient's Everlean Rushing in the make decisions she will need further evaluation for capacity however I do not feel this time patient has that  It would be unsafe for her to go home at this time especially alone  Will give Haldol continue with admission process                                           MDM  Number of Diagnoses or Management Options  Diagnosis management comments:     EKG INTERPRETATION @ 2001  RHYTHM: Sinus tachycardia at 130 bpm  AXIS: Normal axis  INTERVALS: DC interval measured at 170 ms  QRS COMPLEX: QRS measured at 108 ms  ST SEGMENT: Unspecific ST segment changes  Diffuse artifact  Low voltage QRS  QT INTERVAL: QTC measured at 408 ms  COMPARED WITH PRIOR  Old EKG in January 2019 tachycardia present  Interpretation by Joann Simental DO         Amount and/or Complexity of Data Reviewed  Clinical lab tests: ordered and reviewed  Tests in the radiology section of CPT®: ordered and reviewed  Tests in the medicine section of CPT®: ordered and reviewed  Obtain history from someone other than the patient: yes  Review and summarize past medical records: yes  Independent visualization of images, tracings, or specimens: yes        Disposition  Final diagnoses:    Altered mental status   Dementia ([facility] [address] )     Time reflects when diagnosis was documented in both MDM as applicable and the Disposition within this note     Time User Action Codes Description Comment    12/30/2022  9:23 PM Darnell Anders Add [R41 82] Altered mental status     12/30/2022  9:23 PM Armandoochoa Phelps Add [F03 90] Dementia [facility])       ED Disposition     ED Disposition   Admit    Condition   Stable    Date/Time   Fri Dec 30, 2022  9:23 PM    Comment   Case was discussed with Gasper Bui and the patient's admission status was agreed to be Admission Status: inpatient status to the service of Dr Erika Caceres   Follow-up Information    None         Patient's Medications   Discharge Prescriptions    No medications on file       No discharge procedures on file      PDMP Review     None          ED Provider  Electronically Signed by           Fiona Kaplan DO  12/30/22 9240 [facility]  12/30/22 8985

## 2022-12-31 NOTE — ASSESSMENT & PLAN NOTE
· Med list reports levothyroxine 100 mcg daily  · TSH low at 0 134  · decreased dose to 88 mcg     · Will need follow up repeat labs in approx 6 weeks

## 2022-12-31 NOTE — ASSESSMENT & PLAN NOTE
Lab Results   Component Value Date    HGBA1C 7 8 (H) 12/21/2022       Hold oral agents  Order sliding scale with coverage

## 2022-12-31 NOTE — PLAN OF CARE
Problem: PAIN - ADULT  Goal: Verbalizes/displays adequate comfort level or baseline comfort level  Description: Interventions:  - Encourage patient to monitor pain and request assistance  - Assess pain using appropriate pain scale  - Administer analgesics based on type and severity of pain and evaluate response  - Implement non-pharmacological measures as appropriate and evaluate response  - Consider cultural and social influences on pain and pain management  - Notify physician/advanced practitioner if interventions unsuccessful or patient reports new pain  Outcome: Progressing     Problem: INFECTION - ADULT  Goal: Absence or prevention of progression during hospitalization  Description: INTERVENTIONS:  - Assess and monitor for signs and symptoms of infection  - Monitor lab/diagnostic results  - Monitor all insertion sites, i e  indwelling lines, tubes, and drains  - Monitor endotracheal if appropriate and nasal secretions for changes in amount and color  - Nesbit appropriate cooling/warming therapies per order  - Administer medications as ordered  - Instruct and encourage patient and family to use good hand hygiene technique  - Identify and instruct in appropriate isolation precautions for identified infection/condition  Outcome: Progressing     Problem: SAFETY ADULT  Goal: Patient will remain free of falls  Description: INTERVENTIONS:  - Educate patient/family on patient safety including physical limitations  - Instruct patient to call for assistance with activity   - Consult OT/PT to assist with strengthening/mobility   - Keep Call bell within reach  - Keep bed low and locked with side rails adjusted as appropriate  - Keep care items and personal belongings within reach  - Initiate and maintain comfort rounds  - Make Fall Risk Sign visible to staff  - Offer Toileting every 2 Hours, in advance of need  - Initiate/Maintain bed alarm  - Obtain necessary fall risk management equipment: bed alarm   - Apply yellow socks and bracelet for high fall risk patients  - Consider moving patient to room near nurses station  Outcome: Progressing  Goal: Maintain or return to baseline ADL function  Description: INTERVENTIONS:  -  Assess patient's ability to carry out ADLs; assess patient's baseline for ADL function and identify physical deficits which impact ability to perform ADLs (bathing, care of mouth/teeth, toileting, grooming, dressing, etc )  - Assess/evaluate cause of self-care deficits   - Assess range of motion  - Assess patient's mobility; develop plan if impaired  - Assess patient's need for assistive devices and provide as appropriate  - Encourage maximum independence but intervene and supervise when necessary  - Involve family in performance of ADLs  - Assess for home care needs following discharge   - Consider OT consult to assist with ADL evaluation and planning for discharge  - Provide patient education as appropriate  Outcome: Progressing  Goal: Maintains/Returns to pre admission functional level  Description: INTERVENTIONS:  - Perform BMAT or MOVE assessment daily    - Set and communicate daily mobility goal to care team and patient/family/caregiver  - Collaborate with rehabilitation services on mobility goals if consulted  - Perform Range of Motion 3 times a day  - Reposition patient every 2 hours    - Dangle patient 3 times a day  - Stand patient 3 times a day  - Ambulate patient 3 times a day  - Out of bed to chair 3 times a day   - Out of bed for meals 3 times a day  - Out of bed for toileting  - Record patient progress and toleration of activity level   Outcome: Progressing     Problem: DISCHARGE PLANNING  Goal: Discharge to home or other facility with appropriate resources  Description: INTERVENTIONS:  - Identify barriers to discharge w/patient and caregiver  - Arrange for needed discharge resources and transportation as appropriate  - Identify discharge learning needs (meds, wound care, etc )  - Arrange for interpretive services to assist at discharge as needed  - Refer to Case Management Department for coordinating discharge planning if the patient needs post-hospital services based on physician/advanced practitioner order or complex needs related to functional status, cognitive ability, or social support system  Outcome: Progressing     Problem: Knowledge Deficit  Goal: Patient/family/caregiver demonstrates understanding of disease process, treatment plan, medications, and discharge instructions  Description: Complete learning assessment and assess knowledge base    Interventions:  - Provide teaching at level of understanding  - Provide teaching via preferred learning methods  Outcome: Progressing

## 2022-12-31 NOTE — PLAN OF CARE
Problem: PAIN - ADULT  Goal: Verbalizes/displays adequate comfort level or baseline comfort level  Description: Interventions:  - Encourage patient to monitor pain and request assistance  - Assess pain using appropriate pain scale  - Administer analgesics based on type and severity of pain and evaluate response  - Implement non-pharmacological measures as appropriate and evaluate response  - Consider cultural and social influences on pain and pain management  - Notify physician/advanced practitioner if interventions unsuccessful or patient reports new pain  Outcome: Progressing     Problem: INFECTION - ADULT  Goal: Absence or prevention of progression during hospitalization  Description: INTERVENTIONS:  - Assess and monitor for signs and symptoms of infection  - Monitor lab/diagnostic results  - Monitor all insertion sites, i e  indwelling lines, tubes, and drains  - Monitor endotracheal if appropriate and nasal secretions for changes in amount and color  - [place] appropriate cooling/warming therapies per order  - Administer medications as ordered  - Instruct and encourage patient and family to use good hand hygiene technique  - Identify and instruct in appropriate isolation precautions for identified infection/condition  Outcome: Progressing     Problem: SAFETY ADULT  Goal: Patient will remain free of falls  Description: INTERVENTIONS:  - Educate patient/family on patient safety including physical limitations  - Instruct patient to call for assistance with activity   - Consult OT/PT to assist with strengthening/mobility   - Keep Call bell within reach  - Keep bed low and locked with side rails adjusted as appropriate  - Keep care items and personal belongings within reach  - Initiate and maintain comfort rounds  - Make Fall Risk Sign visible to staff  - Offer Toileting every 2 Hours, in advance of need  - Initiate/Maintain bed alarm  - Obtain necessary fall risk management equipment: bed alarm   - Apply yellow socks and bracelet for high fall risk patients  - Consider moving patient to room near nurses station  Outcome: Progressing  Goal: Maintain or return to baseline ADL function  Description: INTERVENTIONS:  -  Assess patient's ability to carry out ADLs; assess patient's baseline for ADL function and identify physical deficits which impact ability to perform ADLs (bathing, care of mouth/teeth, toileting, grooming, dressing, etc )  - Assess/evaluate cause of self-care deficits   - Assess range of motion  - Assess patient's mobility; develop plan if impaired  - Assess patient's need for assistive devices and provide as appropriate  - Encourage maximum independence but intervene and supervise when necessary  - Involve family in performance of ADLs  - Assess for home care needs following discharge   - Consider OT consult to assist with ADL evaluation and planning for discharge  - Provide patient education as appropriate  Outcome: Progressing  Goal: Maintains/Returns to pre admission functional level  Description: INTERVENTIONS:  - Perform BMAT or MOVE assessment daily    - Set and communicate daily mobility goal to care team and patient/family/caregiver  - Collaborate with rehabilitation services on mobility goals if consulted  - Perform Range of Motion 3 times a day  - Reposition patient every 2 hours    - Dangle patient 3 times a day  - Stand patient 3 times a day  - Ambulate patient 3 times a day  - Out of bed to chair 3 times a day   - Out of bed for meals 3 times a day  - Out of bed for toileting  - Record patient progress and toleration of activity level   Outcome: Progressing     Problem: DISCHARGE PLANNING  Goal: Discharge to home or other facility with appropriate resources  Description: INTERVENTIONS:  - Identify barriers to discharge w/patient and caregiver  - Arrange for needed discharge resources and transportation as appropriate  - Identify discharge learning needs (meds, wound care, etc )  - Arrange for interpretive services to assist at discharge as needed  - Refer to Case Management Department for coordinating discharge planning if the patient needs post-hospital services based on physician/advanced practitioner order or complex needs related to functional status, cognitive ability, or social support system  Outcome: Progressing     Problem: Knowledge Deficit  Goal: Patient/family/caregiver demonstrates understanding of disease process, treatment plan, medications, and discharge instructions  Description: Complete learning assessment and assess knowledge base    Interventions:  - Provide teaching at level of understanding  - Provide teaching via preferred learning methods  Outcome: Progressing

## 2022-12-31 NOTE — ASSESSMENT & PLAN NOTE
Med list reports levothyroxine 100 mcg daily  TSH low at 0 134  Will decrease dose to 88 mcg     Will need follow up repeat labs in approx 6 weeks

## 2022-12-31 NOTE — H&P
[address] 1955, 75 y o  female MRN: [id number]  Unit/Bed#: ED 15 Encounter: [id number]  Primary Care Provider: Eitan Lake PA-C   Date and time admitted to hospital: 12/30/2022  7:54 PM    Type 2 diabetes mellitus with hyperglycemia, without long-term current use of insulin [facility])  Assessment & Plan  Lab Results   Component Value Date    HGBA1C 7 8 (H) 12/21/2022       Hold oral agents  Order sliding scale with coverage      Acquired hypothyroidism  Assessment & Plan  Med list reports levothyroxine 100 mcg daily  TSH low at 0 134  Will decrease dose to 88 mcg  Will need follow up repeat labs in approx 6 weeks    Essential hypertension  Assessment & Plan  Reports not taking losartan at home  Monitor blood pressures while admitted    * AMS (altered mental status)  Assessment & Plan  Admit to med/surg  Patient's  is currently admitted here to hospital  Dept of Aging was called for well check because he was concerned about wife being home alone  Patient states she "feels fine and wants to go home so the social security people stop bothering her"  She has been difficult to re-direct and has needed ativan and haldol in the ED  Likely will need placement as she is unsafe to be home alone at this time      VTE Prophylaxis: Pharmacologic VTE Prophylaxis contraindicated due to low risk  / reason for no mechanical VTE prophylaxis low risk   Code Status: full dose  POLST: There is no POLST form on file for this patient (pre-hospital)  Discussion with family: none    Anticipated Length of Stay:  Patient will be admitted on an Inpatient basis with an anticipated length of stay of  Greater than 2 midnights  Justification for Hospital Stay: patient requires inpatient placement    Total Time for Visit, including Counseling / Coordination of Care: 45 minutes    Greater than 50% of this total time spent on direct patient counseling and coordination of care     Chief Complaint:   confusion    History of Present Illness:    Karen Mercado is a 75 y o  female who presents with confusion  She is unable to give history  Per ED, patient's  is admitted here to hospital  He was concerned about her being home alone and Dept of Aging was called  Patient states "my  thinks I forget stuff'  She was seen by  geriatrics as outpatient due to rapidly progressive memory loss and confusion  Patient states she feels fine except for being for feeling thirsty  She does state that her  does the cooking, unclear if she has been eating well since he was admitted    Review of Systems:    Review of Systems   Unable to perform ROS: Dementia       Past Medical and Surgical History:     Past Medical History:   Diagnosis Date   • Diabetes mellitus ([facility] [place] [address] )    • History of staph infection    • Hyperlipidemia    • Hypertension    • Myocardial infarction ([facility], [place] [address] ) 1998   • Varicella        Past Surgical History:   Procedure Laterality Date   • APPENDECTOMY     • CARDIAC PACEMAKER PLACEMENT     • CARPAL TUNNEL RELEASE Bilateral    • CHOLECYSTECTOMY     • HYSTERECTOMY     • OOPHORECTOMY Bilateral    • REPLACEMENT TOTAL KNEE Left    • THYROIDECTOMY Bilateral 2/19/2019    Procedure: TOTAL THYROIDECTOMY;  Surgeon: Zen Gomez MD;  Location: [facility];  Service: Surgical Oncology   • US GUIDED THYROID BIOPSY  1/7/2019       Meds/Allergies:    Prior to Admission medications    Medication Sig Start Date End Date Taking?  Authorizing Provider   alendronate (FOSAMAX) 70 mg tablet Take 1 tablet (70 mg total) by mouth every 7 days 12/1/22 3/1/23  Whitlye Dhillon MD   Calcium Polycarbophil (FIBER-CAPS PO) Take 2 capsules by mouth 2 (two) times a day     Historical Provider, MD   cholecalciferol (VITAMIN D3) 1,000 units tablet Take 2 tablets (2,000 Units total) by mouth once a week  Patient not taking: Reported on 12/1/2022 10/28/20   FANNY Alfaro   Empagliflozin (Jardiance) 25 MG TABS Take 1 tablet (25 mg total) by mouth every morning 12/22/22 3/22/23  Mohini Hart MD   ezetimibe (Zetia) 10 mg tablet Take 1 tablet (10 mg total) by mouth daily  Patient not taking: Reported on 11/10/2021 10/28/20   FANNY Blackburn   fexofenadine (ALLEGRA) 180 MG tablet Take 180 mg by mouth daily  Patient not taking: Reported on 12/1/2022    Historical Provider, MD   levothyroxine 100 mcg tablet Take 1 tablet (100 mcg total) by mouth daily 12/21/22 3/21/23  Mohini Hart MD   losartan (COZAAR) 50 mg tablet Take 1 tablet (50 mg total) by mouth daily  Patient not taking: Reported on 11/10/2021 10/28/20   FANNY Blackburn   metFORMIN (GLUCOPHAGE) 1000 MG tablet Take 1 tablet (1,000 mg total) by mouth 2 (two) times a day with meals 11/25/22 12/25/22  FANNY Blackburn   Multiple Vitamin (MULTI-VITAMIN DAILY) TABS Take by mouth daily     Historical Provider, MD   rosuvastatin (CRESTOR) 20 MG tablet Take 1 tablet (20 mg total) by mouth daily at bedtime 8/24/20 11/10/21  Jennifer Rodrigez PA-C   Semaglutide,0 25 or 0 5MG/DOS, 2 MG/1 5ML SOPN Take 0 25 mg once weekly for 4 weeks and then 0 5 mg weekly 12/1/22   Mohini Hart MD     I have reviewed home medications with a medical source (PCP, Pharmacy, other)  Allergies:    Allergies   Allergen Reactions   • Penicillins Rash   • Sulfa Antibiotics Rash       Social History:     Marital Status: /Civil Union   Occupation: retired  Patient Pre-hospital Living Situation: lives with   Patient Pre-hospital Level of Mobility: ambulatory  Patient Pre-hospital Diet Restrictions: none  Substance Use History:   Social History     Substance and Sexual Activity   Alcohol Use No     Social History     Tobacco Use   Smoking Status Never   Smokeless Tobacco Never     Social History     Substance and Sexual Activity   Drug Use No       Family History:    non-contributory    Physical Exam:     Vitals:   Blood Pressure: 149/93 (12/30/22 1957)  Pulse: (!) 134 (12/30/22 1957)  Temperature: 98 6 °F (37 °C) (12/30/22 1957)  Temp Source: Oral (12/30/22 1957)  Respirations: 17 (12/30/22 1957)  SpO2: 97 % (12/30/22 1957)    Physical Exam  Vitals reviewed  Constitutional:       General: She is not in acute distress  Appearance: She is not ill-appearing or diaphoretic  HENT:      Head: Normocephalic and atraumatic  Nose: Nose normal  No congestion  Mouth/Throat:      Mouth: Mucous membranes are moist       Pharynx: Oropharynx is clear  Cardiovascular:      Rate and Rhythm: Normal rate  Pulmonary:      Effort: Pulmonary effort is normal  No respiratory distress  Breath sounds: Normal breath sounds  No wheezing  Abdominal:      General: Bowel sounds are normal  There is no distension  Palpations: Abdomen is soft  Tenderness: There is no abdominal tenderness  Musculoskeletal:         General: No deformity or signs of injury  Cervical back: Neck supple  Skin:     General: Skin is warm and dry  Findings: No bruising or erythema  Neurological:      General: No focal deficit present  Mental Status: She is alert  She is disoriented  Psychiatric:      Comments: Agitated at times, difficult to redirect             Additional Data:     Lab Results: I have personally reviewed pertinent reports  Results from last 7 days   Lab Units 12/30/22 2013   WBC Thousand/uL 7 35   HEMOGLOBIN g/dL 16 1*   HEMATOCRIT % 46 5*   PLATELETS Thousands/uL 272   NEUTROS PCT % 51   LYMPHS PCT % 37   MONOS PCT % 11   EOS PCT % 1     Results from last 7 days   Lab Units 12/30/22 2013   SODIUM mmol/L 138   POTASSIUM mmol/L 3 3*   CHLORIDE mmol/L 100   CO2 mmol/L 26   BUN mg/dL 24   CREATININE mg/dL 0 73   ANION GAP mmol/L 12   CALCIUM mg/dL 9 1   ALBUMIN g/dL 3 7   TOTAL BILIRUBIN mg/dL 1 48*   ALK PHOS U/L 110   ALT U/L 26   AST U/L 26   GLUCOSE RANDOM mg/dL 270*                       Imaging: I have personally reviewed pertinent reports  CT head without contrast   Final Result by Beba Smith MD (12/30 2049)      No acute intracranial abnormality  Workstation performed: [id number]         XR chest 1 view portable    (Results Pending)       EKG, Pathology, and Other Studies Reviewed on Admission:   · EKG: tachycardia    Allscripts / Epic Records Reviewed: Yes     ** Please Note: This note has been constructed using a voice recognition system   **

## 2022-12-31 NOTE — ASSESSMENT & PLAN NOTE
· Secondary to advancing dementia  · Patient's  is currently admitted here to hospital  Dept of Aging was called for well check because he was concerned about wife being home alone  · Likely will need placement as she is unsafe to be home alone at this time unless  is discharged at the same time

## 2023-01-01 LAB
ATRIAL RATE: 109 BPM
ATRIAL RATE: 118 BPM
GLUCOSE SERPL-MCNC: 136 MG/DL (ref 65–140)
GLUCOSE SERPL-MCNC: 146 MG/DL (ref 65–140)
GLUCOSE SERPL-MCNC: 147 MG/DL (ref 65–140)
GLUCOSE SERPL-MCNC: 165 MG/DL (ref 65–140)
P AXIS: -10 DEGREES
P AXIS: 50 DEGREES
PR INTERVAL: 158 MS
PR INTERVAL: 178 MS
QRS AXIS: -32 DEGREES
QRS AXIS: -7 DEGREES
QRSD INTERVAL: 106 MS
QRSD INTERVAL: 72 MS
QT INTERVAL: 306 MS
QT INTERVAL: 336 MS
QTC INTERVAL: 428 MS
QTC INTERVAL: 452 MS
T WAVE AXIS: 56 DEGREES
T WAVE AXIS: 61 DEGREES
VENTRICULAR RATE: 109 BPM
VENTRICULAR RATE: 118 BPM

## 2023-01-01 RX ADMIN — POTASSIUM CHLORIDE 20 MEQ: 1500 TABLET, EXTENDED RELEASE ORAL at 08:00

## 2023-01-01 RX ADMIN — LEVOTHYROXINE SODIUM 88 MCG: 88 TABLET ORAL at 05:18

## 2023-01-01 RX ADMIN — ATORVASTATIN CALCIUM 40 MG: 40 TABLET, FILM COATED ORAL at 17:39

## 2023-01-01 RX ADMIN — Medication 1 TABLET: at 08:00

## 2023-01-01 NOTE — PLAN OF CARE
Problem: PAIN - ADULT  Goal: Verbalizes/displays adequate comfort level or baseline comfort level  Description: Interventions:  - Encourage patient to monitor pain and request assistance  - Assess pain using appropriate pain scale  - Administer analgesics based on type and severity of pain and evaluate response  - Implement non-pharmacological measures as appropriate and evaluate response  - Consider cultural and social influences on pain and pain management  - Notify physician/advanced practitioner if interventions unsuccessful or patient reports new pain  Outcome: Progressing     Problem: INFECTION - ADULT  Goal: Absence or prevention of progression during hospitalization  Description: INTERVENTIONS:  - Assess and monitor for signs and symptoms of infection  - Monitor lab/diagnostic results  - Monitor all insertion sites, i e  indwelling lines, tubes, and drains  - Monitor endotracheal if appropriate and nasal secretions for changes in amount and color  - [place] appropriate cooling/warming therapies per order  - Administer medications as ordered  - Instruct and encourage patient and family to use good hand hygiene technique  - Identify and instruct in appropriate isolation precautions for identified infection/condition  Outcome: Progressing     Problem: SAFETY ADULT  Goal: Patient will remain free of falls  Description: INTERVENTIONS:  - Educate patient/family on patient safety including physical limitations  - Instruct patient to call for assistance with activity   - Consult OT/PT to assist with strengthening/mobility   - Keep Call bell within reach  - Keep bed low and locked with side rails adjusted as appropriate  - Keep care items and personal belongings within reach  - Initiate and maintain comfort rounds  - Make Fall Risk Sign visible to staff  - Offer Toileting every 2 Hours, in advance of need  - Initiate/Maintain bed alarm  - Obtain necessary fall risk management equipment: bed alarm   - Apply yellow socks and bracelet for high fall risk patients  - Consider moving patient to room near nurses station  Outcome: Progressing  Goal: Maintain or return to baseline ADL function  Description: INTERVENTIONS:  -  Assess patient's ability to carry out ADLs; assess patient's baseline for ADL function and identify physical deficits which impact ability to perform ADLs (bathing, care of mouth/teeth, toileting, grooming, dressing, etc )  - Assess/evaluate cause of self-care deficits   - Assess range of motion  - Assess patient's mobility; develop plan if impaired  - Assess patient's need for assistive devices and provide as appropriate  - Encourage maximum independence but intervene and supervise when necessary  - Involve family in performance of ADLs  - Assess for home care needs following discharge   - Consider OT consult to assist with ADL evaluation and planning for discharge  - Provide patient education as appropriate  Outcome: Progressing  Goal: Maintains/Returns to pre admission functional level  Description: INTERVENTIONS:  - Perform BMAT or MOVE assessment daily    - Set and communicate daily mobility goal to care team and patient/family/caregiver  - Collaborate with rehabilitation services on mobility goals if consulted  - Perform Range of Motion 3 times a day  - Reposition patient every 2 hours    - Dangle patient 3 times a day  - Stand patient 3 times a day  - Ambulate patient 3 times a day  - Out of bed to chair 3 times a day   - Out of bed for meals 3 times a day  - Out of bed for toileting  - Record patient progress and toleration of activity level   Outcome: Progressing     Problem: DISCHARGE PLANNING  Goal: Discharge to home or other facility with appropriate resources  Description: INTERVENTIONS:  - Identify barriers to discharge w/patient and caregiver  - Arrange for needed discharge resources and transportation as appropriate  - Identify discharge learning needs (meds, wound care, etc )  - Arrange for interpretive services to assist at discharge as needed  - Refer to Case Management Department for coordinating discharge planning if the patient needs post-hospital services based on physician/advanced practitioner order or complex needs related to functional status, cognitive ability, or social support system  Outcome: Progressing     Problem: Knowledge Deficit  Goal: Patient/family/caregiver demonstrates understanding of disease process, treatment plan, medications, and discharge instructions  Description: Complete learning assessment and assess knowledge base    Interventions:  - Provide teaching at level of understanding  - Provide teaching via preferred learning methods  Outcome: Progressing

## 2023-01-01 NOTE — PHYSICAL THERAPY NOTE
PT EVALUATION 11:40-12:00    75 y o     [id number]    Altered mental status [R41 82]  Dementia ([facility] ) [F03 90]    Past Medical History:   Diagnosis Date    Diabetes mellitus ([facility] )     History of staph infection     Hyperlipidemia     Hypertension     My[facility] ([facility] 1998    Varicella          Past Surgical History:   Procedure Laterality Date    APPENDECTOMY      CARDIAC PACEMAKER PLACEMENT      CARPAL TUNNEL RELEASE Bilateral     CHOLECYSTECTOMY      HYSTERECTOMY      OOPHORECTOMY Bilateral     REPLACEMENT TOTAL KNEE Left     THYROIDECTOMY Bilateral 2/19/2019    Procedure: TOTAL THYROIDECTOMY;  Surgeon: Malachi Marlow MD;  Location: [facility];  Service: Surgical Oncology    US GUIDED THYROID BIOPSY  1/7/2019 01/01/23 1200   PT Last Visit   PT Visit Date 01/01/23   Note Type   Note type Evaluation   Pain Assessment   Pain Score No Pain   Restrictions/Precautions   Weight Bearing Precautions Per Order No   Other Precautions 1:1;Cognitive; Chair Alarm; Bed Alarm; Fall Risk  (virtual 1:1)   Home Living   Type of [address] Two level;Bed/bath upstairs   Home Equipment   (none)   Additional Comments limited historian with hx of dementia  Prior Function   Level of [place] Independent with functional mobility; Needs assistance with IADLS   Lives With Spouse  (who is currently patient on E2 with diagnosis of CA )   Receives Help From [facility] in the last 6 months   (pt denies)   Comments Reports independence without AD use and driving prior to admission  General   Additional Pertinent History Pt is 74 y/o female admitted to hospital following well check by Dept of Aging as spouse hospitalized  Admitted with confusion  PT consulted  Up with assist orders     Family/Caregiver Present No   Cognition   Overall Cognitive Status Impaired   Arousal/Participation Alert   Orientation Level Oriented to person;Oriented to place;Oriented to time   Following Commands Follows one step commands with increased time or repetition   Comments redirection needed  + anxious about current situation  Pleasant  Subjective   Subjective Worried about her     "were the police here?"   RUE Assessment   RUE Assessment WFL   LUE Assessment   LUE Assessment WFL   RLE Assessment   RLE Assessment WFL   LLE Assessment   LLE Assessment WFL   Vision-Basic Assessment   Current Vision Wears glasses all the time   Bed Mobility   Sit to Supine 6  Modified independent   Additional items HOB elevated   Transfers   Sit to Stand 6  Modified independent   Stand to Sit 6  Modified independent   Ambulation/Elevation   Gait pattern Decreased foot clearance; Inconsistent lelia   Gait Assistance 5  Supervision   Additional items Verbal cues   Assistive Device None   Distance 150'x2 with stair training in between distances  Stair Management Assistance 5  Supervision   Additional items Verbal cues   Stair Management Technique No rails; One rail R;Foreward;Reciprocal  (no rail asceding, R rail decsending )   Number of Stairs 4   Balance   Static Sitting Normal   Dynamic Sitting Good   Static Standing Good   Dynamic Standing Fair +   Ambulatory Fair   Endurance Deficit   Endurance Deficit Yes   Endurance Deficit Description (S)  HR with ambulation 130's  Activity Tolerance   Activity Tolerance Patient tolerated treatment well;Treatment limited secondary to medical complications (Comment)   Medical Staff Made Aware Nurse, Viola   Nurse Made Aware yes   Assessment   Prognosis Good   Problem List Decreased endurance; Impaired balance;Decreased cognition; Impaired judgement;Decreased safety awareness   Assessment Cecilio Han is a 75 y o  female who presents with confusion  She is unable to give history  Per ED, patient's  is admitted here to hospital  He was concerned about her being home alone and Dept of Aging was called  Patient states "my  thinks I forget stuff'   She was seen by  geriatrics as outpatient due to rapidly progressive memory loss and confusion  Patient states she feels fine except for being for feeling thirsty  Admitted with change in mental status  PT consulted  Up with assist orders  Prior to admission resides with spouse in 2 story home  Independent without AD use  Spouse currently hospitalized and with hx of CA  Examination of strength, balance, functional mobility and locomotion WFL  Gait without LOB during ambulation and able to negotiate stairs with S   Risk for falls given impairments in cognition, safety awareness, distractibility with redirection needed to tasks  HR elevation with ambulation observed in 130's on telemetry  Nursing aware  At present no skilled IPPT needs identified however recommend restorative program to prevent functional decline  The patient's AM-PAC Basic Mobility Inpatient Short Form Raw Score is 24  A Raw score of greater than 16 suggests the patient may benefit from discharge to home  Please also refer to the recommendation of the Physical Therapist for safe discharge planning  At this time will d/c PT as no rehab needs identified  Given cognition with hx of dementia will require environment in which has supervision  Barriers to Discharge Decreased caregiver support; Inaccessible home environment   Barriers to Discharge Comments stairs, cognition with spouse hospitalization  Goals   Patient Goals none stated   Plan   Treatment/Interventions Functional transfer training;Elevations; Endurance training;Patient/family training;Equipment eval/education; Bed mobility;Gait training;Spoke to nursing   PT Frequency Other (Comment)  (d/c PT to restorative services )   Recommendation   PT Discharge Recommendation No rehabilitation needs  (given dementia will require more supervised environment)   AM-PAC Basic Mobility Inpatient   Turning in Bed Without Bedrails 4   Lying on Back to Sitting on Edge of Flat Bed 4   Moving Bed to Chair 4   Standing Up From Chair 4 Walk in Room 4   Climb 3-5 Stairs 4   Basic Mobility Inpatient Raw Score 24   Basic Mobility Standardized Score 57 68   Highest Level Of Mobility   JH-HLM Goal 8: Walk 250 feet or more   JH-HLM Achieved 8: Walk 250 feet ot more   End of Consult   Patient Position at End of Consult Supine;Bed/Chair alarm activated; All needs within reach     Hx/personal factors: co-morbidities, inaccessible home, telemetry, dec cognition, and fall risk  Examination: dec mobility, dec endurance, dec amb, risk for falls, dec cognition, assessed body system, balance, endurance, amb, D/C disposition & fall risk  Clinical: unpredictable (ongoing medical status and risk for falls), cognition, 1:1 observation, tele continuous monitoring     Complexity: high             Reynaldo Sherman, PT

## 2023-01-01 NOTE — PROGRESS NOTES
[facility]  Progress Note - Genevive Combe 1955, 75 y o  female MRN: [id number]  Unit/Bed#: E4 -01 Encounter: [id number]  Primary Care Provider: Aby Oh PA-C   Date and time admitted to hospital: 12/30/2022  7:54 PM    * AMS (altered mental status)  Assessment & Plan  · Secondary to advancing dementia  · Patient's  is currently admitted here to hospital  Dept of Aging was called for well check because he was concerned about wife being home alone  · Likely will need placement as she is unsafe to be home alone at this time unless  is discharged at the same time    Type 2 diabetes mellitus with hyperglycemia, without long-term current use of insulin ([facility] )  Assessment & Plan  Lab Results   Component Value Date    HGBA1C 7 8 (H) 12/21/2022       · Hold oral agents  · sliding scale with coverage    Acquired hypothyroidism  Assessment & Plan  · Med list reports levothyroxine 100 mcg daily  · TSH low at 0 134  · decreased dose to 88 mcg  · Will need follow up repeat labs in approx 6 weeks    Essential hypertension  Assessment & Plan  · Reports not taking losartan at home  · Monitor blood pressures while admitted        VTE Pharmacologic Prophylaxis:   Pharmacologic: Low risk, early ambulation    Patient Centered Rounds: I have performed bedside rounds with nursing staff today  Education and Discussions with Family / Patient:  aware    Time Spent for Care: 20 minutes  More than 50% of total time spent on counseling and coordination of care as described above      Current Length of Stay: 2 day(s)    Current Patient Status: Inpatient   Certification Statement: The patient will continue to require additional inpatient hospital stay due to Admitted as her  is in the hospital and she has dementia cannot be alone    Discharge Plan / Estimated Discharge Date: TBD    Code Status: Level 1 - Full Code      Subjective:   Patient seen and examined at bedside, feeling very anxious and emotional     Objective:     Vitals:   Temp (24hrs), Av 3 °F (36 3 °C), Min:96 3 °F (35 7 °C), Max:98 3 °F (36 8 °C)    Temp:  [96 3 °F (35 7 °C)-98 3 °F (36 8 °C)] 98 3 °F (36 8 °C)  HR:  [] 105  Resp:  [18-20] 20  BP: (114-137)/(76-92) 135/76  SpO2:  [96 %-99 %] 98 %  There is no height or weight on file to calculate BMI  Input and Output Summary (last 24 hours): Intake/Output Summary (Last 24 hours) at 2023 1308  Last data filed at 2023 0501  Gross per 24 hour   Intake 960 ml   Output --   Net 960 ml       Physical Exam:    Constitutional: Patient is in no acute distress  HEENT:  Normocephalic, atraumatic  Cardiovascular: Normal S1S2, RRR, No murmurs/rubs/gallops appreciated  Pulmonary:  Bilateral air entry, No rhonchi/rales/wheezing appreciated  Abdominal: Soft, Bowel sounds present, Non-tender, Non-distended  Extremities:  No cyanosis, clubbing or edema  Neurological: Awake, alert  Skin:  Warm, dry    Additional Data:     Labs:    Results from last 7 days   Lab Units 22   WBC Thousand/uL 5 17 7 35   HEMOGLOBIN g/dL 14 7 16 1*   HEMATOCRIT % 43 1 46 5*   PLATELETS Thousands/uL 228 272   NEUTROS PCT %  --  51   LYMPHS PCT %  --  37   MONOS PCT %  --  11   EOS PCT %  --  1     Results from last 7 days   Lab Units 22  0528 22   POTASSIUM mmol/L 3 4* 3 3*   CHLORIDE mmol/L 107 100   CO2 mmol/L 23 26   BUN mg/dL 18 24   CREATININE mg/dL 0 46* 0 73   CALCIUM mg/dL 8 0* 9 1   ALK PHOS U/L  --  110   ALT U/L  --  26   AST U/L  --  26            I Have Reviewed All Lab Data Listed Above      Invasive Devices     Peripheral Intravenous Line  Duration           Peripheral IV 22 Left Antecubital 1 day                     Recent Cultures (last 7 days):           Last 24 Hours Medication List:   Current Facility-Administered Medications   Medication Dose Route Frequency Provider Last Rate   • acetaminophen  650 mg Oral Q6H PRN Blanca Win PA-C     • aluminum-magnesium hydroxide-simethicone  30 mL Oral Q6H PRN Blanca Win PA-C     • atorvastatin  40 mg Oral Daily With SAQIB David     • insulin lispro  1-5 Units Subcutaneous TID AC Blanca Win PA-C     • insulin lispro  1-5 Units Subcutaneous HS Blanca Win PA-C     • levothyroxine  88 mcg Oral Early Morning Blanca Win PA-C     • LORazepam  1 mg Intravenous Q4H PRN Blanca Win PA-C     • multivitamin-minerals  1 tablet Oral Daily Blanca Win PA-C     • ondansetron  4 mg Intravenous Q6H PRN Blanca Win PA-C     • potassium chloride  20 mEq Oral Daily Blanca Win PA-C          Today, Patient Was Seen By: Francis Gates MD

## 2023-01-01 NOTE — ASSESSMENT & PLAN NOTE
Lab Results   Component Value Date    HGBA1C 7 8 (H) 12/21/2022       · Hold oral agents  · sliding scale with coverage

## 2023-01-02 LAB
GLUCOSE SERPL-MCNC: 176 MG/DL (ref 65–140)
GLUCOSE SERPL-MCNC: 223 MG/DL (ref 65–140)
GLUCOSE SERPL-MCNC: 245 MG/DL (ref 65–140)
GLUCOSE SERPL-MCNC: 264 MG/DL (ref 65–140)

## 2023-01-02 RX ADMIN — LEVOTHYROXINE SODIUM 88 MCG: 88 TABLET ORAL at 05:32

## 2023-01-02 RX ADMIN — INSULIN LISPRO 2 UNITS: 100 INJECTION, SOLUTION INTRAVENOUS; SUBCUTANEOUS at 11:37

## 2023-01-02 RX ADMIN — INSULIN LISPRO 1 UNITS: 100 INJECTION, SOLUTION INTRAVENOUS; SUBCUTANEOUS at 21:29

## 2023-01-02 RX ADMIN — Medication 1 TABLET: at 08:58

## 2023-01-02 RX ADMIN — INSULIN LISPRO 1 UNITS: 100 INJECTION, SOLUTION INTRAVENOUS; SUBCUTANEOUS at 08:58

## 2023-01-02 RX ADMIN — ATORVASTATIN CALCIUM 40 MG: 40 TABLET, FILM COATED ORAL at 16:34

## 2023-01-02 RX ADMIN — POTASSIUM CHLORIDE 20 MEQ: 1500 TABLET, EXTENDED RELEASE ORAL at 08:57

## 2023-01-02 RX ADMIN — INSULIN LISPRO 2 UNITS: 100 INJECTION, SOLUTION INTRAVENOUS; SUBCUTANEOUS at 16:34

## 2023-01-02 NOTE — PLAN OF CARE
Problem: PAIN - ADULT  Goal: Verbalizes/displays adequate comfort level or baseline comfort level  Description: Interventions:  - Encourage patient to monitor pain and request assistance  - Assess pain using appropriate pain scale  - Administer analgesics based on type and severity of pain and evaluate response  - Implement non-pharmacological measures as appropriate and evaluate response  - Consider cultural and social influences on pain and pain management  - Notify physician/advanced practitioner if interventions unsuccessful or patient reports new pain  Outcome: Progressing     Problem: INFECTION - ADULT  Goal: Absence or prevention of progression during hospitalization  Description: INTERVENTIONS:  - Assess and monitor for signs and symptoms of infection  - Monitor lab/diagnostic results  - Monitor all insertion sites, i e  indwelling lines, tubes, and drains  - Monitor endotracheal if appropriate and nasal secretions for changes in amount and color  - [place] appropriate cooling/warming therapies per order  - Administer medications as ordered  - Instruct and encourage patient and family to use good hand hygiene technique  - Identify and instruct in appropriate isolation precautions for identified infection/condition  Outcome: Progressing     Problem: SAFETY ADULT  Goal: Patient will remain free of falls  Description: INTERVENTIONS:  - Educate patient/family on patient safety including physical limitations  - Instruct patient to call for assistance with activity   - Consult OT/PT to assist with strengthening/mobility   - Keep Call bell within reach  - Keep bed low and locked with side rails adjusted as appropriate  - Keep care items and personal belongings within reach  - Initiate and maintain comfort rounds  - Make Fall Risk Sign visible to staff  - Offer Toileting every 2 Hours, in advance of need  - Initiate/Maintain bed alarm  - Obtain necessary fall risk management equipment: bed alarm   - Apply yellow socks and bracelet for high fall risk patients  - Consider moving patient to room near nurses station  Outcome: Progressing  Goal: Maintain or return to baseline ADL function  Description: INTERVENTIONS:  -  Assess patient's ability to carry out ADLs; assess patient's baseline for ADL function and identify physical deficits which impact ability to perform ADLs (bathing, care of mouth/teeth, toileting, grooming, dressing, etc )  - Assess/evaluate cause of self-care deficits   - Assess range of motion  - Assess patient's mobility; develop plan if impaired  - Assess patient's need for assistive devices and provide as appropriate  - Encourage maximum independence but intervene and supervise when necessary  - Involve family in performance of ADLs  - Assess for home care needs following discharge   - Consider OT consult to assist with ADL evaluation and planning for discharge  - Provide patient education as appropriate  Outcome: Progressing  Goal: Maintains/Returns to pre admission functional level  Description: INTERVENTIONS:  - Perform BMAT or MOVE assessment daily    - Set and communicate daily mobility goal to care team and patient/family/caregiver  - Collaborate with rehabilitation services on mobility goals if consulted  - Perform Range of Motion 3 times a day  - Reposition patient every 2 hours    - Dangle patient 3 times a day  - Stand patient 3 times a day  - Ambulate patient 3 times a day  - Out of bed to chair 3 times a day   - Out of bed for meals 3 times a day  - Out of bed for toileting  - Record patient progress and toleration of activity level   Outcome: Progressing     Problem: DISCHARGE PLANNING  Goal: Discharge to home or other facility with appropriate resources  Description: INTERVENTIONS:  - Identify barriers to discharge w/patient and caregiver  - Arrange for needed discharge resources and transportation as appropriate  - Identify discharge learning needs (meds, wound care, etc )  - Arrange for interpretive services to assist at discharge as needed  - Refer to Case Management Department for coordinating discharge planning if the patient needs post-hospital services based on physician/advanced practitioner order or complex needs related to functional status, cognitive ability, or social support system  Outcome: Progressing     Problem: Knowledge Deficit  Goal: Patient/family/caregiver demonstrates understanding of disease process, treatment plan, medications, and discharge instructions  Description: Complete learning assessment and assess knowledge base    Interventions:  - Provide teaching at level of understanding  - Provide teaching via preferred learning methods  Outcome: Progressing     Problem: Potential for Falls  Goal: Patient will remain free of falls  Description: INTERVENTIONS:  - Educate patient/family on patient safety including physical limitations  - Instruct patient to call for assistance with activity   - Consult OT/PT to assist with strengthening/mobility   - Keep Call bell within reach  - Keep bed low and locked with side rails adjusted as appropriate  - Keep care items and personal belongings within reach  - Initiate and maintain comfort rounds  - Make Fall Risk Sign visible to staff  - Offer Toileting every 2 Hours, in advance of need  - Initiate/Maintain bed alarm  - Obtain necessary fall risk management equipment: bed alarm   - Apply yellow socks and bracelet for high fall risk patients  - Consider moving patient to room near nurses station  Outcome: Progressing

## 2023-01-02 NOTE — ASSESSMENT & PLAN NOTE
Lab Results   Component Value Date    HGBA1C 7 8 (H) 12/21/2022       · Hold oral agents  · Continue indulin sliding scale

## 2023-01-02 NOTE — PROGRESS NOTES
Progress Note - Anuj Pruitt 75 y o  female MRN: [id number]    Unit/Bed#: E4 -01 Encounter: [id number]      Subjective: The patient feels pretty well  She denies any chest pain, shortness of breath, abdominal pain, nausea, or vomiting  Physical Exam:   Temp:  [97 6 °F (36 4 °C)-99 °F (37 2 °C)] 98 8 °F (37 1 °C)  HR:  [] 118  Resp:  [18-20] 18  BP: (107-152)/() 107/79    Gen: Well-developed, well-nourished, in no distress  Neck: Supple  No lymphadenopathy, goiter, or bruit  Heart: Regular rhythm  I heard no murmur, gallop, or rub  Lungs: Clear to auscultation and percussion  No wheezing, rales, or rhonchi  Abd: Soft with active bowel sounds  No mass, tenderness, or organomegaly  Extremities: No clubbing, cyanosis, or edema  No calf tenderness  Neuro: Alert and conversant  Memory is poor  No focal sign  Skin: Warm and dry  LABS: No new labs    Assessment/Plan:  1  Altered mental status most likely related to the patient's underlying dementia exacerbated by the absence of her   2  Type 2 diabetes  3  Hypothyroidism, over replaced  4  Hypertension    The patient remained stable  Her  is still hospitalized  She will need short-term rehab      VTE Pharmacologic Prophylaxis: Reason for no pharmacologic prophylaxis Low risk  VTE Mechanical Prophylaxis: reason for no mechanical VTE prophylaxis Low risk

## 2023-01-02 NOTE — OCCUPATIONAL THERAPY NOTE
Occupational Therapy Evaluation     Patient Name: Tri Cat  [id number] Date: 1/2/2023  Problem List  Principal Problem:    AMS (altered mental status)  Active Problems:    Essential hypertension    Acquired hypothyroidism    Type 2 diabetes mellitus with hyperglycemia, without long-term current use of insulin ([facility] )    Past Medical History  Past Medical History:   Diagnosis Date    Diabetes mellitus ([facility] )     History of staph infection     Hyperlipidemia     Hypertension     Myocardial infarction ([facility] [facility] ) 1998    Varicella      Past Surgical History  Past Surgical History:   Procedure Laterality Date    APPENDECTOMY      CARDIAC PACEMAKER PLACEMENT      CARPAL TUNNEL RELEASE Bilateral     CHOLECYSTECTOMY      HYSTERECTOMY      OOPHORECTOMY Bilateral     REPLACEMENT TOTAL KNEE Left     THYROIDECTOMY Bilateral 2/19/2019    Procedure: TOTAL THYROIDECTOMY;  Surgeon: Phillip Pete MD;  [facility]: [facility];  Service: Surgical Oncology    US GUIDED THYROID BIOPSY  1/7/2019 01/02/23 0815   OT Last Visit   OT Visit Date 01/02/23   Note Type   Note type Evaluation   Additional Comments Pt greeted in supine and agreeable with skilled OT evalution  Pain Assessment   Pain Assessment Tool 0-10   Pain Score No Pain   Restrictions/Precautions   Weight Bearing Precautions Per Order No   Other Precautions 1:1;Cognitive; Chair Alarm; Bed Alarm;Telemetry; Fall Risk   Home Living   Type of [address] [place] [address] Two level;Bed/bath upstairs   Bathroom Shower/Tub Tub/shower unit   Bathroom Toilet Standard   Bathroom Equipment Built-in shower seat   Home Equipment   (no DME)   Additional Comments limited historian  hx of dementia  Prior Function   Level of [place] Independent with ADLs; Independent with functional mobility; Needs assistance with IADLS   Lives With [address] in the last 6 months 0   Vocational Retired   Comments Prior to admission, pt lives with  in a multi level home  Pt reports home has a tub shower with seat, standard toilets and bed  Pt reports she was I with ADLs and shares IADLs with   Ambulates without device and does not own DME  Pt reports she drives, but has not driven in a while  Pt is a poor historian  Per EMR, pts  is currently admitted to this hospital    Lifestyle   Autonomy I with ADLs and mobility  Reciprocal Relationships spouse   ADL   Where Assessed Chair   Eating Assistance 7  Independent   Grooming Assistance 7  Independent   UB Bathing Assistance 6  Modified Independent   LB Bathing Assistance 6  Modified Independent   UB Dressing Assistance 6  Modified independent   LB Dressing Assistance 6  Modified independent   Toileting Assistance  6  Modified independent   Functional Assistance 6  Modified independent   Transfers   Sit to Stand 6  Modified independent   Stand to Sit 6  Modified independent   Toilet transfer 6  Modified independent   Functional Mobility   Functional Mobility 6  Modified independent   Additional Comments no device  household distances  Balance   Static Sitting Normal   Dynamic Sitting Good   Static Standing Good   Dynamic Standing Fair +   Ambulatory Fair   Activity Tolerance   Activity Tolerance Patient tolerated treatment well;Treatment limited secondary to medical complications (Comment)   Nurse Made Aware NATHALIE Vann Amilcar Assessment   RUE Assessment WFL   LUE Assessment   LUE Assessment WFL   Hand Function   Gross Motor Coordination Functional   Fine Motor Coordination Functional   Vision-Basic Assessment   Current Vision Wears glasses all the time   Psychosocial   Psychosocial (WDL) WDL   Cognition   Overall Cognitive Status Impaired   Arousal/Participation Cooperative   Attention Attends with cues to redirect   Orientation Level Oriented to person;Disoriented to place; Disoriented to time;Disoriented to situation   Memory Decreased recall of precautions;Decreased recall of recent events;Decreased short term memory   Following Commands Follows one step commands with increased time or repetition   Comments pleasant and cooperative   Assessment   Kizzy Hurst is a 75 y o  female who presents with confusion  She is unable to give history  Per ED, patient's  is admitted here to hospital  He was concerned about her being home alone and [facility] was called  Patient states "my  thinks I forget stuff'  She was seen by  geriatrics as outpatient due to rapidly progressive memory loss and confusion  Patient states she feels fine except for being for feeling thirsty  Admitted with change in mental status  Pt with active orders for OT and up with assistance  Prior to admission, pt lives with  in a multi level home  Pt reports home has a tub shower with seat, standard toilets and bed  Pt reports she was I with ADLs and shares IADLs with   Ambulates without device and does not own DME  Pt reports she drives, but has not driven in a while  Pt is a poor historian  Per EMR, pts  is currently admitted to this hospital   Upon evaluation, pt presenting close to her functional baseline for self care tasks, functional transfers and mobility  Pt current level of function: bed mobility - Hitesh; transfers- Hitesh; functional mobility-Hitesh; UB dressing / bathing - Hitesh ; LB dressing/ bathing- Hitesh; toileting - Hitesh  Skilled OT not warranted at this time  OT DC recommendation: home with increased supervision- requires 24 hour supervision due to cognition  Plan   OT Frequency Eval only   Recommendation   OT Discharge Recommendation No rehabilitation needs  (will require an environment that can support 24 hour supervision due to imapired cognition )   Additional Comments  The patient's raw score on the AM-PAC Daily Activity inpatient short form is 23  , standardized score is 51 12  , greater than 39 4  Patients at this level are likely to benefit from discharge to home   Please refer to the recommendation of the Occupational Therapist for safe discharge planning     AM-PAC Daily Activity Inpatient   Lower Body Dressing 4   Bathing 3   Toileting 4   Upper Body Dressing 4   Grooming 4   Eating 4   Daily Activity Raw Score 23   Daily Activity Standardized Score (Calc for Raw Score >=11) 51 12   AM-PAC Applied Cognition Inpatient   Following a Speech/Presentation 3   Understanding Ordinary Conversation 3   Taking Medications 2   Remembering Where Things Are Placed or Put Away 2   Remembering List of 4-5 Errands 1   Taking Care of Complicated Tasks 1   Applied Cognition Raw Score 12   Applied Cognition Standardized Score 28 82   Chaim Pineda, OT

## 2023-01-02 NOTE — CASE MANAGEMENT
Case Management Assessment & Discharge Planning Note    Patient name Noelle Crooks  Location [address] [address] [address] 56-* MRN [id number]  : 1955 Date 2023       Current Admission Date: 2022  Current Admission Diagnosis:AMS (altered mental status)   Patient Active Problem List    Diagnosis Date Noted   • AMS (altered mental status) 2022   • Encounter for follow-up surveillance of thyroid cancer 2019   • Type 2 diabetes mellitus with hyperglycemia, without long-term current use of insulin ([facility] [address] ) 2019   • Papillary thyroid carcinoma ([facility] [address] ) 2019   • Acquired hypothyroidism 2019   • History of thyroid cancer 01/10/2019   • Abnormal thyroid blood test 2018   • Posterior vitreous detachment 01/15/2018   • Arthritis 2017   • History of myocardial infarction 2017   • Insomnia 2017   • Age-related nuclear cataract of both eyes 2017   • Epiretinal membrane 2017   • Elective replacement indicated for pacemaker 2017   • Failed total left knee replacement ([facility] [address] ) 2017   • Seasonal allergic rhinitis 2017   • Lumbar back pain with radiculopathy affecting left lower extremity 10/06/2016   • Diabetes mellitus type 2, uncontrolled, without complications 1026   • Metabolic syndrome 10/64/3703   • Osteoarthritis, generalized 10/20/2015   • Atrophic vaginitis 2013   • Essential hypertension 2012   • Disorder of bilirubin excretion 2010   • Hyperlipidemia 2010   • Cardiac pacemaker in situ 2010   • Coronary atherosclerosis 2010   • Chronic nonalcoholic liver disease       LOS (days): 3  Geometric Mean LOS (GMLOS) (days): 2 50  Days to GMLOS:-0 2     OBJECTIVE:    Risk of Unplanned Readmission Score: 12 69         Current admission status: Inpatient       Preferred Pharmacy:   [id number] [address] [place] [address] [address] [id number]  Phone: [phone] Fax: [phone]    Primary Care Provider: Nelly Gray PA-C    Primary Insurance: MEDICARE  Secondary Insurance: Stanislav Leak:  [address] [employer] Proxies     Daniel Mcclellan Representative - Daughter   Primary Phone: [phone] (Mobile)               Advance Directives  Does patient have a [address]?: No  Was patient offered paperwork?: Yes (5 wishes placed in pt's file for daughter to get upon d/c)  Does patient currently have a Health Care decision maker?: Yes, please see Health Care Proxy section  Does patient have Advance Directives?: No  Was patient offered paperwork?: Yes  Primary Contact: Ivy [facility] (daughter) [phone]              Patient Information  Admitted from[de-identified] Home  Mental Status: Confused  During Assessment patient was accompanied by: Not accompanied during assessment  Assessment information provided by[de-identified] Daughter  Primary Caregiver: Self  Support Systems: Spouse/significant other  [place] of Residence: [facility] do you live in?: [address] entry access options   Select all that apply : Stairs  Number of steps to enter home : 3  Do the steps have railings?: No  Type of Current Residence: 2 story home  Upon entering residence, is there a bedroom on the main floor (no further steps)?: No  A bedroom is located on the following floor levels of residence (select all that apply):: 2nd Floor  Upon entering residence, is there a bathroom on the main floor (no further steps)?: Yes (1/2 bath)  Number of steps to 2nd floor from main floor: One Flight  In the last 12 months, was there a time when you were not able to pay the mortgage or rent on time?: No  In the last 12 months, how many places have you lived?: 1  In the last 12 months, was there a time when you did not have a steady place to sleep or slept in a shelter (including now)?: No  Homeless/housing insecurity resource given?: N/A  Living Arrangements: Lives w/ Spouse/significant other  Is patient a ?: No    Activities of Daily Living Prior to Admission  Functional Status: Independent  Completes ADLs independently?: Yes  Ambulates independently?: Yes  Does patient use assisted devices?: No  Does patient currently own DME?: Yes  What DME does the patient currently own?:  (daughter unsure - believes pt may have bedside commode and walker from previous surgery)  Does the patient have a history of Short-Term Rehab?: No  Does patient have a history of HHC?: Yes  Does patient currently have [facility]?: No         Patient Information Continued  Income Source: SSI/SSD  Does patient have prescription coverage?: Yes  Within the past 12 months, you worried that your food would run out before you got the money to buy more : Never true  Within the past 12 months, the food you bought just didn't last and you didn't have money to get more : Never true  Food insecurity resource given?: N/A  Does patient receive dialysis treatments?: No  Does patient have a history of substance abuse?: No  Does patient have a history of Mental Health Diagnosis?: No         Means of Transportation  Means of Transport to Appts[de-identified] Family transport Darío Hammed typically drives, though pt does drive once in awhile)  In the past 12 months, has lack of transportation kept you from medical appointments or from getting medications?: No  In the past 12 months, has lack of transportation kept you from meetings, work, or from getting things needed for daily living?: No  Was application for public transport provided?: N/A        DISCHARGE DETAILS:    Discharge planning discussed with[de-identified] Soumya Lopez Hidden of Choice: Yes     WILVER contacted family/caregiver?: Yes Ricardo Chaidez)  Were Treatment Team discharge recommendations reviewed with patient/caregiver?: Yes  Did patient/caregiver verbalize understanding of patient care needs?: Yes  Were patient/caregiver advised of the risks associated with not following Treatment Team discharge recommendations?: Yes    Contacts  Patient Contacts: Eve Siegel  Relationship to Patient[de-identified] Family  Contact Method: Phone  Phone Number: [phone]  Reason/Outcome: Emergency Contact, Discharge [address]         Is the patient interested in [facility] at discharge?: No    DME Referral Provided  Referral made for DME?: No    Other Referral/Resources/Interventions Provided:  Interventions: None Indicated         Treatment Team Recommendation: Home  Discharge Destination Plan[de-identified] Home  Transport at Discharge : Family                                      Additional Comments: CM spoke with pt's daughter, Eve Siegel, via phone  Eve Robbin requesting 5 Wishes  CM placed in pt's chart for Eve Siegel to  when she takes pt home  Eve Siegel mentioned she has an older sister who lives in [place]; however, Eve Siegel is more involved with pt's care  CM dept to follow

## 2023-01-02 NOTE — ASSESSMENT & PLAN NOTE
· Med list reports levothyroxine 100 mcg daily  · TSH low at 0 134  · Dose getting decreased to 88 mcg     · Will need follow up repeat labs in approx 6 weeks

## 2023-01-03 LAB
GLUCOSE SERPL-MCNC: 162 MG/DL (ref 65–140)
GLUCOSE SERPL-MCNC: 163 MG/DL (ref 65–140)
GLUCOSE SERPL-MCNC: 246 MG/DL (ref 65–140)
GLUCOSE SERPL-MCNC: 254 MG/DL (ref 65–140)

## 2023-01-03 RX ADMIN — ACETAMINOPHEN 650 MG: 325 TABLET, FILM COATED ORAL at 19:56

## 2023-01-03 RX ADMIN — Medication 1 TABLET: at 08:10

## 2023-01-03 RX ADMIN — LEVOTHYROXINE SODIUM 88 MCG: 88 TABLET ORAL at 06:45

## 2023-01-03 RX ADMIN — INSULIN LISPRO 2 UNITS: 100 INJECTION, SOLUTION INTRAVENOUS; SUBCUTANEOUS at 08:10

## 2023-01-03 RX ADMIN — INSULIN LISPRO 2 UNITS: 100 INJECTION, SOLUTION INTRAVENOUS; SUBCUTANEOUS at 18:01

## 2023-01-03 RX ADMIN — METFORMIN HYDROCHLORIDE 1000 MG: 500 TABLET ORAL at 11:16

## 2023-01-03 RX ADMIN — INSULIN LISPRO 1 UNITS: 100 INJECTION, SOLUTION INTRAVENOUS; SUBCUTANEOUS at 11:53

## 2023-01-03 RX ADMIN — METFORMIN HYDROCHLORIDE 1000 MG: 500 TABLET ORAL at 17:48

## 2023-01-03 RX ADMIN — INSULIN LISPRO 1 UNITS: 100 INJECTION, SOLUTION INTRAVENOUS; SUBCUTANEOUS at 21:43

## 2023-01-03 RX ADMIN — ATORVASTATIN CALCIUM 40 MG: 40 TABLET, FILM COATED ORAL at 17:48

## 2023-01-03 NOTE — PLAN OF CARE
Problem: PAIN - ADULT  Goal: Verbalizes/displays adequate comfort level or baseline comfort level  Description: Interventions:  - Encourage patient to monitor pain and request assistance  - Assess pain using appropriate pain scale  - Administer analgesics based on type and severity of pain and evaluate response  - Implement non-pharmacological measures as appropriate and evaluate response  - Consider cultural and social influences on pain and pain management  - Notify physician/advanced practitioner if interventions unsuccessful or patient reports new pain  Outcome: Progressing     Problem: INFECTION - ADULT  Goal: Absence or prevention of progression during hospitalization  Description: INTERVENTIONS:  - Assess and monitor for signs and symptoms of infection  - Monitor lab/diagnostic results  - Monitor all insertion sites, i e  indwelling lines, tubes, and drains  - Monitor endotracheal if appropriate and nasal secretions for changes in amount and color  - [place] appropriate cooling/warming therapies per order  - Administer medications as ordered  - Instruct and encourage patient and family to use good hand hygiene technique  - Identify and instruct in appropriate isolation precautions for identified infection/condition  Outcome: Progressing     Problem: SAFETY ADULT  Goal: Patient will remain free of falls  Description: INTERVENTIONS:  - Educate patient/family on patient safety including physical limitations  - Instruct patient to call for assistance with activity   - Consult OT/PT to assist with strengthening/mobility   - Keep Call bell within reach  - Keep bed low and locked with side rails adjusted as appropriate  - Keep care items and personal belongings within reach  - Initiate and maintain comfort rounds  - Make Fall Risk Sign visible to staff  - Offer Toileting every 2 Hours, in advance of need  - Initiate/Maintain bed alarm  - Obtain necessary fall risk management equipment: bed alarm   - Apply yellow socks and bracelet for high fall risk patients  - Consider moving patient to room near nurses station  Outcome: Progressing  Goal: Maintain or return to baseline ADL function  Description: INTERVENTIONS:  -  Assess patient's ability to carry out ADLs; assess patient's baseline for ADL function and identify physical deficits which impact ability to perform ADLs (bathing, care of mouth/teeth, toileting, grooming, dressing, etc )  - Assess/evaluate cause of self-care deficits   - Assess range of motion  - Assess patient's mobility; develop plan if impaired  - Assess patient's need for assistive devices and provide as appropriate  - Encourage maximum independence but intervene and supervise when necessary  - Involve family in performance of ADLs  - Assess for home care needs following discharge   - Consider OT consult to assist with ADL evaluation and planning for discharge  - Provide patient education as appropriate  Outcome: Progressing  Goal: Maintains/Returns to pre admission functional level  Description: INTERVENTIONS:  - Perform BMAT or MOVE assessment daily    - Set and communicate daily mobility goal to care team and patient/family/caregiver  - Collaborate with rehabilitation services on mobility goals if consulted  - Perform Range of Motion 3 times a day  - Reposition patient every 2 hours    - Dangle patient 3 times a day  - Stand patient 3 times a day  - Ambulate patient 3 times a day  - Out of bed to chair 3 times a day   - Out of bed for meals 3 times a day  - Out of bed for toileting  - Record patient progress and toleration of activity level   Outcome: Progressing     Problem: DISCHARGE PLANNING  Goal: Discharge to home or other facility with appropriate resources  Description: INTERVENTIONS:  - Identify barriers to discharge w/patient and caregiver  - Arrange for needed discharge resources and transportation as appropriate  - Identify discharge learning needs (meds, wound care, etc )  - Arrange for interpretive services to assist at discharge as needed  - Refer to Case Management Department for coordinating discharge planning if the patient needs post-hospital services based on physician/advanced practitioner order or complex needs related to functional status, cognitive ability, or social support system  Outcome: Progressing     Problem: Knowledge Deficit  Goal: Patient/family/caregiver demonstrates understanding of disease process, treatment plan, medications, and discharge instructions  Description: Complete learning assessment and assess knowledge base    Interventions:  - Provide teaching at level of understanding  - Provide teaching via preferred learning methods  Outcome: Progressing     Problem: Potential for Falls  Goal: Patient will remain free of falls  Description: INTERVENTIONS:  - Educate patient/family on patient safety including physical limitations  - Instruct patient to call for assistance with activity   - Consult OT/PT to assist with strengthening/mobility   - Keep Call bell within reach  - Keep bed low and locked with side rails adjusted as appropriate  - Keep care items and personal belongings within reach  - Initiate and maintain comfort rounds  - Make Fall Risk Sign visible to staff  - Offer Toileting every 2 Hours, in advance of need  - Initiate/Maintain bed alarm  - Obtain necessary fall risk management equipment: bed alarm   - Apply yellow socks and bracelet for high fall risk patients  - Consider moving patient to room near nurses station  Outcome: Progressing

## 2023-01-03 NOTE — PLAN OF CARE
Problem: PAIN - ADULT  Goal: Verbalizes/displays adequate comfort level or baseline comfort level  Description: Interventions:  - Encourage patient to monitor pain and request assistance  - Assess pain using appropriate pain scale  - Administer analgesics based on type and severity of pain and evaluate response  - Implement non-pharmacological measures as appropriate and evaluate response  - Consider cultural and social influences on pain and pain management  - Notify physician/advanced practitioner if interventions unsuccessful or patient reports new pain  Outcome: Progressing     Problem: INFECTION - ADULT  Goal: Absence or prevention of progression during hospitalization  Description: INTERVENTIONS:  - Assess and monitor for signs and symptoms of infection  - Monitor lab/diagnostic results  - Monitor all insertion sites, i e  indwelling lines, tubes, and drains  - Monitor endotracheal if appropriate and nasal secretions for changes in amount and color  - [place] appropriate cooling/warming therapies per order  - Administer medications as ordered  - Instruct and encourage patient and family to use good hand hygiene technique  - Identify and instruct in appropriate isolation precautions for identified infection/condition  Outcome: Progressing     Problem: SAFETY ADULT  Goal: Patient will remain free of falls  Description: INTERVENTIONS:  - Educate patient/family on patient safety including physical limitations  - Instruct patient to call for assistance with activity   - Consult OT/PT to assist with strengthening/mobility   - Keep Call bell within reach  - Keep bed low and locked with side rails adjusted as appropriate  - Keep care items and personal belongings within reach  - Initiate and maintain comfort rounds  - Make Fall Risk Sign visible to staff  - Offer Toileting every 2 Hours, in advance of need  - Initiate/Maintain bed alarm  - Obtain necessary fall risk management equipment: bed alarm   - Apply yellow socks and bracelet for high fall risk patients  - Consider moving patient to room near nurses station  Outcome: Progressing  Goal: Maintain or return to baseline ADL function  Description: INTERVENTIONS:  -  Assess patient's ability to carry out ADLs; assess patient's baseline for ADL function and identify physical deficits which impact ability to perform ADLs (bathing, care of mouth/teeth, toileting, grooming, dressing, etc )  - Assess/evaluate cause of self-care deficits   - Assess range of motion  - Assess patient's mobility; develop plan if impaired  - Assess patient's need for assistive devices and provide as appropriate  - Encourage maximum independence but intervene and supervise when necessary  - Involve family in performance of ADLs  - Assess for home care needs following discharge   - Consider OT consult to assist with ADL evaluation and planning for discharge  - Provide patient education as appropriate  Outcome: Progressing  Goal: Maintains/Returns to pre admission functional level  Description: INTERVENTIONS:  - Perform BMAT or MOVE assessment daily    - Set and communicate daily mobility goal to care team and patient/family/caregiver  - Collaborate with rehabilitation services on mobility goals if consulted  - Perform Range of Motion 3 times a day  - Reposition patient every 2 hours    - Dangle patient 3 times a day  - Stand patient 3 times a day  - Ambulate patient 3 times a day  - Out of bed to chair 3 times a day   - Out of bed for meals 3 times a day  - Out of bed for toileting  - Record patient progress and toleration of activity level   Outcome: Progressing     Problem: DISCHARGE PLANNING  Goal: Discharge to home or other facility with appropriate resources  Description: INTERVENTIONS:  - Identify barriers to discharge w/patient and caregiver  - Arrange for needed discharge resources and transportation as appropriate  - Identify discharge learning needs (meds, wound care, etc )  - Arrange for interpretive services to assist at discharge as needed  - Refer to Case Management Department for coordinating discharge planning if the patient needs post-hospital services based on physician/advanced practitioner order or complex needs related to functional status, cognitive ability, or social support system  Outcome: Progressing     Problem: Knowledge Deficit  Goal: Patient/family/caregiver demonstrates understanding of disease process, treatment plan, medications, and discharge instructions  Description: Complete learning assessment and assess knowledge base    Interventions:  - Provide teaching at level of understanding  - Provide teaching via preferred learning methods  Outcome: Progressing     Problem: Potential for Falls  Goal: Patient will remain free of falls  Description: INTERVENTIONS:  - Educate patient/family on patient safety including physical limitations  - Instruct patient to call for assistance with activity   - Consult OT/PT to assist with strengthening/mobility   - Keep Call bell within reach  - Keep bed low and locked with side rails adjusted as appropriate  - Keep care items and personal belongings within reach  - Initiate and maintain comfort rounds  - Make Fall Risk Sign visible to staff  - Offer Toileting every 2 Hours, in advance of need  - Initiate/Maintain bed alarm  - Obtain necessary fall risk management equipment: bed alarm   - Apply yellow socks and bracelet for high fall risk patients  - Consider moving patient to room near nurses station  Outcome: Progressing

## 2023-01-03 NOTE — PROGRESS NOTES
Progress Note - Tigist Villalobos 75 y o  female MRN: [id number]    Unit/Bed#: E4 -01 Encounter: [id number]      Subjective: The patient is feeling rather well  She denies any chest pain, shortness of breath, abdominal pain, nausea, or vomiting  Physical Exam:   Temp:  [97 1 °F (36 2 °C)-98 3 °F (36 8 °C)] 98 °F (36 7 °C)  HR:  [] 109  Resp:  [18-20] 18  BP: (114-142)/(74-93) 120/74    Gen: Well-developed, well-nourished, in no distress  Neck: Supple  No lymphadenopathy, goiter, or bruit  Heart: Regular rhythm  I heard no murmur, gallop, or rub  Lungs: Clear to auscultation and percussion  No wheezing, rales, or rhonchi  Abd: Soft with active bowel sounds  There is no mass, tenderness, or organomegaly  Extremities: No clubbing, cyanosis, or edema  No calf tenderness  Neuro: Alert and conversant  No focal sign  Skin: Warm and dry  LABS: No new labs  Assessment/Plan:  1  Altered mental status secondary to dementia exacerbated by absence of her   2  Type 2 diabetes  3  Hypothyroidism  4  Hypertension  The patient's  has been transferred to [facility] for esophageal dilatation and possible PEG tube placement  It is hoped that his hospitalization there will only be a few more days  There has been no success in arranging posthospital plans for the patient at present  Hopefully, she will be able to return home with her  in a few days      VTE Pharmacologic Prophylaxis: None secondary to low risk  VTE Mechanical Prophylaxis: reason for no mechanical VTE prophylaxis Low risk

## 2023-01-04 LAB
GLUCOSE SERPL-MCNC: 146 MG/DL (ref 65–140)
GLUCOSE SERPL-MCNC: 164 MG/DL (ref 65–140)
GLUCOSE SERPL-MCNC: 178 MG/DL (ref 65–140)
GLUCOSE SERPL-MCNC: 230 MG/DL (ref 65–140)

## 2023-01-04 RX ADMIN — Medication 1 TABLET: at 08:35

## 2023-01-04 RX ADMIN — METFORMIN HYDROCHLORIDE 1000 MG: 500 TABLET ORAL at 08:35

## 2023-01-04 RX ADMIN — METFORMIN HYDROCHLORIDE 1000 MG: 500 TABLET ORAL at 17:32

## 2023-01-04 RX ADMIN — INSULIN LISPRO 1 UNITS: 100 INJECTION, SOLUTION INTRAVENOUS; SUBCUTANEOUS at 21:20

## 2023-01-04 RX ADMIN — ACETAMINOPHEN 650 MG: 325 TABLET, FILM COATED ORAL at 08:36

## 2023-01-04 RX ADMIN — INSULIN LISPRO 2 UNITS: 100 INJECTION, SOLUTION INTRAVENOUS; SUBCUTANEOUS at 17:33

## 2023-01-04 RX ADMIN — LEVOTHYROXINE SODIUM 88 MCG: 88 TABLET ORAL at 06:09

## 2023-01-04 RX ADMIN — ATORVASTATIN CALCIUM 40 MG: 40 TABLET, FILM COATED ORAL at 17:32

## 2023-01-04 RX ADMIN — INSULIN LISPRO 1 UNITS: 100 INJECTION, SOLUTION INTRAVENOUS; SUBCUTANEOUS at 08:37

## 2023-01-04 NOTE — PLAN OF CARE
Problem: PAIN - ADULT  Goal: Verbalizes/displays adequate comfort level or baseline comfort level  Description: Interventions:  - Encourage patient to monitor pain and request assistance  - Assess pain using appropriate pain scale  - Administer analgesics based on type and severity of pain and evaluate response  - Implement non-pharmacological measures as appropriate and evaluate response  - Consider cultural and social influences on pain and pain management  - Notify physician/advanced practitioner if interventions unsuccessful or patient reports new pain  Outcome: Progressing     Problem: INFECTION - ADULT  Goal: Absence or prevention of progression during hospitalization  Description: INTERVENTIONS:  - Assess and monitor for signs and symptoms of infection  - Monitor lab/diagnostic results  - Monitor all insertion sites, i e  indwelling lines, tubes, and drains  - Monitor endotracheal if appropriate and nasal secretions for changes in amount and color  - [place] appropriate cooling/warming therapies per order  - Administer medications as ordered  - Instruct and encourage patient and family to use good hand hygiene technique  - Identify and instruct in appropriate isolation precautions for identified infection/condition  Outcome: Progressing     Problem: SAFETY ADULT  Goal: Patient will remain free of falls  Description: INTERVENTIONS:  - Educate patient/family on patient safety including physical limitations  - Instruct patient to call for assistance with activity   - Consult OT/PT to assist with strengthening/mobility   - Keep Call bell within reach  - Keep bed low and locked with side rails adjusted as appropriate  - Keep care items and personal belongings within reach  - Initiate and maintain comfort rounds  - Make Fall Risk Sign visible to staff  - Offer Toileting every 2 Hours, in advance of need  - Initiate/Maintain bed alarm  - Obtain necessary fall risk management equipment: bed alarm   - Apply yellow socks and bracelet for high fall risk patients  - Consider moving patient to room near nurses station  Outcome: Progressing  Goal: Maintain or return to baseline ADL function  Description: INTERVENTIONS:  -  Assess patient's ability to carry out ADLs; assess patient's baseline for ADL function and identify physical deficits which impact ability to perform ADLs (bathing, care of mouth/teeth, toileting, grooming, dressing, etc )  - Assess/evaluate cause of self-care deficits   - Assess range of motion  - Assess patient's mobility; develop plan if impaired  - Assess patient's need for assistive devices and provide as appropriate  - Encourage maximum independence but intervene and supervise when necessary  - Involve family in performance of ADLs  - Assess for home care needs following discharge   - Consider OT consult to assist with ADL evaluation and planning for discharge  - Provide patient education as appropriate  Outcome: Progressing  Goal: Maintains/Returns to pre admission functional level  Description: INTERVENTIONS:  - Perform BMAT or MOVE assessment daily    - Set and communicate daily mobility goal to care team and patient/family/caregiver  - Collaborate with rehabilitation services on mobility goals if consulted  - Perform Range of Motion 3 times a day  - Reposition patient every 2 hours    - Dangle patient 3 times a day  - Stand patient 3 times a day  - Ambulate patient 3 times a day  - Out of bed to chair 3 times a day   - Out of bed for meals 3 times a day  - Out of bed for toileting  - Record patient progress and toleration of activity level   Outcome: Progressing     Problem: DISCHARGE PLANNING  Goal: Discharge to home or other facility with appropriate resources  Description: INTERVENTIONS:  - Identify barriers to discharge w/patient and caregiver  - Arrange for needed discharge resources and transportation as appropriate  - Identify discharge learning needs (meds, wound care, etc )  - Arrange for interpretive services to assist at discharge as needed  - Refer to Case Management Department for coordinating discharge planning if the patient needs post-hospital services based on physician/advanced practitioner order or complex needs related to functional status, cognitive ability, or social support system  Outcome: Progressing     Problem: Knowledge Deficit  Goal: Patient/family/caregiver demonstrates understanding of disease process, treatment plan, medications, and discharge instructions  Description: Complete learning assessment and assess knowledge base    Interventions:  - Provide teaching at level of understanding  - Provide teaching via preferred learning methods  Outcome: Progressing     Problem: Potential for Falls  Goal: Patient will remain free of falls  Description: INTERVENTIONS:  - Educate patient/family on patient safety including physical limitations  - Instruct patient to call for assistance with activity   - Consult OT/PT to assist with strengthening/mobility   - Keep Call bell within reach  - Keep bed low and locked with side rails adjusted as appropriate  - Keep care items and personal belongings within reach  - Initiate and maintain comfort rounds  - Make Fall Risk Sign visible to staff  - Offer Toileting every 2 Hours, in advance of need  - Initiate/Maintain bed alarm  - Obtain necessary fall risk management equipment: bed alarm   - Apply yellow socks and bracelet for high fall risk patients  - Consider moving patient to room near nurses station  Outcome: Progressing

## 2023-01-04 NOTE — PLAN OF CARE
Problem: PAIN - ADULT  Goal: Verbalizes/displays adequate comfort level or baseline comfort level  Description: Interventions:  - Encourage patient to monitor pain and request assistance  - Assess pain using appropriate pain scale  - Administer analgesics based on type and severity of pain and evaluate response  - Implement non-pharmacological measures as appropriate and evaluate response  - Consider cultural and social influences on pain and pain management  - Notify physician/advanced practitioner if interventions unsuccessful or patient reports new pain  Outcome: Progressing     Problem: INFECTION - ADULT  Goal: Absence or prevention of progression during hospitalization  Description: INTERVENTIONS:  - Assess and monitor for signs and symptoms of infection  - Monitor lab/diagnostic results  - Monitor all insertion sites, i e  indwelling lines, tubes, and drains  - Monitor endotracheal if appropriate and nasal secretions for changes in amount and color  - [place] appropriate cooling/warming therapies per order  - Administer medications as ordered  - Instruct and encourage patient and family to use good hand hygiene technique  - Identify and instruct in appropriate isolation precautions for identified infection/condition  Outcome: Progressing     Problem: SAFETY ADULT  Goal: Patient will remain free of falls  Description: INTERVENTIONS:  - Educate patient/family on patient safety including physical limitations  - Instruct patient to call for assistance with activity   - Consult OT/PT to assist with strengthening/mobility   - Keep Call bell within reach  - Keep bed low and locked with side rails adjusted as appropriate  - Keep care items and personal belongings within reach  - Initiate and maintain comfort rounds  - Make Fall Risk Sign visible to staff  - Offer Toileting every 2 Hours, in advance of need  - Initiate/Maintain bed alarm  - Obtain necessary fall risk management equipment: bed alarm   - Apply yellow socks and bracelet for high fall risk patients  - Consider moving patient to room near nurses station  Outcome: Progressing  Goal: Maintain or return to baseline ADL function  Description: INTERVENTIONS:  -  Assess patient's ability to carry out ADLs; assess patient's baseline for ADL function and identify physical deficits which impact ability to perform ADLs (bathing, care of mouth/teeth, toileting, grooming, dressing, etc )  - Assess/evaluate cause of self-care deficits   - Assess range of motion  - Assess patient's mobility; develop plan if impaired  - Assess patient's need for assistive devices and provide as appropriate  - Encourage maximum independence but intervene and supervise when necessary  - Involve family in performance of ADLs  - Assess for home care needs following discharge   - Consider OT consult to assist with ADL evaluation and planning for discharge  - Provide patient education as appropriate  Outcome: Progressing  Goal: Maintains/Returns to pre admission functional level  Description: INTERVENTIONS:  - Perform BMAT or MOVE assessment daily    - Set and communicate daily mobility goal to care team and patient/family/caregiver  - Collaborate with rehabilitation services on mobility goals if consulted  - Perform Range of Motion 3 times a day  - Reposition patient every 2 hours    - Dangle patient 3 times a day  - Stand patient 3 times a day  - Ambulate patient 3 times a day  - Out of bed to chair 3 times a day   - Out of bed for meals 3 times a day  - Out of bed for toileting  - Record patient progress and toleration of activity level   Outcome: Progressing     Problem: DISCHARGE PLANNING  Goal: Discharge to home or other facility with appropriate resources  Description: INTERVENTIONS:  - Identify barriers to discharge w/patient and caregiver  - Arrange for needed discharge resources and transportation as appropriate  - Identify discharge learning needs (meds, wound care, etc )  - Arrange for interpretive services to assist at discharge as needed  - Refer to Case Management Department for coordinating discharge planning if the patient needs post-hospital services based on physician/advanced practitioner order or complex needs related to functional status, cognitive ability, or social support system  Outcome: Progressing     Problem: Knowledge Deficit  Goal: Patient/family/caregiver demonstrates understanding of disease process, treatment plan, medications, and discharge instructions  Description: Complete learning assessment and assess knowledge base    Interventions:  - Provide teaching at level of understanding  - Provide teaching via preferred learning methods  Outcome: Progressing     Problem: Potential for Falls  Goal: Patient will remain free of falls  Description: INTERVENTIONS:  - Educate patient/family on patient safety including physical limitations  - Instruct patient to call for assistance with activity   - Consult OT/PT to assist with strengthening/mobility   - Keep Call bell within reach  - Keep bed low and locked with side rails adjusted as appropriate  - Keep care items and personal belongings within reach  - Initiate and maintain comfort rounds  - Make Fall Risk Sign visible to staff  - Offer Toileting every 2 Hours, in advance of need  - Initiate/Maintain bed alarm  - Obtain necessary fall risk management equipment: bed alarm   - Apply yellow socks and bracelet for high fall risk patients  - Consider moving patient to room near nurses station  Outcome: Progressing

## 2023-01-04 NOTE — PROGRESS NOTES
Progress Note - Tigist Villalobos 75 y o  female MRN: [id number]    Unit/Bed#: E4 -01 Encounter: [id number]      Subjective: The patient feels very well  She slept well  She is eating well  She has no chest pain, shortness of breath, abdominal pain, nausea, or vomiting  Physical Exam:   Temp:  [97 7 °F (36 5 °C)-99 6 °F (37 6 °C)] 99 6 °F (37 6 °C)  HR:  [] 116  Resp:  [18-20] 20  BP: (147-174)/(85-94) 174/94    Gen: Well-developed, well-nourished, in no distress  Neck: Supple  No lymphadenopathy, goiter, or bruit  Heart: Regular rhythm  I hear no murmur, gallop, or rub  Lungs: Clear to auscultation and percussion  No wheezing, rales, or rhonchi  Abd: Soft with active bowel sounds  No mass, tenderness, organomegaly  Extremities: No clubbing, cyanosis, or edema  No calf tenderness  Neuro: Alert and conversant  The patient's memory is clearly impaired  No focal sign is noted  Skin: Warm and dry  LABS: No new labs  Assessment/Plan:  1  Altered mental status secondary to dementia and change in home circumstances  2  Type 2 diabetes  3  Hypothyroidism  4  Hypertension  5  Coronary artery disease with past MI  6  History of thyroid cancer    The patient remains medically stable  Case management is exploring respite arrangements  Hopefully, the patient's  will be discharged from the hospital in the near future and this would allow for safe discharge for the patient    VTE Pharmacologic Prophylaxis: Reason for no pharmacologic prophylaxis Low risk  VTE Mechanical Prophylaxis: reason for no mechanical VTE prophylaxis Low risk

## 2023-01-05 LAB
GLUCOSE SERPL-MCNC: 140 MG/DL (ref 65–140)
GLUCOSE SERPL-MCNC: 173 MG/DL (ref 65–140)
GLUCOSE SERPL-MCNC: 180 MG/DL (ref 65–140)
GLUCOSE SERPL-MCNC: 191 MG/DL (ref 65–140)

## 2023-01-05 RX ADMIN — INSULIN LISPRO 1 UNITS: 100 INJECTION, SOLUTION INTRAVENOUS; SUBCUTANEOUS at 21:06

## 2023-01-05 RX ADMIN — Medication 1 TABLET: at 08:50

## 2023-01-05 RX ADMIN — ACETAMINOPHEN 650 MG: 325 TABLET, FILM COATED ORAL at 08:50

## 2023-01-05 RX ADMIN — LEVOTHYROXINE SODIUM 88 MCG: 88 TABLET ORAL at 05:37

## 2023-01-05 RX ADMIN — METFORMIN HYDROCHLORIDE 1000 MG: 500 TABLET ORAL at 08:50

## 2023-01-05 RX ADMIN — ATORVASTATIN CALCIUM 40 MG: 40 TABLET, FILM COATED ORAL at 16:34

## 2023-01-05 RX ADMIN — METFORMIN HYDROCHLORIDE 1000 MG: 500 TABLET ORAL at 16:34

## 2023-01-05 RX ADMIN — INSULIN LISPRO 1 UNITS: 100 INJECTION, SOLUTION INTRAVENOUS; SUBCUTANEOUS at 08:53

## 2023-01-05 NOTE — PROGRESS NOTES
Progress Note - Lina Vásquez 75 y o  female MRN: [id number]    Unit/Bed#: E4 -01 Encounter: [id number]      Subjective: The patient is feeling well  She is eating and sleeping well  She denies chest pain, shortness of breath, abdominal pain, nausea, or vomiting  Physical Exam:   Temp:  [97 1 °F (36 2 °C)-99 6 °F (37 6 °C)] 98 7 °F (37 1 °C)  HR:  [] 94  Resp:  [18-20] 18  BP: (131-174)/() 131/76    Gen: Well-developed, well-nourished, in no distress  Neck: Supple  No lymphadenopathy, goiter, or bruit  Heart: Regular rhythm  I heard no murmur, gallop, or rub  Lungs: Clear to auscultation and percussion  No wheezing, rales, or rhonchi  Abd: Soft with active bowel sounds  No mass, tenderness, organomegaly  Extremities: No clubbing, cyanosis, or edema  No calf tenderness  Neuro: Alert and conversant  No focal sign  Skin: Warm and dry  LABS: No new labs  Assessment/Plan:  1  Altered mental status secondary to dementia plus change in home circumstances  2  Type 2 diabetes  3  Hypothyroidism  4  Hypertension  5  Coronary artery disease  6  History of thyroid cancer    The patient remains medically stable  Unfortunately, the patient's  is still hospitalized  Case management is exploring respite arrangements      VTE Pharmacologic Prophylaxis: Reason for no pharmacologic prophylaxis Low risk  VTE Mechanical Prophylaxis: reason for no mechanical VTE prophylaxis Low risk

## 2023-01-06 VITALS
TEMPERATURE: 98.6 F | SYSTOLIC BLOOD PRESSURE: 130 MMHG | OXYGEN SATURATION: 98 % | DIASTOLIC BLOOD PRESSURE: 82 MMHG | RESPIRATION RATE: 18 BRPM | HEART RATE: 107 BPM

## 2023-01-06 LAB
GLUCOSE SERPL-MCNC: 142 MG/DL (ref 65–140)
GLUCOSE SERPL-MCNC: 185 MG/DL (ref 65–140)
GLUCOSE SERPL-MCNC: 197 MG/DL (ref 65–140)

## 2023-01-06 RX ADMIN — ATORVASTATIN CALCIUM 40 MG: 40 TABLET, FILM COATED ORAL at 16:10

## 2023-01-06 RX ADMIN — LEVOTHYROXINE SODIUM 88 MCG: 88 TABLET ORAL at 05:46

## 2023-01-06 RX ADMIN — METFORMIN HYDROCHLORIDE 1000 MG: 500 TABLET ORAL at 16:10

## 2023-01-06 RX ADMIN — Medication 1 TABLET: at 08:45

## 2023-01-06 RX ADMIN — INSULIN LISPRO 1 UNITS: 100 INJECTION, SOLUTION INTRAVENOUS; SUBCUTANEOUS at 17:23

## 2023-01-06 RX ADMIN — METFORMIN HYDROCHLORIDE 1000 MG: 500 TABLET ORAL at 08:45

## 2023-01-06 RX ADMIN — INSULIN LISPRO 1 UNITS: 100 INJECTION, SOLUTION INTRAVENOUS; SUBCUTANEOUS at 08:46

## 2023-01-06 NOTE — PROGRESS NOTES
Progress Note - Blas Maravilla 75 y o  female MRN: [id number]    Unit/Bed#: E4 -01 Encounter: [id number]      Subjective: The patient is feeling pretty well  She slept well  She has no chest pain, shortness of breath, abdominal pain, nausea, or vomiting  Physical Exam:   Temp:  [98 °F (36 7 °C)-98 7 °F (37 1 °C)] 98 °F (36 7 °C)  HR:  [77-94] 90  Resp:  [18] 18  BP: (119-131)/(69-76) 131/72    Gen: Well-developed, well-nourished, in no distress  Neck: Supple  No lymphadenopathy, goiter, or bruit  Heart: Regular rhythm  I heard no murmur, gallop, or rub  Lungs: Clear to auscultation and percussion  No wheezing, rales, or rhonchi  Abd: Soft with active bowel sounds  No mass, tenderness, organomegaly  Extremities: No clubbing, cyanosis, or edema  No calf tenderness  Neuro: Alert and conversant  No focal sign  Skin: Warm and dry  LABS:   No new labs  Assessment/Plan:  1  Altered mental status secondary to dementia plus change in home circumstances  2  Type 2 diabetes  3  Hypothyroidism  4  Hypertension  5  Coronary artery disease  6  History of thyroid cancer    The patient remains medically stable  Her  has been discharged from the hospital today  Hopefully, this will allow the patient to be safely discharged home tomorrow      VTE Pharmacologic Prophylaxis: Reason for no pharmacologic prophylaxis Low risk  VTE Mechanical Prophylaxis: reason for no mechanical VTE prophylaxis Low risk

## 2023-01-06 NOTE — PLAN OF CARE
Problem: PAIN - ADULT  Goal: Verbalizes/displays adequate comfort level or baseline comfort level  Description: Interventions:  - Encourage patient to monitor pain and request assistance  - Assess pain using appropriate pain scale  - Administer analgesics based on type and severity of pain and evaluate response  - Implement non-pharmacological measures as appropriate and evaluate response  - Consider cultural and social influences on pain and pain management  - Notify physician/advanced practitioner if interventions unsuccessful or patient reports new pain  Outcome: Progressing     Problem: INFECTION - ADULT  Goal: Absence or prevention of progression during hospitalization  Description: INTERVENTIONS:  - Assess and monitor for signs and symptoms of infection  - Monitor lab/diagnostic results  - Monitor all insertion sites, i e  indwelling lines, tubes, and drains  - Monitor endotracheal if appropriate and nasal secretions for changes in amount and color  - Dana appropriate cooling/warming therapies per order  - Administer medications as ordered  - Instruct and encourage patient and family to use good hand hygiene technique  - Identify and instruct in appropriate isolation precautions for identified infection/condition  Outcome: Progressing     Problem: SAFETY ADULT  Goal: Patient will remain free of falls  Description: INTERVENTIONS:  - Educate patient/family on patient safety including physical limitations  - Instruct patient to call for assistance with activity   - Consult OT/PT to assist with strengthening/mobility   - Keep Call bell within reach  - Keep bed low and locked with side rails adjusted as appropriate  - Keep care items and personal belongings within reach  - Initiate and maintain comfort rounds  - Make Fall Risk Sign visible to staff  - Offer Toileting every 2 Hours, in advance of need  - Initiate/Maintain bed alarm  - Obtain necessary fall risk management equipment: bed alarm   - Apply yellow socks and bracelet for high fall risk patients  - Consider moving patient to room near nurses station  Outcome: Progressing  Goal: Maintain or return to baseline ADL function  Description: INTERVENTIONS:  -  Assess patient's ability to carry out ADLs; assess patient's baseline for ADL function and identify physical deficits which impact ability to perform ADLs (bathing, care of mouth/teeth, toileting, grooming, dressing, etc )  - Assess/evaluate cause of self-care deficits   - Assess range of motion  - Assess patient's mobility; develop plan if impaired  - Assess patient's need for assistive devices and provide as appropriate  - Encourage maximum independence but intervene and supervise when necessary  - Involve family in performance of ADLs  - Assess for home care needs following discharge   - Consider OT consult to assist with ADL evaluation and planning for discharge  - Provide patient education as appropriate  Outcome: Progressing  Goal: Maintains/Returns to pre admission functional level  Description: INTERVENTIONS:  - Perform BMAT or MOVE assessment daily    - Set and communicate daily mobility goal to care team and patient/family/caregiver  - Collaborate with rehabilitation services on mobility goals if consulted  - Perform Range of Motion 3 times a day  - Reposition patient every 2 hours    - Dangle patient 3 times a day  - Stand patient 3 times a day  - Ambulate patient 3 times a day  - Out of bed to chair 3 times a day   - Out of bed for meals 3 times a day  - Out of bed for toileting  - Record patient progress and toleration of activity level   Outcome: Progressing     Problem: DISCHARGE PLANNING  Goal: Discharge to home or other facility with appropriate resources  Description: INTERVENTIONS:  - Identify barriers to discharge w/patient and caregiver  - Arrange for needed discharge resources and transportation as appropriate  - Identify discharge learning needs (meds, wound care, etc )  - Arrange for interpretive services to assist at discharge as needed  - Refer to Case Management Department for coordinating discharge planning if the patient needs post-hospital services based on physician/advanced practitioner order or complex needs related to functional status, cognitive ability, or social support system  Outcome: Progressing     Problem: Knowledge Deficit  Goal: Patient/family/caregiver demonstrates understanding of disease process, treatment plan, medications, and discharge instructions  Description: Complete learning assessment and assess knowledge base    Interventions:  - Provide teaching at level of understanding  - Provide teaching via preferred learning methods  Outcome: Progressing     Problem: Potential for Falls  Goal: Patient will remain free of falls  Description: INTERVENTIONS:  - Educate patient/family on patient safety including physical limitations  - Instruct patient to call for assistance with activity   - Consult OT/PT to assist with strengthening/mobility   - Keep Call bell within reach  - Keep bed low and locked with side rails adjusted as appropriate  - Keep care items and personal belongings within reach  - Initiate and maintain comfort rounds  - Make Fall Risk Sign visible to staff  - Offer Toileting every 2 Hours, in advance of need  - Initiate/Maintain bed alarm  - Obtain necessary fall risk management equipment: bed alarm   - Apply yellow socks and bracelet for high fall risk patients  - Consider moving patient to room near nurses station  Outcome: Progressing

## 2023-01-06 NOTE — NURSING NOTE
Patient discharged accompanied by her  who was also discharged  Discharge instructions reviewed with patients

## 2023-01-09 ENCOUNTER — TELEPHONE (OUTPATIENT)
Dept: ENDOCRINOLOGY | Facility: CLINIC | Age: 68
End: 2023-01-09

## 2023-01-09 ENCOUNTER — TRANSITIONAL CARE MANAGEMENT (OUTPATIENT)
Dept: FAMILY MEDICINE CLINIC | Facility: CLINIC | Age: 68
End: 2023-01-09

## 2023-01-09 NOTE — DISCHARGE SUMMARY
Discharge Summary - Ravinder Christian 0/19/2223, [id number]        Admission Date: 12/30/2022  Discharge Date: 1/6/2023      Discharge Diagnosis:   1  Altered mental status secondary to dementia plus change in home circumstances  2  Dementia  3  Type 2 diabetes  4  Hypothyroidism  5  Hypertension  6  Coronary artery disease  7  History of thyroid cancer    Consulting Physicians:  None    Procedures Performed:   None    HPI: The patient is a 79-year-old woman who came to the emergency room with confusion  She was unable to give a coherent history  The patient has a history of dementia  Unfortunately, her  who is her caretaker was admitted to the hospital   He called [facility] agency on aging to check the patient  They were concerned about her safety and brought her to the emergency room  She is admitted for further care  Hospital Course: The patient was admitted to the hospital and gently hydrated with intravenous fluid  She was monitored carefully  After her admission her cognition did seem to improve somewhat  She was able to eat and drink well  Her chronic medical illnesses remained stable  Case management actively pursued a variety of discharge plans  Unfortunately, none of these prove feasible  Ultimately, the patient's  was discharged from the hospital and she was able to return home under his care  Disposition: The patient was discharged from the hospital on January 6  Diet and activity will be as tolerated  She was asked to arrange follow-up evaluation with her primary care provider within 1 week  Discharge instructions/Information to patient and family:   See after visit summary for information provided to patient and family  Provisions for Follow-Up Care:  See after visit summary for information related to follow-up care and any pertinent home health orders  Planned Readmission: No    Discharge Statement   I spent 35 minutes discharging the patient   This time was spent on the day of discharge  I had direct contact with the patient on the day of discharge  Discharge Medications:  See after visit summary for reconciled discharge medications provided to patient and family

## 2023-01-09 NOTE — TELEPHONE ENCOUNTER
isrrael from [employer] called regarding the freestyle sis forms they did get all the equipment ordered   They also asked either a nurse or the doctor give them a call back with updates [phone]

## 2023-01-10 DIAGNOSIS — E11.65 TYPE 2 DIABETES MELLITUS WITH HYPERGLYCEMIA, WITHOUT LONG-TERM CURRENT USE OF INSULIN (HCC): ICD-10-CM

## 2023-01-16 ENCOUNTER — OFFICE VISIT (OUTPATIENT)
Dept: FAMILY MEDICINE CLINIC | Facility: CLINIC | Age: 68
End: 2023-01-16

## 2023-01-16 VITALS
HEART RATE: 99 BPM | OXYGEN SATURATION: 99 % | HEIGHT: 63 IN | DIASTOLIC BLOOD PRESSURE: 84 MMHG | WEIGHT: 130.6 LBS | TEMPERATURE: 98.1 F | SYSTOLIC BLOOD PRESSURE: 161 MMHG | BODY MASS INDEX: 23.14 KG/M2

## 2023-01-16 DIAGNOSIS — E11.65 TYPE 2 DIABETES MELLITUS WITH HYPERGLYCEMIA, WITHOUT LONG-TERM CURRENT USE OF INSULIN (HCC): ICD-10-CM

## 2023-01-16 DIAGNOSIS — F03.C0 SEVERE DEMENTIA, UNSPECIFIED DEMENTIA TYPE, UNSPECIFIED WHETHER BEHAVIORAL, PSYCHOTIC, OR MOOD DISTURBANCE OR ANXIETY: Primary | ICD-10-CM

## 2023-01-18 ENCOUNTER — PATIENT OUTREACH (OUTPATIENT)
Dept: FAMILY MEDICINE CLINIC | Facility: CLINIC | Age: 68
End: 2023-01-18

## 2023-01-20 NOTE — PROGRESS NOTES
Assessment & Plan     1  Severe dementia, unspecified dementia type, unspecified whether behavioral, psychotic, or mood disturbance or anxiety  -     Ambulatory Referral to Social Work Care Management Program; Future    2  Type 2 diabetes mellitus with hyperglycemia, without long-term current use of insulin ([facility])      Mini-Mental today score of 14  This is severe dementia  I have discussed results of Mini-Mental with patient and her   I have also advised to both of them that she should not be driving and she should stop driving immediately and that I will be sending report to [facility]  We will place chronic care management referral   We will have them follow-up in 1 to 2 weeks and we can discuss further treatment  During our visit today  says if patient is not going be compliant with treatment and improvement changes that he will explore options to put her in a home because he can no longer take care of both himself and her  Subjective     Transitional Care Management Review:   Neil Delacruz is a 75 y o  female here for TCM follow up  During the TCM phone call patient stated:  TCM Call     Date and time call was made  1/9/2023  9:25 AM    Hospital care reviewed  Records reviewed    Patient was hospitialized at  [facility] - [facility]OS[facility]    Date of Admission  12/30/22    Date of discharge  01/06/23    Diagnosis  Altered Mental Status    Disposition  Home    Were the patients medications reviewed and updated  No    Current Symptoms  None      TCM Call     Post hospital issues  None    Should patient be enrolled in anticoag monitoring? No    Scheduled for follow up?   Yes    Did you obtain your prescribed medications  Yes    Do you need help managing your prescriptions or medications  No    Is transportation to your appointment needed  No    I have advised the patient to call PCP with any new or worsening symptoms  Chichi Henry MA    Living Arrangements  Spouse or Significiant other    Are you recieving any outpatient services  No    Are you recieving home care services  No    Are you using any community resources  No    Current waiver services  No    Have you fallen in the last 12 months  No    Interperter language line needed  No    Counseling  Patient        Presents to the office for transition of care  She is here with her  today  He has concerns for dementia and memory loss  She recently had to be hospitalized because he is her caretaker and he had to be hospitalized and no one could take care of her  No significant changes were noted during hospitalization   reports she having trouble with memory over the last few months  He helps her take her medication and organizes all her pills in her pillbox  If he does not do this she does not take her meds  Some of her meds have recently gone missing and he does not know where they are  Patient does not know where they are either  He is her only caretaker  He cannot keep taking care of her because he is having difficulty taking care of both himself and his chronic conditions along with his wife's  Review of Systems    Objective     /84 (BP Location: Left arm, Patient Position: Sitting, Cuff Size: Standard)   Pulse 99   Temp 98 1 °F (36 7 °C) (Tympanic)   Ht 5' 3" (1 6 m)   Wt 59 2 kg (130 lb 9 6 oz)   SpO2 99%   BMI 23 13 kg/m²      Physical Exam  Vitals and nursing note reviewed  Constitutional:       General: She is not in acute distress  Appearance: She is well-developed  HENT:      Head: Normocephalic and atraumatic  Eyes:      Conjunctiva/sclera: Conjunctivae normal    Cardiovascular:      Rate and Rhythm: Normal rate and regular rhythm  Heart sounds: No murmur heard  Pulmonary:      Effort: Pulmonary effort is normal  No respiratory distress  Breath sounds: Normal breath sounds  Abdominal:      Palpations: Abdomen is soft  Tenderness: There is no abdominal tenderness  Musculoskeletal:         General: No swelling  Cervical back: Neck supple  Skin:     General: Skin is warm and dry  Capillary Refill: Capillary refill takes less than 2 seconds  Neurological:      Mental Status: She is alert     Psychiatric:         Mood and Affect: Mood normal        Medications have been reviewed by provider in current encounter    Karli Randle MD

## 2023-01-23 ENCOUNTER — PATIENT OUTREACH (OUTPATIENT)
Dept: FAMILY MEDICINE CLINIC | Facility: CLINIC | Age: 68
End: 2023-01-23

## 2023-01-23 NOTE — PROGRESS NOTES
OP CM called to pts dtr Veatrice Soulier again and left message  Also called to pts spouse and he states he has cancer and lost 100 pounds the past year and is on a feeding tube  Pts hsb states that pt is still driving  Pt is able to bathe and dress herself at times  Pts hsb states he is collecting unemployment  [place] [employer] waiver form completed and RN partner Orlando Koch will have PCP sign Thursday at the office 
2

## 2023-01-24 ENCOUNTER — OFFICE VISIT (OUTPATIENT)
Dept: FAMILY MEDICINE CLINIC | Facility: CLINIC | Age: 68
End: 2023-01-24

## 2023-01-24 ENCOUNTER — APPOINTMENT (OUTPATIENT)
Dept: LAB | Facility: CLINIC | Age: 68
End: 2023-01-24

## 2023-01-24 VITALS
OXYGEN SATURATION: 98 % | HEART RATE: 114 BPM | HEIGHT: 63 IN | SYSTOLIC BLOOD PRESSURE: 128 MMHG | WEIGHT: 133 LBS | TEMPERATURE: 97.9 F | BODY MASS INDEX: 23.57 KG/M2 | DIASTOLIC BLOOD PRESSURE: 86 MMHG

## 2023-01-24 DIAGNOSIS — G30.9 SEVERE ALZHEIMER'S DEMENTIA WITH OTHER BEHAVIORAL DISTURBANCE, UNSPECIFIED TIMING OF DEMENTIA ONSET (HCC): ICD-10-CM

## 2023-01-24 DIAGNOSIS — C73 PAPILLARY THYROID CARCINOMA (HCC): ICD-10-CM

## 2023-01-24 DIAGNOSIS — E03.9 ACQUIRED HYPOTHYROIDISM: ICD-10-CM

## 2023-01-24 DIAGNOSIS — F03.C18 SEVERE DEMENTIA WITH OTHER BEHAVIORAL DISTURBANCE, UNSPECIFIED DEMENTIA TYPE: Primary | ICD-10-CM

## 2023-01-24 DIAGNOSIS — F02.C18 SEVERE ALZHEIMER'S DEMENTIA WITH OTHER BEHAVIORAL DISTURBANCE, UNSPECIFIED TIMING OF DEMENTIA ONSET (HCC): ICD-10-CM

## 2023-01-24 DIAGNOSIS — Z08 ENCOUNTER FOR FOLLOW-UP SURVEILLANCE OF THYROID CANCER: ICD-10-CM

## 2023-01-24 DIAGNOSIS — Z85.850 ENCOUNTER FOR FOLLOW-UP SURVEILLANCE OF THYROID CANCER: ICD-10-CM

## 2023-01-24 DIAGNOSIS — E11.65 TYPE 2 DIABETES MELLITUS WITH HYPERGLYCEMIA, WITHOUT LONG-TERM CURRENT USE OF INSULIN (HCC): ICD-10-CM

## 2023-01-24 DIAGNOSIS — Z85.850 PERSONAL HISTORY OF MALIGNANT NEOPLASM OF THYROID: ICD-10-CM

## 2023-01-24 DIAGNOSIS — F39 MOOD DISORDER (HCC): ICD-10-CM

## 2023-01-24 LAB
T4 FREE SERPL-MCNC: 0.92 NG/DL (ref 0.76–1.46)
TSH SERPL DL<=0.05 MIU/L-ACNC: 2.29 UIU/ML (ref 0.45–4.5)

## 2023-01-24 RX ORDER — DONEPEZIL HYDROCHLORIDE 5 MG/1
5 TABLET, FILM COATED ORAL
Qty: 90 TABLET | Refills: 0 | Status: SHIPPED | OUTPATIENT
Start: 2023-01-24

## 2023-01-24 NOTE — PROGRESS NOTES
Name: Zhang Pate      : 1955      MRN: [id number]  Encounter Provider: Albin Acosta MD  Encounter Date: 2023   Encounter department: FAMILY PRACTICE AT [address] [facility]     1  Severe dementia with other behavioral disturbance, unspecified dementia type  Assessment & Plan:  MMSE 14   Start donepezil  Side effects discussed  Living at home with  who has health concerns of his own  and it is getting difficult for him to take care of her  As of now she can manage some ADLs on her own but does need help with meds  She should not be driving until she has been further evaluated  I placed report to keenan khalil given her severe dementia   Social work also following  Orders:  -     donepezil (ARICEPT) 5 mg tablet; Take 1 tablet (5 mg total) by mouth daily at bedtime    2  Severe Alzheimer's dementia with other behavioral disturbance, unspecified timing of dementia onset ([facility] [address] )    3  Mood disorder ([facility] )    4  Papillary thyroid carcinoma [facility])  Assessment & Plan:  Following with Endo and surg/onc  Has neck US scheduled for         5  Type 2 diabetes mellitus with hyperglycemia, without long-term current use of insulin [facility])  Assessment & Plan:    Lab Results   Component Value Date    HGBA1C 7 8 (H) 2022    uncontrolled  Following with endo   Per last  Endo visit she is supposed to be on semaglutide but reports not taking it today  Subjective      Presents to the office with  for dementia follow-up  Last visit was for hospital follow-up where she had to be admitted because her  was admitted and no one could take care of her  We did Mini-Mental exam and she scored 14  Per the history it appears these are acute on chronic changes that have been worsening over the last few months  She can manage on her own majority the time but does need help with medications  She dresses herself can shop and cook but needs help with meds     reports if he does not help her with them she will not take them  Review of Systems   Constitutional: Negative for chills and fever  HENT: Negative for ear pain and sore throat  Eyes: Negative for pain and visual disturbance  Respiratory: Negative for cough and shortness of breath  Cardiovascular: Negative for chest pain and palpitations  Gastrointestinal: Negative for abdominal pain and vomiting  Genitourinary: Negative for dysuria and hematuria  Musculoskeletal: Negative for arthralgias and back pain  Skin: Negative for color change and rash  Neurological: Negative for seizures and syncope  Psychiatric/Behavioral: Positive for confusion and decreased concentration  Negative for dysphoric mood  All other systems reviewed and are negative  Current Outpatient Medications on File Prior to Visit   Medication Sig   • alendronate (FOSAMAX) 70 mg tablet Take 1 tablet (70 mg total) by mouth every 7 days   • Calcium Polycarbophil (FIBER-CAPS PO) Take 2 capsules by mouth 2 (two) times a day    • Empagliflozin (Jardiance) 25 MG TABS Take 1 tablet (25 mg total) by mouth every morning   • levothyroxine 100 mcg tablet Take 1 tablet (100 mcg total) by mouth daily   • metFORMIN (GLUCOPHAGE) 1000 MG tablet TAKE ONE TABLET BY MOUTH TWICE DAILY WITH MEALS  • Multiple Vitamin (MULTI-VITAMIN DAILY) TABS Take by mouth daily    • rosuvastatin (CRESTOR) 20 MG tablet Take 1 tablet (20 mg total) by mouth daily at bedtime   • [DISCONTINUED] Semaglutide,0 25 or 0 5MG/DOS, 2 MG/1 5ML SOPN Take 0 25 mg once weekly for 4 weeks and then 0 5 mg weekly (Patient not taking: Reported on 1/24/2023)       Objective     /86 (BP Location: Left arm)   Pulse (!) 114   Temp 97 9 °F (36 6 °C)   Ht 5' 3" (1 6 m)   Wt 60 3 kg (133 lb)   SpO2 98%   BMI 23 56 kg/m²     Physical Exam  Vitals and nursing note reviewed  Constitutional:       General: She is not in acute distress  Appearance: Normal appearance   She is not ill-appearing, toxic-appearing or diaphoretic  Eyes:      General:         Right eye: No discharge  Left eye: No discharge  Extraocular Movements: Extraocular movements intact  Conjunctiva/sclera: Conjunctivae normal    Cardiovascular:      Rate and Rhythm: Normal rate  Pulmonary:      Effort: Pulmonary effort is normal    Musculoskeletal:      Cervical back: Normal range of motion and neck supple  Neurological:      Mental Status: She is alert and oriented to person, place, and time  Mental status is at baseline  Cranial Nerves: No cranial nerve deficit     Psychiatric:         Mood and Affect: Mood normal          Behavior: Behavior normal          Judgment: Judgment normal        Ayaan Gandhi MD

## 2023-01-25 DIAGNOSIS — C73 PAPILLARY THYROID CARCINOMA (HCC): Primary | ICD-10-CM

## 2023-01-25 DIAGNOSIS — E03.9 ACQUIRED HYPOTHYROIDISM: ICD-10-CM

## 2023-01-25 PROBLEM — F03.C0 SEVERE DEMENTIA: Status: ACTIVE | Noted: 2023-01-25

## 2023-01-25 LAB
THYROGLOB AB SERPL-ACNC: <1 IU/ML (ref 0–0.9)
THYROGLOB SERPL-MCNC: 0.3 NG/ML (ref 1.5–38.5)

## 2023-01-25 NOTE — ASSESSMENT & PLAN NOTE
MMSE 14   Start donepezil  Side effects discussed  Living at home with  who has health concerns of his own  and it is getting difficult for him to take care of her  As of now she can manage some ADLs on her own but does need help with meds  She should not be driving until she has been further evaluated  I placed report to keenan khalil given her severe dementia   Social work also following

## 2023-01-25 NOTE — ASSESSMENT & PLAN NOTE
Lab Results   Component Value Date    HGBA1C 7 8 (H) 12/21/2022    uncontrolled  Following with endo   Per last  Endo visit she is supposed to be on semaglutide but reports not taking it today

## 2023-01-27 ENCOUNTER — HOSPITAL ENCOUNTER (OUTPATIENT)
Dept: RADIOLOGY | Age: 68
Discharge: HOME | End: 2023-01-27

## 2023-01-27 DIAGNOSIS — Z85.850 ENCOUNTER FOR FOLLOW-UP SURVEILLANCE OF THYROID CANCER: ICD-10-CM

## 2023-01-27 DIAGNOSIS — Z08 ENCOUNTER FOR FOLLOW-UP SURVEILLANCE OF THYROID CANCER: ICD-10-CM

## 2023-01-30 ENCOUNTER — HOSPITAL ENCOUNTER (OUTPATIENT)
Facility: HOSPITAL | Age: 68
Setting detail: OBSERVATION
Discharge: HOME | End: 2023-01-31
Attending: EMERGENCY MEDICINE | Admitting: INTERNAL MEDICINE

## 2023-01-30 DIAGNOSIS — F03.90 DEMENTIA (HCC): Primary | ICD-10-CM

## 2023-01-31 VITALS
SYSTOLIC BLOOD PRESSURE: 129 MMHG | HEART RATE: 77 BPM | OXYGEN SATURATION: 96 % | WEIGHT: 132.94 LBS | TEMPERATURE: 97.6 F | BODY MASS INDEX: 23.55 KG/M2 | RESPIRATION RATE: 16 BRPM | DIASTOLIC BLOOD PRESSURE: 73 MMHG | HEIGHT: 63 IN

## 2023-01-31 PROBLEM — G30.0 SEVERE EARLY ONSET ALZHEIMER'S DEMENTIA (HCC): Status: ACTIVE | Noted: 2023-01-31

## 2023-01-31 PROBLEM — F02.C0 SEVERE EARLY ONSET ALZHEIMER'S DEMENTIA (HCC): Status: ACTIVE | Noted: 2023-01-31

## 2023-01-31 LAB
ALBUMIN SERPL BCP-MCNC: 4 G/DL (ref 3.5–5)
ALP SERPL-CCNC: 91 U/L (ref 34–104)
ALT SERPL W P-5'-P-CCNC: 14 U/L (ref 7–52)
ANION GAP SERPL CALCULATED.3IONS-SCNC: 10 MMOL/L (ref 4–13)
AST SERPL W P-5'-P-CCNC: 14 U/L (ref 13–39)
BILIRUB SERPL-MCNC: 1.17 MG/DL (ref 0.2–1)
BUN SERPL-MCNC: 13 MG/DL (ref 5–25)
CALCIUM SERPL-MCNC: 8.7 MG/DL (ref 8.4–10.2)
CHLORIDE SERPL-SCNC: 102 MMOL/L (ref 96–108)
CO2 SERPL-SCNC: 25 MMOL/L (ref 21–32)
CREAT SERPL-MCNC: 0.55 MG/DL (ref 0.6–1.3)
ERYTHROCYTE [DISTWIDTH] IN BLOOD BY AUTOMATED COUNT: 13.5 % (ref 11.6–15.1)
FLUAV RNA RESP QL NAA+PROBE: NEGATIVE
FLUBV RNA RESP QL NAA+PROBE: NEGATIVE
GFR SERPL CREATININE-BSD FRML MDRD: 96 ML/MIN/1.73SQ M
GLUCOSE P FAST SERPL-MCNC: 171 MG/DL (ref 65–99)
GLUCOSE SERPL-MCNC: 171 MG/DL (ref 65–140)
GLUCOSE SERPL-MCNC: 171 MG/DL (ref 65–140)
GLUCOSE SERPL-MCNC: 200 MG/DL (ref 65–140)
HCT VFR BLD AUTO: 43 % (ref 34.8–46.1)
HGB BLD-MCNC: 15 G/DL (ref 11.5–15.4)
MCH RBC QN AUTO: 29.8 PG (ref 26.8–34.3)
MCHC RBC AUTO-ENTMCNC: 34.9 G/DL (ref 31.4–37.4)
MCV RBC AUTO: 86 FL (ref 82–98)
PLATELET # BLD AUTO: 302 THOUSANDS/UL (ref 149–390)
PMV BLD AUTO: 8.4 FL (ref 8.9–12.7)
POTASSIUM SERPL-SCNC: 3.3 MMOL/L (ref 3.5–5.3)
PROT SERPL-MCNC: 7 G/DL (ref 6.4–8.4)
RBC # BLD AUTO: 5.03 MILLION/UL (ref 3.81–5.12)
RSV RNA RESP QL NAA+PROBE: NEGATIVE
SARS-COV-2 RNA RESP QL NAA+PROBE: NEGATIVE
SODIUM SERPL-SCNC: 137 MMOL/L (ref 135–147)
WBC # BLD AUTO: 6.68 THOUSAND/UL (ref 4.31–10.16)

## 2023-01-31 RX ORDER — LEVOTHYROXINE SODIUM 0.1 MG/1
100 TABLET ORAL
Status: DISCONTINUED | OUTPATIENT
Start: 2023-01-31 | End: 2023-01-31 | Stop reason: HOSPADM

## 2023-01-31 RX ORDER — ACETAMINOPHEN 325 MG/1
650 TABLET ORAL EVERY 6 HOURS PRN
Status: DISCONTINUED | OUTPATIENT
Start: 2023-01-31 | End: 2023-01-31 | Stop reason: HOSPADM

## 2023-01-31 RX ORDER — MAGNESIUM HYDROXIDE/ALUMINUM HYDROXICE/SIMETHICONE 120; 1200; 1200 MG/30ML; MG/30ML; MG/30ML
30 SUSPENSION ORAL EVERY 6 HOURS PRN
Status: DISCONTINUED | OUTPATIENT
Start: 2023-01-31 | End: 2023-01-31 | Stop reason: HOSPADM

## 2023-01-31 RX ORDER — ENOXAPARIN SODIUM 100 MG/ML
40 INJECTION SUBCUTANEOUS DAILY
Status: DISCONTINUED | OUTPATIENT
Start: 2023-01-31 | End: 2023-01-31 | Stop reason: HOSPADM

## 2023-01-31 RX ORDER — ONDANSETRON 2 MG/ML
4 INJECTION INTRAMUSCULAR; INTRAVENOUS EVERY 6 HOURS PRN
Status: DISCONTINUED | OUTPATIENT
Start: 2023-01-31 | End: 2023-01-31 | Stop reason: HOSPADM

## 2023-01-31 RX ORDER — DONEPEZIL HYDROCHLORIDE 5 MG/1
5 TABLET, FILM COATED ORAL
Status: DISCONTINUED | OUTPATIENT
Start: 2023-01-31 | End: 2023-01-31 | Stop reason: HOSPADM

## 2023-01-31 RX ORDER — INSULIN LISPRO 100 [IU]/ML
1-5 INJECTION, SOLUTION INTRAVENOUS; SUBCUTANEOUS
Status: DISCONTINUED | OUTPATIENT
Start: 2023-01-31 | End: 2023-01-31 | Stop reason: HOSPADM

## 2023-01-31 RX ORDER — OLANZAPINE 10 MG/1
2.5 INJECTION, POWDER, LYOPHILIZED, FOR SOLUTION INTRAMUSCULAR
Status: DISCONTINUED | OUTPATIENT
Start: 2023-01-31 | End: 2023-01-31 | Stop reason: HOSPADM

## 2023-01-31 RX ORDER — POLYETHYLENE GLYCOL 3350 17 G/17G
17 POWDER, FOR SOLUTION ORAL DAILY
Status: DISCONTINUED | OUTPATIENT
Start: 2023-01-31 | End: 2023-01-31 | Stop reason: HOSPADM

## 2023-01-31 RX ADMIN — INSULIN LISPRO 1 UNITS: 100 INJECTION, SOLUTION INTRAVENOUS; SUBCUTANEOUS at 12:41

## 2023-01-31 RX ADMIN — DONEPEZIL HYDROCHLORIDE 5 MG: 5 TABLET ORAL at 02:44

## 2023-01-31 RX ADMIN — LEVOTHYROXINE SODIUM 100 MCG: 100 TABLET ORAL at 06:12

## 2023-01-31 RX ADMIN — INSULIN LISPRO 1 UNITS: 100 INJECTION, SOLUTION INTRAVENOUS; SUBCUTANEOUS at 08:05

## 2023-01-31 RX ADMIN — B-COMPLEX W/ C & FOLIC ACID TAB 1 TABLET: TAB at 08:04

## 2023-01-31 NOTE — ASSESSMENT & PLAN NOTE
Lab Results   Component Value Date    HGBA1C 7 8 (H) 12/21/2022       No results for input(s): POCGLU in the last 72 hours      Blood Sugar Average: Last 72 hrs:     - BG goal 140-200 while inpatient, ACHS sliding scale  - PTA regimen: empagliflozin/metformin

## 2023-01-31 NOTE — ASSESSMENT & PLAN NOTE
Patient pleasant with  in ED  She has no acute complaints currently, concerned for her     - mini-[facility] outpatient 15, started on donepezil  - unsafe discharge home, lives with  who is currently admitted  - will need case management planning   - high risk for hospital acquired delirium: ear plugs/eye shades at night/TV out, avoid unnecessary sleep interruption, use of glasses/hearing aids during day, frequent reorientation, aggressive physical therapy/mobilization, avoid opioids, benzodiazepines, antihistamines  If agitated risk to self/others use antipsychotic early evening/safety sitter

## 2023-01-31 NOTE — H&P
[address] 1955, 75 y o  female MRN: [id number]  Unit/Bed#: [place] 2 [facility] [place] 87 221-01 Encounter: [id number]  Primary Care Provider: Rodger Mensah MD   Date and time admitted to hospital: 1/30/2023 11:43 PM    * Severe early onset Alzheimer's dementia [facility])  Assessment & Plan  Patient pleasant with  in ED  She has no acute complaints currently, concerned for her     - mini-MoCA outpatient 15, started on donepezil  - unsafe discharge home, lives with  who is currently admitted  - will need case management planning   - high risk for hospital acquired delirium: ear plugs/eye shades at night/TV out, avoid unnecessary sleep interruption, use of glasses/hearing aids during day, frequent reorientation, aggressive physical therapy/mobilization, avoid opioids, benzodiazepines, antihistamines  If agitated risk to self/others use antipsychotic early evening/safety sitter  Type 2 diabetes mellitus with hyperglycemia, without long-term current use of insulin ([facility])  Assessment & Plan  Lab Results   Component Value Date    HGBA1C 7 8 (H) 12/21/2022       No results for input(s): POCGLU in the last 72 hours  Blood Sugar Average: Last 72 hrs:     - BG goal 140-200 while inpatient, ACHS sliding scale  - PTA regimen: empagliflozin/metformin    Essential hypertension  Assessment & Plan  Normotensive on admission, not currently on antihypertensives, monitor while inpatient     VTE Pharmacologic Prophylaxis: VTE Score: 2 Low Risk (Score 0-2) - Encourage Ambulation  Code Status: Level 1 - Full Code   Discussion with family: Updated  () at bedside  Anticipated Length of Stay: Patient will be admitted on an observation basis with an anticipated length of stay of less than 2 midnights secondary to dementia      Total Time for Visit, including Counseling / Coordination of Care: 30 minutes Greater than 50% of this total time spent on direct patient counseling and coordination of care  Chief Complaint: none    History of Present Illness:  Kostas Gallardo is a 75 y o  female with a PMH of dementia, HTN, T2DM who presents with inability to care for herself  Patient's  is primary caregiver at home and is now admitted  She was fairly recently diagnosed with severe dementia outpatient and started on donzepil  She was pleasant and cooperative on my exam without acute complaints   states she is unable to care for herself at home and is concerned for her safety if she were to leave  Review of Systems:  Review of Systems   Constitutional: Negative for chills and fever  HENT: Negative for ear pain and sore throat  Eyes: Negative for pain and visual disturbance  Respiratory: Negative for cough and shortness of breath  Cardiovascular: Negative for chest pain and palpitations  Gastrointestinal: Negative for abdominal pain and vomiting  Genitourinary: Negative for dysuria and hematuria  Musculoskeletal: Negative for arthralgias and back pain  Skin: Negative for color change and rash  Neurological: Negative for seizures and syncope  All other systems reviewed and are negative  Past Medical and Surgical History:   Past Medical History:   Diagnosis Date   • [facility]    • History of staph infection    • Hyperlipidemia    • Myocardial infarction ([facility] ) 1998   • Varicella        Past Surgical History:   Procedure Laterality Date   • APPENDECTOMY     • CARDIAC PACEMAKER PLACEMENT     • CARPAL TUNNEL RELEASE Bilateral    • CHOLECYSTECTOMY     • HYSTERECTOMY     • OOPHORECTOMY Bilateral    • REPLACEMENT TOTAL KNEE Left    • THYROIDECTOMY Bilateral 2/19/2019    Procedure: TOTAL THYROIDECTOMY;  Surgeon: Judy Lopez MD;  Location: [facility];  Service: Surgical Oncology   • US GUIDED THYROID BIOPSY  1/7/2019       Meds/Allergies:  Prior to Admission medications    Medication Sig Start Date End Date Taking? Authorizing Provider   alendronate (FOSAMAX) 70 mg tablet Take 1 tablet (70 mg total) by mouth every 7 days 12/1/22 3/1/23  Chester Mix MD   Calcium Polycarbophil (FIBER-CAPS PO) Take 2 capsules by mouth 2 (two) times a day     Historical Provider, MD   donepezil (ARICEPT) 5 mg tablet Take 1 tablet (5 mg total) by mouth daily at bedtime 1/24/23   Selene Wright MD   Empagliflozin (Jardiance) 25 MG TABS Take 1 tablet (25 mg total) by mouth every morning 12/22/22 3/22/23  Chester Mix MD   levothyroxine 100 mcg tablet Take 1 tablet (100 mcg total) by mouth daily 12/21/22 3/21/23  Chester Mix MD   metFORMIN (GLUCOPHAGE) 1000 MG tablet TAKE ONE TABLET BY MOUTH TWICE DAILY WITH MEALS  1/10/23   FANNY Gallo   Multiple Vitamin (MULTI-VITAMIN DAILY) TABS Take by mouth daily     Historical Provider, MD   rosuvastatin (CRESTOR) 20 MG tablet Take 1 tablet (20 mg total) by mouth daily at bedtime 8/24/20 1/24/23  Molly Garvin PA-C     I have reviewed home medications with patient family member  Allergies:    Allergies   Allergen Reactions   • Penicillins Rash   • Sulfa Antibiotics Rash       Social History:  Marital Status: /Civil Union   Occupation:   Patient Pre-hospital Living Situation: Patient Pre-hospital Level of Mobility:   Patient Pre-hospital Diet Restrictions:   Substance Use History:   Social History     Substance and Sexual Activity   Alcohol Use No     Social History     Tobacco Use   Smoking Status Never   Smokeless Tobacco Never     Social History     Substance and Sexual Activity   Drug Use No       Family History:  Family History   Problem Relation Age of Onset   • Diabetes unspecified Maternal Aunt    • Colon cancer Father    • Cancer Father    • Heart disease Mother         Cardiac disorder, congenital aortic stenosis   • Diabetes Other    • No Known Problems Sister    • No Known Problems Daughter    • No Known Problems Maternal Grandmother    • No Known Problems Maternal Grandfather    • No Known Problems Paternal Grandmother    • No Known Problems Paternal Grandfather    • No Known Problems Daughter    • No Known Problems Maternal Aunt    • No Known Problems Maternal Aunt    • No Known Problems Paternal Aunt        Physical Exam:     Vitals:   Blood Pressure: 153/95 (01/31/23 0150)  Pulse: 98 (01/31/23 0150)  Temperature: 98 7 °F (37 1 °C) (01/30/23 2349)  Temp Source: Oral (01/30/23 2349)  Respirations: 16 (01/30/23 2349)  Height: 5' 3" (160 cm) (01/31/23 0150)  Weight - Scale: 60 3 kg (132 lb 15 oz) (01/31/23 0150)  SpO2: 98 % (01/31/23 0150)    Physical Exam  Vitals and nursing note reviewed  Constitutional:       General: She is not in acute distress  Appearance: She is well-developed  HENT:      Head: Normocephalic and atraumatic  Eyes:      Conjunctiva/sclera: Conjunctivae normal    Cardiovascular:      Rate and Rhythm: Normal rate and regular rhythm  Heart sounds: No murmur heard  Pulmonary:      Effort: Pulmonary effort is normal  No respiratory distress  Breath sounds: Normal breath sounds  Abdominal:      Palpations: Abdomen is soft  Tenderness: There is no abdominal tenderness  Musculoskeletal:         General: No swelling  Cervical back: Neck supple  Skin:     General: Skin is warm and dry  Capillary Refill: Capillary refill takes less than 2 seconds  Neurological:      Mental Status: She is alert  Mental status is at baseline  Psychiatric:         Mood and Affect: Mood normal           Additional Data:     Lab Results:                            Lines/Drains:  Invasive Devices     None                     Imaging: No pertinent imaging reviewed  No orders to display       EKG and Other Studies Reviewed on Admission:   · EKG: No EKG obtained  ** Please Note: This note has been constructed using a voice recognition system   **

## 2023-01-31 NOTE — ASSESSMENT & PLAN NOTE
Lab Results   Component Value Date    HGBA1C 7 8 (H) 12/21/2022       Recent Labs     01/31/23  0731 01/31/23  1118   POCGLU 171* 200*       Blood Sugar Average: Last 72 hrs:  (P) 185 5   - BG goal 140-200 while inpatient, ACHS sliding scale  Resume home medication

## 2023-01-31 NOTE — PLAN OF CARE
Problem: SAFETY ADULT  Goal: Patient will remain free of falls  Description: INTERVENTIONS:  - Educate patient/family on patient safety including physical limitations  - Instruct patient to call for assistance with activity   - Consult OT/PT to assist with strengthening/mobility   - Keep Call bell within reach  - Keep bed low and locked with side rails adjusted as appropriate  - Keep care items and personal belongings within reach  - Initiate and maintain comfort rounds  - Make Fall Risk Sign visible to staff  - Offer Toileting every 2 Hours, in advance of need  - Initiate/Maintain   alarm  - Obtain necessary fall risk management equipment:   - Apply yellow socks and bracelet for high fall risk patients  - Consider moving patient to room near nurses station  Outcome: Progressing  Goal: Maintain or return to baseline ADL function  Description: INTERVENTIONS:  - Educate patient/family on patient safety including physical limitations  - Instruct patient to call for assistance with activity   - Consult OT/PT to assist with strengthening/mobility   - Keep Call bell within reach  - Keep bed low and locked with side rails adjusted as appropriate  - Keep care items and personal belongings within reach  - Initiate and maintain comfort rounds  - Make Fall Risk Sign visible to staff  - Offer Toileting every   Hours, in advance of need  - Initiate/Maintain   alarm  - Obtain necessary fall risk management equipment:      - Apply yellow socks and bracelet for high fall risk patients  - Consider moving patient to room near nurses station  Outcome: Progressing  Goal: Maintains/Returns to pre admission functional level  Description: INTERVENTIONS:  -  Assess patient's ability to carry out ADLs; assess patient's baseline for ADL function and identify physical deficits which impact ability to perform ADLs (bathing, care of mouth/teeth, toileting, grooming, dressing, etc )  - Assess/evaluate cause of self-care deficits   - Assess range of motion  - Assess patient's mobility; develop plan if impaired  - Assess patient's need for assistive devices and provide as appropriate  - Encourage maximum independence but intervene and supervise when necessary  - Involve family in performance of ADLs  - Assess for home care needs following discharge   - Consider OT consult to assist with ADL evaluation and planning for discharge  - Provide patient education as appropriate  Outcome: Progressing

## 2023-01-31 NOTE — CASE MANAGEMENT
Case Management Assessment & Discharge Planning Note    Patient name Bailey Diss  Location [place] 2 /[facility] Carlos Eduardo Membreno* MRN [id number]  : 1955 Date 2023       Current Admission Date: 2023  Current Admission Diagnosis:Severe early onset Alzheimer's dementia [facility])   Patient Active Problem List    Diagnosis Date Noted   • Severe early onset Alzheimer's dementia ([facility] ) 2023   • Severe dementia 2023   • Severe Alzheimer's dementia with other behavioral disturbance, unspecified timing of dementia onset ([facility] ) 2023   • Mood disorder ([place] [address] ) 2023   • AMS (altered mental status) 2022   • Encounter for follow-up surveillance of thyroid cancer 2019   • Type 2 diabetes mellitus with hyperglycemia, without long-term current use of insulin ([place] [address] ) 2019   • Papillary thyroid carcinoma ([place] [address] ) 2019   • Acquired hypothyroidism 2019   • History of thyroid cancer 01/10/2019   • Abnormal thyroid blood test 2018   • Posterior vitreous detachment 01/15/2018   • Arthritis 2017   • History of myocardial infarction 2017   • Insomnia 2017   • Age-related nuclear cataract of both eyes 2017   • Epiretinal membrane 2017   • Elective replacement indicated for pacemaker 2017   • Failed total left knee replacement ([facility] ) 2017   • Seasonal allergic rhinitis 2017   • Lumbar back pain with radiculopathy affecting left lower extremity 10/06/2016   • Diabetes mellitus type 2, uncontrolled, without complications    • Metabolic syndrome    • Osteoarthritis, generalized 10/20/2015   • Atrophic vaginitis 2013   • Essential hypertension 2012   • Disorder of bilirubin excretion 2010   • Hyperlipidemia 2010   • Cardiac pacemaker in situ 2010   • Coronary atherosclerosis 2010   • Chronic nonalcoholic liver disease       LOS (days): 0  Geometric Mean LOS (GMLOS) (days):   Days to LOS:     OBJECTIVE:              Current admission status: Observation       Preferred Pharmacy:   [id number] [address]  [address] [address]  Phone: [phone] Fax: [phone]    Primary Care Provider: Corinne Melgar MD    Primary Insurance: MEDICARE  Secondary Insurance: [place]:  [address] 3 [facility] Representative - Spouse   Primary Phone: [phone] (Mobile)  Home Phone: [phone]               Advance Directives  Primary Contact: Rachel Caitie-          Readmission Root Cause  30 Day Readmission: No    Patient Information  Admitted from[de-identified] Home  Mental Status: Alert (has a stand off approach to recognizing she has dementia)  During Assessment patient was accompanied by: Spouse  Assessment information provided by[de-identified] Spouse  Primary Caregiver: Spouse  Caregiver's Name[de-identified] Manda Rodriguez Relationship to Patient[de-identified] Family Member  Support Systems: Family members, Daughter, Spouse/significant other  [place] of Residence: [facility] do you live in?: [employer] [employer] entry access options   Select all that apply : Stairs  Number of steps to enter home : 1 (from garage)  Do the steps have railings?: No  Type of Current Residence: 2 story home  Upon entering residence, is there a bedroom on the main floor (no further steps)?: No  A bedroom is located on the following floor levels of residence (select all that apply):: 2nd Floor  Upon entering residence, is there a bathroom on the main floor (no further steps)?: Yes (1/2 bath)  Number of steps to 2nd floor from main floor: One Flight  In the last 12 months, was there a time when you were not able to pay the mortgage or rent on time?: No  In the last 12 months, how many places have you lived?: 1  In the last 12 months, was there a time when you did not have a steady place to sleep or slept in a shelter (including now)?: No  Homeless/housing insecurity resource given?: N/A  Living Arrangements: Lives w/ Spouse/significant other  Is patient a ?: No    Activities of Daily Living Prior to Admission  Functional Status: Assistance ( completes most IADL's along with supervision of daily needs)  Completes ADLs independently?: Yes  Ambulates independently?: Yes  Does patient use assisted devices?: No  Does patient currently own DME?: No  Does patient have a history of Outpatient Therapy (PT/OT)?: No  Does the patient have a history of Short-Term Rehab?: No  Does patient have a history of HHC?: No  Does patient currently have [facility]?: No         Patient Information Continued  Income Source: SSI/SSD  Does patient have prescription coverage?: Yes  Within the past 12 months, you worried that your food would run out before you got the money to buy more : Never true  Within the past 12 months, the food you bought just didn't last and you didn't have money to get more : Never true  Food insecurity resource given?: N/A  Does patient receive dialysis treatments?: No  Does patient have a history of substance abuse?: No  Does patient have a history of Mental Health Diagnosis?: No         Means of Transportation  Means of Transport to Appts[de-identified] Family transport  In the past 12 months, has lack of transportation kept you from medical appointments or from getting medications?: No  In the past 12 months, has lack of transportation kept you from meetings, work, or from getting things needed for daily living?: No  Was application for public transport provided?: N/A        DISCHARGE DETAILS:    Discharge planning discussed with[de-identified] pt and pt's         CM contacted family/caregiver?: No- see comments ( in room)  Were Treatment Team discharge recommendations reviewed with patient/caregiver?: Yes  Did patient/caregiver verbalize understanding of patient care needs?: Yes       Contacts  Patient Contacts: Jeff Blas  Relationship to Patient[de-identified] Family  Contact Method: In [facility]         Is the patient interested in [place] [address] at discharge?: No    DME Referral Provided  Referral made for DME?: No         Would you like to participate in our [address] service program?  : No - Declined    Treatment Team Recommendation: Home  Discharge Destination Plan[de-identified] Home  Transport at Discharge : Free Local Transportation        Transported by [employer] and Unit #):  [place] to be arranged at d/c  ETA of Transport (Date): 01/31/23  ETA of Transport (Time): 1500

## 2023-01-31 NOTE — ASSESSMENT & PLAN NOTE
Patient pleasant with  in ED  She has no acute complaints currently, concerned for her     - mini-[facility] outpatient 15, started on donepezil  - unsafe discharge home, lives with    Patient is now discharged    She can go home now

## 2023-01-31 NOTE — DISCHARGE SUMMARY
[address] [facility]  Discharge- Maria Del Rosario Beltran 1955, 75 y o  female MRN: [id number]  Unit/Bed#: [address] [place] [address] 221-01 Encounter: [id number]  Primary Care Provider: Jose Luis Siddiqui MD   Date and time admitted to hospital: 1/30/2023 11:43 PM    Admitting Provider:  Ben Mckinley DO  Discharge Provider:  Harley Manjarrez DO  Admission Date: 1/30/2023       Discharge Date: 01/31/23   LOS: 0  Primary Care Physician at Discharge: Khalida Ponce5 FLORINA Matson COURSE:  Maria Del Rosario Beltran is a 75 y o  female who presented with her  who had abnormal loss of weight loss findings on imaging  Due to her history of dementia the patient was admitted to the hospital as she had no further caretakers  The patient subsequently did well, she had no acute complaints  Her  was discharged and she subsequently was discharged home with in his care  DISCHARGE DIAGNOSES  * Severe early onset Alzheimer's dementia [facility])  Assessment & Plan  Patient pleasant with  in ED  She has no acute complaints currently, concerned for her     - mini-[facility] outpatient 15, started on donepezil  - unsafe discharge home, lives with    Patient is now discharged  She can go home now    Type 2 diabetes mellitus with hyperglycemia, without long-term current use of insulin [facility])  Assessment & Plan  Lab Results   Component Value Date    HGBA1C 7 8 (H) 12/21/2022       Recent Labs     01/31/23  0731 01/31/23  1118   POCGLU 171* 200*       Blood Sugar Average: Last 72 hrs:  (P) 185 5   - BG goal 140-200 while inpatient, ACHS sliding scale  Resume home medication    Essential hypertension-resolved as of 1/31/2023  Assessment & Plan  Normotensive on admission, not currently on antihypertensives, monitor while inpatient       CONSULTING PROVIDERS   IP CONSULT TO CASE MANAGEMENT    PROCEDURES PERFORMED  * No surgery found *    RADIOLOGY RESULTS  No results found      LABS  Results from last 7 days   Lab Units 01/31/23  0552   WBC Thousand/uL 6 68   HEMOGLOBIN g/dL 15 0   HEMATOCRIT % 43 0   MCV fL 86   PLATELETS Thousands/uL 302     Results from last 7 days   Lab Units 01/31/23  0552   SODIUM mmol/L 137   POTASSIUM mmol/L 3 3*   CHLORIDE mmol/L 102   CO2 mmol/L 25   BUN mg/dL 13   CREATININE mg/dL 0 55*   CALCIUM mg/dL 8 7   ALBUMIN g/dL 4 0   TOTAL BILIRUBIN mg/dL 1 17*   ALK PHOS U/L 91   ALT U/L 14   AST U/L 14   EGFR ml/min/1 73sq m 96   GLUCOSE RANDOM mg/dL 171*                  Results from last 7 days   Lab Units 01/31/23  1118 01/31/23  0731   POC GLUCOSE mg/dl 200* 171*                       Cultures:         Invalid input(s): URIBILINOGEN        Results from last 7 days   Lab Units 01/31/23  0037   INFLUENZA A PCR  Negative       PHYSICAL EXAM:  Vitals:   Blood Pressure: 129/73 (01/31/23 0646)  Pulse: 77 (01/31/23 0646)  Temperature: 97 6 °F (36 4 °C) (01/31/23 0646)  Temp Source: Oral (01/30/23 2349)  Respirations: 16 (01/30/23 2349)  Height: 5' 3" (160 cm) (01/31/23 0150)  Weight - Scale: 60 3 kg (132 lb 15 oz) (01/31/23 0150)  SpO2: 96 % (01/31/23 0646)      General: well appearing, no acute distress  HEENT: atraumatic, PERRLA, moist mucosa, normal pharynx, normal tonsils and adenoids, normal tongue, no fluid in sinuses  Neck: Trachea midline, no carotid bruit, no masses  Respiratory: normal chest wall expansion, CTA B, no r/r/w, no rubs  Cardiovascular: RRR, no m/r/g, Normal S1 and S2  Abdomen: Soft, non-tender, non-distended, normal bowel sounds in all quadrants, no hepatosplenomegaly, no tympany  Rectal: deferred  Musculoskeletal: normal ROM in upper and lower extremities  Integumentary: warm, dry, and pink, with no rash, purpura, or petechia  Heme/Lymph: no lymphadenopathy, no bruises  Neurological: Cranial Nerves II-XII grossly intact  Psychiatric: cooperative       Discharge Disposition: Home/Self Care      Test Results Pending at Discharge:           Medications · Summary of Medication Adjustments made as a result of this hospitalization: See discharge list  · Medication Dosing Tapers - Please refer to Discharge Medication List for details on any medication dosing tapers (if applicable to patient)  · Discharge Medication List: See after visit summary for reconciled discharge medications  Diet restrictions:         Diet Orders   (From admission, onward)             Start     Ordered    01/31/23 0152  Diet Regular; Regular House  Diet effective now        References:    Nutrtion Support Algorithm Enteral vs  Parenteral   Question Answer Comment   Diet Type Regular    Regular Regular House    RD to adjust diet per protocol? Yes        01/31/23 0151              Activity restrictions: No strenuous activity  Discharge Condition: good    Outpatient Follow-Up and Discharge Instructions  See after visit summary section titled Discharge Instructions for information provided to patient and family  Code Status: Level 1 - Full Code  Discharge Statement   I spent 35 minutes discharging the patient  This time was spent on the day of discharge  Greater than 50% of total time was spent with the patient and / or family counseling and / or coordination of care  ** Please Note: This note was completed in part utilizing M-[employer] Flu[employer] Direct Software  Grammatical errors, random word insertions, spelling mistakes, and incomplete sentences may be an occasional consequence of this system secondary to software limitations, ambient noise, and hardware issues  If you have any questions or concerns about the content, text, or information contained within the body of this dictation, please contact the provider for clarification  **

## 2023-01-31 NOTE — PLAN OF CARE
Problem: Potential for Falls  Goal: Patient will remain free of falls  Description: INTERVENTIONS:  - Educate patient/family on patient safety including physical limitations  - Instruct patient to call for assistance with activity   - Consult OT/PT to assist with strengthening/mobility   - Keep Call bell within reach  - Keep bed low and locked with side rails adjusted as appropriate  - Keep care items and personal belongings within reach  - Initiate and maintain comfort rounds  - Make Fall Risk Sign visible to staff  - Offer Toileting every 2 Hours, in advance of need  - Initiate/Maintain bed alarm  - Obtain necessary fall risk management equipment    - Apply yellow socks and bracelet for high fall risk patients  - Consider moving patient to room near nurses station  Outcome: Progressing     Problem: PAIN - ADULT  Goal: Verbalizes/displays adequate comfort level or baseline comfort level  Description: Interventions:  - Encourage patient to monitor pain and request assistance  - Assess pain using appropriate pain scale  - Administer analgesics based on type and severity of pain and evaluate response  - Implement non-pharmacological measures as appropriate and evaluate response  - Consider cultural and social influences on pain and pain management  - Notify physician/advanced practitioner if interventions unsuccessful or patient reports new pain  Outcome: Progressing     Problem: INFECTION - ADULT  Goal: Absence or prevention of progression during hospitalization  Description: INTERVENTIONS:  - Assess and monitor for signs and symptoms of infection  - Monitor lab/diagnostic results  - Monitor all insertion sites, i e  indwelling lines, tubes, and drains  - Monitor endotracheal if appropriate and nasal secretions for changes in amount and color  - [place] appropriate cooling/warming therapies per order  - Administer medications as ordered  - Instruct and encourage patient and family to use good hand hygiene technique  - Identify and instruct in appropriate isolation precautions for identified infection/condition  Outcome: Progressing  Goal: Absence of fever/infection during neutropenic period  Description: INTERVENTIONS:  - Monitor WBC    Outcome: Progressing     Problem: SAFETY ADULT  Goal: Patient will remain free of falls  Description: INTERVENTIONS:  - Educate patient/family on patient safety including physical limitations  - Instruct patient to call for assistance with activity   - Consult OT/PT to assist with strengthening/mobility   - Keep Call bell within reach  - Keep bed low and locked with side rails adjusted as appropriate  - Keep care items and personal belongings within reach  - Initiate and maintain comfort rounds  - Make Fall Risk Sign visible to staff  - Offer Toileting every 2 Hours, in advance of need  - Initiate/Maintain bed alarm  - Obtain necessary fall risk management equipment    - Apply yellow socks and bracelet for high fall risk patients  - Consider moving patient to room near nurses station  Outcome: Progressing  Goal: Maintain or return to baseline ADL function  Description: INTERVENTIONS:  -  Assess patient's ability to carry out ADLs; assess patient's baseline for ADL function and identify physical deficits which impact ability to perform ADLs (bathing, care of mouth/teeth, toileting, grooming, dressing, etc )  - Assess/evaluate cause of self-care deficits   - Assess range of motion  - Assess patient's mobility; develop plan if impaired  - Assess patient's need for assistive devices and provide as appropriate  - Encourage maximum independence but intervene and supervise when necessary  - Involve family in performance of ADLs  - Assess for home care needs following discharge   - Consider OT consult to assist with ADL evaluation and planning for discharge  - Provide patient education as appropriate  Outcome: Progressing  Goal: Maintains/Returns to pre admission functional level  Description: INTERVENTIONS:  - Perform BMAT or MOVE assessment daily    - Set and communicate daily mobility goal to care team and patient/family/caregiver  - Collaborate with rehabilitation services on mobility goals if consulted  - Perform Range of Motion 2 times a day  - Reposition patient every 2 hours  - Dangle patient 2 times a day  - Stand patient 2 times a day  - Ambulate patient 2 times a day  - Out of bed to chair 2 times a day   - Out of bed for meals 2 times a day  - Out of bed for toileting  - Record patient progress and toleration of activity level   Outcome: Progressing     Problem: DISCHARGE PLANNING  Goal: Discharge to home or other facility with appropriate resources  Description: INTERVENTIONS:  - Identify barriers to discharge w/patient and caregiver  - Arrange for needed discharge resources and transportation as appropriate  - Identify discharge learning needs (meds, wound care, etc )  - Arrange for interpretive services to assist at discharge as needed  - Refer to Case Management Department for coordinating discharge planning if the patient needs post-hospital services based on physician/advanced practitioner order or complex needs related to functional status, cognitive ability, or social support system  Outcome: Progressing     Problem: Knowledge Deficit  Goal: Patient/family/caregiver demonstrates understanding of disease process, treatment plan, medications, and discharge instructions  Description: Complete learning assessment and assess knowledge base    Interventions:  - Provide teaching at level of understanding  - Provide teaching via preferred learning methods  Outcome: Progressing

## 2023-01-31 NOTE — ED PROVIDER NOTES
History  Chief Complaint   Patient presents with   • Personal Problem     Pt's  being admitted, pt unable to be home d/t dementia and has no one to care for pt while  is here  Pt has no complaints  Social admit     24-year-old female who came to the emergency department over concerns of her ability to care for herself  Patient has dementia and her  is being admitted to the hospital    is primary caregiver patient has been admitted for monitoring in the past when the  has been admitted to the hospital           Prior to Admission Medications   Prescriptions Last Dose Informant Patient Reported? Taking? Calcium Polycarbophil (FIBER-CAPS PO)   Yes No   Sig: Take 2 capsules by mouth 2 (two) times a day    Empagliflozin (Jardiance) 25 MG TABS   No No   Sig: Take 1 tablet (25 mg total) by mouth every morning   Multiple Vitamin (MULTI-VITAMIN DAILY) TABS   Yes No   Sig: Take by mouth daily    alendronate (FOSAMAX) 70 mg tablet   No No   Sig: Take 1 tablet (70 mg total) by mouth every 7 days   donepezil (ARICEPT) 5 mg tablet   No No   Sig: Take 1 tablet (5 mg total) by mouth daily at bedtime   levothyroxine 100 mcg tablet   No No   Sig: Take 1 tablet (100 mcg total) by mouth daily   metFORMIN (GLUCOPHAGE) 1000 MG tablet   No No   Sig: TAKE ONE TABLET BY MOUTH TWICE DAILY WITH MEALS     rosuvastatin (CRESTOR) 20 MG tablet   No No   Sig: Take 1 tablet (20 mg total) by mouth daily at bedtime      Facility-Administered Medications: None       Past Medical History:   Diagnosis Date   • Diabetes mellitus ([facility] [address] )    • History of staph infection    • Hyperlipidemia    • Myocardial infarction ([facility] [address] ) 1998   • Varicella        Past Surgical History:   Procedure Laterality Date   • APPENDECTOMY     • CARDIAC PACEMAKER PLACEMENT     • CARPAL TUNNEL RELEASE Bilateral    • CHOLECYSTECTOMY     • HYSTERECTOMY     • OOPHORECTOMY Bilateral    • REPLACEMENT TOTAL KNEE Left    • THYROIDECTOMY Bilateral 2/19/2019    Procedure: TOTAL THYROIDECTOMY;  Surgeon: Martha Carver MD;  Location: [facility];  Service: Surgical Oncology   • US GUIDED THYROID BIOPSY  1/7/2019       Family History   Problem Relation Age of Onset   • Diabetes unspecified Maternal Aunt    • Colon cancer Father    • Cancer Father    • Heart disease Mother         Cardiac disorder, congenital aortic stenosis   • Diabetes Other    • No Known Problems Sister    • No Known Problems Daughter    • No Known Problems Maternal Grandmother    • No Known Problems Maternal Grandfather    • No Known Problems Paternal Grandmother    • No Known Problems Paternal Grandfather    • No Known Problems Daughter    • No Known Problems Maternal Aunt    • No Known Problems Maternal Aunt    • No Known Problems Paternal Aunt      I have reviewed and agree with the history as documented  E-Cigarette/Vaping   • E-Cigarette Use Never User      E-Cigarette/Vaping Substances   • Nicotine No    • THC No    • CBD No    • Flavoring No    • Other No    • Unknown No      Social History     Tobacco Use   • Smoking status: Never   • Smokeless tobacco: Never   Vaping Use   • Vaping Use: Never used   Substance Use Topics   • Alcohol use: No   • Drug use: No        Review of Systems   Unable to perform ROS: Dementia       Physical Exam  ED Triage Vitals   Temperature Pulse Respirations Blood Pressure SpO2   01/30/23 2349 01/30/23 2349 01/30/23 2349 01/30/23 2349 01/30/23 2349   98 7 °F (37 1 °C) (!) 109 16 116/90 97 %      Temp Source Heart Rate Source Patient Position - Orthostatic VS BP Location FiO2 (%)   01/30/23 2349 01/30/23 2349 01/30/23 2349 01/30/23 2349 --   Oral Monitor Standing Left arm       Pain Score       01/31/23 0150       No Pain             Orthostatic Vital Signs  Vitals:    01/30/23 2349 01/31/23 0150   BP: 116/90 153/95   Pulse: (!) 109 98   Patient Position - Orthostatic VS: Standing        Physical Exam  Vitals and nursing note reviewed     Constitutional: General: She is not in acute distress  Appearance: She is well-developed  HENT:      Head: Normocephalic and atraumatic  Eyes:      Conjunctiva/sclera: Conjunctivae normal    Cardiovascular:      Rate and Rhythm: Normal rate and regular rhythm  Heart sounds: No murmur heard  Pulmonary:      Effort: Pulmonary effort is normal  No respiratory distress  Musculoskeletal:         General: No swelling  Skin:     General: Skin is warm and dry  Capillary Refill: Capillary refill takes less than 2 seconds  Neurological:      Mental Status: She is alert     Psychiatric:         Mood and Affect: Mood normal          ED Medications  Medications   donepezil (ARICEPT) tablet 5 mg (5 mg Oral Given 1/31/23 0244)   levothyroxine tablet 100 mcg (has no administration in time range)   multivitamin stress formula tablet 1 tablet (has no administration in time range)   enoxaparin (LOVENOX) subcutaneous injection 40 mg (has no administration in time range)   acetaminophen (TYLENOL) tablet 650 mg (has no administration in time range)   polyethylene glycol (MIRALAX) packet 17 g (has no administration in time range)   ondansetron (ZOFRAN) injection 4 mg (has no administration in time range)   aluminum-magnesium hydroxide-simethicone (MYLANTA) oral suspension 30 mL (has no administration in time range)   OLANZapine (ZyPREXA) IM injection 2 5 mg (has no administration in time range)   insulin lispro (HumaLOG) 100 units/mL subcutaneous injection 1-5 Units (has no administration in time range)       Diagnostic Studies  Results Reviewed     Procedure Component Value Units Date/Time    FLU/RSV/COVID - if FLU/RSV clinically relevant [[id number]]  (Normal) Collected: 01/31/23 0037    Lab Status: Final result Specimen: Nares from Nose Updated: 01/31/23 0127     SARS-CoV-2 Negative     INFLUENZA A PCR Negative     INFLUENZA B PCR Negative     RSV PCR Negative    Narrative:      FOR PEDIATRIC PATIENTS - copy/paste COVID Guidelines URL to browser: [email or web address]    SARS-CoV-2 assay is a Nucleic Acid Amplification assay intended for the  qualitative detection of nucleic acid from SARS-CoV-2 in nasopharyngeal  swabs  Results are for the presumptive identification of SARS-CoV-2 RNA  Positive results are indicative of infection with SARS-CoV-2, the virus  causing COVID-19, but do not rule out bacterial infection or co-infection  with other viruses  Laboratories within the [place] and its  territories are required to report all positive results to the appropriate  public health authorities  Negative results do not preclude SARS-CoV-2  infection and should not be used as the sole basis for treatment or other  patient management decisions  Negative results must be combined with  clinical observations, patient history, and epidemiological information  This test has not been FDA cleared or approved  This test has been authorized by FDA under an Emergency Use Authorization  (EUA)  This test is only authorized for the duration of time the  declaration that circumstances exist justifying the authorization of the  emergency use of an in vitro diagnostic tests for detection of SARS-CoV-2  virus and/or diagnosis of COVID-19 infection under section 564(b)(1) of  the Act, 21 U  S C  234NXG-7(J)(2), unless the authorization is terminated  or revoked sooner  The test has been validated but independent review by FDA  and CLIA is pending  Test performed using [employer] Gene[employer]pert: This RT-PCR assay targets N2,  a region unique to SARS-CoV-2  A conserved region in the E-gene was chosen  for pan-Sarbecovirus detection which includes SARS-CoV-2  According to CMS-2020-01-R, this platform meets the definition of high-throughput technology                   No orders to display         Procedures  Procedures      ED Course                             SBIRT 22yo+    Flowsheet Row Most Recent Value   SBIRT (24 yo +)    In order to provide better care to our patients, we are screening all of our patients for alcohol and drug use  Would it be okay to ask you these screening questions? No Filed at: 01/30/2023 3859                Medical Decision Making  48-year-old female with dementia presents emergency department for a social admission    DDx: Dementia, inability to care for herself    Plan: Admit observation status to medicine service  [facility]): acute illness or injury  Risk  Decision regarding hospitalization  Disposition  Final diagnoses:   Dementia ([address] )     Time reflects when diagnosis was documented in both MDM as applicable and the Disposition within this note     Time User Action Codes Description Comment    1/31/2023 12:53 AM Na Cassette Add [F03 90] [facility])       ED Disposition     ED Disposition   Admit    Condition   Stable    Date/Time   Tue Jan 31, 2023 12:55 AM    Comment   Case was discussed with Nissa Pearce and the patient's admission status was agreed to be Admission Status: obs status to the service of Dr George Benjamin              Follow-up Information    None         Current Discharge Medication List      CONTINUE these medications which have NOT CHANGED    Details   alendronate (FOSAMAX) 70 mg tablet Take 1 tablet (70 mg total) by mouth every 7 days  Qty: 4 tablet, Refills: 2    Associated Diagnoses: Age-related osteoporosis without current pathological fracture      Calcium Polycarbophil (FIBER-CAPS PO) Take 2 capsules by mouth 2 (two) times a day       donepezil (ARICEPT) 5 mg tablet Take 1 tablet (5 mg total) by mouth daily at bedtime  Qty: 90 tablet, Refills: 0    Associated Diagnoses: Severe dementia with other behavioral disturbance, unspecified dementia type      Empagliflozin (Jardiance) 25 MG TABS Take 1 tablet (25 mg total) by mouth every morning  Qty: 60 tablet, Refills: 0    Associated Diagnoses: Type 2 diabetes mellitus with hyperglycemia, without long-term current use of insulin (HCC)      levothyroxine 100 mcg tablet Take 1 tablet (100 mcg total) by mouth daily  Qty: 30 tablet, Refills: 2    Associated Diagnoses: Papillary thyroid carcinoma ([address] ); Acquired hypothyroidism      metFORMIN (GLUCOPHAGE) 1000 MG tablet TAKE ONE TABLET BY MOUTH TWICE DAILY WITH MEALS  Qty: 60 tablet, Refills: 0    Associated Diagnoses: Type 2 diabetes mellitus with hyperglycemia, without long-term current use of insulin (HCC)      Multiple Vitamin (MULTI-VITAMIN DAILY) TABS Take by mouth daily       rosuvastatin (CRESTOR) 20 MG tablet Take 1 tablet (20 mg total) by mouth daily at bedtime  Qty: 90 tablet, Refills: 1    Associated Diagnoses: Hyperlipidemia, unspecified hyperlipidemia type           No discharge procedures on file  PDMP Review     None           ED Provider  Attending physically available and evaluated Elaine Bimal MAYNARD managed the patient along with the ED Attending      Electronically Signed by         Terrance Kirkland DO  01/31/23 7380

## 2023-01-31 NOTE — ED ATTENDING ATTESTATION
1/30/2023  Cherylynn Duverney, DO, saw and evaluated the patient  I have discussed the patient with the resident/non-physician practitioner and agree with the resident's/non-physician practitioner's findings, Plan of Care, and MDM as documented in the resident's/non-physician practitioner's note, except where noted  All available labs and Radiology studies were reviewed  I was present for key portions of any procedure(s) performed by the resident/non-physician practitioner and I was immediately available to provide assistance  At this point I agree with the current assessment done in the Emergency Department  I have conducted an independent evaluation of this patient a history and physical is as follows:    57-year-old female that was signed in for a social admission  Patient has been is being admitted and due to her underlying dementia she cannot be by herself  Patient has no outpatient resources as of now  She will be admitted to internal medicine for case management and placement  We will try to keep her in her 's room to help her stay calm    ED Course         Critical Care Time  Procedures

## 2023-01-31 NOTE — NURSING NOTE
AV reviewed with pt and , understanding was expressed  PT dc'd via wheelchair  to lyft at exit by RN

## 2023-02-03 ENCOUNTER — OFFICE VISIT (OUTPATIENT)
Dept: SURGICAL ONCOLOGY | Facility: CLINIC | Age: 68
End: 2023-02-03

## 2023-02-03 VITALS
HEIGHT: 63 IN | SYSTOLIC BLOOD PRESSURE: 122 MMHG | OXYGEN SATURATION: 100 % | HEART RATE: 97 BPM | DIASTOLIC BLOOD PRESSURE: 64 MMHG | WEIGHT: 132 LBS | BODY MASS INDEX: 23.39 KG/M2 | TEMPERATURE: 97.7 F | RESPIRATION RATE: 16 BRPM

## 2023-02-03 DIAGNOSIS — Z85.850 ENCOUNTER FOR FOLLOW-UP SURVEILLANCE OF THYROID CANCER: Primary | ICD-10-CM

## 2023-02-03 DIAGNOSIS — C73 PAPILLARY THYROID CARCINOMA (HCC): ICD-10-CM

## 2023-02-03 DIAGNOSIS — Z08 ENCOUNTER FOR FOLLOW-UP SURVEILLANCE OF THYROID CANCER: Primary | ICD-10-CM

## 2023-02-03 DIAGNOSIS — Z85.850 HISTORY OF THYROID CANCER: ICD-10-CM

## 2023-02-03 NOTE — PROGRESS NOTES
Surgical Oncology Follow Up       [facility] SURGICAL ONCOLOGY [facility] [address] [address]    Wes Brooks  1955  [id number]  [facility]GICAL [facility] [place]  [place] [address]    Chief Complaint   Patient presents with   • Other     Office visit       Assessment/Plan:  1  Encounter for follow-up surveillance of thyroid cancer  - 6 month follow up    2  History of thyroid cancer    3  Papillary thyroid carcinoma ([address] )  - US head neck lymph node mapping; Future       Discussion/Summary: Patient is a 75year old female presenting for a 6 month follow up for papillary thyroid cancer diagnosed in January 2019  She underwent a total thyroidectomy with Dr Mcfadden Russian  She was stage 1 p T1b(m), pN0, pMx  She follows with endocrinology who manages her levothyroxine and thyroid labs  We have been seeing her every 6 months for clinical exam and with an ultrasound head neck and lymph nodes  Her last ultrasound was on 1/27/2023 which showed no evidence of recurrent or metastatic disease  No suspicious lymphadenopathy is noted  There were no concerns on her clinical exam  We will get her u/s head/neck scheduled for 6 months  We will see the patient back in 6 months or sooner should the need arise  She was instructed to call with any questions or concerns prior to this time  All questions were answered today        History of Present Illness:     Oncology History   History of thyroid cancer   1/7/2019 Biopsy    Fine needle aspiration of right thyroid  Malignant ([place] category VI) papillary thyroid carcinoma     2/19/2019 Surgery    Total thyroidectomy  Multifocal tumor  -largest tumor focus 1 7cm (T1b  -Tumor laterality: right lobe  -Papillary thyroid carcinoma  -margins: carcinoma less than 0 1cm from inked resection surface  -regional lymph nodes-2 lymph nodes negative for carcinoma (0/2)    8th Ed AJCC Stage: at least stage I pT1b(m), pN0, pMx          -Interval History: Patient is a 75year old female presenting for a 6 month follow up for papillary thyroid cancer diagnosed in January 2019  Her last ultrasound was on 1/27/2023 which showed no evidence of recurrent or metastatic disease  Patient denies difficulty swallowing, mass at the neck, hoarseness of voice  She denies persistent headaches, bone pain, back pain, shortness of breath, or abdominal pain  Review of Systems:  Review of Systems   Constitutional: Negative for activity change, appetite change, fatigue and unexpected weight change  Respiratory: Negative for cough and shortness of breath  Cardiovascular: Negative for chest pain  Gastrointestinal: Negative for abdominal pain, diarrhea, nausea and vomiting  Endocrine: Negative for heat intolerance  Musculoskeletal: Negative for arthralgias, back pain and myalgias  Skin: Negative for rash  Neurological: Negative for weakness and headaches  Hematological: Negative for adenopathy         Patient Active Problem List   Diagnosis   • Hyperlipidemia   • Arthritis   • History of myocardial infarction   • Insomnia   • Cardiac pacemaker in situ   • Chronic nonalcoholic liver disease   • Coronary atherosclerosis   • Disorder of bilirubin excretion   • Elective replacement indicated for pacemaker   • Failed total left knee replacement ([facility] )   • Lumbar back pain with radiculopathy affecting left lower extremity   • Metabolic syndrome   • Osteoarthritis, generalized   • Atrophic vaginitis   • Seasonal allergic rhinitis   • Abnormal thyroid blood test   • History of thyroid cancer   • Diabetes mellitus type 2, uncontrolled, without complications   • Acquired hypothyroidism   • Encounter for follow-up surveillance of thyroid cancer   • Type 2 diabetes mellitus with hyperglycemia, without long-term current use of insulin (HCC)   • Papillary thyroid carcinoma (HCC)   • Age-related nuclear cataract of both eyes   • Epiretinal membrane   • Posterior vitreous detachment   • AMS (altered mental status)   • Severe Alzheimer's dementia with other behavioral disturbance, unspecified timing of dementia onset ([facility] )   • Mood disorder ([facility] )   • Severe dementia   • Severe early onset Alzheimer's dementia ([facility] )     Past Medical History:   Diagnosis Date   • Diabetes mellitus ([facility] )    • History of staph infection    • Hyperlipidemia    • Myocardial infarction ([place] [address] ) 1998   • Varicella      Past Surgical History:   Procedure Laterality Date   • APPENDECTOMY     • CARDIAC PACEMAKER PLACEMENT     • CARPAL TUNNEL RELEASE Bilateral    • CHOLECYSTECTOMY     • HYSTERECTOMY     • OOPHORECTOMY Bilateral    • REPLACEMENT TOTAL KNEE Left    • THYROIDECTOMY Bilateral 2/19/2019    Procedure: TOTAL THYROIDECTOMY;  Surgeon: Adi Otto MD;  Location: [facility];  Service: Surgical Oncology   • US GUIDED THYROID BIOPSY  1/7/2019     Family History   Problem Relation Age of Onset   • Diabetes unspecified Maternal Aunt    • Colon cancer Father    • Cancer Father    • Heart disease Mother         Cardiac disorder, congenital aortic stenosis   • Diabetes Other    • No Known Problems Sister    • No Known Problems Daughter    • No Known Problems Maternal Grandmother    • No Known Problems Maternal Grandfather    • No Known Problems Paternal Grandmother    • No Known Problems Paternal Grandfather    • No Known Problems Daughter    • No Known Problems Maternal Aunt    • No Known Problems Maternal Aunt    • No Known Problems Paternal Aunt      Social History     Socioeconomic History   • Marital status: /Civil Union     Spouse name: Not on file   • Number of children: Not on file   • Years of education: Not on file   • Highest education level: Not on file   Occupational History   • Occupation: medical records   Tobacco Use   • Smoking status: Never   • Smokeless tobacco: Never   Vaping Use   • Vaping Use: Never used   Substance and Sexual Activity   • Alcohol use: No   • Drug use: No   • Sexual activity: Not Currently     Partners: Male   Other Topics Concern   • Not on file   Social History Narrative    Always uses seat belt     No caffeine use      Social Determinants of Health     Financial Resource Strain: Low Risk    • Difficulty of Paying Living Expenses: Not hard at all   Food Insecurity: No Food Insecurity   • Worried About Running Out of Food in the Last Year: Never true   • Ran Out of Food in the Last Year: Never true   Transportation Needs: No Transportation Needs   • Lack of Transportation (Medical): No   • Lack of Transportation (Non-Medical):  No   Physical Activity: Not on file   Stress: Not on file   Social Connections: Not on file   Intimate Partner Violence: Not on file   Housing Stability: Low Risk    • Unable to Pay for Housing in the Last Year: No   • Number of Places Lived in the Last Year: 1   • Unstable Housing in the Last Year: No       Current Outpatient Medications:   •  alendronate (FOSAMAX) 70 mg tablet, Take 1 tablet (70 mg total) by mouth every 7 days, Disp: 4 tablet, Rfl: 2  •  donepezil (ARICEPT) 5 mg tablet, Take 1 tablet (5 mg total) by mouth daily at bedtime, Disp: 90 tablet, Rfl: 0  •  Empagliflozin (Jardiance) 25 MG TABS, Take 1 tablet (25 mg total) by mouth every morning, Disp: 60 tablet, Rfl: 0  •  levothyroxine 100 mcg tablet, Take 1 tablet (100 mcg total) by mouth daily, Disp: 30 tablet, Rfl: 2  •  metFORMIN (GLUCOPHAGE) 1000 MG tablet, TAKE ONE TABLET BY MOUTH TWICE DAILY WITH MEALS , Disp: 60 tablet, Rfl: 0  •  Multiple Vitamin (MULTI-VITAMIN DAILY) TABS, Take by mouth daily , Disp: , Rfl:   •  rosuvastatin (CRESTOR) 20 MG tablet, Take 1 tablet (20 mg total) by mouth daily at bedtime, Disp: 90 tablet, Rfl: 1  •  Calcium Polycarbophil (FIBER-CAPS PO), Take 2 capsules by mouth 2 (two) times a day  (Patient not taking: Reported on 2/3/2023), Disp: , Rfl:   Allergies   Allergen Reactions   • Penicillins Rash   • Sulfa Antibiotics Rash     Vitals:    02/03/23 0854   Resp: 16       Physical Exam  Constitutional:       General: She is not in acute distress  Appearance: Normal appearance  Neck:      Thyroid: No thyroid mass or thyroid tenderness  Comments: Neck excisional scar  Cardiovascular:      Rate and Rhythm: Normal rate and regular rhythm  Pulses: Normal pulses  Heart sounds: Normal heart sounds  Pulmonary:      Effort: Pulmonary effort is normal       Breath sounds: Normal breath sounds  Chest:      Chest wall: No mass  Breasts:     Right: No swelling, bleeding, inverted nipple, mass, nipple discharge, skin change or tenderness  Left: No swelling, bleeding, inverted nipple, mass, nipple discharge, skin change or tenderness  Abdominal:      General: Abdomen is flat  Palpations: Abdomen is soft  Musculoskeletal:      Cervical back: No edema or erythema  Lymphadenopathy:      Cervical: No cervical adenopathy  Upper Body:      Right upper body: No supraclavicular, axillary or pectoral adenopathy  Left upper body: No supraclavicular, axillary or pectoral adenopathy  Skin:     General: Skin is warm  Neurological:      General: No focal deficit present  Mental Status: She is alert and oriented to person, place, and time  Psychiatric:         Mood and Affect: Mood normal          Behavior: Behavior normal            Results:    Imaging  US head neck lymph node mapping    Result Date: 2/1/2023  Narrative: NECK ULTRASOUND INDICATION:     Z08: Encounter for follow-up examination after completed treatment for malignant neoplasm Z85 850: Personal history of malignant neoplasm of thyroid  COMPARISON:  Multiple priors most recently 10/19/2022 FINDINGS: Ultrasound of the thyroidectomy bed and cervical lymph node chains was performed with a high frequency linear transducer  There is no suspicion of recurrent mass in the thyroidectomy bed   Lymph nodes maintain normal morphologic contour, echogenicity and short axis dimensions of less than 0 7 cm  No evidence for microcalcification or focal nodularity  Previously seen questionably abnormal lymph node no longer identified  Impression: No evidence of recurrent or metastatic disease  No suspicious lymphadenopathy is noted  Workstation performed: [id number]       I reviewed the above imaging data  Advance Care Planning/Advance Directives:  Discussed disease status, cancer treatment plans and/or cancer treatment goals with the patient

## 2023-02-16 ENCOUNTER — PATIENT OUTREACH (OUTPATIENT)
Dept: FAMILY MEDICINE CLINIC | Facility: CLINIC | Age: 68
End: 2023-02-16

## 2023-02-16 NOTE — PROGRESS NOTES
OP CM called to pt in regards to waiver  Pts dtr answered the phone and states that she does not know the status of the home health waiver  OP CM sent LUKE an email to check the status

## 2023-02-16 NOTE — PROGRESS NOTES
Email ana back from [facility] that case was closed due to pt stating she does not want any stranger in the house  Will call dtr and let her know

## 2023-02-27 ENCOUNTER — TELEPHONE (OUTPATIENT)
Dept: FAMILY MEDICINE CLINIC | Facility: CLINIC | Age: 68
End: 2023-02-27

## 2023-02-27 ENCOUNTER — TELEPHONE (OUTPATIENT)
Dept: ENDOCRINOLOGY | Facility: CLINIC | Age: 68
End: 2023-02-27

## 2023-02-27 NOTE — TELEPHONE ENCOUNTER
Sahil from assisted living called regarding medication list  Crestor is on her list, but pt hasn't gotten meds filled since 8-2020, should the pt be taking it

## 2023-02-27 NOTE — TELEPHONE ENCOUNTER
Sorry about that  I have not seen her since 2020, did not realize she was in dementia unit    I would suggest that at this time her primary care physician would determine if she needs this statin based on her current lipid panel

## 2023-02-27 NOTE — TELEPHONE ENCOUNTER
I spoke with Jyotsna Salas from the memory care facility again and relayed the recommendations from [facility]  Jyotsna Salas stated that the rosuvastatin is on the patient's medication list that they received from the patient's primary care office and that the patient "needs to be given it because it's on the medication list " I reiterated that the prescribing nad management of this medication can be done by her primary care provider or it can be discussed at her visit on 3/13/23 with Dr Petar Gomez  I advised that Javier will not be prescribing this medication as Clifford Cheema is no longer under her care, but is seen by Dr Petar Gomez in our [place] office  Jyotsna Salas verbalized understanding and stated she would be call the PCP's office again

## 2023-02-27 NOTE — TELEPHONE ENCOUNTER
I reviewed her recent lipid panel and she has elevations in cholesterol  Would recommend to continue Crestor at current dose based on her med list   Thank you

## 2023-02-27 NOTE — TELEPHONE ENCOUNTER
[facility] from the memory care department at [facility] called and left a message today to see if the patient should be taking rosuvastatin 20 mg daily by mouth at bedtimes  She stated that the patient was seen this morning and "didn't bring it in "  I advised her that the patient has not been seen in our office since  and the prescription has ; however, I would check with Sheldon Lopez PA-C and reach back out to her  [facility] verbalized understanding of the plan

## 2023-02-27 NOTE — TELEPHONE ENCOUNTER
It doesn't look like it was prescribed since 2020 so she has probably not been taking it but her cholesterol is high and she has diabetes so rosuvastatin  is likely indicated  She is seeing Dr Jamir Jacinto now so they can discuss at the upcoming visit

## 2023-03-17 ENCOUNTER — RA CDI HCC (OUTPATIENT)
Dept: OTHER | Facility: HOSPITAL | Age: 68
End: 2023-03-17

## 2023-03-17 NOTE — PROGRESS NOTES
[address]  coding opportunities       Chart reviewed, no opportunity found: CHART REVIEWED, NO OPPORTUNITY FOUND        Patients Insurance     Medicare Insurance: Medicare

## 2023-03-23 ENCOUNTER — TELEPHONE (OUTPATIENT)
Dept: FAMILY MEDICINE CLINIC | Facility: CLINIC | Age: 68
End: 2023-03-23

## 2023-03-23 ENCOUNTER — TELEPHONE (OUTPATIENT)
Dept: CARDIOLOGY CLINIC | Facility: CLINIC | Age: 68
End: 2023-03-23

## 2023-03-23 NOTE — TELEPHONE ENCOUNTER
Pt daughter called to inform that her mother will no longer be coming to our office because the pt is now in a nursing home and will be seeing the doctors in the facility

## 2023-03-23 NOTE — TELEPHONE ENCOUNTER
Pt has not been seen in our office for over 3 years and last devise report was 5/2021  Daughter called saying she needed a wifi monitor because pt dose not have phone line in senior care home  Can you help at all? Daughter is Cassy Hassan # [phone]      Thank you

## 2023-08-01 ENCOUNTER — HOSPITAL ENCOUNTER (OUTPATIENT)
Dept: ULTRASOUND IMAGING | Facility: MEDICAL CENTER | Age: 68
Discharge: HOME | End: 2023-08-01
Payer: MEDICARE

## 2023-08-01 DIAGNOSIS — C73 PAPILLARY THYROID CARCINOMA (HCC): ICD-10-CM

## 2023-08-01 PROCEDURE — 76536 US EXAM OF HEAD AND NECK: CPT

## 2023-08-03 ENCOUNTER — OFFICE VISIT (OUTPATIENT)
Dept: SURGICAL ONCOLOGY | Facility: CLINIC | Age: 68
End: 2023-08-03
Payer: MEDICARE

## 2023-08-03 VITALS
BODY MASS INDEX: 23.35 KG/M2 | OXYGEN SATURATION: 98 % | SYSTOLIC BLOOD PRESSURE: 140 MMHG | DIASTOLIC BLOOD PRESSURE: 70 MMHG | TEMPERATURE: 98.4 F | HEIGHT: 63 IN | HEART RATE: 78 BPM | WEIGHT: 131.8 LBS

## 2023-08-03 DIAGNOSIS — Z85.850 HISTORY OF THYROID CANCER: ICD-10-CM

## 2023-08-03 DIAGNOSIS — C73 PAPILLARY THYROID CARCINOMA (HCC): ICD-10-CM

## 2023-08-03 DIAGNOSIS — Z85.850 ENCOUNTER FOR FOLLOW-UP SURVEILLANCE OF THYROID CANCER: Primary | ICD-10-CM

## 2023-08-03 DIAGNOSIS — Z08 ENCOUNTER FOR FOLLOW-UP SURVEILLANCE OF THYROID CANCER: Primary | ICD-10-CM

## 2023-08-03 PROCEDURE — 99213 OFFICE O/P EST LOW 20 MIN: CPT

## 2023-08-03 RX ORDER — LORAZEPAM 0.5 MG/1
TABLET ORAL
COMMUNITY
Start: 2023-07-13

## 2023-08-03 RX ORDER — LEVOTHYROXINE SODIUM 88 UG/1
TABLET ORAL
COMMUNITY
Start: 2023-07-31

## 2023-08-03 NOTE — PROGRESS NOTES
Surgical Oncology Follow Up       [facility]COL[facility] [place] [address]    Kami Martinez  1955  [id number]  [facility] [place] [id number]    Chief Complaint   Patient presents with   • Follow-up       Assessment/Plan:  1. Encounter for follow-up surveillance of thyroid cancer  - 6 mo follow up    2. History of thyroid cancer  - US head neck lymph node mapping; Future    3. Papillary thyroid carcinoma [facility])       Discussion/Summary: Patient is a 75year old female presenting for a 6 month follow up for papillary thyroid cancer diagnosed in January 2019. She underwent a total thyroidectomy with Dr. Wellington Vee. She was stage 1 p T1b(m), pN0, pMx. She follows with endocrinology who manages her levothyroxine and thyroid labs. We have been seeing her every 6 months for clinical exam and with an ultrasound head neck and lymph nodes. Her last ultrasound was on 8/1/2023 which showed no evidence of recurrent or metastatic disease. There were no concerns on her clinical exam. We will get her u/s head/neck scheduled for 6 months. We will see the patient back in 6 months or sooner should the need arise. She was instructed to call with any questions or concerns prior to this time.  All questions were answered today.       History of Present Illness:     Oncology History   History of thyroid cancer   1/7/2019 Biopsy    Fine needle aspiration of right thyroid  Malignant ([place] category VI) papillary thyroid carcinoma     2/19/2019 Surgery    Total thyroidectomy  Multifocal tumor  -largest tumor focus 1.7cm (T1b  -Tumor laterality: right lobe  -Papillary thyroid carcinoma  -margins: carcinoma less than 0.1cm from inked resection surface  -regional lymph nodes-2 lymph nodes negative for carcinoma (0/2)    8th Ed AJCC Stage: at least stage I pT1b(m), pN0, pMx -Interval History: Patient is a 75year old female presenting for a 6 month follow up for papillary thyroid cancer diagnosed in January 2019. Her last ultrasound was on 8/1/2023 which showed no evidence of recurrent or metastatic disease. Patient denies changes on her exam. She denies persistent headaches, bone pain, back pain, shortness of breath, or abdominal pain. Review of Systems:  Review of Systems   Constitutional: Negative for activity change, appetite change, fatigue and unexpected weight change. Respiratory: Negative for cough and shortness of breath. Cardiovascular: Negative for chest pain. Gastrointestinal: Negative for abdominal pain, diarrhea, nausea and vomiting. Endocrine: Negative for heat intolerance. Musculoskeletal: Negative for arthralgias, back pain and myalgias. Skin: Negative for rash. Neurological: Negative for weakness and headaches. Hematological: Negative for adenopathy.        Patient Active Problem List   Diagnosis   • Hyperlipidemia   • Arthritis   • History of myocardial infarction   • Insomnia   • Cardiac pacemaker in situ   • Chronic nonalcoholic liver disease   • Coronary atherosclerosis   • Disorder of bilirubin excretion   • Elective replacement indicated for pacemaker   • Failed total left knee replacement (HCC)   • Lumbar back pain with radiculopathy affecting left lower extremity   • Metabolic syndrome   • Osteoarthritis, generalized   • Atrophic vaginitis   • Seasonal allergic rhinitis   • Abnormal thyroid blood test   • History of thyroid cancer   • Diabetes mellitus type 2, uncontrolled, without complications   • Acquired hypothyroidism   • Encounter for follow-up surveillance of thyroid cancer   • Type 2 diabetes mellitus with hyperglycemia, without long-term current use of insulin (HCC)   • Papillary thyroid carcinoma (HCC)   • Age-related nuclear cataract of both eyes   • Epiretinal membrane   • Posterior vitreous detachment   • AMS (altered mental status)   • Severe Alzheimer's dementia with other behavioral disturbance, unspecified timing of dementia onset ([address])   • Mood disorder ([address])   • Severe dementia ([address])   • Severe early onset Alzheimer's dementia ([address])     Past Medical History:   Diagnosis Date   • Diabetes mellitus ([address])    • History of staph infection    • Hyperlipidemia    • Myocardial infarction ([address]) 1998   • Varicella      Past Surgical History:   Procedure Laterality Date   • APPENDECTOMY     • CARDIAC PACEMAKER PLACEMENT     • CARPAL TUNNEL RELEASE Bilateral    • CHOLECYSTECTOMY     • HYSTERECTOMY     • OOPHORECTOMY Bilateral    • REPLACEMENT TOTAL KNEE Left    • THYROIDECTOMY Bilateral 2/19/2019    Procedure: TOTAL THYROIDECTOMY;  Surgeon: Jean Marie Gomez MD;  Location: [facility];  Service: Surgical Oncology   • US GUIDED THYROID BIOPSY  1/7/2019     Family History   Problem Relation Age of Onset   • Diabetes unspecified Maternal Aunt    • Colon cancer Father    • Cancer Father    • Heart disease Mother         Cardiac disorder, congenital aortic stenosis   • Diabetes Other    • No Known Problems Sister    • No Known Problems Daughter    • No Known Problems Maternal Grandmother    • No Known Problems Maternal Grandfather    • No Known Problems Paternal Grandmother    • No Known Problems Paternal Grandfather    • No Known Problems Daughter    • No Known Problems Maternal Aunt    • No Known Problems Maternal Aunt    • No Known Problems Paternal Aunt      Social History     Socioeconomic History   • Marital status:       Spouse name: Not on file   • Number of children: Not on file   • Years of education: Not on file   • Highest education level: Not on file   Occupational History   • Occupation: medical records   Tobacco Use   • Smoking status: Never   • Smokeless tobacco: Never   Vaping Use   • Vaping Use: Never used   Substance and Sexual Activity   • Alcohol use: No   • Drug use: No   • Sexual activity: Not Currently     Partners: Male   Other Topics Concern   • Not on file   Social History Narrative    Always uses seat belt     No caffeine use      Social Determinants of Health     Financial Resource Strain: Low Risk  (11/14/2022)    Overall Financial Resource Strain (CARDIA)    • Difficulty of Paying Living Expenses: Not hard at all   Food Insecurity: No Food Insecurity (1/31/2023)    Hunger Vital Sign    • Worried About Running Out of Food in the Last Year: Never true    • Ran Out of Food in the Last Year: Never true   Transportation Needs: No Transportation Needs (1/31/2023)    PRAPARE - Transportation    • Lack of Transportation (Medical): No    • Lack of Transportation (Non-Medical):  No   Physical Activity: Not on file   Stress: Not on file   Social Connections: Not on file   Intimate Partner Violence: Not on file   Housing Stability: Low Risk  (1/31/2023)    Housing Stability Vital Sign    • Unable to Pay for Housing in the Last Year: No    • Number of Places Lived in the Last Year: 1    • Unstable Housing in the Last Year: No       Current Outpatient Medications:   •  alendronate (FOSAMAX) 70 mg tablet, Take 1 tablet (70 mg total) by mouth every 7 days, Disp: 4 tablet, Rfl: 2  •  aspirin (ECOTRIN LOW STRENGTH) 81 mg EC tablet, Take 81 mg by mouth daily, Disp: , Rfl:   •  donepezil (ARICEPT) 5 mg tablet, Take 1 tablet (5 mg total) by mouth daily at bedtime, Disp: 90 tablet, Rfl: 0  •  Empagliflozin (Jardiance) 25 MG TABS, Take 1 tablet (25 mg total) by mouth every morning, Disp: 60 tablet, Rfl: 0  •  levothyroxine 88 mcg tablet, , Disp: , Rfl:   •  LORazepam (ATIVAN) 0.5 mg tablet, , Disp: , Rfl:   •  metFORMIN (GLUCOPHAGE) 1000 MG tablet, TAKE ONE TABLET BY MOUTH TWICE DAILY WITH MEALS., Disp: 60 tablet, Rfl: 0  •  Multiple Vitamin (MULTI-VITAMIN DAILY) TABS, Take by mouth daily , Disp: , Rfl:   •  PSYLLIUM HUSK PO, Take by mouth 2 (two) times a day, Disp: , Rfl:   •  rosuvastatin (CRESTOR) 20 MG tablet, Take 1 tablet (20 mg total) by mouth daily at bedtime, Disp: 90 tablet, Rfl: 1  •  Calcium Polycarbophil (FIBER-CAPS PO), Take 2 capsules by mouth 2 (two) times a day  (Patient not taking: Reported on 2/3/2023), Disp: , Rfl:   Allergies   Allergen Reactions   • Penicillins Rash   • Sulfa Antibiotics Rash     Vitals:    08/03/23 0950   BP: 140/70   Pulse: 78   Temp: 98.4 °F (36.9 °C)   SpO2: 98%       Physical Exam  Constitutional:       General: She is not in acute distress. Appearance: Normal appearance. Cardiovascular:      Rate and Rhythm: Normal rate and regular rhythm. Pulses: Normal pulses. Heart sounds: Normal heart sounds. Pulmonary:      Effort: Pulmonary effort is normal.      Breath sounds: Normal breath sounds. Chest:      Chest wall: No mass. Breasts:     Right: No swelling, bleeding, inverted nipple, mass, nipple discharge, skin change or tenderness. Left: No swelling, bleeding, inverted nipple, mass, nipple discharge, skin change or tenderness. Abdominal:      General: Abdomen is flat. Palpations: Abdomen is soft. Lymphadenopathy:      Upper Body:      Right upper body: No supraclavicular, axillary or pectoral adenopathy. Left upper body: No supraclavicular, axillary or pectoral adenopathy. Skin:     General: Skin is warm. Neurological:      General: No focal deficit present. Mental Status: She is alert and oriented to person, place, and time. Psychiatric:         Mood and Affect: Mood normal.         Behavior: Behavior normal.           Results:    Imaging  US head neck lymph node mapping    Result Date: 8/2/2023  Narrative: NECK ULTRASOUND INDICATION:     C73: Malignant neoplasm of thyroid gland. COMPARISON: Neck ultrasound 1/27/2023 FINDINGS: Ultrasound of the thyroidectomy bed and cervical lymph node chains was performed with a high frequency linear transducer. There is no suspicion of recurrent mass in the thyroidectomy bed.  Lymph nodes maintain normal morphologic contour, echogenicity and short axis dimensions of less than 0.7 cm. No evidence for microcalcification or focal nodularity. Impression: No evidence of recurrent or metastatic disease. Workstation performed: [id number]       I reviewed the above imaging data. Advance Care Planning/Advance Directives:  Discussed disease status, cancer treatment plans and/or cancer treatment goals with the patient.

## 2024-02-02 ENCOUNTER — HOSPITAL ENCOUNTER (OUTPATIENT)
Dept: ULTRASOUND IMAGING | Facility: MEDICAL CENTER | Age: 69
Discharge: HOME | End: 2024-02-02
Payer: MEDICARE

## 2024-02-02 DIAGNOSIS — Z85.850 HISTORY OF THYROID CANCER: ICD-10-CM

## 2024-02-02 PROCEDURE — 76536 US EXAM OF HEAD AND NECK: CPT

## 2024-02-08 ENCOUNTER — OFFICE VISIT (OUTPATIENT)
Dept: SURGICAL ONCOLOGY | Facility: CLINIC | Age: 69
End: 2024-02-08
Payer: MEDICARE

## 2024-02-08 VITALS
RESPIRATION RATE: 16 BRPM | SYSTOLIC BLOOD PRESSURE: 116 MMHG | TEMPERATURE: 98 F | OXYGEN SATURATION: 98 % | HEIGHT: 63 IN | WEIGHT: 146.2 LBS | BODY MASS INDEX: 25.91 KG/M2 | DIASTOLIC BLOOD PRESSURE: 72 MMHG | HEART RATE: 98 BPM

## 2024-02-08 DIAGNOSIS — C73 PAPILLARY THYROID CARCINOMA (HCC): ICD-10-CM

## 2024-02-08 DIAGNOSIS — Z08 ENCOUNTER FOR FOLLOW-UP SURVEILLANCE OF THYROID CANCER: Primary | ICD-10-CM

## 2024-02-08 DIAGNOSIS — Z85.850 ENCOUNTER FOR FOLLOW-UP SURVEILLANCE OF THYROID CANCER: Primary | ICD-10-CM

## 2024-02-08 DIAGNOSIS — Z85.850 HISTORY OF THYROID CANCER: ICD-10-CM

## 2024-02-08 PROCEDURE — 99213 OFFICE O/P EST LOW 20 MIN: CPT

## 2024-02-08 NOTE — PROGRESS NOTES
Surgical Oncology Follow Up       [address]  [facility]COLOG[facility] [place]  [address]  [facility] [address]    Juliana Garcia  1955  [id number]  [address] [facility] [place]  [address]  [facility] [address]    Chief Complaint   Patient presents with   • Follow-up       Assessment/Plan:  1. Encounter for follow-up surveillance of thyroid cancer  - PRN    2. History of thyroid cancer      3. Papillary thyroid carcinoma (HCC)       Discussion/Summary: Patient is a 68 year old female presenting for a 6 month follow up for papillary thyroid cancer diagnosed in January 2019. She underwent a total thyroidectomy with Dr. Meadows. She was stage 1 p T1b(m), pN0, pMx.  She follows with endocrinology who manages her levothyroxine and thyroid labs. We have been seeing her every 6 months for clinical exam and with an ultrasound head neck and lymph nodes. Her last ultrasound was on 2/2/24 which showed no evidence of recurrent or metastatic disease. There were no concerns on her clinical exam. She is 5 years out from her diagnosis. She was instructed to call us in the future with questions or concerns. All questions were answered today.        History of Present Illness:     Oncology History   History of thyroid cancer   1/7/2019 Biopsy    Fine needle aspiration of right thyroid  Malignant ([place] category VI) papillary thyroid carcinoma     2/19/2019 Surgery    Total thyroidectomy  Multifocal tumor  -largest tumor focus 1.7cm (T1b  -Tumor laterality: right lobe  -Papillary thyroid carcinoma  -margins: carcinoma less than 0.1cm from inked resection surface  -regional lymph nodes-2 lymph nodes negative for carcinoma (0/2)    8th Ed AJCC Stage: at least stage I pT1b(m), pN0, pMx          -Interval History: Patient is a 68 year old female presenting for a 6 month follow up for papillary thyroid cancer diagnosed in January 2019. Her last  ultrasound was on 2/2/24 which showed no evidence of recurrent or metastatic disease. She denies changes on her neck exam. She denies persistent headaches, bone pain, back pain, shortness of breath, or abdominal pain.    Review of Systems:  Review of Systems   Constitutional:  Negative for activity change, appetite change, fatigue and unexpected weight change.   Respiratory:  Negative for cough and shortness of breath.    Cardiovascular:  Negative for chest pain.   Gastrointestinal:  Negative for abdominal pain, diarrhea, nausea and vomiting.   Endocrine: Negative for heat intolerance.   Musculoskeletal:  Negative for arthralgias, back pain and myalgias.   Skin:  Negative for rash.   Neurological:  Negative for weakness and headaches.   Hematological:  Negative for adenopathy.       Patient Active Problem List   Diagnosis   • Hyperlipidemia   • Arthritis   • History of myocardial infarction   • Insomnia   • Cardiac pacemaker in situ   • Chronic nonalcoholic liver disease   • Coronary atherosclerosis   • Disorder of bilirubin excretion   • Elective replacement indicated for pacemaker   • Failed total left knee replacement (HCC)   • Lumbar back pain with radiculopathy affecting left lower extremity   • Metabolic syndrome   • Osteoarthritis, generalized   • Atrophic vaginitis   • Seasonal allergic rhinitis   • Abnormal thyroid blood test   • History of thyroid cancer   • Diabetes mellitus type 2, uncontrolled, without complications   • Acquired hypothyroidism   • Encounter for follow-up surveillance of thyroid cancer   • Type 2 diabetes mellitus with hyperglycemia, without long-term current use of insulin (HCC)   • Papillary thyroid carcinoma (HCC)   • Age-related nuclear cataract of both eyes   • Epiretinal membrane   • Posterior vitreous detachment   • AMS (altered mental status)   • Severe Alzheimer's dementia with other behavioral disturbance, unspecified timing of dementia onset (HCC)   • Mood disorder (HCC)   •  Severe dementia (HCC)   • Severe early onset Alzheimer's dementia (HCC)     Past Medical History:   Diagnosis Date   • Diabetes mellitus (HCC)    • History of staph infection    • Hyperlipidemia    • Myocardial infarction (HCC) 1998   • Varicella      Past Surgical History:   Procedure Laterality Date   • APPENDECTOMY     • CARDIAC PACEMAKER PLACEMENT     • CARPAL TUNNEL RELEASE Bilateral    • CHOLECYSTECTOMY     • HYSTERECTOMY     • OOPHORECTOMY Bilateral    • REPLACEMENT TOTAL KNEE Left    • THYROIDECTOMY Bilateral 2/19/2019    Procedure: TOTAL THYROIDECTOMY;  Surgeon: Hema Meadows MD;  [facility];  Service: Surgical Oncology   • US GUIDED THYROID BIOPSY  1/7/2019     Family History   Problem Relation Age of Onset   • Diabetes unspecified Maternal Aunt    • Colon cancer Father    • Cancer Father    • Heart disease Mother         Cardiac disorder, congenital aortic stenosis   • Diabetes Other    • No Known Problems Sister    • No Known Problems Daughter    • No Known Problems Maternal Grandmother    • No Known Problems Maternal Grandfather    • No Known Problems Paternal Grandmother    • No Known Problems Paternal Grandfather    • No Known Problems Daughter    • No Known Problems Maternal Aunt    • No Known Problems Maternal Aunt    • No Known Problems Paternal Aunt      Social History     Socioeconomic History   • Marital status:      Spouse name: Not on file   • Number of children: Not on file   • Years of education: Not on file   • Highest education level: Not on file   Occupational History   • Occupation: medical records   Tobacco Use   • Smoking status: Never   • Smokeless tobacco: Never   Vaping Use   • Vaping status: Never Used   Substance and Sexual Activity   • Alcohol use: No   • Drug use: No   • Sexual activity: Not Currently     Partners: Male   Other Topics Concern   • Not on file   Social History Narrative    Always uses seat belt     No caffeine use      Social Determinants of  Health     Financial Resource Strain: Low Risk  (11/14/2022)    Overall Financial Resource Strain (CARDIA)    • Difficulty of Paying Living Expenses: Not hard at all   Food Insecurity: No Food Insecurity (1/31/2023)    Hunger Vital Sign    • Worried About Running Out of Food in the Last Year: Never true    • Ran Out of Food in the Last Year: Never true   Transportation Needs: No Transportation Needs (1/31/2023)    PRAPARE - Transportation    • Lack of Transportation (Medical): No    • Lack of Transportation (Non-Medical): No   Physical Activity: Not on file   Stress: Not on file   Social Connections: Not on file   Intimate Partner Violence: Not on file   Housing Stability: Low Risk  (1/31/2023)    Housing Stability Vital Sign    • Unable to Pay for Housing in the Last Year: No    • Number of Places Lived in the Last Year: 1    • Unstable Housing in the Last Year: No       Current Outpatient Medications:   •  alendronate (FOSAMAX) 70 mg tablet, Take 1 tablet (70 mg total) by mouth every 7 days, Disp: 4 tablet, Rfl: 2  •  aspirin (ECOTRIN LOW STRENGTH) 81 mg EC tablet, Take 81 mg by mouth daily, Disp: , Rfl:   •  donepezil (ARICEPT) 5 mg tablet, Take 1 tablet (5 mg total) by mouth daily at bedtime, Disp: 90 tablet, Rfl: 0  •  Empagliflozin (Jardiance) 25 MG TABS, Take 1 tablet (25 mg total) by mouth every morning, Disp: 60 tablet, Rfl: 0  •  levothyroxine 88 mcg tablet, , Disp: , Rfl:   •  LORazepam (ATIVAN) 0.5 mg tablet, , Disp: , Rfl:   •  metFORMIN (GLUCOPHAGE) 1000 MG tablet, TAKE ONE TABLET BY MOUTH TWICE DAILY WITH MEALS., Disp: 60 tablet, Rfl: 0  •  Multiple Vitamin (MULTI-VITAMIN DAILY) TABS, Take by mouth daily , Disp: , Rfl:   •  PSYLLIUM HUSK PO, Take by mouth 2 (two) times a day, Disp: , Rfl:   •  rosuvastatin (CRESTOR) 20 MG tablet, Take 1 tablet (20 mg total) by mouth daily at bedtime, Disp: 90 tablet, Rfl: 1  •  Calcium Polycarbophil (FIBER-CAPS PO), Take 2 capsules by mouth 2 (two) times a day   (Patient not taking: Reported on 2/3/2023), Disp: , Rfl:   Allergies   Allergen Reactions   • Penicillins Rash   • Sulfa Antibiotics Rash     Vitals:    02/08/24 1031   BP: 116/72   Pulse: 98   Resp: 16   Temp: 98 °F (36.7 °C)   SpO2: 98%       Physical Exam  Constitutional:       General: She is not in acute distress.     Appearance: Normal appearance.   Neck:      Thyroid: No thyroid mass, thyromegaly or thyroid tenderness.     Cardiovascular:      Rate and Rhythm: Normal rate and regular rhythm.      Pulses: Normal pulses.      Heart sounds: Normal heart sounds.   Pulmonary:      Effort: Pulmonary effort is normal.      Breath sounds: Normal breath sounds.   Chest:      Chest wall: No mass.   Breasts:     Right: No swelling, bleeding, inverted nipple, mass, nipple discharge, skin change or tenderness.      Left: No swelling, bleeding, inverted nipple, mass, nipple discharge, skin change or tenderness.   Abdominal:      General: Abdomen is flat.      Palpations: Abdomen is soft.   Lymphadenopathy:      Cervical:      Right cervical: No superficial or deep cervical adenopathy.     Left cervical: No superficial or deep cervical adenopathy.      Upper Body:      Right upper body: No supraclavicular, axillary or pectoral adenopathy.      Left upper body: No supraclavicular, axillary or pectoral adenopathy.   Skin:     General: Skin is warm.   Neurological:      General: No focal deficit present.      Mental Status: She is alert and oriented to person, place, and time.   Psychiatric:         Mood and Affect: Mood normal.         Behavior: Behavior normal.           Results:    Imaging  US head neck lymph node mapping    Result Date: 2/7/2024  Narrative: NECK ULTRASOUND INDICATION: Z85.850: Personal history of malignant neoplasm of thyroid. COMPARISON: 8/1/2023. FINDINGS: Ultrasound of the thyroidectomy bed and cervical lymph node chains was performed with a high frequency linear transducer. There is no suspicion of  recurrent mass in the thyroidectomy bed. Lymph nodes maintain unremarkable morphologic contour, echogenicity and short axis dimensions of less than 0.7 cm. No evidence for microcalcification or focal nodularity.     Impression: No evidence of recurrent or metastatic disease. Workstation performed: [id number]       I reviewed the above imaging data.      Advance Care Planning/Advance Directives:  Discussed disease status, cancer treatment plans and/or cancer treatment goals with the patient.

## 2024-10-12 ENCOUNTER — HOSPITAL ENCOUNTER (EMERGENCY)
Facility: HOSPITAL | Age: 69
Discharge: HOME | End: 2024-10-12
Attending: EMERGENCY MEDICINE
Payer: MEDICARE

## 2024-10-12 ENCOUNTER — APPOINTMENT (EMERGENCY)
Dept: CT IMAGING | Facility: HOSPITAL | Age: 69
End: 2024-10-12
Payer: MEDICARE

## 2024-10-12 VITALS
TEMPERATURE: 98.3 F | DIASTOLIC BLOOD PRESSURE: 59 MMHG | WEIGHT: 138.89 LBS | SYSTOLIC BLOOD PRESSURE: 115 MMHG | OXYGEN SATURATION: 97 % | HEIGHT: 63 IN | BODY MASS INDEX: 24.61 KG/M2 | RESPIRATION RATE: 18 BRPM | HEART RATE: 81 BPM

## 2024-10-12 DIAGNOSIS — K59.00 CONSTIPATION: Primary | ICD-10-CM

## 2024-10-12 LAB
ALBUMIN SERPL BCG-MCNC: 4.3 G/DL (ref 3.5–5)
ALP SERPL-CCNC: 55 U/L (ref 34–104)
ALT SERPL W P-5'-P-CCNC: 15 U/L (ref 7–52)
ANION GAP SERPL CALCULATED.3IONS-SCNC: 13 MMOL/L (ref 4–13)
AST SERPL W P-5'-P-CCNC: 17 U/L (ref 13–39)
BACTERIA UR QL AUTO: ABNORMAL /HPF
BASOPHILS # BLD AUTO: 0.04 THOUSANDS/ΜL (ref 0–0.1)
BASOPHILS NFR BLD AUTO: 1 % (ref 0–1)
BILIRUB SERPL-MCNC: 1.4 MG/DL (ref 0.2–1)
BILIRUB UR QL STRIP: NEGATIVE
BUN SERPL-MCNC: 18 MG/DL (ref 5–25)
CALCIUM SERPL-MCNC: 9.1 MG/DL (ref 8.4–10.2)
CHLORIDE SERPL-SCNC: 104 MMOL/L (ref 96–108)
CLARITY UR: CLEAR
CO2 SERPL-SCNC: 24 MMOL/L (ref 21–32)
COLOR UR: YELLOW
CREAT SERPL-MCNC: 0.52 MG/DL (ref 0.6–1.3)
EOSINOPHIL # BLD AUTO: 0.08 THOUSAND/ΜL (ref 0–0.61)
EOSINOPHIL NFR BLD AUTO: 1 % (ref 0–6)
ERYTHROCYTE [DISTWIDTH] IN BLOOD BY AUTOMATED COUNT: 12.7 % (ref 11.6–15.1)
GFR SERPL CREATININE-BSD FRML MDRD: 97 ML/MIN/1.73SQ M
GLUCOSE SERPL-MCNC: 255 MG/DL (ref 65–140)
GLUCOSE UR STRIP-MCNC: ABNORMAL MG/DL
HCT VFR BLD AUTO: 41.9 % (ref 34.8–46.1)
HGB BLD-MCNC: 14 G/DL (ref 11.5–15.4)
HGB UR QL STRIP.AUTO: NEGATIVE
IMM GRANULOCYTES # BLD AUTO: 0.04 THOUSAND/UL (ref 0–0.2)
IMM GRANULOCYTES NFR BLD AUTO: 1 % (ref 0–2)
KETONES UR STRIP-MCNC: ABNORMAL MG/DL
LEUKOCYTE ESTERASE UR QL STRIP: ABNORMAL
LIPASE SERPL-CCNC: 44 U/L (ref 11–82)
LYMPHOCYTES # BLD AUTO: 2.23 THOUSANDS/ΜL (ref 0.6–4.47)
LYMPHOCYTES NFR BLD AUTO: 31 % (ref 14–44)
MCH RBC QN AUTO: 29.8 PG (ref 26.8–34.3)
MCHC RBC AUTO-ENTMCNC: 33.4 G/DL (ref 31.4–37.4)
MCV RBC AUTO: 89 FL (ref 82–98)
MONOCYTES # BLD AUTO: 0.61 THOUSAND/ΜL (ref 0.17–1.22)
MONOCYTES NFR BLD AUTO: 8 % (ref 4–12)
NEUTROPHILS # BLD AUTO: 4.31 THOUSANDS/ΜL (ref 1.85–7.62)
NEUTS SEG NFR BLD AUTO: 58 % (ref 43–75)
NITRITE UR QL STRIP: NEGATIVE
NON-SQ EPI CELLS URNS QL MICRO: ABNORMAL /HPF
NRBC BLD AUTO-RTO: 0 /100 WBCS
PH UR STRIP.AUTO: 5.5 [PH] (ref 4.5–8)
PLATELET # BLD AUTO: 234 THOUSANDS/UL (ref 149–390)
PMV BLD AUTO: 9.4 FL (ref 8.9–12.7)
POTASSIUM SERPL-SCNC: 3.6 MMOL/L (ref 3.5–5.3)
PROT SERPL-MCNC: 7.5 G/DL (ref 6.4–8.4)
PROT UR STRIP-MCNC: NEGATIVE MG/DL
RBC # BLD AUTO: 4.7 MILLION/UL (ref 3.81–5.12)
RBC #/AREA URNS AUTO: ABNORMAL /HPF
SODIUM SERPL-SCNC: 141 MMOL/L (ref 135–147)
SP GR UR STRIP.AUTO: 1.01 (ref 1–1.03)
UROBILINOGEN UR QL STRIP.AUTO: 1 E.U./DL
WBC # BLD AUTO: 7.31 THOUSAND/UL (ref 4.31–10.16)
WBC #/AREA URNS AUTO: ABNORMAL /HPF

## 2024-10-12 PROCEDURE — 74177 CT ABD & PELVIS W/CONTRAST: CPT

## 2024-10-12 PROCEDURE — 80053 COMPREHEN METABOLIC PANEL: CPT | Performed by: EMERGENCY MEDICINE

## 2024-10-12 PROCEDURE — 83690 ASSAY OF LIPASE: CPT | Performed by: EMERGENCY MEDICINE

## 2024-10-12 PROCEDURE — 99285 EMERGENCY DEPT VISIT HI MDM: CPT

## 2024-10-12 PROCEDURE — 99285 EMERGENCY DEPT VISIT HI MDM: CPT | Performed by: EMERGENCY MEDICINE

## 2024-10-12 PROCEDURE — 36415 COLL VENOUS BLD VENIPUNCTURE: CPT | Performed by: EMERGENCY MEDICINE

## 2024-10-12 PROCEDURE — 81001 URINALYSIS AUTO W/SCOPE: CPT

## 2024-10-12 PROCEDURE — 85025 COMPLETE CBC W/AUTO DIFF WBC: CPT | Performed by: EMERGENCY MEDICINE

## 2024-10-12 RX ORDER — LORAZEPAM 0.5 MG/1
0.5 TABLET ORAL ONCE
Status: COMPLETED | OUTPATIENT
Start: 2024-10-12 | End: 2024-10-12

## 2024-10-12 RX ORDER — DOCUSATE SODIUM 100 MG/1
100 CAPSULE, LIQUID FILLED ORAL EVERY 12 HOURS
Qty: 60 CAPSULE | Refills: 0 | Status: SHIPPED | OUTPATIENT
Start: 2024-10-12

## 2024-10-12 RX ORDER — POLYETHYLENE GLYCOL 3350 17 G/17G
17 POWDER, FOR SOLUTION ORAL DAILY PRN
Qty: 116 G | Refills: 0 | Status: SHIPPED | OUTPATIENT
Start: 2024-10-12

## 2024-10-12 RX ADMIN — IOHEXOL 100 ML: 350 INJECTION, SOLUTION INTRAVENOUS at 18:56

## 2024-10-12 RX ADMIN — LORAZEPAM 0.5 MG: 0.5 TABLET ORAL at 18:40

## 2024-10-12 NOTE — ED NOTES
Pt ambulated to bathroom to provide urine sample. Pt missed urine collection container, will try again.      Elsa Naik  10/12/24 [id number]

## 2024-10-12 NOTE — ED PROVIDER NOTES
Time reflects when diagnosis was documented in both MDM as applicable and the Disposition within this note       Time User Action Codes Description Comment    10/12/2024  9:11 PM Constance Osei Add [K59.00] Constipation           ED Disposition       ED Disposition   Discharge    Condition   Stable    Date/Time   Sat Oct 12, 2024  9:11 PM    Comment   Juliana Garcia discharge to home/self care.                   Assessment & Plan       Medical Decision Making  A 69-year-old female presents with right upper quadrant abdominal pain that occurred earlier this evening and has since resolved.  Questionable constipation.  Will check labs to evaluate for electrolyte abnormality, MAYANK, anemia, significant leukocytosis, pancreatitis, hepatitis and biliary etiology.  Will check CTAP to evaluate for intra-abdominal pathology.  Will check urine for infection.    Amount and/or Complexity of Data Reviewed  Labs: ordered. Decision-making details documented in ED Course.  Radiology: ordered. Decision-making details documented in ED Course.    Risk  OTC drugs.  Prescription drug management.        ED Course as of 10/12/24 2216   Sat Oct 12, 2024   1831 Pt becoming increasingly agitated, will give home dose PRN ativan 0.5 mg   1913 Comprehensive metabolic panel(!)  Labs WNL   2021 CT abdomen pelvis with contrast  1.) Mild circumferential bladder wall thickening, consider a cystitis in the appropriate clinical setting.   2.) Cardiomegaly.  3.) Moderate amount of stool in the colon, consider a component of constipation.    Pending urine sample to evaluate for infection.  Will plan to start bowel regimen.   2111 Urine Macroscopic, POC(!)  Negative for acute UTi   2111 Will proceed with discharge home on bowel regimen       Medications   LORazepam (ATIVAN) tablet 0.5 mg (0.5 mg Oral Given 10/12/24 1840)   iohexol (OMNIPAQUE) 350 MG/ML injection (MULTI-DOSE) 100 mL (100 mL Intravenous Given 10/12/24 1856)       ED Risk Strat Scores                            SBIRT 20yo+      Flowsheet Row Most Recent Value   Initial Alcohol Screen: US AUDIT-C     1. How often do you have a drink containing alcohol? 0 Filed at: 10/12/2024 1656   2. How many drinks containing alcohol do you have on a typical day you are drinking?  0 Filed at: 10/12/2024 1656   3a. Male UNDER 65: How often do you have five or more drinks on one occasion? 0 Filed at: 10/12/2024 1656   3b. FEMALE Any Age, or MALE 65+: How often do you have 4 or more drinks on one occassion? 0 Filed at: 10/12/2024 1656   Audit-C Score 0 Filed at: 10/12/2024 1656   SHIRA: How many times in the past year have you...    Used an illegal drug or used a prescription medication for non-medical reasons? Never Filed at: 10/12/2024 1656                            History of Present Illness       Chief Complaint   Patient presents with    Altered Mental Status     EMS from [facility], pt c/o 10/10 abd pain, constipation no BM x2 days, staff reports she is more altered than normal but unable to elaborate. Hx dementia and diabetes. A&O to self.       Past Medical History:   Diagnosis Date    Diabetes mellitus (HCC)     History of staph infection     Hyperlipidemia     Myocardial infarction (HCC) 1998    Varicella       Past Surgical History:   Procedure Laterality Date    APPENDECTOMY      CARDIAC PACEMAKER PLACEMENT      CARPAL TUNNEL RELEASE Bilateral     CHOLECYSTECTOMY      HYSTERECTOMY      OOPHORECTOMY Bilateral     REPLACEMENT TOTAL KNEE Left     THYROIDECTOMY Bilateral 2/19/2019    Procedure: TOTAL THYROIDECTOMY;  Surgeon: Hema Meadows MD;  Location: [facility];  Service: Surgical Oncology    US GUIDED THYROID BIOPSY  1/7/2019      Family History   Problem Relation Age of Onset    Diabetes unspecified Maternal Aunt     Colon cancer Father     Cancer Father     Heart disease Mother         Cardiac disorder, congenital aortic stenosis    Diabetes Other     No Known Problems Sister     No Known  Problems Daughter     No Known Problems Maternal Grandmother     No Known Problems Maternal Grandfather     No Known Problems Paternal Grandmother     No Known Problems Paternal Grandfather     No Known Problems Daughter     No Known Problems Maternal Aunt     No Known Problems Maternal Aunt     No Known Problems Paternal Aunt       Social History     Tobacco Use    Smoking status: Never    Smokeless tobacco: Never   Vaping Use    Vaping status: Never Used   Substance Use Topics    Alcohol use: No    Drug use: No      E-Cigarette/Vaping    E-Cigarette Use Never User       E-Cigarette/Vaping Substances    Nicotine No     THC No     CBD No     Flavoring No     Other No     Unknown No       I have reviewed and agree with the history as documented.     A 69-year-old female with past medical history of CAD, hyperlipidemia, diabetes, Alzheimer's dementia, papillary thyroid carcinoma (s/p thyroidectomy) and s/p pacemaker; BIBA from her NH for right upper quadrant abdominal pain that occurred just prior to arrival.  Patient states symptoms lasted for a brief period of time before spontaneously resolving.  She is currently asymptomatic.  Per nursing home records, there is also concern for possible constipation however patient believes she had a bowel movement today.  Patient is otherwise not had fever, chills, chest pain, shortness of breath, nausea, vomiting, peripheral edema and rashes.      History provided by:  Patient and medical records  History limited by:  Dementia  Altered Mental Status  Associated symptoms: abdominal pain        Review of Systems   Gastrointestinal:  Positive for abdominal pain.   All other systems reviewed and are negative.      Objective       ED Triage Vitals [10/12/24 1644]   Temperature Pulse Blood Pressure Respirations SpO2 Patient Position - Orthostatic VS   98.3 °F (36.8 °C) 81 115/59 18 97 % Lying      Temp Source Heart Rate Source BP Location FiO2 (%) Pain Score    Oral Monitor Right arm  -- No Pain      Vitals      Date and Time Temp Pulse SpO2 Resp BP Pain Score FACES Pain Rating User   10/12/24 1644 98.3 °F (36.8 °C) 81 97 % 18 115/59 No Pain -- DW            Physical Exam  General Appearance: alert and oriented to self, nad, non toxic appearing  Skin:  Warm, dry, intact.  No cyanosis  HEENT: Atraumatic, normocephalic.  No eye drainage.  Normal hearing.  Moist mucous membranes.    Neck: Supple, trachea midline  Cardiac: RRR; no murmurs, rub, gallops.  No pedal edema, 2+ pulses  Pulmonary: lungs CTAB; no wheezes, rales, rhonchi  Gastrointestinal: abdomen soft, nontender, nondistended; no guarding or rebound tenderness; good bowel sounds, no mass or bruits  Extremities:  No deformities.  No calf tenderness, no clubbing  Neuro:  no focal motor or sensory deficits, CN 2-12 grossly intact  Psych:  Normal mood and affect, normal judgement and insight       Results Reviewed       Procedure Component Value Units Date/Time    Urine Microscopic [[id number]]  (Abnormal) Collected: 10/12/24 2051    Lab Status: Final result Specimen: Urine, Clean Catch Updated: 10/12/24 2134     RBC, UA 2-4 /hpf      WBC, UA 2-4 /hpf      Epithelial Cells Occasional /hpf      Bacteria, UA Occasional /hpf     Urine Macroscopic, POC [[id number]]  (Abnormal) Collected: 10/12/24 2051    Lab Status: Final result Specimen: Urine Updated: 10/12/24 2053     Color, UA Yellow     Clarity, UA Clear     pH, UA 5.5     Leukocytes, UA Elevated glucose may cause decreased leukocyte values. See urine microscopic for UWBC result     Nitrite, UA Negative     Protein, UA Negative mg/dl      Glucose, UA >=1000 (1%) mg/dl      Ketones, UA 15 (1+) mg/dl      Urobilinogen, UA 1.0 E.U./dl      Bilirubin, UA Negative     Occult Blood, UA Negative     Specific Gravity, UA 1.010    Narrative:      CLINITEK RESULT    Comprehensive metabolic panel [[id number]]  (Abnormal) Collected: 10/12/24 1750    Lab Status: Final result Specimen: Blood from Arm, Right  Updated: 10/12/24 1823     Sodium 141 mmol/L      Potassium 3.6 mmol/L      Chloride 104 mmol/L      CO2 24 mmol/L      ANION GAP 13 mmol/L      BUN 18 mg/dL      Creatinine 0.52 mg/dL      Glucose 255 mg/dL      Calcium 9.1 mg/dL      AST 17 U/L      ALT 15 U/L      Alkaline Phosphatase 55 U/L      Total Protein 7.5 g/dL      Albumin 4.3 g/dL      Total Bilirubin 1.40 mg/dL      eGFR 97 ml/min/1.73sq m     Narrative:      National Kidney Disease Foundation guidelines for Chronic Kidney Disease (CKD):     Stage 1 with normal or high GFR (GFR > 90 mL/min/1.73 square meters)    Stage 2 Mild CKD (GFR = 60-89 mL/min/1.73 square meters)    Stage 3A Moderate CKD (GFR = 45-59 mL/min/1.73 square meters)    Stage 3B Moderate CKD (GFR = 30-44 mL/min/1.73 square meters)    Stage 4 Severe CKD (GFR = 15-29 mL/min/1.73 square meters)    Stage 5 End Stage CKD (GFR <15 mL/min/1.73 square meters)  Note: GFR calculation is accurate only with a steady state creatinine    Lipase [[id number]]  (Normal) Collected: 10/12/24 1750    Lab Status: Final result Specimen: Blood from Arm, Right Updated: 10/12/24 1823     Lipase 44 u/L     CBC and differential [[id number]] Collected: 10/12/24 1750    Lab Status: Final result Specimen: Blood from Arm, Right Updated: 10/12/24 1805     WBC 7.31 Thousand/uL      RBC 4.70 Million/uL      Hemoglobin 14.0 g/dL      Hematocrit 41.9 %      MCV 89 fL      MCH 29.8 pg      MCHC 33.4 g/dL      RDW 12.7 %      MPV 9.4 fL      Platelets 234 Thousands/uL      nRBC 0 /100 WBCs      Segmented % 58 %      Immature Grans % 1 %      Lymphocytes % 31 %      Monocytes % 8 %      Eosinophils Relative 1 %      Basophils Relative 1 %      Absolute Neutrophils 4.31 Thousands/µL      Absolute Immature Grans 0.04 Thousand/uL      Absolute Lymphocytes 2.23 Thousands/µL      Absolute Monocytes 0.61 Thousand/µL      Eosinophils Absolute 0.08 Thousand/µL      Basophils Absolute 0.04 Thousands/µL             CT abdomen pelvis with  contrast   Final Interpretation by Anson Blanco DO (10/12 2017)      Mild circumferential bladder wall thickening, consider a cystitis in the appropriate clinical setting. Correlation with the patient's symptoms, laboratory values, and urinalysis recommended.      Cardiomegaly.      Moderate amount of stool in the colon, consider a component of constipation.      Other findings as above.            Workstation performed: [id number]             Procedures    ED Medication and Procedure Management   Prior to Admission Medications   Prescriptions Last Dose Informant Patient Reported? Taking?   Calcium Polycarbophil (FIBER-CAPS PO)  Self Yes No   Sig: Take 2 capsules by mouth 2 (two) times a day    Patient not taking: Reported on 2/3/2023   Empagliflozin (Jardiance) 25 MG TABS  Self No No   Sig: Take 1 tablet (25 mg total) by mouth every morning   LORazepam (ATIVAN) 0.5 mg tablet  Self Yes No   Multiple Vitamin (MULTI-VITAMIN DAILY) TABS  Self Yes No   Sig: Take by mouth daily    PSYLLIUM HUSK PO  Self Yes No   Sig: Take by mouth 2 (two) times a day   alendronate (FOSAMAX) 70 mg tablet  Self No No   Sig: Take 1 tablet (70 mg total) by mouth every 7 days   aspirin (ECOTRIN LOW STRENGTH) 81 mg EC tablet  Self Yes No   Sig: Take 81 mg by mouth daily   donepezil (ARICEPT) 5 mg tablet  Self No No   Sig: Take 1 tablet (5 mg total) by mouth daily at bedtime   levothyroxine 88 mcg tablet  Self Yes No   metFORMIN (GLUCOPHAGE) 1000 MG tablet  Self No No   Sig: TAKE ONE TABLET BY MOUTH TWICE DAILY WITH MEALS.   rosuvastatin (CRESTOR) 20 MG tablet  Self No No   Sig: Take 1 tablet (20 mg total) by mouth daily at bedtime      Facility-Administered Medications: None     Discharge Medication List as of 10/12/2024  9:12 PM        START taking these medications    Details   docusate sodium (COLACE) 100 mg capsule Take 1 capsule (100 mg total) by mouth every 12 (twelve) hours, Starting Sat 10/12/2024, Print      polyethylene  glycol (GLYCOLAX) 17 GM/SCOOP powder Take 17 g by mouth daily as needed (constipation), Starting Sat 10/12/2024, Print           CONTINUE these medications which have NOT CHANGED    Details   alendronate (FOSAMAX) 70 mg tablet Take 1 tablet (70 mg total) by mouth every 7 days, Starting Thu 12/1/2022, Until Thu 2/8/2024, Normal      aspirin (ECOTRIN LOW STRENGTH) 81 mg EC tablet Take 81 mg by mouth daily, Historical Med      Calcium Polycarbophil (FIBER-CAPS PO) Take 2 capsules by mouth 2 (two) times a day , Historical Med      donepezil (ARICEPT) 5 mg tablet Take 1 tablet (5 mg total) by mouth daily at bedtime, Starting Tue 1/24/2023, Normal      Empagliflozin (Jardiance) 25 MG TABS Take 1 tablet (25 mg total) by mouth every morning, Starting Thu 12/22/2022, Until Thu 2/8/2024, Normal      levothyroxine 88 mcg tablet Starting Mon 7/31/2023, Historical Med      LORazepam (ATIVAN) 0.5 mg tablet Starting Thu 7/13/2023, Historical Med      metFORMIN (GLUCOPHAGE) 1000 MG tablet TAKE ONE TABLET BY MOUTH TWICE DAILY WITH MEALS., Normal      Multiple Vitamin (MULTI-VITAMIN DAILY) TABS Take by mouth daily , Historical Med      PSYLLIUM HUSK PO Take by mouth 2 (two) times a day, Historical Med      rosuvastatin (CRESTOR) 20 MG tablet Take 1 tablet (20 mg total) by mouth daily at bedtime, Starting Mon 8/24/2020, Until Thu 2/8/2024, Normal           No discharge procedures on file.  ED SEPSIS DOCUMENTATION   Time reflects when diagnosis was documented in both MDM as applicable and the Disposition within this note       Time User Action Codes Description Comment    10/12/2024  9:11 PM Constance Osei Add [K59.00] Constipation                  Constance Osei DO  10/12/24 3011

## 2024-12-03 NOTE — PLAN OF CARE
Problem: PAIN - ADULT  Goal: Verbalizes/displays adequate comfort level or baseline comfort level  Description: Interventions:  - Encourage patient to monitor pain and request assistance  - Assess pain using appropriate pain scale  - Administer analgesics based on type and severity of pain and evaluate response  - Implement non-pharmacological measures as appropriate and evaluate response  - Consider cultural and social influences on pain and pain management  - Notify physician/advanced practitioner if interventions unsuccessful or patient reports new pain  Outcome: Progressing     Problem: INFECTION - ADULT  Goal: Absence or prevention of progression during hospitalization  Description: INTERVENTIONS:  - Assess and monitor for signs and symptoms of infection  - Monitor lab/diagnostic results  - Monitor all insertion sites, i e  indwelling lines, tubes, and drains  - Monitor endotracheal if appropriate and nasal secretions for changes in amount and color  - [place] appropriate cooling/warming therapies per order  - Administer medications as ordered  - Instruct and encourage patient and family to use good hand hygiene technique  - Identify and instruct in appropriate isolation precautions for identified infection/condition  Outcome: Progressing     Problem: SAFETY ADULT  Goal: Patient will remain free of falls  Description: INTERVENTIONS:  - Educate patient/family on patient safety including physical limitations  - Instruct patient to call for assistance with activity   - Consult OT/PT to assist with strengthening/mobility   - Keep Call bell within reach  - Keep bed low and locked with side rails adjusted as appropriate  - Keep care items and personal belongings within reach  - Initiate and maintain comfort rounds  - Make Fall Risk Sign visible to staff  - Offer Toileting every 2 Hours, in advance of need  - Initiate/Maintain bed alarm  - Obtain necessary fall risk management equipment: bed alarm   - Apply yellow socks and bracelet for high fall risk patients  - Consider moving patient to room near nurses station  Outcome: Progressing  Goal: Maintain or return to baseline ADL function  Description: INTERVENTIONS:  -  Assess patient's ability to carry out ADLs; assess patient's baseline for ADL function and identify physical deficits which impact ability to perform ADLs (bathing, care of mouth/teeth, toileting, grooming, dressing, etc )  - Assess/evaluate cause of self-care deficits   - Assess range of motion  - Assess patient's mobility; develop plan if impaired  - Assess patient's need for assistive devices and provide as appropriate  - Encourage maximum independence but intervene and supervise when necessary  - Involve family in performance of ADLs  - Assess for home care needs following discharge   - Consider OT consult to assist with ADL evaluation and planning for discharge  - Provide patient education as appropriate  Outcome: Progressing  Goal: Maintains/Returns to pre admission functional level  Description: INTERVENTIONS:  - Perform BMAT or MOVE assessment daily    - Set and communicate daily mobility goal to care team and patient/family/caregiver  - Collaborate with rehabilitation services on mobility goals if consulted  - Perform Range of Motion 3 times a day  - Reposition patient every 2 hours    - Dangle patient 3 times a day  - Stand patient 3 times a day  - Ambulate patient 3 times a day  - Out of bed to chair 3 times a day   - Out of bed for meals 3 times a day  - Out of bed for toileting  - Record patient progress and toleration of activity level   Outcome: Progressing     Problem: DISCHARGE PLANNING  Goal: Discharge to home or other facility with appropriate resources  Description: INTERVENTIONS:  - Identify barriers to discharge w/patient and caregiver  - Arrange for needed discharge resources and transportation as appropriate  - Identify discharge learning needs (meds, wound care, etc )  - Arrange for interpretive services to assist at discharge as needed  - Refer to Case Management Department for coordinating discharge planning if the patient needs post-hospital services based on physician/advanced practitioner order or complex needs related to functional status, cognitive ability, or social support system  Outcome: Progressing     Problem: Knowledge Deficit  Goal: Patient/family/caregiver demonstrates understanding of disease process, treatment plan, medications, and discharge instructions  Description: Complete learning assessment and assess knowledge base    Interventions:  - Provide teaching at level of understanding  - Provide teaching via preferred learning methods  Outcome: Progressing     Problem: Potential for Falls  Goal: Patient will remain free of falls  Description: INTERVENTIONS:  - Educate patient/family on patient safety including physical limitations  - Instruct patient to call for assistance with activity   - Consult OT/PT to assist with strengthening/mobility   - Keep Call bell within reach  - Keep bed low and locked with side rails adjusted as appropriate  - Keep care items and personal belongings within reach  - Initiate and maintain comfort rounds  - Make Fall Risk Sign visible to staff  - Offer Toileting every 2 Hours, in advance of need  - Initiate/Maintain bed alarm  - Obtain necessary fall risk management equipment: bed alarm   - Apply yellow socks and bracelet for high fall risk patients  - Consider moving patient to room near nurses station  Outcome: Progressing Additional Notes: Photo taken Detail Level: Simple Render Risk Assessment In Note?: no

## (undated) DEVICE — SUT VICRYL 3-0 SH 27 IN J416H

## (undated) DEVICE — 3M™ STERI-STRIP™ REINFORCED ADHESIVE SKIN CLOSURES, R1542, 1/4 IN X 1-1/2 IN (6 MM X 38 MM), 6 STRIPS/ENVELOPE: Brand: 3M™ STERI-STRIP™

## (undated) DEVICE — SUT MONOCRYL 5-0 P-3 18 IN Y493G

## (undated) DEVICE — DRAPE SURGIKIT SADDLE BAG

## (undated) DEVICE — MINOR PROCEDURE DRAPE: Brand: CONVERTORS

## (undated) DEVICE — 3000CC GUARDIAN II: Brand: GUARDIAN

## (undated) DEVICE — HARMONIC 1100 SHEARS, 20CM SHAFT LENGTH: Brand: HARMONIC

## (undated) DEVICE — ELECTRODE BLADE MOD E-Z CLEAN 2.5IN 6.4CM -0012M

## (undated) DEVICE — SUT VICRYL 4-0 PS-2 27 IN J426H

## (undated) DEVICE — SUT VICRYL 3-0 18 IN J110T

## (undated) DEVICE — LIGACLIP MCA MULTIPLE CLIP APPLIERS, 20 SMALL CLIPS: Brand: LIGACLIP

## (undated) DEVICE — INTENDED FOR TISSUE SEPARATION, AND OTHER PROCEDURES THAT REQUIRE A SHARP SURGICAL BLADE TO PUNCTURE OR CUT.: Brand: BARD-PARKER SAFETY BLADES SIZE 15, STERILE

## (undated) DEVICE — 3M™ STERI-STRIP™ COMPOUND BENZOIN TINCTURE 40 BAGS/CARTON 4 CARTONS/CASE C1544: Brand: 3M™ STERI-STRIP™

## (undated) DEVICE — PLUMEPEN PRO 10FT

## (undated) DEVICE — ALL PURPOSE SPONGES,NONWOVEN, 4 PLY: Brand: CURITY

## (undated) DEVICE — NEEDLE 25G X 1 1/2

## (undated) DEVICE — BIPOLAR CORD DISP

## (undated) DEVICE — SUT SILK 2-0 SH 30 IN K833H

## (undated) DEVICE — PACK UNIVERSAL NECK

## (undated) DEVICE — INTENDED FOR TISSUE SEPARATION, AND OTHER PROCEDURES THAT REQUIRE A SHARP SURGICAL BLADE TO PUNCTURE OR CUT.: Brand: BARD-PARKER ® CARBON RIB-BACK BLADES

## (undated) DEVICE — SUT SILK 2-0 18 IN A185H

## (undated) DEVICE — GLOVE INDICATOR PI UNDERGLOVE SZ 7 BLUE

## (undated) DEVICE — SUT SILK 3-0 18 IN A184H

## (undated) DEVICE — GLOVE SRG BIOGEL ECLIPSE 7

## (undated) DEVICE — ELECTRODE BLADE MOD E-Z CLEAN 4IN -0014AM

## (undated) DEVICE — PAD GROUNDING ADULT

## (undated) DEVICE — CHLORAPREP HI-LITE 26ML ORANGE